# Patient Record
Sex: FEMALE | Race: BLACK OR AFRICAN AMERICAN | NOT HISPANIC OR LATINO | Employment: UNEMPLOYED
[De-identification: names, ages, dates, MRNs, and addresses within clinical notes are randomized per-mention and may not be internally consistent; named-entity substitution may affect disease eponyms.]

---

## 2019-02-15 ENCOUNTER — HEALTH MAINTENANCE LETTER (OUTPATIENT)
Age: 29
End: 2019-02-15

## 2019-05-17 ENCOUNTER — HOSPITAL ENCOUNTER (OUTPATIENT)
Facility: CLINIC | Age: 29
End: 2019-05-17
Attending: ADVANCED PRACTICE MIDWIFE | Admitting: ADVANCED PRACTICE MIDWIFE
Payer: COMMERCIAL

## 2019-05-30 ENCOUNTER — HOSPITAL ENCOUNTER (EMERGENCY)
Facility: CLINIC | Age: 29
Discharge: HOME | End: 2019-05-31
Attending: EMERGENCY MEDICINE | Admitting: EMERGENCY MEDICINE
Payer: COMMERCIAL

## 2019-05-30 DIAGNOSIS — S01.81XA FACIAL LACERATION, INITIAL ENCOUNTER: ICD-10-CM

## 2019-05-30 LAB
ANION GAP SERPL CALCULATED.3IONS-SCNC: 3 MMOL/L (ref 3–14)
BASOPHILS # BLD AUTO: 0 10E9/L (ref 0–0.2)
BASOPHILS NFR BLD AUTO: 0.3 %
BUN SERPL-MCNC: 11 MG/DL (ref 7–30)
CALCIUM SERPL-MCNC: 9.2 MG/DL (ref 8.5–10.1)
CHLORIDE SERPL-SCNC: 109 MMOL/L (ref 94–109)
CO2 SERPL-SCNC: 28 MMOL/L (ref 20–32)
CREAT SERPL-MCNC: 1.13 MG/DL (ref 0.52–1.04)
DIFFERENTIAL METHOD BLD: ABNORMAL
EOSINOPHIL # BLD AUTO: 0 10E9/L (ref 0–0.7)
EOSINOPHIL NFR BLD AUTO: 0.3 %
ERYTHROCYTE [DISTWIDTH] IN BLOOD BY AUTOMATED COUNT: 14.2 % (ref 10–15)
GFR SERPL CREATININE-BSD FRML MDRD: 66 ML/MIN/{1.73_M2}
GLUCOSE SERPL-MCNC: 82 MG/DL (ref 70–99)
HCT VFR BLD AUTO: 47.7 % (ref 35–47)
HGB BLD-MCNC: 15.5 G/DL (ref 11.7–15.7)
IMM GRANULOCYTES # BLD: 0 10E9/L (ref 0–0.4)
IMM GRANULOCYTES NFR BLD: 0.2 %
LYMPHOCYTES # BLD AUTO: 2.4 10E9/L (ref 0.8–5.3)
LYMPHOCYTES NFR BLD AUTO: 36.6 %
MCH RBC QN AUTO: 30.2 PG (ref 26.5–33)
MCHC RBC AUTO-ENTMCNC: 32.5 G/DL (ref 31.5–36.5)
MCV RBC AUTO: 93 FL (ref 78–100)
MONOCYTES # BLD AUTO: 0.5 10E9/L (ref 0–1.3)
MONOCYTES NFR BLD AUTO: 7.8 %
NEUTROPHILS # BLD AUTO: 3.6 10E9/L (ref 1.6–8.3)
NEUTROPHILS NFR BLD AUTO: 54.8 %
NRBC # BLD AUTO: 0 10*3/UL
NRBC BLD AUTO-RTO: 0 /100
PLATELET # BLD AUTO: 249 10E9/L (ref 150–450)
POTASSIUM SERPL-SCNC: 3.4 MMOL/L (ref 3.4–5.3)
RBC # BLD AUTO: 5.14 10E12/L (ref 3.8–5.2)
SODIUM SERPL-SCNC: 140 MMOL/L (ref 133–144)
WBC # BLD AUTO: 6.6 10E9/L (ref 4–11)

## 2019-05-30 PROCEDURE — 80048 BASIC METABOLIC PNL TOTAL CA: CPT | Performed by: EMERGENCY MEDICINE

## 2019-05-30 PROCEDURE — 85025 COMPLETE CBC W/AUTO DIFF WBC: CPT | Performed by: EMERGENCY MEDICINE

## 2019-05-30 PROCEDURE — 82075 ASSAY OF BREATH ETHANOL: CPT | Performed by: EMERGENCY MEDICINE

## 2019-05-30 PROCEDURE — 99282 EMERGENCY DEPT VISIT SF MDM: CPT | Mod: 25 | Performed by: EMERGENCY MEDICINE

## 2019-05-30 PROCEDURE — 12011 RPR F/E/E/N/L/M 2.5 CM/<: CPT | Mod: Z6 | Performed by: EMERGENCY MEDICINE

## 2019-05-30 PROCEDURE — 99283 EMERGENCY DEPT VISIT LOW MDM: CPT | Performed by: EMERGENCY MEDICINE

## 2019-05-30 PROCEDURE — 12011 RPR F/E/E/N/L/M 2.5 CM/<: CPT | Performed by: EMERGENCY MEDICINE

## 2019-05-30 NOTE — ED AVS SNAPSHOT
[facility], [place], Emergency Department  [address] [place] [id number]  Phone:  [phone]                                    Queen Brittany Stack   MRN: [id number]    Department:  [facility], Emergency Department   Date of Visit:  5/30/2019           After Visit Summary Signature Page    I have received my discharge instructions, and my questions have been answered. I have discussed any challenges I see with this plan with the nurse or doctor.    ..........................................................................................................................................  Patient/Patient Representative Signature      ..........................................................................................................................................  Patient Representative Print Name and Relationship to Patient    ..................................................               ................................................  Date                                   Time    ..........................................................................................................................................  Reviewed by Signature/Title    ...................................................              ..............................................  Date                                               Time          22EPIC Rev 08/18

## 2019-05-31 VITALS
RESPIRATION RATE: 16 BRPM | OXYGEN SATURATION: 99 % | TEMPERATURE: 98.6 F | SYSTOLIC BLOOD PRESSURE: 104 MMHG | BODY MASS INDEX: 31.98 KG/M2 | HEART RATE: 92 BPM | HEIGHT: 67 IN | DIASTOLIC BLOOD PRESSURE: 72 MMHG

## 2019-05-31 LAB — ALCOHOL BREATH TEST: 0.05 (ref 0–0.01)

## 2019-05-31 PROCEDURE — 25000132 ZZH RX MED GY IP 250 OP 250 PS 637: Performed by: EMERGENCY MEDICINE

## 2019-05-31 RX ORDER — LIDOCAINE HYDROCHLORIDE AND EPINEPHRINE 10; 10 MG/ML; UG/ML
5 INJECTION, SOLUTION INFILTRATION; PERINEURAL ONCE
Status: DISCONTINUED | OUTPATIENT
Start: 2019-05-31 | End: 2019-05-31 | Stop reason: HOSPADM

## 2019-05-31 RX ORDER — IBUPROFEN 600 MG/1
600 TABLET, FILM COATED ORAL ONCE
Status: COMPLETED | OUTPATIENT
Start: 2019-05-31 | End: 2019-05-31

## 2019-05-31 RX ADMIN — IBUPROFEN 600 MG: 600 TABLET ORAL at 05:22
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
## 2019-05-31 NOTE — DISCHARGE INSTRUCTIONS
The suture in the lip will dissolve on its own.     Please make an appointment to follow up with Your Primary Care Provider or Primary Care Center (phone: [phone] in 3-5 days for recheck.     Return to the ED if you are having worsening symptoms, or any urgent/life-threatening concerns.
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
## 2019-10-05 ENCOUNTER — HEALTH MAINTENANCE LETTER (OUTPATIENT)
Age: 29
End: 2019-10-05

## 2019-10-21 ENCOUNTER — TRANSFERRED RECORDS (OUTPATIENT)
Dept: HEALTH INFORMATION MANAGEMENT | Facility: CLINIC | Age: 29
End: 2019-10-21

## 2019-11-19 ENCOUNTER — HOSPITAL ENCOUNTER (INPATIENT)
Facility: CLINIC | Age: 29
LOS: 5 days | Discharge: LEFT BEFORE DISCHARGE | End: 2019-11-25
Attending: EMERGENCY MEDICINE | Admitting: PSYCHIATRY & NEUROLOGY
Payer: COMMERCIAL

## 2019-11-19 DIAGNOSIS — F29 PSYCHOSIS, UNSPECIFIED PSYCHOSIS TYPE (H): ICD-10-CM

## 2019-11-19 PROCEDURE — 25000132 ZZH RX MED GY IP 250 OP 250 PS 637: Performed by: EMERGENCY MEDICINE

## 2019-11-19 PROCEDURE — 99285 EMERGENCY DEPT VISIT HI MDM: CPT

## 2019-11-19 PROCEDURE — 90791 PSYCH DIAGNOSTIC EVALUATION: CPT

## 2019-11-19 RX ORDER — OLANZAPINE 10 MG/1
10 TABLET, ORALLY DISINTEGRATING ORAL AT BEDTIME
Status: DISCONTINUED | OUTPATIENT
Start: 2019-11-19 | End: 2019-11-25 | Stop reason: HOSPADM

## 2019-11-19 RX ADMIN — OLANZAPINE 10 MG: 10 TABLET, ORALLY DISINTEGRATING ORAL at 21:28

## 2019-11-19 ASSESSMENT — ENCOUNTER SYMPTOMS: AGITATION: 1

## 2019-11-20 PROBLEM — J45.20 MILD INTERMITTENT ASTHMA WITHOUT COMPLICATION: Status: ACTIVE | Noted: 2019-01-08

## 2019-11-20 PROBLEM — F32.A DEPRESSION WITH SUICIDAL IDEATION: Status: ACTIVE | Noted: 2019-11-20

## 2019-11-20 PROBLEM — R45.851 DEPRESSION WITH SUICIDAL IDEATION: Status: ACTIVE | Noted: 2019-11-20

## 2019-11-20 LAB
AMPHETAMINES UR QL SCN: POSITIVE
ANION GAP SERPL CALCULATED.3IONS-SCNC: 5 MMOL/L (ref 3–14)
B-HCG SERPL-ACNC: <1 IU/L (ref 0–5)
BARBITURATES UR QL: NEGATIVE
BASOPHILS # BLD AUTO: 0 10E9/L (ref 0–0.2)
BASOPHILS NFR BLD AUTO: 0.3 %
BENZODIAZ UR QL: NEGATIVE
BUN SERPL-MCNC: 14 MG/DL (ref 7–30)
CALCIUM SERPL-MCNC: 8.4 MG/DL (ref 8.5–10.1)
CANNABINOIDS UR QL SCN: NEGATIVE
CHLORIDE SERPL-SCNC: 109 MMOL/L (ref 94–109)
CO2 SERPL-SCNC: 24 MMOL/L (ref 20–32)
COCAINE UR QL: NEGATIVE
CREAT SERPL-MCNC: 0.82 MG/DL (ref 0.52–1.04)
DIFFERENTIAL METHOD BLD: ABNORMAL
EOSINOPHIL # BLD AUTO: 0 10E9/L (ref 0–0.7)
EOSINOPHIL NFR BLD AUTO: 0.8 %
ERYTHROCYTE [DISTWIDTH] IN BLOOD BY AUTOMATED COUNT: 12.7 % (ref 10–15)
GFR SERPL CREATININE-BSD FRML MDRD: >90 ML/MIN/{1.73_M2}
GLUCOSE SERPL-MCNC: 93 MG/DL (ref 70–99)
HCT VFR BLD AUTO: 44.1 % (ref 35–47)
HGB BLD-MCNC: 14.8 G/DL (ref 11.7–15.7)
IMM GRANULOCYTES # BLD: 0 10E9/L (ref 0–0.4)
IMM GRANULOCYTES NFR BLD: 0 %
LYMPHOCYTES # BLD AUTO: 2.3 10E9/L (ref 0.8–5.3)
LYMPHOCYTES NFR BLD AUTO: 59.4 %
MCH RBC QN AUTO: 29.7 PG (ref 26.5–33)
MCHC RBC AUTO-ENTMCNC: 33.6 G/DL (ref 31.5–36.5)
MCV RBC AUTO: 88 FL (ref 78–100)
MONOCYTES # BLD AUTO: 0.3 10E9/L (ref 0–1.3)
MONOCYTES NFR BLD AUTO: 6.4 %
NEUTROPHILS # BLD AUTO: 1.3 10E9/L (ref 1.6–8.3)
NEUTROPHILS NFR BLD AUTO: 33.1 %
NRBC # BLD AUTO: 0 10*3/UL
NRBC BLD AUTO-RTO: 0 /100
OPIATES UR QL SCN: NEGATIVE
PCP UR QL SCN: NEGATIVE
PLATELET # BLD AUTO: 225 10E9/L (ref 150–450)
POTASSIUM SERPL-SCNC: 3.6 MMOL/L (ref 3.4–5.3)
RBC # BLD AUTO: 4.99 10E12/L (ref 3.8–5.2)
SODIUM SERPL-SCNC: 138 MMOL/L (ref 133–144)
TSH SERPL DL<=0.005 MIU/L-ACNC: 1 MU/L (ref 0.4–4)
WBC # BLD AUTO: 3.9 10E9/L (ref 4–11)

## 2019-11-20 PROCEDURE — 80048 BASIC METABOLIC PNL TOTAL CA: CPT | Performed by: PSYCHIATRY & NEUROLOGY

## 2019-11-20 PROCEDURE — 80307 DRUG TEST PRSMV CHEM ANLYZR: CPT | Performed by: EMERGENCY MEDICINE

## 2019-11-20 PROCEDURE — 84443 ASSAY THYROID STIM HORMONE: CPT | Performed by: PSYCHIATRY & NEUROLOGY

## 2019-11-20 PROCEDURE — 84702 CHORIONIC GONADOTROPIN TEST: CPT | Performed by: PSYCHIATRY & NEUROLOGY

## 2019-11-20 PROCEDURE — 99221 1ST HOSP IP/OBS SF/LOW 40: CPT | Mod: AI | Performed by: PHYSICIAN ASSISTANT

## 2019-11-20 PROCEDURE — 85025 COMPLETE CBC W/AUTO DIFF WBC: CPT | Performed by: PSYCHIATRY & NEUROLOGY

## 2019-11-20 PROCEDURE — 25000132 ZZH RX MED GY IP 250 OP 250 PS 637: Performed by: EMERGENCY MEDICINE

## 2019-11-20 PROCEDURE — 36415 COLL VENOUS BLD VENIPUNCTURE: CPT | Performed by: PSYCHIATRY & NEUROLOGY

## 2019-11-20 PROCEDURE — 12400000 ZZH R&B MH

## 2019-11-20 RX ORDER — ALBUTEROL SULFATE 90 UG/1
2 AEROSOL, METERED RESPIRATORY (INHALATION) EVERY 4 HOURS PRN
COMMUNITY
End: 2021-04-14

## 2019-11-20 RX ORDER — ALBUTEROL SULFATE 90 UG/1
2 AEROSOL, METERED RESPIRATORY (INHALATION) EVERY 6 HOURS PRN
Status: DISCONTINUED | OUTPATIENT
Start: 2019-11-20 | End: 2019-11-25 | Stop reason: HOSPADM

## 2019-11-20 RX ORDER — IBUPROFEN 200 MG
200 TABLET ORAL EVERY 4 HOURS PRN
COMMUNITY
End: 2021-04-14

## 2019-11-20 RX ORDER — ACETAMINOPHEN 325 MG/1
650 TABLET ORAL EVERY 6 HOURS PRN
COMMUNITY
End: 2021-04-14

## 2019-11-20 RX ADMIN — OLANZAPINE 10 MG: 10 TABLET, ORALLY DISINTEGRATING ORAL at 21:16

## 2019-11-20 ASSESSMENT — ACTIVITIES OF DAILY LIVING (ADL)
TRANSFERRING: 0-->INDEPENDENT
DRESS: 0-->INDEPENDENT
DRESS: INDEPENDENT
HYGIENE/GROOMING: INDEPENDENT
LAUNDRY: WITH SUPERVISION
RETIRED_COMMUNICATION: 0-->UNDERSTANDS/COMMUNICATES WITHOUT DIFFICULTY
COGNITION: 0 - NO COGNITION ISSUES REPORTED
AMBULATION: 0-->INDEPENDENT
RETIRED_EATING: 0-->INDEPENDENT
FALL_HISTORY_WITHIN_LAST_SIX_MONTHS: NO
BATHING: 0-->INDEPENDENT
TOILETING: 0-->INDEPENDENT
HYGIENE/GROOMING: INDEPENDENT
LAUNDRY: WITH SUPERVISION
ORAL_HYGIENE: INDEPENDENT
SWALLOWING: 0-->SWALLOWS FOODS/LIQUIDS WITHOUT DIFFICULTY
ORAL_HYGIENE: INDEPENDENT
DRESS: INDEPENDENT

## 2019-11-20 NOTE — PROGRESS NOTES
Patient admitted from [facility] ED. Patient was unable to answer several questions. Reports she is depressed and having SI. Appears to be responding to internal stimuli. Denies taking any home medications. Positive for amphetamines. Had zyprexa 10mg in ED, and appears very tired. Patient refused admission questions, due to be tired. States she can remain safe while in the hospital.     Welcome packet reviewed with patient. Information reviewed includes getting emergency help, preventing infections, understanding your care, using medication safely, reducing falls, preventing pressure ulcers, smoking cessation, powerful choices and Patients Bill of Rights.  Pt. Search completed and belongings inventoried.         Care plan initiated. Assessments reviewed with physician and admit orders received. Video monitoring in progress, Patient Informed.

## 2019-11-20 NOTE — PROGRESS NOTES
"Evasive, when answering questions, she feels bother when questions are asked.   She denies any suicidal ideation, she is homeless , stated \"  I live everywhere and I come to the Hospital[facility] to get warm meals and a place to stay\",  She reported she is trying to stop smoking \"crack cocaine\" last time she had it , was a few days ago.   Denies any abuse, or rape.    "

## 2019-11-20 NOTE — PROGRESS NOTES
11/20/19 0405   Patient Belongings   Did you bring any home meds/supplements to the hospital?  Yes   Disposition of meds  Other (see comment)   Patient Belongings locker;other (see comments)   Patient Belongings Put in Hospital Secure Location (Security or Locker, etc.) clothing;shoes;purse/wallet;cell phone/electronics   Belongings Search Yes   Clothing Search Yes   Second Staff Geovanna   Comment Belongings placed in unit locker.     Purse (x3)  Bag (x2)  Screwdriver  Face shaver  Scissors  Portable   Notebooks w/ pen  Flashlight  Empty mini Smirnoff bottle  Jeans (x5)  Bra  Sweatpants w/ strings  Poncho / shawl  Pillowcases (x2)  Leggings (x3)  Tank top (x3)  Long sleeve top  Lighter (x3)  Sweatshirt w/ strings  Cellphone  Cellphone (cracked)  Jewelry  Pink bin  Jackets (x4)  Vest  Shoes w/ laces (x2)  Boots  Flip flops  Hat (x2)  Shorts w/ strings  Romper  Dress (x3)  Halter top  Body suit  Gloves (2 pairs)  Misc. Paperwork  Bible (x2)  Encyclopedia [employer] Gard[employer]  Underwear  Misc. Toiletries  Makeup  Socks (8 pairs)  1 stiletto heel (unpaired)  Stiletto (1 pair)  Hair striaghtener  3 plaid shirts  Metronidazole 500mg (x11 tabs)  Inhaler (Proair)    Admission:  I am responsible for any personal items that are not sent to the safe or pharmacy. [facility] is not responsible for loss, theft or damage of any property in my possession.        Patient Signature: ___________________________________________      Date/Time:__________________________     Staff Signature: __________________________________      Date/Time:__________________________     2nd Staff person, if patient is unable/unwilling to sign:      __________________________________________________________      Date/Time: __________________________        Discharge:  [facility] has returned all of my personal belongings:     Patient Signature: ________________________________________     Date/Time: ____________________________________     Staff  Signature: ______________________________________     Date/Time:_____________________________________

## 2019-11-20 NOTE — PHARMACY-ADMISSION MEDICATION HISTORY
Pharmacy Medication History  Admission medication history interview status for the 11/19/2019  admission is complete. See EPIC admission navigator for prior to admission medications     Medication history sources: Patient and Care Everywhere  Medication history source reliability: Poor  Adherence assessment: Poor - difficult to obtain medication history, needed to ask multiple times for medications.    Additional medication history information:   Patient appears to have filled Abilify and trazodone in the past, though she states she is not currently taking anything.    Medication reconciliation completed by provider prior to medication history? N/A    Time spent in this activity: 10 minutes.      Prior to Admission medications    Medication Sig Last Dose Taking? Auth Provider   acetaminophen (TYLENOL) 325 MG tablet Take 650 mg by mouth every 6 hours as needed for mild pain prn Yes Unknown, Entered By History   albuterol (PROAIR HFA/PROVENTIL HFA/VENTOLIN HFA) 108 (90 Base) MCG/ACT inhaler Inhale 2 puffs into the lungs every 4 hours as needed for shortness of breath / dyspnea or wheezing prn Yes Unknown, Entered By History   ibuprofen (ADVIL/MOTRIN) 200 MG tablet Take 200 mg by mouth every 4 hours as needed for mild pain prn Yes Unknown, Entered By History   Condoms Latex Lubricated CHANDLER Insist partner use comdom prior to any coital penetration.   Anna Tenorio APRN CNM

## 2019-11-20 NOTE — ED PROVIDER NOTES
"  History     Chief Complaint:  Suicidal    History limited by: patient states she is not able to communicate right now due to irritation.      Queen Brittany Stack is a 29 year old female with a history of bipolar 1 disorder, polysubstance abuse, depression, and anxiety who presents to the emergency department for evaluation of suicidal ideation. The patient reports she has been having suicidal ideations for the last few days. She denies ever trying to hurt herself. She states she does \"not feel sane right now.\" The patient indicates she is unable to communicate because she feels irritated.     Allergies:  NKDA     Medications:    Albuterol inhaler  Desyrel  Abilify      Past Medical History:    Asthma  Bipolar 1 disoder  Herpes simplex   Major depressive disorder   Anxiety   Polysubstance abuse   PID  Gonorrhea    Past Surgical History:    The patient does not have any pertinent past surgical history.     Family History:    Diabetes  HTN  Autoimmune disease     Social History:  Presents alone.  Current every day smoker, 0.25 ppd, 2 pack years.   Negative for alcohol use.   Positive for marijuana use.   Marital Status:  Single [1]     Review of Systems   Psychiatric/Behavioral: Positive for agitation and suicidal ideas.   All other systems reviewed and are negative.      Physical Exam     Patient Vitals for the past 24 hrs:   BP Temp Heart Rate Resp SpO2   11/19/19 1954 -- 97.8  F (36.6  C) 89 18 98 %   11/19/19 1941 (!) 137/103 -- -- 20 98 %      Physical Exam  General: No distress. Appears agitated.   Head: No signs of trauma.   Mouth/Throat: Oropharynx moist.   Eyes: Conjunctivae are normal. Pupils are equal..   Neck: Normal range of motion.    Resp:No respiratory distress.   MSK: Normal range of motion. No obvious deformity.  Neuro: The patient is alert and interactive. RUGGIERO. Speech normal. GCS 15  Skin: No lesion or sign of trauma noted.   Psych: agitated and flat affect.      Emergency Department Course "     Laboratory:  Drug abuse screen urine: pending     Interventions:  2128 Zyprexa 10 mg PO    Emergency Department Course:  Nursing notes and vitals reviewed. 2004 I performed an exam of the patient as documented above.     Medicine administered as documented above.     The patient was evaluated by DEC.     The patient was signed out to my colleague, Dr. Lam, pending admission.    Findings and plan explained to the Patient prior to sign out.     Impression & Plan      Medical Decision Making:  Queen Brittany Stack is a 29 year old female who presents with an alteration in her mental status, likely secondary to psychosis. The patient is hemodynamically stable. She is awake and alert. She does have a history of drug intoxication and polysubstance abuse. No evidence for trauma. She is not extremely cooperative with the exam. However, the DEC  was able to get most of her answers to her questions regarding her current mental status, and she appears to be unable to care for herself. She is gong to require hospitalization. She is medically cleared at this point. Her evaluation is somewhat difficult given her non-cooperation with my questioning.     Diagnosis:    ICD-10-CM   1. Psychosis, unspecified psychosis type (H) F29     Disposition:  Signed out to Dr. Lam pending admission.     Scribe Disclosure:  I, Leela Sheets, am serving as a scribe on 11/19/2019 at 8:26 PM to personally document services performed by Joe Valdivia MD based on my observations and the provider's statements to me.      Leela Sheets  11/19/2019    EMERGENCY DEPARTMENT       Joe Valdivia MD  11/20/19 0058

## 2019-11-20 NOTE — ED TRIAGE NOTES
Pt came through triage with multiple bags, pt stated suicidal. Refuses to answer if she has a plan, refuses to answer about ETOH or drug use. Refuses to answer about ingesting substance with intent to harm self. Limited triage due to pt not speaking to staff. At one point pt stated wants an Aids test.

## 2019-11-20 NOTE — PLAN OF CARE
Problem: Depressive Symptoms  Goal: Depressive Symptoms  Description  Signs and symptoms of listed problems will be absent or manageable.  Outcome: No Change  Flowsheets (Taken 11/20/2019 1326)  Depressive Symptoms Assessed: all  Depressive Symptoms Present: affect; other (see comment); insight  Note:   Pt presents with a blunted affect and slightly irritated mood. Her insight is poor and judgment is impaired. Pt was isolative for most of the day, and engaged minimally with staff. Pt stated that she is simply at the hospital for meals and a warm bed, and has made frequent requests for food. She also stated that she is seeking help for crack/cocaine use. Pt denies any SI.

## 2019-11-20 NOTE — H&P
[facility]    History and Physical - Hospitalist Service       Date of Admission:  11/19/2019    Assessment & Plan   Queen Brittany Stack is a 29 year old female past medical history significant for major depressive disorder, anxiety, bipolar 1 disorder, mild intermittent asthma, polysubstance abuse admitted on 11/19/2019 with complaints of suicidal ideation.      Suicidal ideation      Major depressive disorder, recurrent episode, moderate     Bipolar I disorder, most recent episode mixed, moderate     Generalized anxiety disorder    Assessment: Sweating history of multiple psychiatric issues.  Presented to the ED at [facility] on 11/17/2019 similar complaints as well as wanting to be tested for STDs.  Patient stoic on exam declined to answer any questions.  Labs are unremarkable.  Vital signs stable.    Plan:   - Resume and adjust medications per psychiatry  - 72-hour hold in place.       Mild intermittent asthma without complication    Assessment: Per chart review patient rarely uses inhaler.  Patient declined to answer questions but has no increased work of breathing or wheezing on exam.    Plan: PRN albuterol inhaler available      Polysubstance dependence (H)    Cannabis dependence, continuous (H)    Tobacco use disorder    Assessment: UDS positive for amphetamines.    Plan: Encourage cessation. Pt declined to provide further history.      Pelvic inflammatory disease    Assessment: Previously treated for gonorrhea.    Plan: No current issues however patient recently requested STD test at [facility]. Decline to provide additional information.    The hospitalist service will sign off at this time.  Basic labs and vital signs are unremarkable.  Patient declines to participate in interview and allows limited physical examination.  She has no complaints.  Nursing has no current concerns regarding this patient. Please notify us if you have any questions or concerns throughout this admission that internal  medicine needs to address.     Diet: Regular Diet Adult    DVT Prophylaxis: Low Risk/Ambulatory with no VTE prophylaxis indicated and Ambulate every shift  Sims Catheter: not present  Code Status: Full Code      Disposition Plan   Expected discharge: Per primary service, recommended to prior living arrangement once Cleared per psychiatry.  Entered: Lalitha Barclay PA-C 11/20/2019, 10:05 AM     The patient's care was discussed with the Attending Physician, Dr. Nguyen, Bedside Nurse and Patient.    Lalitha Barclay PA-C  [facility]    ______________________________________________________________________    Chief Complaint   Suicidal ideation    History is obtained from the patient and electronic health record    History of Present Illness   Queen Brittany Stack is a 29 year old female past medical history significant for bipolar 1 disorder, polysubstance abuse, depression, and anxiety who presents emergency department for evaluation of suicidal ideation.  Patient reported to the ED physician is she is having increased suicidal thoughts over the past few days.  Denies any attempts or self-harm. Did not admit to specific plan. Patient was given 10 mg of PSA approximately emergency department.  Drug screen was completed. Per chart review, seen at [facility] 11/17/19 for similar and requested STD check. Was discharged from the ED. VSS. UDS +Amphetamine. Labs unremarkable including BMP, TSH, CBC, glucose.    Review of Systems    Review of systems not obtained due to patient factors - lack of cooperation    Past Medical History    I have reviewed this patient's medical history and updated it with pertinent information if needed.   Past Medical History:   Diagnosis Date     Abnormal Pap smear     2010, f/u normal     Anxiety      Asthma     no ihaler use     Bipolar 1 disorder (H)      Bipolar affective (H)      Fainting spell      Herpes simplex without mention of complication     Pt reports last outbreak  about 4 weeks ago     Major depressive disorder      Pelvic inflammatory disease      Polysubstance abuse (H)        Past Surgical History   I have reviewed this patient's surgical history and updated it with pertinent information if needed.  Past Surgical History:   Procedure Laterality Date     NO HISTORY OF SURGERY         Social History   I have reviewed this patient's social history and updated it with pertinent information if needed.  Social History     Tobacco Use     Smoking status: Current Every Day Smoker     Packs/day: 0.25     Years: 8.00     Pack years: 2.00     Types: Cigarettes     Smokeless tobacco: Never Used   Substance Use Topics     Alcohol use: No     Comment: none     Drug use: Yes     Types: Marijuana     Comment: last smoke yest.       Family History   I have reviewed this patient's family history and updated it with pertinent information if needed.   Family History   Problem Relation Age of Onset     Unknown/Adopted Mother      Unknown/Adopted Father      Unknown/Adopted Maternal Grandmother      Unknown/Adopted Maternal Grandfather      Unknown/Adopted Paternal Grandmother      Unknown/Adopted Paternal Grandfather        Prior to Admission Medications   Prior to Admission Medications   Prescriptions Last Dose Informant Patient Reported? Taking?   Condoms Latex Lubricated CHANDLER   No No   Sig: Insist partner use comdom prior to any coital penetration.   Emollient (EUCERIN PLUS INTENSIVE REPAIR) 2.5-10 % CREA   No No   Sig: Externally apply topically 3 times daily      Facility-Administered Medications: None     Allergies   Allergies   Allergen Reactions     Bees Anaphylaxis       Physical Exam   Vital Signs: Temp: 98.4  F (36.9  C) Temp src: Oral BP: 127/81 Pulse: 81 Heart Rate: 91 Resp: 16 SpO2: 99 % O2 Device: None (Room air)    Weight: 0 lbs 0 oz    Physical Exam  Constitutional:       General: She is awake. She is not in acute distress.     Appearance: Normal appearance. She is normal  weight.   HENT:      Head: Normocephalic and atraumatic.   Eyes:      Extraocular Movements: Extraocular movements intact.   Cardiovascular:      Rate and Rhythm: Normal rate and regular rhythm.      Heart sounds: Normal heart sounds, S1 normal and S2 normal. No murmur.   Pulmonary:      Effort: Pulmonary effort is normal. No respiratory distress.      Breath sounds: Normal breath sounds. No wheezing, rhonchi or rales.   Abdominal:      General: There is no distension.      Palpations: Abdomen is soft.   Musculoskeletal: Normal range of motion.   Skin:     General: Skin is warm and dry.   Neurological:      General: No focal deficit present.      Mental Status: She is alert.   Psychiatric:         Attention and Perception: She is attentive.         Mood and Affect: Affect is blunt and flat.         Speech: She is noncommunicative.         Behavior: Behavior is uncooperative and withdrawn.           Data   Data reviewed today: I reviewed all medications, new labs and imaging results over the last 24 hours. I personally reviewed no images or EKG's today.    Recent Labs   Lab 11/20/19  0828   WBC 3.9*   HGB 14.8   MCV 88         POTASSIUM 3.6   CHLORIDE 109   CO2 24   BUN 14   CR 0.82   ANIONGAP 5   TAZ 8.4*   GLC 93       No results found for this or any previous visit (from the past 24 hour(s)).

## 2019-11-20 NOTE — PROGRESS NOTES
11/20/19 1400   General Information   Has Not Attended OT as of: 11/20/19     Pt has not attended OT since admit.  Will continue to encourage participation and completion of  self-assessment as able. OT staff will explain the purpose of being involved with treatment plan and provide options to meet current needs and goals.

## 2019-11-20 NOTE — H&P
"Admitted:     11/19/2019      IDENTIFYING DATA AND REASON FOR REFERRAL:  Queen Brittany Stack is a 29-year-old woman who reports she is single and has never been .  She states she has a 14-year-old daughter who resides in [place] with her mother as well as 9 and 7-year-old sons who are in the custody of her ex-boyfriend's mother.  She presented to [facility] ED on 11/19/2019 on account of being overwhelmed and suicidal.  She reportedly has a history of bipolar 1 disorder, polysubstance abuse and had been more irritable lately on account of which she sought help in the emergency room.  Information was gathered through direct patient contact.  She is admitted on a 72-hour hold that expires on 11/25/2019 at 3:43 a.m.      CHIEF COMPLAINT:  \"I'm just overwhelmed.\"      HISTORY OF PRESENT ILLNESS:  Queen Brittany Stack is a 29-year-old woman who reports established history of mental illness and chemical use problems.  She states she has had multiple ED visits but does not recall any previous psychiatric hospitalization.  She states she does not have a therapist or psychiatrist, but recalls previous diagnosis of bipolar 1 disorder along with dependence on crack cocaine and methamphetamines.  She states she has been homeless for the last 3 years and claims she had lost custody of her daughter to her mother a few years ago.  She states she still has visitation rights with her sons, but their grandmother reportedly would not let her see them.  The patient admits to ruminative worry as well as feelings of hopelessness, helplessness and worthlessness.  She endorses depressed mood and anhedonia.  She reports crying spells, lack of energy, lack of motivation.  She states that she has been more depressed lately and had been using crack cocaine and methamphetamines up until 5 days ago.  She tells me she is hoping to pursue CD treatment.  She states she was last in treatment about 5 years ago.      PAST " PSYCHIATRIC HISTORY:  The patient reports that she has been on Abilify and trazodone in the past.  She currently does not have a psychiatrist or therapist.      CHEMICAL USE HISTORY:  As described above, the patient admits to abusing crack cocaine and methamphetamines with her last use of methamphetamines being 5 days ago and crack cocaine about a week ago.      PAST MEDICAL HISTORY:   1.  Abnormal Pap smear.   2.  Asthma.   3.  Fainting spell.   4.  Pelvic inflammatory disease.   5.  Polysubstance abuse.      PAST SURGICAL HISTORY:   section x 3.      MEDICATIONS PRIOR TO ADMISSION:  Eucerin Plus Intensive Repair Crme 2.5-10% externally applied topically 3 times daily.      ALLERGIES:  BEES THAT CAUSE ANAPHYLAXIS.      FAMILY PSYCHIATRIC HISTORY:  The patient reports history of mental illness in her biological parents, but does not know of a specific diagnosis.      SOCIAL HISTORY:  The patient reports she grew up in [place].  She has an older brother, a younger sister and 2 half siblings.  She states her father has other children that she does not know of.  She states she obtained her GED after the 12th grade.  She states she has taken different courses in medical coding and has worked as a  and .  She states she has been in trouble with the law on account of check fraud in the past, but denies any  history.  She states she has never been  and has a 14-year-old daughter and 9 and 7-year-old sons.  She is currently homeless and unemployed.      REVIEW OF SYSTEMS:  I refer the reader to the review of systems documented by Lalitha Barclay PA-C, on 2019 at 8:00 a.m.      VITAL SIGNS:  Blood pressure 127/81, pulse 81, respirations 16, temperature 98.4, weight unrecorded.      MENTAL STATUS EXAMINATION:  This is a young lady who appears her stated age of 29.  She is dressed in hospital scrubs.  She is teary and fatigued.  She makes little eye contact.  She  is seen at her bedside where she is lying almost face down, but responds to queries.  Her speech is soft and monosyllabic.  Her mood is depressed and her affect is flat and restricted in range.  Her thought process is logical, relevant and goal directed.  She does not endorse the presence of auditory or visual hallucinations and there are no delusions elicited.  She is alert and oriented to time, place and person.  Her fund of knowledge is fair.  Her gait and station are within normal limits.  Her muscle strength is adequate.  She displays poor insight and limited judgment.  Her impulse control is marginal.  Risk assessment at this time is considered moderate to high on account of her persistent chemical use problems.      DIAGNOSTIC IMPRESSION:  Queen Brittany Stack is a 29-year-old woman with prior history of bipolar 1 disorder and polysubstance abuse who presents to [facility] on account of experiencing suicidal ideations in the context of recent abuse of amphetamines and crack cocaine.  She reports feeling down and sad, but denies the presence of auditory or visual hallucinations.      ADMITTING DIAGNOSES:   1.  Bipolar 1 disorder by history, current episode depressed without psychotic features.     2.  Substance-induced mood disorder, rule out amphetamine-induced mood disorder.  3.  Amphetamine use disorder.   4.  Cocaine use disorder.   5.  Adjustment disorder with mixed anxiety and depressed mood.   6.  Mild intermittent asthma without complication.   7.  History of pelvic inflammatory disease.    8.  Tobacco use disorder.      PLAN:  The patient will be maintained on the ITC unit.  Staff will provide a safe environment and she will be encouraged to participate in individual, milieu and group therapy.  She states she wants help with her chemical use problems and is open to completing a chemical use assessment.  She will be maintained on olanzapine at 10 mg at bedtime.  Estimated length of  stay 3-5 days.         NACHO ROGERS MD             D: 2019   T: 2019   MT:       Name:     QUEEN CUENCA   MRN:      [id number]        Account:      [id number]   :      1990        Admitted:     2019                   Document: [id number]

## 2019-11-20 NOTE — CONSULTS
11/20/19 CD consult acknowledged. The writer left a VM for the unit social workers stating patient has [facility] insurance so she will need Rule 25 funding through [facility] for any substance use services.  Requested unit social workers to provide [facility] Rule 25 application to patient prior to chem dep seeing patient for evaluation.

## 2019-11-21 PROCEDURE — 12400000 ZZH R&B MH

## 2019-11-21 PROCEDURE — 25000132 ZZH RX MED GY IP 250 OP 250 PS 637: Performed by: EMERGENCY MEDICINE

## 2019-11-21 PROCEDURE — 25000132 ZZH RX MED GY IP 250 OP 250 PS 637: Performed by: PSYCHIATRY & NEUROLOGY

## 2019-11-21 RX ORDER — ACETAMINOPHEN 325 MG/1
650 TABLET ORAL EVERY 4 HOURS PRN
Status: DISCONTINUED | OUTPATIENT
Start: 2019-11-21 | End: 2019-11-25 | Stop reason: HOSPADM

## 2019-11-21 RX ADMIN — OLANZAPINE 10 MG: 10 TABLET, ORALLY DISINTEGRATING ORAL at 20:55

## 2019-11-21 RX ADMIN — ACETAMINOPHEN 650 MG: 325 TABLET, FILM COATED ORAL at 17:37

## 2019-11-21 ASSESSMENT — ACTIVITIES OF DAILY LIVING (ADL)
ORAL_HYGIENE: INDEPENDENT
HYGIENE/GROOMING: INDEPENDENT
HYGIENE/GROOMING: INDEPENDENT
DRESS: INDEPENDENT
ORAL_HYGIENE: INDEPENDENT
LAUNDRY: WITH SUPERVISION
DRESS: SCRUBS (BEHAVIORAL HEALTH)
LAUNDRY: WITH SUPERVISION

## 2019-11-21 NOTE — CONSULTS
11/21/19 chem dep consult acknowledged.  Attempted to meet with patient , she was sleeping and initially acknowledged me, but then would not open eyes, ad blanket covering her face and quit verbally acknowledging me.  Patient needs funding through [facility] for any substance use services.  I spoke with Aleksandra SMILEY () and informed her I would leave application in front of her paper chart and to notify me once patient was given this paperwork to complete and then I will complete Rule 25 if she is interested in treatment.

## 2019-11-21 NOTE — PROGRESS NOTES
[facility] Psychiatric Progress Note      Interval History:   Pt seen, team meeting held with , nursing staff, OT, and PA's to review diagnosis and treatment plan.  Staff reported the patient has been medication compliant and has spent a lot of time on the unit sleeping.  She does not say much and isolates in her room.  She does not endorse the presence of auditory or visual hallucinations.  She declined to speak with the [facility] earlier this morning but subsequently informed staff that she had completed a rule 25 assessment 2 weeks prior.  She is somewhat withdrawn and internally preoccupied but does not endorse any self-harm thoughts plans or intent.     Review of systems:   The Review of Systems completed by Amanda Waldron MD is negative other than noted in the HPI     Medications:       OLANZapine zydis  10 mg Oral At Bedtime     albuterol, nicotine    Mental Status Examination:     Appearance:  dressed in hospital scrubs, Superficially cooperative and slightly unkempt  Attitude:  evasive, guarded and slightly uncooperative  Eye Contact:  fair  Mood:  sad  and depressed  Affect:  mood congruent, intensity is blunted and intensity is flat  Speech:  clear, coherent and decreased prosody  Psychomotor Behavior:  no evidence of tardive dyskinesia, dystonia, or tics and intact station, gait and muscle tone  Thought Process:  logical, linear and goal oriented  Associations:  no loose associations  Thought Content:  no evidence of suicidal ideation or homicidal ideation and patient appears to be responding to internal stimuli  Insight:  limited  Judgment:  fair  Oriented to:  time, person, and place  Attention Span and Concentration:  fair  Recent and Remote Memory:  fair  Language: Able to name objects, Able to repeat phrases and Able to read and write  Fund of Knowledge: appropriate  Muscle Strength and Tone: normal  Gait and Station: Normal          Labs/Vitals:   /64    Pulse 78   Temp 98  F (36.7  C)   Resp 16   SpO2 99%   No results found for this or any previous visit (from the past 24 hour(s)).    Impression:   Queen Brittany Stack is a 29-year-old woman with prior history of bipolar 1 disorder and polysubstance abuse who presents to [facility] on account of experiencing suicidal ideations in the context of recent abuse of amphetamines and crack cocaine.  She reports feeling down and sad, but denies the presence of auditory or visual hallucinations.       DIagnoses:     1.  Bipolar 1 disorder by history, current episode depressed without psychotic features.     2.  Substance-induced mood disorder, rule out amphetamine-induced mood disorder.  3.  Amphetamine use disorder.   4.  Cocaine use disorder.   5.  Adjustment disorder with mixed anxiety and depressed mood.   6.  Mild intermittent asthma without complication.   7.  History of pelvic inflammatory disease.    8.  Tobacco use disorder.          Plan:   1. Written information given on medications. Side effects, risks, benefits reviewed.  2. Medication changes: None.  3. Legal Status: 72-hour hold.  4. CM to verify reported Rule-25 assessment.      Attestation:  Patient has been seen and evaluated by me, Amanda Waldron MD today.    PATIENT ID  Name: Queen Brittany Stack  MRN:[id number]  YOB: 1990

## 2019-11-21 NOTE — PLAN OF CARE
Problem: Depressive Symptoms  Goal: Depressive Symptoms  Description  Signs and symptoms of listed problems will be absent or manageable.  11/20/2019 2222 by Ron Baker  Flowsheets (Taken 11/20/2019 2222)  Depressive Symptoms Assessed: all  Depressive Symptoms Present: affect; mood; anxiety; insight; thought process  Note:   Pt presents with a blunt sad affect and a depressed mood. Pt spent almost all shift isolating in her room. Pt did come out briefly for a snack and dinner earlier in the shift, before retreating to her room. Pt declined a 1 to 1 and was minimally social. Pt ate all of her food and was med compliant. Pt denies Si.

## 2019-11-21 NOTE — PLAN OF CARE
patient spent most of the shift in her room, bed resting. Isolated and withdrawn. She is irritable at times and states she just wants to be left alone. An attempt was made to move her to SDU today but patient became increasingly agitated so it was decided to keep her in ITC for the time being. Mood is depressed. Affect is flat and blunt.

## 2019-11-21 NOTE — PROGRESS NOTES
attempted to meet with patient this afternoon to do a social history, but she declined to meet with , stating that she wants to sleep.  did ask her where she had her Rule 25 assessment done, and she replied that it was done at the [facility] on [address] [place] [address] in [place].  will attempt to have her sign a release of information form tomorrow in order to get a copy of the Rule 25 and determine when it was done.

## 2019-11-22 PROCEDURE — 25000132 ZZH RX MED GY IP 250 OP 250 PS 637: Performed by: EMERGENCY MEDICINE

## 2019-11-22 PROCEDURE — 12400000 ZZH R&B MH

## 2019-11-22 RX ADMIN — OLANZAPINE 10 MG: 10 TABLET, ORALLY DISINTEGRATING ORAL at 21:41

## 2019-11-22 ASSESSMENT — ACTIVITIES OF DAILY LIVING (ADL)
DRESS: STREET CLOTHES
HYGIENE/GROOMING: INDEPENDENT
ORAL_HYGIENE: INDEPENDENT
LAUNDRY: WITH SUPERVISION

## 2019-11-22 NOTE — PLAN OF CARE
Shift Update: Pt mood is irritable. Thought process is impaired. Insight is poor. Pt denies suicidal ideation. Patient states she wants to leave and states her hold is up tomorrow. This writer corrected her and told her it is up on the 23 rd. THe original hold was not correct. A compass was done. Pt is ambiguous about CD Rx.

## 2019-11-22 NOTE — PROGRESS NOTES
[facility] Psychiatric Progress Note      Interval History:   Pt seen, team meeting held with , nursing staff, OT, and PA's to review diagnosis and treatment plan.  Staff report that patient has been isolative and intermittently ornery. She reportedly refused to move from the [facility]. She says she wants to go to treatment but wants to find out what is going on with her ride first. She states her current medication is making her sleepy. She denies experiencing racing thoughts or hallucinations. She reports future orientation. Unit CM reports that patient had a Rule-25 assessment completed at detox about 2 weeks ago and the hospital [facility] has been apprised to facilitate an update. Tolerating medications well without significant side effects.     Review of systems:   The Review of Systems completed by Amanda Waldron MD is negative other than noted in the HPI     Medications:       OLANZapine zydis  10 mg Oral At Bedtime     acetaminophen, albuterol, nicotine    Mental Status Examination:     Appearance:  dressed in hospital scrubs, casually dressed and slightly unkempt  Attitude:  somewhat cooperative  Eye Contact:  fair  Mood:  Dysphoric  Affect:  mood congruent and intensity is flat  Speech:  clear, coherent and normal prosody  Psychomotor Behavior:  no evidence of tardive dyskinesia, dystonia, or tics and intact station, gait and muscle tone  Thought Process:  logical, linear and goal oriented  Associations:  no loose associations  Thought Content:  no evidence of suicidal ideation or homicidal ideation and patient appears to be responding to internal stimuli  Insight:  limited  Judgment:  fair  Oriented to:  time, person, and place  Attention Span and Concentration:  fair  Recent and Remote Memory:  fair  Language: Able to name objects, Able to repeat phrases and Able to read and write  Fund of Knowledge: appropriate  Muscle Strength and Tone: normal  Gait and Station:  Normal          Labs/Vitals:   /86   Pulse 90   Temp 98  F (36.7  C) (Oral)   Resp 16   SpO2 99%   No results found for this or any previous visit (from the past 24 hour(s)).    Impression:   Queen Brittany Stack is a 29-year-old woman with prior history of bipolar 1 disorder and polysubstance abuse who presents to [facility] on account of experiencing suicidal ideations in the context of recent abuse of amphetamines and crack cocaine.  She reports feeling down and sad, but denies the presence of auditory or visual hallucinations.       DIagnoses:     1.  Bipolar 1 disorder by history, current episode depressed without psychotic features.     2.  Substance-induced mood disorder, rule out amphetamine-induced mood disorder.  3.  Amphetamine use disorder.   4.  Cocaine use disorder.   5.  Adjustment disorder with mixed anxiety and depressed mood.   6.  Mild intermittent asthma without complication.   7.  History of pelvic inflammatory disease.    8.  Tobacco use disorder.           Plan:   1. Written information given on medications. Side effects, risks, benefits reviewed.  2. Medication changes: None.  3. Legal Status: 72-hour hold expires on 11/25/2019 at 03:43 AM.  4. Pursue CD treatment.      Attestation:  Patient has been seen and evaluated by me, Amanda Waldron MD today.    PATIENT ID  Name: Queen Brittany Stack  MRN:[id number]  YOB: 1990

## 2019-11-22 NOTE — CONSULTS
11/22/19 chem dep consult acknowledged.  Message received from Aleksandra SMILEY, that patient reported she had Rule 25 completed recently at [address] [place] [address].  She is working on obtaining copy and will update me once received to determine if an update is needed.

## 2019-11-22 NOTE — PROGRESS NOTES
met with patient briefly to have her sign a release of information form for [facility] ([phone]). Patient apparently had a Rule 25 done at [address] in [place] recently. She is uncertain of when it was done.  called and left a message for callback to check on the status of the Rule 25.

## 2019-11-22 NOTE — PLAN OF CARE
BEHAVIORAL TEAM DISCUSSION    Participants: MD, RN, CM, PA, OT   Progress: No change  Anticipated length of stay: Through 72-hour hold  which expires on 11/25/2019 at 03:43 AM.  Continued Stay Criteria/Rationale: Continued stabilization  Medical/Physical: Per hospitalist consult.   Precautions:   Behavioral Orders   Procedures    Code 1 - Restrict to Unit    Routine Programming     As clinically indicated    Status 15     Every 15 minutes.     Plan:  Possibly CD treatment at discharge. Patient had Rule 25 done at [facility], so trying to obtain copy of Rule 25 and see if it can be updated.   Rationale for change in precautions or plan: N/A

## 2019-11-22 NOTE — PLAN OF CARE
Pt spent the majority of the shift napping and bedresting.  Up for dinner.  Complained of head pain in the back of her head that he described as throbbing and rated as a 10 out of 10. Order received for PRN Tylenol.  Pt slept after.  She was woken up for hs meds and was uncertain if the meds helped.  Appeared tense and irritable.  Continue treatment plan.

## 2019-11-23 PROCEDURE — 12400000 ZZH R&B MH

## 2019-11-23 PROCEDURE — 25000132 ZZH RX MED GY IP 250 OP 250 PS 637: Performed by: EMERGENCY MEDICINE

## 2019-11-23 RX ADMIN — OLANZAPINE 10 MG: 10 TABLET, ORALLY DISINTEGRATING ORAL at 21:17

## 2019-11-23 ASSESSMENT — ACTIVITIES OF DAILY LIVING (ADL)
ORAL_HYGIENE: INDEPENDENT
DRESS: STREET CLOTHES;SCRUBS (BEHAVIORAL HEALTH)
HYGIENE/GROOMING: INDEPENDENT
LAUNDRY: WITH SUPERVISION

## 2019-11-23 NOTE — PLAN OF CARE
Pt spent the majority of the shift in her room bed resting. She only got up to eat dinner, do laundry and spent some time in the lounge. She presents with a blunt affect and irritable mood.

## 2019-11-23 NOTE — PLAN OF CARE
Shift Update: Pt mood is irritable,affect is tense. Thought process is impaired. Insight is poor. Pt denied suicidal ideation, attended the community meeting, was med and food compliant. Pt repeatedly asked if she could get discharged today. Staffs explained her that her hold expires on Monday but the pt didn't seem to register the information.

## 2019-11-24 VITALS
SYSTOLIC BLOOD PRESSURE: 126 MMHG | RESPIRATION RATE: 17 BRPM | OXYGEN SATURATION: 93 % | TEMPERATURE: 98.9 F | HEART RATE: 86 BPM | DIASTOLIC BLOOD PRESSURE: 85 MMHG

## 2019-11-24 PROCEDURE — 25000132 ZZH RX MED GY IP 250 OP 250 PS 637: Performed by: PSYCHIATRY & NEUROLOGY

## 2019-11-24 PROCEDURE — 25000132 ZZH RX MED GY IP 250 OP 250 PS 637: Performed by: EMERGENCY MEDICINE

## 2019-11-24 PROCEDURE — 12400000 ZZH R&B MH

## 2019-11-24 RX ADMIN — NICOTINE POLACRILEX 2 MG: 2 GUM, CHEWING ORAL at 22:32

## 2019-11-24 RX ADMIN — OLANZAPINE 10 MG: 10 TABLET, ORALLY DISINTEGRATING ORAL at 22:25

## 2019-11-24 ASSESSMENT — ACTIVITIES OF DAILY LIVING (ADL)
DRESS: INDEPENDENT
LAUNDRY: WITH SUPERVISION
LAUNDRY: WITH SUPERVISION
HYGIENE/GROOMING: INDEPENDENT
HYGIENE/GROOMING: INDEPENDENT
ORAL_HYGIENE: INDEPENDENT
ORAL_HYGIENE: INDEPENDENT
DRESS: INDEPENDENT

## 2019-11-24 NOTE — PLAN OF CARE
Problem: Depressive Symptoms  Goal: Depressive Symptoms  Description  Signs and symptoms of listed problems will be absent or manageable.  Outcome: No Change  Flowsheets (Taken 11/24/2019 8227)  Depressive Symptoms Assessed: all  Depressive Symptoms Present: mood; insight; other (see comment); affect  Note:   Pt presents with a tense affect and irritable mood. Pt appears calmer than yesterday and is more visible on the unit. She spent time in the lounge and attended activity group. Her insight is poor and judgement is impaired. Pt continues to question staff about her hold but is less agitated and redirectable. She denies SI.

## 2019-11-25 NOTE — PROGRESS NOTES
Pt discharged AMA 0001, papers signed; escorted downstairs w/ staff. Pt given belongings, papers signed. Denies SI and understands possible consequences of d/cing AMA.

## 2019-11-25 NOTE — PLAN OF CARE
Problem: Depressive Symptoms  Goal: Depressive Symptoms  Description  Signs and symptoms of listed problems will be absent or manageable.  11/24/2019 2056 by Ambika Charlton  Outcome: No Change  Flowsheets (Taken 11/24/2019 1446 by Betzaida Ac)  Depressive Symptoms Assessed: all  Depressive Symptoms Present: mood;insight;other (see comment);affect  Note:   Pt presents with a tense affect and labile mood. Pt thought process and insight is poor. Pt was visible on the unit for most of the shift, seen watching Tv in the lounge, on the phone, and attended both activity and wrap up group. Pt was adamant about leaving when her hold is up at midnight. Staff explained to pt that leaving tonight would be AMA. Pt has many requests, easily irritated, but redirectable. Pt denies Si, med compliant.

## 2019-12-09 NOTE — DISCHARGE SUMMARY
[facility]    Discharge Summary  Adult Psychiatry    Date of Admission:  11/19/2019  Date of Discharge:  11/25/2019 12:01 AM  Discharging Provider: Amanda Waldron MD      Discharge Diagnoses   1.  Bipolar 1 disorder by history, current episode depressed without psychotic features.     2.  Substance-induced mood disorder, rule out amphetamine-induced mood disorder.  3.  Amphetamine use disorder.   4.  Cocaine use disorder.   5.  Adjustment disorder with mixed anxiety and depressed mood.   6.  Mild intermittent asthma without complication.   7.  History of pelvic inflammatory disease.    8.  Tobacco use disorder.     History of Present Illness   Queen Brittany Stack is a 29-year-old woman who reports she is single and has never been .  She states she has a 14-year-old daughter who resides in [place] with her mother as well as 9 and 7-year-old sons who are in the custody of her ex-boyfriend's mother.  She presented to [facility] ED on 11/19/2019 on account of being overwhelmed and suicidal.  She reportedly has a history of bipolar 1 disorder, polysubstance abuse and had been more irritable lately on account of which she sought help in the emergency room. For more details, I refer the reader to the initial psychiatric assessment documented on 11/20/2019 by Amanda Waldron MD in addition to the history and physical examination documented by Lalitha Barclay PA-C on 11/20/2019.    Hospital Course   Queen Brittany Stack was admitted on 11/19/2019.  She was retained on the intensive treatment center on account of her reported self-injurious thoughts and history of substance use disorders.  She was not very forthcoming with information and spent a lot of time in bed resting.  She appeared to be coming down from the effects of psychostimulants but denied experiencing any auditory or visual hallucinations.  Patient also denied experiencing any self-harm  thoughts plans or intent even though she claims she had felt unsafe prior to coming into the hospital she requested to be placed back on psychotropic medications on account of which she was offered olanzapine at 10 mg at bedtime.  She indicated that this helped her mood and racing thoughts.  She requested a chemical dependency assessment as she expressed interest in pursuing residential CD treatment.  She subsequently advised the unit  that she had completed a rule 25 application at [facility] in [place].  She was advised by the [facility] Nataly Cedillo to sign a release of information so that her rule 25 assessment completed at detox could be forwarded to her to update in order to facilitate her hospitalization.  She continues to display irritability and poor insight.  Once her 72-hour hold , the patient expressed no further interest in CD treatment and discharged herself AGAINST MEDICAL ADVICE on 2019.  No aftercare appointments was made at discharge and she did not receive any medications.    Amanda Waldron MD    Significant Results and Procedures   Please see below.    Unresulted Labs Ordered in the Past 30 Days of this Admission     No orders found from 10/20/2019 to 2019.          Code Status   Full Code    Primary Care Physician   Physician No Ref-Primary    Physical Exam   No physical examination was completed at discharge.    Time Spent on this Encounter   I, Amanda Waldron MD, personally saw the patient today and spent greater than 30 minutes discharging this patient.    Discharge Disposition   Discharged to home  Condition at discharge: Stable    PSYCHIATRY FOLLOW-UP:  None  Consultations This Hospital Stay   HOSPITALIST IP CONSULT  CHEMICAL DEPENDENCY IP CONSULT    Discharge Orders   No discharge procedures on file.  Discharge Medications   Discharge Medication List as of 2019 12:00 PM      CONTINUE these medications which have NOT  CHANGED    Details   acetaminophen (TYLENOL) 325 MG tablet Take 650 mg by mouth every 6 hours as needed for mild pain, Historical      albuterol (PROAIR HFA/PROVENTIL HFA/VENTOLIN HFA) 108 (90 Base) MCG/ACT inhaler Inhale 2 puffs into the lungs every 4 hours as needed for shortness of breath / dyspnea or wheezing, HistoricalPharmacy may dispense brand covered by insurance (Proair, or proventil or ventolin or generic albuterol inhaler)      Condoms Latex Lubricated CHANDLER Insist partner use comdom prior to any coital penetration., Disp-24 Device, R-3, Fax      ibuprofen (ADVIL/MOTRIN) 200 MG tablet Take 200 mg by mouth every 4 hours as needed for mild pain, Historical           Allergies   Allergies   Allergen Reactions     Bees Anaphylaxis     Data   Most Recent 3 CBC's:  Recent Labs   Lab Test 11/20/19 0828 05/30/19 2317 06/13/15  1236   WBC 3.9* 6.6 5.0   HGB 14.8 15.5 13.6   MCV 88 93 85    249 242      Most Recent 3 BMP's:  Recent Labs   Lab Test 11/20/19  0828 05/30/19  2317 10/23/12  1334 07/10/12  1637    140  --  134   POTASSIUM 3.6 3.4  --  3.6   CHLORIDE 109 109  --  103   CO2 24 28  --  22   BUN 14 11  --  10   CR 0.82 1.13* 0.60 0.60   ANIONGAP 5 3  --  9.1   TAZ 8.4* 9.2  --  9.0   GLC 93 82  --  84     Most Recent 2 LFT's:  Recent Labs   Lab Test 10/23/12  1334 03/28/12  0835   AST 28 18   ALT 26 <6   ALKPHOS  --  54   BILITOTAL  --  0.5      Panel:No lab results found.  Most Recent 6 Bacteria Isolates From Any Culture (See EPIC Reports for Culture Details):  Recent Labs   Lab Test 10/14/12  1930 08/27/12  1700 08/17/12  1705 08/13/12  1400 07/10/12  1530 05/31/12  1030   CULT 50 to 100,000 colonies/mL Mixed gram positive abiola Multiple species present, probable perineal contamination. Susceptibility testing not routinely done >100,000 colonies/mL Mixed gram positive abiola Multiple species present, probable perineal contamination. Susceptibility testing not routinely done 10 to 50,000  colonies/mL Mixed gram negative and positive abiola Multiple species present, probable perineal contamination. Susceptibility testing not routinely done No Beta Streptococcus isolated 10 to 50,000 colonies/mL Mixed gram positive abiola Multiple species present, probable perineal contamination. Susceptibility testing not routinely done 10 to 50,000 colonies/mL Mixed gram positive abiola Multiple species present, probable perineal contamination. Susceptibility testing not routinely done     Most Recent TSH, T4 and A1c Labs:  Recent Labs   Lab Test 19  0828   TSH 1.00     Results for orders placed or performed during the hospital encounter of 06/13/15   US Pelvic Complete w Transvaginal    Narrative    US PELVIC COMPLETE WITH TRANSVAGINAL 2015 1:58 PM    HISTORY:  Pain.  four weeks ago. Evaluate for retained  products of conception.     TECHNIQUE: Transabdominal. Transvaginal scanning for clarification of  endometrial and ovarian anatomy. Color Doppler of the ovaries.    FINDINGS:   Uterus: 9.7 x 4.6 x 5.1 cm. No fibroids.    Endometrial stripe: Thickened and hypervascular and somewhat  heterogeneous measuring up to 2.1 cm. Cannot exclude a small amount of  retained products of conception versus hemorrhage.    Right ovary: 1.6 x 1.5 x 1.4 cm complex cyst, likely an hemorrhagic  cyst or collapsing cyst. Blood flow present.     Left ovary: Normal. Blood flow present.     Small amount of free fluid.       Impression    IMPRESSION:   1. Endometrial stripe is heterogeneous and hypervascular measuring up  to 2.1 cm in diameter. Findings are suspicious for a small amount of  retained products of conception. Hemorrhage or blood within the  endometrial canal could contribute to this appearance as well.  2. 1.6 x 1.5 x 1.4 cm complex right ovarian cyst, likely an  hemorrhagic cyst or collapsing cyst.  3. Small amount of free fluid.    WANDA BOWER MD

## 2020-11-14 ENCOUNTER — HEALTH MAINTENANCE LETTER (OUTPATIENT)
Age: 30
End: 2020-11-14

## 2021-04-14 ENCOUNTER — HOSPITAL ENCOUNTER (INPATIENT)
Facility: CLINIC | Age: 31
LOS: 20 days | Discharge: PSYCHIATRIC OR SUBSTANCE USE FACILITY | End: 2021-05-05
Attending: EMERGENCY MEDICINE | Admitting: PSYCHIATRY & NEUROLOGY
Payer: COMMERCIAL

## 2021-04-14 ENCOUNTER — TELEPHONE (OUTPATIENT)
Dept: BEHAVIORAL HEALTH | Facility: CLINIC | Age: 31
End: 2021-04-14

## 2021-04-14 ENCOUNTER — APPOINTMENT (OUTPATIENT)
Dept: ULTRASOUND IMAGING | Facility: CLINIC | Age: 31
End: 2021-04-14
Attending: EMERGENCY MEDICINE
Payer: COMMERCIAL

## 2021-04-14 DIAGNOSIS — Z33.1 PREGNANT STATE, INCIDENTAL: ICD-10-CM

## 2021-04-14 DIAGNOSIS — F19.20 POLYSUBSTANCE DEPENDENCE (H): ICD-10-CM

## 2021-04-14 DIAGNOSIS — F19.10 POLYSUBSTANCE ABUSE (H): ICD-10-CM

## 2021-04-14 DIAGNOSIS — Z33.2 LEGAL TERMINATION OF PREGNANCY: ICD-10-CM

## 2021-04-14 DIAGNOSIS — M79.10 MYALGIA: ICD-10-CM

## 2021-04-14 DIAGNOSIS — Z20.822 COVID-19 RULED OUT BY LABORATORY TESTING: ICD-10-CM

## 2021-04-14 DIAGNOSIS — Z30.8 ENCOUNTER FOR OTHER CONTRACEPTIVE MANAGEMENT: ICD-10-CM

## 2021-04-14 DIAGNOSIS — Z64.0 UNWANTED PREGNANCY WITH PLANS FOR TERMINATION: Primary | ICD-10-CM

## 2021-04-14 DIAGNOSIS — Z64.0 UNWANTED PREGNANCY WITH PLANS FOR TERMINATION: ICD-10-CM

## 2021-04-14 DIAGNOSIS — F29 PSYCHOSIS, UNSPECIFIED PSYCHOSIS TYPE (H): ICD-10-CM

## 2021-04-14 DIAGNOSIS — F31.75 BIPOLAR DISORDER, IN PARTIAL REMISSION, MOST RECENT EPISODE DEPRESSED (H): ICD-10-CM

## 2021-04-14 DIAGNOSIS — R45.851 SUICIDAL IDEATION: ICD-10-CM

## 2021-04-14 DIAGNOSIS — J30.2 SEASONAL ALLERGIC RHINITIS, UNSPECIFIED TRIGGER: ICD-10-CM

## 2021-04-14 LAB
ALBUMIN SERPL-MCNC: 3.3 G/DL (ref 3.4–5)
ALBUMIN UR-MCNC: 10 MG/DL
ALP SERPL-CCNC: 51 U/L (ref 40–150)
ALT SERPL W P-5'-P-CCNC: 21 U/L (ref 0–50)
AMPHETAMINES UR QL SCN: POSITIVE
ANION GAP SERPL CALCULATED.3IONS-SCNC: 7 MMOL/L (ref 3–14)
APPEARANCE UR: ABNORMAL
AST SERPL W P-5'-P-CCNC: 17 U/L (ref 0–45)
B-HCG SERPL-ACNC: ABNORMAL IU/L (ref 0–5)
BACTERIA #/AREA URNS HPF: ABNORMAL /HPF
BARBITURATES UR QL: NEGATIVE
BASOPHILS # BLD AUTO: 0 10E9/L (ref 0–0.2)
BASOPHILS NFR BLD AUTO: 0.2 %
BENZODIAZ UR QL: NEGATIVE
BILIRUB SERPL-MCNC: 0.5 MG/DL (ref 0.2–1.3)
BILIRUB UR QL STRIP: NEGATIVE
BUN SERPL-MCNC: 12 MG/DL (ref 7–30)
CALCIUM SERPL-MCNC: 8.8 MG/DL (ref 8.5–10.1)
CANNABINOIDS UR QL SCN: POSITIVE
CHLORIDE SERPL-SCNC: 105 MMOL/L (ref 94–109)
CO2 SERPL-SCNC: 24 MMOL/L (ref 20–32)
COCAINE UR QL: POSITIVE
COLOR UR AUTO: ABNORMAL
CREAT SERPL-MCNC: 0.79 MG/DL (ref 0.52–1.04)
DIFFERENTIAL METHOD BLD: ABNORMAL
DIFFERENTIAL METHOD BLD: NORMAL
EOSINOPHIL # BLD AUTO: 0 10E9/L (ref 0–0.7)
EOSINOPHIL NFR BLD AUTO: 0.2 %
ERYTHROCYTE [DISTWIDTH] IN BLOOD BY AUTOMATED COUNT: 13.6 % (ref 10–15)
ERYTHROCYTE [DISTWIDTH] IN BLOOD BY AUTOMATED COUNT: NORMAL % (ref 10–15)
ETHANOL UR QL SCN: NEGATIVE
FLUAV RNA RESP QL NAA+PROBE: NEGATIVE
FLUBV RNA RESP QL NAA+PROBE: NEGATIVE
GFR SERPL CREATININE-BSD FRML MDRD: >90 ML/MIN/{1.73_M2}
GLUCOSE SERPL-MCNC: 114 MG/DL (ref 70–99)
GLUCOSE UR STRIP-MCNC: NEGATIVE MG/DL
HCT VFR BLD AUTO: 29.9 % (ref 35–47)
HCT VFR BLD AUTO: NORMAL % (ref 35–47)
HGB BLD-MCNC: 10.2 G/DL (ref 11.7–15.7)
HGB BLD-MCNC: NORMAL G/DL (ref 11.7–15.7)
HGB UR QL STRIP: NEGATIVE
IMM GRANULOCYTES # BLD: 0.1 10E9/L (ref 0–0.4)
IMM GRANULOCYTES NFR BLD: 0.6 %
KETONES UR STRIP-MCNC: 40 MG/DL
LABORATORY COMMENT REPORT: NORMAL
LEUKOCYTE ESTERASE UR QL STRIP: ABNORMAL
LIPASE SERPL-CCNC: 101 U/L (ref 73–393)
LYMPHOCYTES # BLD AUTO: 2 10E9/L (ref 0.8–5.3)
LYMPHOCYTES NFR BLD AUTO: 22.8 %
MCH RBC QN AUTO: 30.7 PG (ref 26.5–33)
MCH RBC QN AUTO: NORMAL PG (ref 26.5–33)
MCHC RBC AUTO-ENTMCNC: 34.1 G/DL (ref 31.5–36.5)
MCHC RBC AUTO-ENTMCNC: NORMAL G/DL (ref 31.5–36.5)
MCV RBC AUTO: 90 FL (ref 78–100)
MCV RBC AUTO: NORMAL FL (ref 78–100)
MONOCYTES # BLD AUTO: 0.7 10E9/L (ref 0–1.3)
MONOCYTES NFR BLD AUTO: 7.5 %
MUCOUS THREADS #/AREA URNS LPF: PRESENT /LPF
NEUTROPHILS # BLD AUTO: 6.1 10E9/L (ref 1.6–8.3)
NEUTROPHILS NFR BLD AUTO: 68.7 %
NITRATE UR QL: NEGATIVE
NRBC # BLD AUTO: 0 10*3/UL
NRBC BLD AUTO-RTO: 0 /100
OPIATES UR QL SCN: NEGATIVE
PH UR STRIP: 5.5 PH (ref 5–7)
PLATELET # BLD AUTO: 335 10E9/L (ref 150–450)
PLATELET # BLD AUTO: NORMAL 10E9/L (ref 150–450)
POTASSIUM SERPL-SCNC: 3.7 MMOL/L (ref 3.4–5.3)
PROT SERPL-MCNC: 7.3 G/DL (ref 6.8–8.8)
RBC # BLD AUTO: 3.32 10E12/L (ref 3.8–5.2)
RBC # BLD AUTO: NORMAL 10E12/L (ref 3.8–5.2)
RBC #/AREA URNS AUTO: 20 /HPF (ref 0–2)
RSV RNA SPEC QL NAA+PROBE: NORMAL
SARS-COV-2 RNA RESP QL NAA+PROBE: NEGATIVE
SODIUM SERPL-SCNC: 136 MMOL/L (ref 133–144)
SOURCE: ABNORMAL
SP GR UR STRIP: 1.02 (ref 1–1.03)
SPECIMEN SOURCE: NORMAL
SQUAMOUS #/AREA URNS AUTO: 15 /HPF (ref 0–1)
UROBILINOGEN UR STRIP-MCNC: NORMAL MG/DL (ref 0–2)
WBC # BLD AUTO: 8.9 10E9/L (ref 4–11)
WBC # BLD AUTO: NORMAL 10E9/L (ref 4–11)
WBC #/AREA URNS AUTO: 50 /HPF (ref 0–5)

## 2021-04-14 PROCEDURE — 81001 URINALYSIS AUTO W/SCOPE: CPT | Performed by: EMERGENCY MEDICINE

## 2021-04-14 PROCEDURE — C9803 HOPD COVID-19 SPEC COLLECT: HCPCS | Performed by: EMERGENCY MEDICINE

## 2021-04-14 PROCEDURE — 76801 OB US < 14 WKS SINGLE FETUS: CPT

## 2021-04-14 PROCEDURE — 87340 HEPATITIS B SURFACE AG IA: CPT | Performed by: EMERGENCY MEDICINE

## 2021-04-14 PROCEDURE — 80307 DRUG TEST PRSMV CHEM ANLYZR: CPT | Performed by: EMERGENCY MEDICINE

## 2021-04-14 PROCEDURE — 99285 EMERGENCY DEPT VISIT HI MDM: CPT | Mod: 25 | Performed by: EMERGENCY MEDICINE

## 2021-04-14 PROCEDURE — 80053 COMPREHEN METABOLIC PANEL: CPT | Performed by: EMERGENCY MEDICINE

## 2021-04-14 PROCEDURE — 99285 EMERGENCY DEPT VISIT HI MDM: CPT | Performed by: EMERGENCY MEDICINE

## 2021-04-14 PROCEDURE — 250N000013 HC RX MED GY IP 250 OP 250 PS 637: Performed by: EMERGENCY MEDICINE

## 2021-04-14 PROCEDURE — 87086 URINE CULTURE/COLONY COUNT: CPT

## 2021-04-14 PROCEDURE — 83690 ASSAY OF LIPASE: CPT | Performed by: EMERGENCY MEDICINE

## 2021-04-14 PROCEDURE — 80320 DRUG SCREEN QUANTALCOHOLS: CPT | Performed by: EMERGENCY MEDICINE

## 2021-04-14 PROCEDURE — 90791 PSYCH DIAGNOSTIC EVALUATION: CPT

## 2021-04-14 PROCEDURE — 84702 CHORIONIC GONADOTROPIN TEST: CPT | Performed by: EMERGENCY MEDICINE

## 2021-04-14 PROCEDURE — 87636 SARSCOV2 & INF A&B AMP PRB: CPT | Performed by: EMERGENCY MEDICINE

## 2021-04-14 PROCEDURE — 85025 COMPLETE CBC W/AUTO DIFF WBC: CPT | Performed by: EMERGENCY MEDICINE

## 2021-04-14 RX ORDER — ACETAMINOPHEN 500 MG
1000 TABLET ORAL ONCE
Status: COMPLETED | OUTPATIENT
Start: 2021-04-14 | End: 2021-04-14

## 2021-04-14 RX ORDER — ONDANSETRON 4 MG/1
4 TABLET, ORALLY DISINTEGRATING ORAL EVERY 8 HOURS PRN
Status: ON HOLD | COMMUNITY
End: 2021-05-04

## 2021-04-14 RX ORDER — OLANZAPINE 10 MG/1
10 TABLET, ORALLY DISINTEGRATING ORAL ONCE
Status: COMPLETED | OUTPATIENT
Start: 2021-04-14 | End: 2021-04-14

## 2021-04-14 RX ORDER — CEFUROXIME AXETIL 500 MG/1
500 TABLET ORAL 2 TIMES DAILY
Status: ON HOLD | COMMUNITY
End: 2021-05-04

## 2021-04-14 RX ADMIN — OLANZAPINE 10 MG: 10 TABLET, ORALLY DISINTEGRATING ORAL at 14:29

## 2021-04-14 RX ADMIN — ACETAMINOPHEN 1000 MG: 500 TABLET, FILM COATED ORAL at 13:15

## 2021-04-14 ASSESSMENT — ACTIVITIES OF DAILY LIVING (ADL)
DRESSING/BATHING_DIFFICULTY: NO
DIFFICULTY_EATING/SWALLOWING: NO
FALL_HISTORY_WITHIN_LAST_SIX_MONTHS: NO
WALKING_OR_CLIMBING_STAIRS_DIFFICULTY: NO
HEARING_DIFFICULTY_OR_DEAF: NO
DOING_ERRANDS_INDEPENDENTLY_DIFFICULTY: NO
DIFFICULTY_COMMUNICATING: NO
CONCENTRATING,_REMEMBERING_OR_MAKING_DECISIONS_DIFFICULTY: NO
WEAR_GLASSES_OR_BLIND: NO
TOILETING_ISSUES: NO

## 2021-04-14 ASSESSMENT — ENCOUNTER SYMPTOMS
FLANK PAIN: 0
NECK PAIN: 0
HALLUCINATIONS: 1
SHORTNESS OF BREATH: 0
VOMITING: 0
NAUSEA: 0
MUSCULOSKELETAL NEGATIVE: 1
ABDOMINAL PAIN: 0
EYES NEGATIVE: 1
HEADACHES: 1
FEVER: 0

## 2021-04-14 NOTE — PHARMACY-ADMISSION MEDICATION HISTORY
Admission Medication History Completed by Pharmacy    See [facility] Admission Navigator for allergy information, preferred outpatient pharmacy, prior to admission medications and immunization status.     Medication history sources:  patient via iPad interview, Care Everywhere ([facility])    Changes made to PTA medication list (reason)  Added:   - both meds  Deleted:   - APAP, ibuprofen PRNs  Changed: N/A    Additional medication history information:   - Cefuroxime and Zofran prescribed at [facility] ED visit on 4/6/21. Patient states she took some Zofran PRN, but did no take the antibiotic course (prescribed after left vaginal sidewall laceration repair per [facility] notes)  - Patient denies taking any additional Rx/OTC medications other than the ones listed below.    Prior to Admission medications    Medication Sig Last Dose Taking? Auth Provider   cefuroxime (CEFTIN) 500 MG tablet Take 500 mg by mouth 2 times daily for 7 days Not yet started Yes Unknown, Entered By History   ondansetron (ZOFRAN-ODT) 4 MG ODT tab Take 4 mg by mouth every 8 hours as needed for nausea or vomiting PRN Yes Unknown, Entered By History     Date completed: 04/14/21    Medication history completed by:   Gopal Calderon, PharmD, BCPS  [facility]  Emergency Department: Ascom *[id number]

## 2021-04-14 NOTE — ED NOTES
Bed: ED10  Expected date:   Expected time:   Means of arrival:   Comments:  needs to be cleaned- held for rm 8

## 2021-04-14 NOTE — ED NOTES
Bed: ED03  Expected date: 4/14/21  Expected time: 8:37 AM  Means of arrival:   Comments:  Hen 438  30F  Body aches
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
## 2021-04-14 NOTE — ED PROVIDER NOTES
"    [facility] EMERGENCY DEPARTMENT ([facility])  4/14/21  History     Chief Complaint   Patient presents with     Hallucinations     Pt states she sick of the voices in her head     Suicidal     Pt states she wants to kill herself because she doesn't want to live anymore     The history is provided by the patient and medical records.     Queen Brittany Stack is a 30 year old female MDD, bipolar I, polysubstance abuse and homelessness who presents to the ED via EMS for evaluation of hallucinations and suicidal ideation.  Patient reportedly walked in and [facility] facility and asked him to call an ambulance. She reports that she is \"sick of the voices in my head\" and that \"I want to kill myself because I do not want to live anymore.\"  The patient notes that there is a family hanging around her and her children who are torturing them.  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  She notes she came in today because all of her feelings are so terrible that she would like to find a way out.  The patient initially endorsed body generalized body aches but now complains of head pain and dorsal foot pain.  She denies chest pain, abdominal pain or shortness of breath.  Patient notes daily use of alcohol, cigarettes and crack.  Patient last used crack cocaine yesterday.    This part of the document was transcribed by Willem Tran Medical Scribe.    Past Medical History:   Diagnosis Date     Abnormal Pap smear     2010, f/u normal     Anxiety      Asthma     no ihaler use     Bipolar 1 disorder (H)      Bipolar affective (H)      Fainting spell      Herpes simplex without mention of complication     Pt reports last outbreak about 4 weeks ago     Major depressive disorder      Pelvic inflammatory disease      Polysubstance abuse (H)        Past Surgical History:   Procedure Laterality Date     NO HISTORY OF SURGERY         Family History "   Problem Relation Age of Onset     Unknown/Adopted Mother      Unknown/Adopted Father      Unknown/Adopted Maternal Grandmother      Unknown/Adopted Maternal Grandfather      Unknown/Adopted Paternal Grandmother      Unknown/Adopted Paternal Grandfather        Social History     Tobacco Use     Smoking status: Current Every Day Smoker     Packs/day: 0.25     Years: 8.00     Pack years: 2.00     Types: Cigarettes     Smokeless tobacco: Never Used   Substance Use Topics     Alcohol use: No     Comment: none     Current Facility-Administered Medications   Medication     OLANZapine zydis (zyPREXA) ODT tab 10 mg     Current Outpatient Medications   Medication     cefuroxime (CEFTIN) 500 MG tablet     ondansetron (ZOFRAN-ODT) 4 MG ODT tab        Allergies   Allergen Reactions     Bees Anaphylaxis         Review of Systems   Constitutional: Negative for fever.   Eyes: Negative.    Respiratory: Negative for shortness of breath.    Cardiovascular: Negative for chest pain.   Gastrointestinal: Negative for abdominal pain, nausea and vomiting.   Genitourinary: Negative for flank pain.   Musculoskeletal: Negative.  Negative for neck pain.        Positive for foot pain   Skin: Negative for rash.   Neurological: Positive for headaches.   Psychiatric/Behavioral: Positive for hallucinations and suicidal ideas.   All other systems reviewed and are negative.        Physical Exam   BP: 133/80  Pulse: 79  Temp: 97.4  F (36.3  C)  Resp: 18  SpO2: 98 %  Physical Exam  Physical Exam   Constitutional:   well nourished, well developed, screaming, demanding to see a doctor.  HENT:   Head: Normocephalic and atraumatic.   Cardiovascular: regular rate and rhythm without murmurs or gallops  Pulmonary/Chest: Clear to auscultation bilaterally, with no wheezes or retractions. No respiratory distress.  GI: Soft with good bowel sounds.  Non-tender, non-distended, with no guarding, no rebound, no peritoneal signs.   Back:  No bony or CVA tenderness    Musculoskeletal:  no edema  Skin: Skin is warm and dry. No rash noted.   Neurological: alert and oriented to person, place, and time. Nonfocal exam  Psychiatric:    Patient is agitated and aggressive.  Appears to be hallucinating.  She is perseverating and tangential with pressured speech.  She is very emotional    ED Course     ED Course as of Apr 14 1401   Wed Apr 14, 2021   1222 WBC: Canceled, Test credited     Procedures        The medical record was reviewed and interpreted.  Current labs reviewed and interpreted.  Previous labs reviewed and interpreted.       Results for orders placed or performed during the hospital encounter of 04/14/21   CBC with platelets differential     Status: None   Result Value Ref Range    WBC Canceled, Test credited 4.0 - 11.0 10e9/L    RBC Count Canceled, Test credited 3.8 - 5.2 10e12/L    Hemoglobin Canceled, Test credited 11.7 - 15.7 g/dL    Hematocrit Canceled, Test credited 35.0 - 47.0 %    MCV Canceled, Test credited 78 - 100 fl    MCH Canceled, Test credited 26.5 - 33.0 pg    MCHC Canceled, Test credited 31.5 - 36.5 g/dL    RDW Canceled, Test credited 10.0 - 15.0 %    Platelet Count Canceled, Test credited 150 - 450 10e9/L    Diff Method Canceled, Test credited    Comprehensive metabolic panel     Status: Abnormal   Result Value Ref Range    Sodium 136 133 - 144 mmol/L    Potassium 3.7 3.4 - 5.3 mmol/L    Chloride 105 94 - 109 mmol/L    Carbon Dioxide 24 20 - 32 mmol/L    Anion Gap 7 3 - 14 mmol/L    Glucose 114 (H) 70 - 99 mg/dL    Urea Nitrogen 12 7 - 30 mg/dL    Creatinine 0.79 0.52 - 1.04 mg/dL    GFR Estimate >90 >60 mL/min/[1.73_m2]    GFR Estimate If Black >90 >60 mL/min/[1.73_m2]    Calcium 8.8 8.5 - 10.1 mg/dL    Bilirubin Total 0.5 0.2 - 1.3 mg/dL    Albumin 3.3 (L) 3.4 - 5.0 g/dL    Protein Total 7.3 6.8 - 8.8 g/dL    Alkaline Phosphatase 51 40 - 150 U/L    ALT 21 0 - 50 U/L    AST 17 0 - 45 U/L   Lipase     Status: None   Result Value Ref Range    Lipase 101 73  - 393 U/L   HCG quantitative pregnancy (blood)     Status: Abnormal   Result Value Ref Range    HCG Quantitative Serum 130,371 (H) 0 - 5 IU/L   UA reflex to Microscopic and Culture     Status: Abnormal    Specimen: Urine clean catch; Nasopharyngeal   Result Value Ref Range    Color Urine Light Yellow     Appearance Urine Slightly Cloudy     Glucose Urine Negative NEG^Negative mg/dL    Bilirubin Urine Negative NEG^Negative    Ketones Urine 40 (A) NEG^Negative mg/dL    Specific Gravity Urine 1.020 1.003 - 1.035    Blood Urine Negative NEG^Negative    pH Urine 5.5 5.0 - 7.0 pH    Protein Albumin Urine 10 (A) NEG^Negative mg/dL    Urobilinogen mg/dL Normal 0.0 - 2.0 mg/dL    Nitrite Urine Negative NEG^Negative    Leukocyte Esterase Urine Large (A) NEG^Negative    Source Nasopharyngeal     RBC Urine 20 (H) 0 - 2 /HPF    WBC Urine 50 (H) 0 - 5 /HPF    Bacteria Urine Moderate (A) NEG^Negative /HPF    Squamous Epithelial /HPF Urine 15 (H) 0 - 1 /HPF    Mucous Urine Present (A) NEG^Negative /LPF   Drug abuse screen 6 urine (chem dep)     Status: Abnormal   Result Value Ref Range    Amphetamine Qual Urine Positive (A) NEG^Negative    Barbiturates Qual Urine Negative NEG^Negative    Benzodiazepine Qual Urine Negative NEG^Negative    Cannabinoids Qual Urine Positive (A) NEG^Negative    Cocaine Qual Urine Positive (A) NEG^Negative    Ethanol Qual Urine Negative NEG^Negative    Opiates Qualitative Urine Negative NEG^Negative   Asymptomatic Influenza A/B & SARS-CoV2 (COVID-19) Virus PCR Multiplex     Status: None    Specimen: Nasopharyngeal   Result Value Ref Range    Flu A/B & SARS-COV-2 PCR Source Nasopharyngeal     SARS-CoV-2 PCR Result NEGATIVE     Influenza A PCR Negative NEG^Negative    Influenza B PCR Negative NEG^Negative    Respiratory Syncytial Virus PCR (Note)     Flu A/B & SARS-CoV-2 PCR Comment (Note)    Urine Culture Aerobic Bacterial     Status: None (Preliminary result)    Specimen: Unspecified Urine   Result Value  Ref Range    Specimen Description Unspecified Urine     Special Requests Specimen received in preservative     Culture Micro PENDING      Medications   OLANZapine zydis (zyPREXA) ODT tab 10 mg (has no administration in time range)   acetaminophen (TYLENOL) tablet 1,000 mg (1,000 mg Oral Given 4/14/21 1315)        Assessments & Plan (with Medical Decision Making)         I have reviewed the nursing notes. Emergency Department course:  The patient was seen and examined at 0915 am.     Per chart review, on 4/4/21 patient presented to [facility] ED with vaginal bleeding.  US OB revealed live intrauterine pregnancy and 2.9 cm follicle arising from the left ovary.  Patient was seen by OB/GYN for repair of left vaginal sidewall laceration approximately 1 inch deep.  Patient was thought to have stabbed herself in attempt to self abort.    A ultrasound done at that time showed 1. Live intrauterine pregnancy as above. No acute abnormality is identified.  2. Dominant 2.9 cm follicle arising from the left ovary. The ovaries demonstrate an otherwise unremarkable transabdominal appearance.    The patient was seen today by  [facility]  Rhoda.  Please see her documentation for details.  Basically the patient is very dysregulated and states she cannot stand the voices in her head anymore.  She is tangential..  She is suicidal and has tried to harm her self.  She told Rhoda that she wanted to blow her head off with a gun and asked Rhoda if she had a gun.    Laboratory studies show an essentially unremarkable comprehensive metabolic panel apart from a low albumin of 3.3.  Lipase is normal at 101.  UA is contaminated with squamous epithelial cells.   hCG is 130,371.    The patient is a 30-year-old polysubstance abuser who presents with psychosis and hallucinations.  She is pregnant and has attempted to self- terminate recently.  She is suicidal.  I placed her on a 72-hour hold for admission at approximately 1345 pm.      I have  reviewed the findings, diagnosis, plan and need for follow up with the patient.    New Prescriptions    No medications on file       Final diagnoses:   Polysubstance abuse (H)   Psychosis, unspecified psychosis type (H)   Suicidal ideation   Pregnant state, incidental     I, Willem Tran , am serving as a trained medical scribe to document services personally performed by Swati Vegas MD, based on the provider's statements to me.      I, Swati Vegas MD, was physically present and have reviewed and verified the accuracy of this note documented by Willem Tran.   --This note was created in part by the use of Dragon voice recognition dictation system. Inadvertent grammatical errors and typographical errors may still exist.  MD Swati Jordan MD  [facility] EMERGENCY DEPARTMENT  4/14/2021     Swati Vegas MD  04/14/21 1558

## 2021-04-14 NOTE — SAFE
Queen Brittany Stack  April 14, 2021    Patient is a 30 year old pregnant female with hx of bipolar and polysubstance abuse.  Psychosis, delusional thinking, voices.  Suicidal ideation with a plan to jump from something or shoot her head off.  Denies having or access to a gun.  Requests a gun from clinician to be put out of her misery.  UDS positive for amphetamine, cocaine, and cannabis.      Current Suicidal Ideation/Self-Injurious Concerns/Methods: Jumping (from building, into traffic, etc.) and Other shooting self with a gun.    Inappropriate Sexual Behavior: Concern that patient may have recently attempted to self-abort her pregnancy with a knife.  (Review record from [facility] 4/4-4/6/2021.     Aggression/Homicidal Ideation: Agitation/Hyperactivity  Patient denies a plan or intent to harm or kill anyone.  Later she makes references to making people pay.        For additional details see full DEC assessment.       Rhoda Vera

## 2021-04-14 NOTE — ED TRIAGE NOTES
"Pt is homeless. Walked into [facility] and asked them to call an ambulance. Stated that she was tired. No specific complaints otherwise. Pt states here that she is \"sick of the voices in my head\" and \"I want to kill myself because I don't want to live anymore\". Pt seems somewhat confused as when asked if she was pregnant in the last 12 weeks she stated yes. When asked when she gave birth she stated 2 weeks ago. When asked where the baby was, she then stated \"in the toilet\".   "

## 2021-04-15 PROBLEM — F29 PSYCHOSIS, UNSPECIFIED PSYCHOSIS TYPE (H): Status: ACTIVE | Noted: 2021-04-15

## 2021-04-15 PROBLEM — F19.10 POLYSUBSTANCE ABUSE (H): Status: ACTIVE | Noted: 2021-04-15

## 2021-04-15 PROBLEM — Z33.1 PREGNANT STATE, INCIDENTAL: Status: ACTIVE | Noted: 2021-04-15

## 2021-04-15 PROBLEM — R45.851 SUICIDAL IDEATION: Status: ACTIVE | Noted: 2021-04-15

## 2021-04-15 LAB
BACTERIA SPEC CULT: NORMAL
Lab: NORMAL
SPECIMEN SOURCE: NORMAL

## 2021-04-15 PROCEDURE — 250N000013 HC RX MED GY IP 250 OP 250 PS 637: Performed by: NURSE PRACTITIONER

## 2021-04-15 PROCEDURE — 99223 1ST HOSP IP/OBS HIGH 75: CPT | Mod: AI | Performed by: NURSE PRACTITIONER

## 2021-04-15 PROCEDURE — 124N000002 HC R&B MH UMMC

## 2021-04-15 RX ORDER — ACETAMINOPHEN 325 MG/1
650 TABLET ORAL EVERY 4 HOURS PRN
Status: DISCONTINUED | OUTPATIENT
Start: 2021-04-15 | End: 2021-05-05 | Stop reason: HOSPADM

## 2021-04-15 RX ORDER — PRENATAL VIT/IRON FUM/FOLIC AC 27MG-0.8MG
1 TABLET ORAL DAILY
Status: DISCONTINUED | OUTPATIENT
Start: 2021-04-15 | End: 2021-05-05 | Stop reason: HOSPADM

## 2021-04-15 RX ORDER — HYDROXYZINE HYDROCHLORIDE 25 MG/1
25 TABLET, FILM COATED ORAL EVERY 4 HOURS PRN
Status: DISCONTINUED | OUTPATIENT
Start: 2021-04-15 | End: 2021-05-05 | Stop reason: HOSPADM

## 2021-04-15 RX ORDER — LANOLIN ALCOHOL/MO/W.PET/CERES
3 CREAM (GRAM) TOPICAL
Status: DISCONTINUED | OUTPATIENT
Start: 2021-04-15 | End: 2021-04-17

## 2021-04-15 RX ORDER — MAGNESIUM HYDROXIDE/ALUMINUM HYDROXICE/SIMETHICONE 120; 1200; 1200 MG/30ML; MG/30ML; MG/30ML
30 SUSPENSION ORAL EVERY 4 HOURS PRN
Status: DISCONTINUED | OUTPATIENT
Start: 2021-04-15 | End: 2021-05-05 | Stop reason: HOSPADM

## 2021-04-15 RX ORDER — OLANZAPINE 5 MG/1
5-10 TABLET ORAL 2 TIMES DAILY PRN
Status: DISCONTINUED | OUTPATIENT
Start: 2021-04-15 | End: 2021-05-05 | Stop reason: HOSPADM

## 2021-04-15 RX ORDER — OLANZAPINE 10 MG/2ML
5-10 INJECTION, POWDER, FOR SOLUTION INTRAMUSCULAR 2 TIMES DAILY PRN
Status: DISCONTINUED | OUTPATIENT
Start: 2021-04-15 | End: 2021-05-05 | Stop reason: HOSPADM

## 2021-04-15 RX ADMIN — CEPHALEXIN 250 MG: 250 CAPSULE ORAL at 12:42

## 2021-04-15 RX ADMIN — CEPHALEXIN 250 MG: 250 CAPSULE ORAL at 17:30

## 2021-04-15 RX ADMIN — PRENATAL VITAMINS-IRON FUMARATE 27 MG IRON-FOLIC ACID 0.8 MG TABLET 1 TABLET: at 12:42

## 2021-04-15 NOTE — PROGRESS NOTES
"CLINICAL NUTRITION SERVICES - ASSESSMENT NOTE     Nutrition Prescription    RECOMMENDATIONS FOR MDs/PROVIDERS TO ORDER:  Prenatal MVI    Malnutrition Status:    Unable to assess as not nutrition/intake hx PTA available, will monitor and update as able     Recommendations already ordered by Registered Dietitian (RD):  Ensure enlive BID with meals (breakfast & dinner; rotate flavors)    Future/Additional Recommendations:  Monitor ability/appropriateness to speak with pt and obtain nutrition hx   Monitor PO intake and supplement acceptance/tolerance  Weight trends for gestational goals      REASON FOR ASSESSMENT  Queen Brittany Stack is a/an 30 year old female assessed by the dietitian for MST screen + (unsure of weight loss)    NUTRITION HISTORY  Presents for SI; & pregnant (gestational age estimated 12wk). Hx of BPD, polysubstance abuse, psychosis.   Per chart review:   3/29/21: presented to ED & reported low appetite     CURRENT NUTRITION ORDERS  Diet: Regular  Intake/Tolerance:   Per chart review: pt requesting snacks   Per RN: pt refusing to speak with RN, providers. Eating meals adequately; no concern noted for inadequat intake during admission. Recommended writer follow up at later date/time to speak with pt. RN notified of writer's order for ensure BID.    Per pt: unable to speak with pt; will follow up as able/appropriate    LABS  Labs reviewed  Albumin 3.3 (L)    MEDICATIONS  Medications reviewed  Zyprexa  Maalox     ANTHROPOMETRICS  Height: 5'7\"  Most Recent Weight: 67.1 kg (148 lb)    IBW: 61.3 kg  BMI: Normal BMI  Weight History:   10/30/20: 63.5kg (140lb)  No further weight hx available per chart review    Pt does not appear to have recent weight loss as reported by MST score, however given pt's gestation (~12wk) weight gain is not adequate for goals (0.8-1.0lb/week for 2nd & 3rd trimester); overall weight gain goal of 25-35lb for BMI.    Dosing Weight: 67 kg based on actual recent weight of 67.1kg on " 4/14/21    ASSESSED NUTRITION NEEDS  Estimated Energy Needs: 5107-8590 kcals/day (30 - 35 kcals/kg )  Justification: Increased needs  Estimated Protein Needs: 74-94 grams protein/day (1.1 - 1.4 grams of pro/kg)  Justification: Increased needs  Estimated Fluid Needs: 3000 mL/day (3L/day)   Justification: Increased needs    PHYSICAL FINDINGS  See malnutrition section below.  Did not see pt in person; per chart review no abnormal physical findings noted    MALNUTRITION  % Intake: Unable to assess  % Weight Loss: None noted, however weight gain does not meet goals for gestational age   Subcutaneous Fat Loss: Unable to assess  Muscle Loss: Unable to assess  Fluid Accumulation/Edema: None noted  Malnutrition Diagnosis: Unable to assess as not nutrition/intake hx PTA available, will monitor and update as able     NUTRITION DIAGNOSIS  Increased nutrient needs (calorie, protein) related to gestation as evidenced by therapeutic recommended guidelines above       INTERVENTIONS  Implementation  Medical food supplement therapy - as above     Goals  Patient to consume % of nutritionally adequate meal trays TID, or the equivalent with supplements/snacks.     Monitoring/Evaluation  Progress toward goals will be monitored and evaluated per protocol.    Nancy Flores  Dietetic Intern
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
## 2021-04-15 NOTE — PROVIDER NOTIFICATION
04/14/21 2300   Patient Belongings   Did you bring any home meds/supplements to the hospital?  No   Patient Belongings locker;sent to security per site process   Patient Belongings Put in Hospital Secure Location (Security or Locker, etc.) clothing;suitcase;other (see comments)  (taser, EBT card sent to security )   Belongings Search Yes   Clothing Search Yes   Second Staff queen and farnaz     On unit--suitcase and pt belonging bag with:   -shoes X2, boots, sweatshirt with strings, sunglasses (broken), partial nail set, partial earring set, bra X3, black hat, socks, shoelaces, eye lashes, lip stick, make-up, cocoa butter, sports drink, misc hair products, perfume, tanktop, shorts, lamb  Sent to security:   -lisset (sent from ) contrdeanna [id number]  -1.57 in change and EBT card envelope number [id number]    A               Admission:  I am responsible for any personal items that are not sent to the safe or pharmacy.  [facility] is not responsible for loss, theft or damage of any property in my possession.    Signature:  _________________________________ Date: _______  Time: _____                                              Staff Signature:  ____________________________ Date: ________  Time: _____      2nd Staff person, if patient is unable/unwilling to sign:    Signature: ________________________________ Date: ________  Time: _____     Discharge:  [facility] has returned all of my personal belongings:    Signature: _________________________________ Date: ________  Time: _____                                          Staff Signature:  ____________________________ Date: ________  Time: _____
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
## 2021-04-15 NOTE — PLAN OF CARE
CTC called [facility] pre-petition screening, [phone]. Gave verbal report to [place]. Faxed petition to [phone].

## 2021-04-15 NOTE — PLAN OF CARE
Pt. remains in bed. Pt.'s breakfast brought to her room;  pt. did eat majority of her tray.  This staff has attempted to engage with her several times.  The pt. Is currently refusing to engage in a 1:1 with this staff,  does not open her eyes or engages in eye contact.  The pt. will not answer any questions that staff asks about regarding her emotional, physical, and cognitive  status.  Pt. was offered her antibiotic which the pt. currently is refusing.  The pt.'s behavioral assessment was not completed due to the pt. refusing to answer any questions currently.  Staff will continue to assess the pt. on a physical, emotional, and cognitive status.
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
## 2021-04-15 NOTE — PROGRESS NOTES
Pt slept approximately 6.5 hrs. Pt woke up requesting for snacks. No other issues noted. Will continue to monitor.

## 2021-04-15 NOTE — ED NOTES
ED to Behavioral Floor Handoff    SITUATION  Queen Brittany Stack is a 30 year old female who speaks English and lives is homeless unknown The patient arrived in the ED by ambulance from clinic with a complaint of Hallucinations (Pt states she sick of the voices in her head) and Suicidal (Pt states she wants to kill herself because she doesn't want to live anymore)  .The patient's current symptoms started/worsened 6 day(s) ago and during this time the symptoms have increased.   In the ED, pt was diagnosed with   Final diagnoses:   Polysubstance abuse (H)   Psychosis, unspecified psychosis type (H)   Suicidal ideation   Pregnant state, incidental        Initial vitals were: BP: 133/80  Pulse: 79  Temp: 97.4  F (36.3  C)  Resp: 18  SpO2: 98 %   --------  Is the patient diabetic? No   If yes, last blood glucose? --     If yes, was this treated in the ED? --  --------  Is the patient inebriated (ETOH) No or Impaired on other substances? Yes  MSSA done? N/A  Last MSSA score: --    Were withdrawal symptoms treated? N/A  Does the patient have a seizure history? Yes. If yes, date of most recent seizure-- 3 years ago, unknown etiology  --------  Is the patient patient experiencing suicidal ideation? reports suicidal ideation with out intention or a suicidal plan    Homicidal ideation? denies current or recent homicidal ideation or behaviors.    Self-injurious behavior/urges? denies current or recent self injurious behavior or ideation.  ------  Was pt aggressive in the ED No  Was a code called No  Is the pt now cooperative? Yes  -------  Meds given in ED:   Medications   acetaminophen (TYLENOL) tablet 1,000 mg (1,000 mg Oral Given 4/14/21 1315)   OLANZapine zydis (zyPREXA) ODT tab 10 mg (10 mg Oral Given 4/14/21 5340)      Family present during ED course? No  Family currently present? No    BACKGROUND  Does the patient have a cognitive impairment or developmental disability? No  Allergies:   Allergies   Allergen Reactions      Bees Anaphylaxis   .   Social demographics are   Social History     Socioeconomic History     Marital status: Single     Spouse name: None     Number of children: None     Years of education: None     Highest education level: None   Occupational History     None   Social Needs     Financial resource strain: None     Food insecurity     Worry: None     Inability: None     Transportation needs     Medical: None     Non-medical: None   Tobacco Use     Smoking status: Current Every Day Smoker     Packs/day: 0.25     Years: 8.00     Pack years: 2.00     Types: Cigarettes     Smokeless tobacco: Never Used   Substance and Sexual Activity     Alcohol use: No     Comment: none     Drug use: Yes     Types: Marijuana     Comment: last smoke yest.     Sexual activity: Yes     Partners: Male     Birth control/protection: None     Comment: NONE   Lifestyle     Physical activity     Days per week: None     Minutes per session: None     Stress: None   Relationships     Social connections     Talks on phone: None     Gets together: None     Attends Congregation service: None     Active member of club or organization: None     Attends meetings of clubs or organizations: None     Relationship status: None     Intimate partner violence     Fear of current or ex partner: None     Emotionally abused: None     Physically abused: None     Forced sexual activity: None   Other Topics Concern     Parent/sibling w/ CABG, MI or angioplasty before 65F 55M? Not Asked   Social History Narrative     None        ASSESSMENT  Labs results   Labs Ordered and Resulted from Time of ED Arrival Up to the Time of Departure from the ED   COMPREHENSIVE METABOLIC PANEL - Abnormal; Notable for the following components:       Result Value    Glucose 114 (*)     Albumin 3.3 (*)     All other components within normal limits   HCG QUANTITATIVE PREGNANCY - Abnormal; Notable for the following components:    HCG Quantitative Serum 130,371 (*)     All other components  within normal limits   UA MACROSCOPIC WITH REFLEX TO MICRO AND CULTURE - Abnormal; Notable for the following components:    Ketones Urine 40 (*)     Protein Albumin Urine 10 (*)     Leukocyte Esterase Urine Large (*)     RBC Urine 20 (*)     WBC Urine 50 (*)     Bacteria Urine Moderate (*)     Squamous Epithelial /HPF Urine 15 (*)     Mucous Urine Present (*)     All other components within normal limits   DRUG ABUSE SCREEN 6 CHEM DEP URINE ([facility]) - Abnormal; Notable for the following components:    Amphetamine Qual Urine Positive (*)     Cannabinoids Qual Urine Positive (*)     Cocaine Qual Urine Positive (*)     All other components within normal limits   CBC WITH PLATELETS DIFFERENTIAL - Abnormal; Notable for the following components:    RBC Count 3.32 (*)     Hemoglobin 10.2 (*)     Hematocrit 29.9 (*)     All other components within normal limits   CBC WITH PLATELETS DIFFERENTIAL   LIPASE   INFLUENZA A/B & SARS-COV2 PCR MULTIPLEX   DOCUMENT   URINE CULTURE AEROBIC BACTERIAL      Imaging Studies: No results found for this or any previous visit (from the past 24 hour(s)).   Most recent vital signs /66   Pulse 82   Temp 97.4  F (36.3  C) (Oral)   Resp 18   SpO2 98%    Abnormal labs/tests/findings requiring intervention:---   Pain control: pt had none  Nausea control: pt had none    RECOMMENDATION  Are any infection precautions needed (MRSA, VRE, etc.)? No If yes, what infection? --  ---  Does the patient have mobility issues? independently. If yes, what device does the pt use? ---  ---  Is patient on 72 hour hold or commitment? Yes If on 72 hour hold, have hold and rights been given to patient? Yes  Are admitting orders written if after 10 p.m. ?N/A  Tasks needing to be completed:---     Rick Sepulveda, RN    [phone] [facility]   [phone] [facility] ED

## 2021-04-15 NOTE — PLAN OF CARE
BEHAVIORAL TEAM DISCUSSION    Participants: Vicky Jackman CNP; Jackie Momin and Maida Vance [facility] s; Yarelis Jansen RN  Progress: pt is a 30 year old female [facility] due to psychotic sxs and SI with a plan.  Her UA is positive for amphetamines, cannabinoids and cocaine.    Anticipated length of stay: TBD  Continued Stay Criteria/Rationale: pt needs further evaluation  Medical/Physical: pt is pregnant   Precautions:   Behavioral Orders   Procedures     Code 1 - Restrict to Unit     Routine Programming     As clinically indicated     Self Injury Precaution     Single Room     Status 15     Every 15 minutes.     Suicide precautions     Patients on Suicide Precautions should have a Combination Diet ordered that includes a Diet selection(s) AND a Behavioral Tray selection for Safe Tray - with utensils, or Safe Tray - NO utensils       Plan: assess, monitor and stabilize.    Rationale for change in precautions or plan: new admission with hx of bipolar disorder and substance abuse.
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
## 2021-04-15 NOTE — TELEPHONE ENCOUNTER
R: Carmelita/David/Gasper     816pm - Garcia, on call provider, paged   831pm - Garcia accepts   Pt placed in queue   837pm - unit charge notified, will look into the chart and call intake back   924pm - intake called unit charge, unavail, intake will try later     933pm - Garcia called and reports the unit charge made some concern. There is concern about the viability in the fetus, would like ultra sound and additional information about gestational period. Would like to request additional OB/GYN work up   934pm - doc to doc initiated, Garcia requested ultra sound Lesvia reports he will order. Garcia wants a page back once the ultra sound is completed   941pm - Intake called ED charge and requested that intake be notified once the ultrasound has been completed     1027pm - ED charge notified intake that the ultrasound was completed and the RN in the ED had given report the station 32  1032pm - Garcia called and reports pt has UTI and would like that treated in the ED  1033pm - Doc to doc initiated Garcia reports that she would like to wait for the results of the ultrasound before sending her to the unit. Lesvia reports he will not treat it in the ED.   Garcia additionally reports if the results are within normal limits we can proceed w admission but would like to wait the results before sending her up to the unit   1036pm - Intake called unit charge, reports the pt is already en route to the unit in order to complete the admission before shift change. He additionally reported less concern as the pt is 14 weeks along   1037pm - Garcia paged   1038pm - Garcia notified that pt en route to the unit   1039pm - ED charge notified intake the results are within normal limits and is ready to send the pt up to the unit   1039pm - N[place] notified

## 2021-04-15 NOTE — ED NOTES
This writer spoke with a nurse on [address] who voiced concerns about whether or not there was a plan to check on the viability of the patient's fetus.  This concern was shared with the provider who stated that there was no need at this time for that concern.
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
## 2021-04-16 LAB
ABO + RH BLD: NORMAL
ABO + RH BLD: NORMAL
BASOPHILS # BLD AUTO: 0 10E9/L (ref 0–0.2)
BASOPHILS NFR BLD AUTO: 0.2 %
BLD GP AB SCN SERPL QL: NORMAL
BLOOD BANK CMNT PATIENT-IMP: NORMAL
CHOLEST SERPL-MCNC: 130 MG/DL
DIFFERENTIAL METHOD BLD: ABNORMAL
EOSINOPHIL # BLD AUTO: 0 10E9/L (ref 0–0.7)
EOSINOPHIL NFR BLD AUTO: 0.2 %
ERYTHROCYTE [DISTWIDTH] IN BLOOD BY AUTOMATED COUNT: 13.4 % (ref 10–15)
HBV SURFACE AG SERPL QL IA: NONREACTIVE
HCT VFR BLD AUTO: 35.8 % (ref 35–47)
HDLC SERPL-MCNC: 81 MG/DL
HGB BLD-MCNC: 12 G/DL (ref 11.7–15.7)
IMM GRANULOCYTES # BLD: 0 10E9/L (ref 0–0.4)
IMM GRANULOCYTES NFR BLD: 0.4 %
LDLC SERPL CALC-MCNC: 27 MG/DL
LYMPHOCYTES # BLD AUTO: 1.9 10E9/L (ref 0.8–5.3)
LYMPHOCYTES NFR BLD AUTO: 16.8 %
MCH RBC QN AUTO: 30.4 PG (ref 26.5–33)
MCHC RBC AUTO-ENTMCNC: 33.5 G/DL (ref 31.5–36.5)
MCV RBC AUTO: 91 FL (ref 78–100)
MONOCYTES # BLD AUTO: 0.4 10E9/L (ref 0–1.3)
MONOCYTES NFR BLD AUTO: 3.8 %
NEUTROPHILS # BLD AUTO: 8.7 10E9/L (ref 1.6–8.3)
NEUTROPHILS NFR BLD AUTO: 78.6 %
NONHDLC SERPL-MCNC: 49 MG/DL
NRBC # BLD AUTO: 0 10*3/UL
NRBC BLD AUTO-RTO: 0 /100
PLATELET # BLD AUTO: 365 10E9/L (ref 150–450)
RBC # BLD AUTO: 3.95 10E12/L (ref 3.8–5.2)
SPECIMEN EXP DATE BLD: NORMAL
T4 FREE SERPL-MCNC: 0.92 NG/DL (ref 0.76–1.46)
TRIGL SERPL-MCNC: 108 MG/DL
TSH SERPL DL<=0.005 MIU/L-ACNC: 0.29 MU/L (ref 0.4–4)
WBC # BLD AUTO: 11 10E9/L (ref 4–11)

## 2021-04-16 PROCEDURE — 99221 1ST HOSP IP/OBS SF/LOW 40: CPT | Performed by: PHYSICIAN ASSISTANT

## 2021-04-16 PROCEDURE — 85025 COMPLETE CBC W/AUTO DIFF WBC: CPT | Performed by: PSYCHIATRY & NEUROLOGY

## 2021-04-16 PROCEDURE — 250N000013 HC RX MED GY IP 250 OP 250 PS 637: Performed by: PSYCHIATRY & NEUROLOGY

## 2021-04-16 PROCEDURE — 86901 BLOOD TYPING SEROLOGIC RH(D): CPT | Performed by: NURSE PRACTITIONER

## 2021-04-16 PROCEDURE — 84439 ASSAY OF FREE THYROXINE: CPT | Performed by: PSYCHIATRY & NEUROLOGY

## 2021-04-16 PROCEDURE — 80061 LIPID PANEL: CPT | Performed by: PSYCHIATRY & NEUROLOGY

## 2021-04-16 PROCEDURE — 87591 N.GONORRHOEAE DNA AMP PROB: CPT | Performed by: NURSE PRACTITIONER

## 2021-04-16 PROCEDURE — 84443 ASSAY THYROID STIM HORMONE: CPT | Performed by: PSYCHIATRY & NEUROLOGY

## 2021-04-16 PROCEDURE — 86850 RBC ANTIBODY SCREEN: CPT | Performed by: NURSE PRACTITIONER

## 2021-04-16 PROCEDURE — 99207 PR CONSULT E&M CHANGED TO INITIAL LEVEL: CPT | Performed by: PHYSICIAN ASSISTANT

## 2021-04-16 PROCEDURE — 86780 TREPONEMA PALLIDUM: CPT | Performed by: PSYCHIATRY & NEUROLOGY

## 2021-04-16 PROCEDURE — 250N000013 HC RX MED GY IP 250 OP 250 PS 637: Performed by: PHYSICIAN ASSISTANT

## 2021-04-16 PROCEDURE — 36415 COLL VENOUS BLD VENIPUNCTURE: CPT | Performed by: PSYCHIATRY & NEUROLOGY

## 2021-04-16 PROCEDURE — 86803 HEPATITIS C AB TEST: CPT | Performed by: PSYCHIATRY & NEUROLOGY

## 2021-04-16 PROCEDURE — 124N000002 HC R&B MH UMMC

## 2021-04-16 PROCEDURE — 87491 CHLMYD TRACH DNA AMP PROBE: CPT | Performed by: NURSE PRACTITIONER

## 2021-04-16 PROCEDURE — 87389 HIV-1 AG W/HIV-1&-2 AB AG IA: CPT | Performed by: PSYCHIATRY & NEUROLOGY

## 2021-04-16 PROCEDURE — 99233 SBSQ HOSP IP/OBS HIGH 50: CPT | Performed by: NURSE PRACTITIONER

## 2021-04-16 PROCEDURE — 250N000013 HC RX MED GY IP 250 OP 250 PS 637: Performed by: NURSE PRACTITIONER

## 2021-04-16 PROCEDURE — 86900 BLOOD TYPING SEROLOGIC ABO: CPT | Performed by: NURSE PRACTITIONER

## 2021-04-16 RX ORDER — LORATADINE 10 MG/1
10 TABLET ORAL DAILY
Status: DISCONTINUED | OUTPATIENT
Start: 2021-04-16 | End: 2021-05-05 | Stop reason: HOSPADM

## 2021-04-16 RX ADMIN — AMOXICILLIN AND CLAVULANATE POTASSIUM 1 TABLET: 875; 125 TABLET, FILM COATED ORAL at 19:57

## 2021-04-16 RX ADMIN — MELATONIN TAB 3 MG 3 MG: 3 TAB at 19:57

## 2021-04-16 RX ADMIN — CEPHALEXIN 250 MG: 250 CAPSULE ORAL at 00:17

## 2021-04-16 RX ADMIN — LORATADINE 10 MG: 10 TABLET ORAL at 17:02

## 2021-04-16 RX ADMIN — CEPHALEXIN 250 MG: 250 CAPSULE ORAL at 06:12

## 2021-04-16 RX ADMIN — ACETAMINOPHEN 650 MG: 325 TABLET, FILM COATED ORAL at 09:33

## 2021-04-16 RX ADMIN — PRENATAL VITAMINS-IRON FUMARATE 27 MG IRON-FOLIC ACID 0.8 MG TABLET 1 TABLET: at 08:04

## 2021-04-16 ASSESSMENT — ACTIVITIES OF DAILY LIVING (ADL)
DRESS: INDEPENDENT
LAUNDRY: WITH SUPERVISION
ORAL_HYGIENE: INDEPENDENT
HYGIENE/GROOMING: INDEPENDENT
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
## 2021-04-16 NOTE — PROGRESS NOTES
NOC Shift Report    Pt in bed at beginning of shift, breathing quiet and unlabored. Pt slept through shift, woke up once for snacks. No complaints or concerns at this time. Pt tolerated ABX. No other issues.Will continue to monitor.

## 2021-04-16 NOTE — PLAN OF CARE
"..Pt was isolative to room throughout shift sleeping and napping, minimally social with peers, not attending groups. Affect was blunted, flat, somewhat tense. Mood was depressed. Pt ate meals and took her scheduled prenatal vitamin and requested prn tylenol for L ear pain. Pt has orders for a urine screen for gonorrhea and chlamydia, sterile sample cup was left in her bathroom. Pt is currently sleeping and has not provided a sample yet. No SI/SIB noted, endorses AH \"talking shit\" about her. Will continue to monitor.   "

## 2021-04-16 NOTE — PLAN OF CARE
VM from [facility], [phone], with pre-petition screening. They are supporting both MI and CD.

## 2021-04-16 NOTE — CONSULTS
[facility]  Consult Note - Hospitalist Service     Date of Admission:  4/14/2021  Consult Requested by: Soo WOODARD CNP    Reason for Consult: Ear pain     Assessment & Plan   Queen Brittany Stack is a 30 year old female admitted on 4/14/2021. She has a history of bipolar 1, bipolar affective, MDD, HSV, PID, asthma, polysubstance abuse, currently pregnant who is admitted to station 32 for SI currently on 72 hours hold. Medicine was asked to consult for left ear pain.     Otitis media   Eustation tube dysfunction s/s seasonal allergies   Patient has 1-3 days history of left ear shooting pain with associated congestion and itchy eyes. Reports hx of seasonal allergies, does not take any medication for allergies PTA. On exam, left ear with fluid noted in the middle ear, TM intact with erythema of inferior portion.    - Start loratadine 10 mg daily for seasonal allergies   - Augmentin 875 BID X 7 days for treatment of otitis media (pregnancy class B and discussed with OB)   - Please notify medicine if any worsening pain or pain not resolved upon completion of abx     Abnormal UA   - Agree that treatment is not necessary based on urine culture     Pregnancy   - OB/gyn consulted appreciate assistance     Bipolar 1  Bipolar affective  MDD  SI   - Management per psychiatry      Currently patient is medically stable and medicine will sign off. Thank you for allowing us to be a part of this patients care. Please notify on call JENELLE if any intercurrent medical issues arise.       ANALY Tafoya  [facility]  Contact information available via [facility] Paging/Directory    ______________________________________________________________________    Chief Complaint   Ear pain     History is obtained from the patient    History of Present Illness   Queen Brittany Stack is a 30 year old female who has a history of bipolar 1,  bipolar affective, MDD, HSV, PID, asthma, polysubstance abuse, currently pregnant who is admitted to station 32N for SI currently on 72 hours hold. Medicine was asked to consult for left ear pain. Patient is a poor historian. Reports left ear pain starting yesterday with associated itchy eyes and rhinorrhea. Notes pain as sharp. Denies fevers, chills, chest pain, SOB, trauma to ear, sore throat, dysphagia.     Review of Systems   The 5 point Review of Systems is negative other than noted in the HPI.     Past Medical History    I have reviewed this patient's medical history and updated it with pertinent information if needed.   Past Medical History:   Diagnosis Date     Abnormal Pap smear     2010, f/u normal     Anxiety      Asthma     no ihaler use     Bipolar 1 disorder (H)      Bipolar affective (H)      Fainting spell      Herpes simplex without mention of complication     Pt reports last outbreak about 4 weeks ago     Major depressive disorder      Pelvic inflammatory disease      Polysubstance abuse (H)        Past Surgical History   I have reviewed this patient's surgical history and updated it with pertinent information if needed.  Past Surgical History:   Procedure Laterality Date     NO HISTORY OF SURGERY         Social History   I have reviewed this patient's social history and updated it with pertinent information if needed.  Social History     Tobacco Use     Smoking status: Current Every Day Smoker     Packs/day: 0.25     Years: 8.00     Pack years: 2.00     Types: Cigarettes     Smokeless tobacco: Never Used   Substance Use Topics     Alcohol use: No     Comment: none     Drug use: Yes     Types: Marijuana     Comment: last smoke yest.       Family History   I have reviewed this patient's family history and updated it with pertinent information if needed.  Family History   Problem Relation Age of Onset     Unknown/Adopted Mother      Unknown/Adopted Father      Unknown/Adopted Maternal Grandmother       Unknown/Adopted Maternal Grandfather      Unknown/Adopted Paternal Grandmother      Unknown/Adopted Paternal Grandfather        Medications   Medications Prior to Admission   Medication Sig Dispense Refill Last Dose     cefuroxime (CEFTIN) 500 MG tablet Take 500 mg by mouth 2 times daily for 7 days   Not yet started     ondansetron (ZOFRAN-ODT) 4 MG ODT tab Take 4 mg by mouth every 8 hours as needed for nausea or vomiting   PRN       Allergies   Allergies   Allergen Reactions     Bees Anaphylaxis       Physical Exam   Vital Signs: Temp: 98  F (36.7  C) Temp src: Oral BP: 103/71 Pulse: 101   Resp: 16       Weight: 148 lbs 0 oz  GENERAL: Alert and oriented x 3. NAD.   HEENT: Anicteric sclera. PERRL. Mucous membranes moist and without lesions.   LEFT EAR: Non tender to palpation of outer ear, TM intact, fluid present posterior to ear drum, erythema at inferior portion of TM. RIGHT EAR: Minimal fluid present posterior to TM, no erythema.   RESPIRATORY: Effort normal on RA.    SKIN: No jaundice. No rashes.         Data   Results for orders placed or performed during the hospital encounter of 04/14/21 (from the past 24 hour(s))   TSH with free T4 reflex and/or T3 as indicated   Result Value Ref Range    TSH 0.29 (L) 0.40 - 4.00 mU/L   Lipid panel   Result Value Ref Range    Cholesterol 130 <200 mg/dL    Triglycerides 108 <150 mg/dL    HDL Cholesterol 81 >49 mg/dL    LDL Cholesterol Calculated 27 <100 mg/dL    Non HDL Cholesterol 49 <130 mg/dL   CBC with platelets differential   Result Value Ref Range    WBC 11.0 4.0 - 11.0 10e9/L    RBC Count 3.95 3.8 - 5.2 10e12/L    Hemoglobin 12.0 11.7 - 15.7 g/dL    Hematocrit 35.8 35.0 - 47.0 %    MCV 91 78 - 100 fl    MCH 30.4 26.5 - 33.0 pg    MCHC 33.5 31.5 - 36.5 g/dL    RDW 13.4 10.0 - 15.0 %    Platelet Count 365 150 - 450 10e9/L    Diff Method Automated Method     % Neutrophils 78.6 %    % Lymphocytes 16.8 %    % Monocytes 3.8 %    % Eosinophils 0.2 %    % Basophils 0.2 %     % Immature Granulocytes 0.4 %    Nucleated RBCs 0 0 /100    Absolute Neutrophil 8.7 (H) 1.6 - 8.3 10e9/L    Absolute Lymphocytes 1.9 0.8 - 5.3 10e9/L    Absolute Monocytes 0.4 0.0 - 1.3 10e9/L    Absolute Eosinophils 0.0 0.0 - 0.7 10e9/L    Absolute Basophils 0.0 0.0 - 0.2 10e9/L    Abs Immature Granulocytes 0.0 0 - 0.4 10e9/L    Absolute Nucleated RBC 0.0    ABO/Rh type and screen   Result Value Ref Range    ABO A     RH(D) Pos     Antibody Screen Neg     Test Valid Only At          [facility]    Specimen Expires 04/19/2021    T4 free   Result Value Ref Range    T4 Free 0.92 0.76 - 1.46 ng/dL

## 2021-04-17 LAB
C TRACH DNA SPEC QL NAA+PROBE: NEGATIVE
N GONORRHOEA DNA SPEC QL NAA+PROBE: NEGATIVE
SPECIMEN SOURCE: NORMAL
SPECIMEN SOURCE: NORMAL
T PALLIDUM AB SER QL: NONREACTIVE

## 2021-04-17 PROCEDURE — 250N000013 HC RX MED GY IP 250 OP 250 PS 637: Performed by: PHYSICIAN ASSISTANT

## 2021-04-17 PROCEDURE — 250N000013 HC RX MED GY IP 250 OP 250 PS 637: Performed by: CLINICAL NURSE SPECIALIST

## 2021-04-17 PROCEDURE — 250N000013 HC RX MED GY IP 250 OP 250 PS 637: Performed by: NURSE PRACTITIONER

## 2021-04-17 PROCEDURE — 124N000002 HC R&B MH UMMC

## 2021-04-17 RX ADMIN — Medication 5 MG: at 19:45

## 2021-04-17 RX ADMIN — LORATADINE 10 MG: 10 TABLET ORAL at 08:11

## 2021-04-17 RX ADMIN — AMOXICILLIN AND CLAVULANATE POTASSIUM 1 TABLET: 875; 125 TABLET, FILM COATED ORAL at 08:11

## 2021-04-17 RX ADMIN — PRENATAL VITAMINS-IRON FUMARATE 27 MG IRON-FOLIC ACID 0.8 MG TABLET 1 TABLET: at 08:11

## 2021-04-17 RX ADMIN — AMOXICILLIN AND CLAVULANATE POTASSIUM 1 TABLET: 875; 125 TABLET, FILM COATED ORAL at 19:44

## 2021-04-17 ASSESSMENT — ACTIVITIES OF DAILY LIVING (ADL)
ORAL_HYGIENE: INDEPENDENT
HYGIENE/GROOMING: INDEPENDENT
DRESS: INDEPENDENT
ORAL_HYGIENE: INDEPENDENT
LAUNDRY: WITH SUPERVISION
LAUNDRY: WITH SUPERVISION
DRESS: INDEPENDENT
HYGIENE/GROOMING: INDEPENDENT

## 2021-04-17 NOTE — PLAN OF CARE
..Pt was visible in lounge in the morning, spent time watching a comedy movie and was laughing, brighter upon approach, minimally social with peers. Affect was full range. Mood was calm. Pt ate meals and was compliant with medications. She spent the rest of the shift resting in her room. No SI/SIB noted. Will continue to monitor.

## 2021-04-17 NOTE — PLAN OF CARE
"Nursing Assessment     Pt had uneventful shift. Pt visible in milieu at times. When not in milieu, pt appeared to be napping in room. Pt did not attend group. Throughout the shift, pt appeared calm, comfortable, tired, flat and blunted. Reports auditory hallucinations. Pt states the voices are \"quieter,\" this shift. Pt denies SI, SIB and HI. Pt agreeable to safe behaviors on the unit. Pt ate 100% of dinner. No observed concerns with appetite. Pt requested PRN melatonin to target sleep. First dose education provided. Pt instructed to follow up with AM RN re:effectiveness. Pt continues to endorse ear pain, but at a manageable level. Pt took medications without issue. Pt educated on plan of care and encouraged to continue working toward goals.    Pt remains on SI and SIB precautions. Will continue to monitor and support.       "

## 2021-04-17 NOTE — PLAN OF CARE
Patient slept 5 hours during the overnight shift. Patient was heard coughing on and off through the night and requested chicken broth at approximately 0200 to help soothe the cough. Patient also up at 0500 for a snack (applesauce and an apple). No other issues or concerns reported or observed. Nursing will continue to monitor.

## 2021-04-18 LAB
HCV AB SERPL QL IA: NONREACTIVE
HIV 1+2 AB+HIV1 P24 AG SERPL QL IA: NONREACTIVE

## 2021-04-18 PROCEDURE — 250N000013 HC RX MED GY IP 250 OP 250 PS 637: Performed by: CLINICAL NURSE SPECIALIST

## 2021-04-18 PROCEDURE — G0177 OPPS/PHP; TRAIN & EDUC SERV: HCPCS

## 2021-04-18 PROCEDURE — 250N000013 HC RX MED GY IP 250 OP 250 PS 637: Performed by: PSYCHIATRY & NEUROLOGY

## 2021-04-18 PROCEDURE — 250N000013 HC RX MED GY IP 250 OP 250 PS 637: Performed by: PHYSICIAN ASSISTANT

## 2021-04-18 PROCEDURE — 250N000013 HC RX MED GY IP 250 OP 250 PS 637: Performed by: NURSE PRACTITIONER

## 2021-04-18 PROCEDURE — 124N000002 HC R&B MH UMMC

## 2021-04-18 RX ADMIN — HYDROXYZINE HYDROCHLORIDE 25 MG: 25 TABLET, FILM COATED ORAL at 20:48

## 2021-04-18 RX ADMIN — AMOXICILLIN AND CLAVULANATE POTASSIUM 1 TABLET: 875; 125 TABLET, FILM COATED ORAL at 08:15

## 2021-04-18 RX ADMIN — ACETAMINOPHEN 650 MG: 325 TABLET, FILM COATED ORAL at 19:22

## 2021-04-18 RX ADMIN — AMOXICILLIN AND CLAVULANATE POTASSIUM 1 TABLET: 875; 125 TABLET, FILM COATED ORAL at 19:22

## 2021-04-18 RX ADMIN — PRENATAL VITAMINS-IRON FUMARATE 27 MG IRON-FOLIC ACID 0.8 MG TABLET 1 TABLET: at 08:15

## 2021-04-18 RX ADMIN — ACETAMINOPHEN 650 MG: 325 TABLET, FILM COATED ORAL at 10:34

## 2021-04-18 RX ADMIN — Medication 5 MG: at 19:22

## 2021-04-18 RX ADMIN — LORATADINE 10 MG: 10 TABLET ORAL at 08:15

## 2021-04-18 ASSESSMENT — ACTIVITIES OF DAILY LIVING (ADL)
DRESS: INDEPENDENT
ORAL_HYGIENE: INDEPENDENT
HYGIENE/GROOMING: INDEPENDENT
DRESS: INDEPENDENT
HYGIENE/GROOMING: INDEPENDENT
LAUNDRY: WITH SUPERVISION
LAUNDRY: WITH SUPERVISION
ORAL_HYGIENE: INDEPENDENT

## 2021-04-18 NOTE — PLAN OF CARE
"..Pt was visible in milieu, watching movies and eating in the lounge, social with select peers. Affect was full range. Mood was calm. Pt ate meals and was compliant with medications. Pt endorses AH, no SI/SIB noted. Pt asked staff when her \"out day\" would be, referring to when she would be discharged. Pt was reminded that she is on a hold and is going through the commitment process, but she could discuss this with the provider on Monday. Will continue to monitor.   "

## 2021-04-18 NOTE — PLAN OF CARE
"Patient played chess, and watched movie, with peers in [facility] until 2030 and went to bed \"tired\" (PRN melatonin, increased to 5 mg, given). Patient was med-compliant, eating 100% and cooperative. Patient was on phone with mother and believes \"my sister wants my baby\" will be the solution to current pregnancy. No cough complained of or observed.     Patient denies auditory/visual hallucinations. Patient denies anxiety/depression and states \"I feel better.    Patient denies SI/SIB.    /73   Pulse 87   Temp 98  F (36.7  C) (Oral)   Resp 16   Wt 67.1 kg (148 lb)   SpO2 (!) 84%   BMI 23.18 kg/m  . No complaints of pain.    Nursing will continue to monitor.    "

## 2021-04-19 ENCOUNTER — ANESTHESIA EVENT (OUTPATIENT)
Dept: BEHAVIORAL HEALTH | Facility: CLINIC | Age: 31
End: 2021-04-19

## 2021-04-19 PROBLEM — F31.9 BIPOLAR DISORDER (H): Status: ACTIVE | Noted: 2021-04-19

## 2021-04-19 PROBLEM — Z64.0 UNWANTED PREGNANCY WITH PLANS FOR TERMINATION: Status: ACTIVE | Noted: 2021-04-14

## 2021-04-19 PROCEDURE — 250N000013 HC RX MED GY IP 250 OP 250 PS 637: Performed by: PHYSICIAN ASSISTANT

## 2021-04-19 PROCEDURE — 250N000013 HC RX MED GY IP 250 OP 250 PS 637: Performed by: PSYCHIATRY & NEUROLOGY

## 2021-04-19 PROCEDURE — 250N000013 HC RX MED GY IP 250 OP 250 PS 637: Performed by: NURSE PRACTITIONER

## 2021-04-19 PROCEDURE — 250N000013 HC RX MED GY IP 250 OP 250 PS 637: Performed by: CLINICAL NURSE SPECIALIST

## 2021-04-19 PROCEDURE — 99233 SBSQ HOSP IP/OBS HIGH 50: CPT | Performed by: NURSE PRACTITIONER

## 2021-04-19 PROCEDURE — 124N000002 HC R&B MH UMMC

## 2021-04-19 RX ADMIN — LORATADINE 10 MG: 10 TABLET ORAL at 08:29

## 2021-04-19 RX ADMIN — ACETAMINOPHEN 650 MG: 325 TABLET, FILM COATED ORAL at 17:41

## 2021-04-19 RX ADMIN — AMOXICILLIN AND CLAVULANATE POTASSIUM 1 TABLET: 875; 125 TABLET, FILM COATED ORAL at 19:57

## 2021-04-19 RX ADMIN — HYDROXYZINE HYDROCHLORIDE 25 MG: 25 TABLET, FILM COATED ORAL at 20:00

## 2021-04-19 RX ADMIN — PRENATAL VITAMINS-IRON FUMARATE 27 MG IRON-FOLIC ACID 0.8 MG TABLET 1 TABLET: at 08:29

## 2021-04-19 RX ADMIN — Medication 5 MG: at 20:00

## 2021-04-19 RX ADMIN — HYDROXYZINE HYDROCHLORIDE 25 MG: 25 TABLET, FILM COATED ORAL at 02:16

## 2021-04-19 RX ADMIN — AMOXICILLIN AND CLAVULANATE POTASSIUM 1 TABLET: 875; 125 TABLET, FILM COATED ORAL at 08:29

## 2021-04-19 ASSESSMENT — ACTIVITIES OF DAILY LIVING (ADL)
DRESS: INDEPENDENT
DRESS: INDEPENDENT
HYGIENE/GROOMING: INDEPENDENT
ORAL_HYGIENE: INDEPENDENT
LAUNDRY: WITH SUPERVISION
LAUNDRY: WITH SUPERVISION
ORAL_HYGIENE: INDEPENDENT
HYGIENE/GROOMING: INDEPENDENT

## 2021-04-19 NOTE — PLAN OF CARE
Patient up x2 since the beginning of the shift, requesting snacks and additional to assist with sleep and snacks. During the first encounter (at approximately 0000), it was explained she did not have any additional medications for sleep, but was provided with snacks. During the second interaction, (at approximately 0215), patient requested hydroxyzine and another snack, both of which she received (see MAR) and went back to bed afterwards. Currently in her room. Will continue to monitor.

## 2021-04-19 NOTE — PROGRESS NOTES
Initially seen by OT on this date.  participated in OT clinic this evening, where she initiated a chosen project (painted plaque), followed through with plan, and asked for support with supplies as needed. Focused on task. Quiet and agreeable. Was given a written-self assessment, has not yet completed it. To be revisited upon increased group attendance. More observation needed to complete initial evaluation at this time.      04/18/21 2000   Occupational Therapy   Type of Intervention structured groups   Response Initiates, socially acceptable   Hours 1
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
## 2021-04-19 NOTE — PLAN OF CARE
Pt. willing to engage with staff for a short 1:1.  Pt. states that she wants to be discharged today.  The pt. realizes that she is on a 72 hold till .  Pt. denies suicidal ideation, self-injurious behavior, any hallucinations.  Pt. states that her thoughts are clear and reality based.  Pt. states that she will be living with friends in a tent, that it is safe for her.  Pt. also states that she plans on having an  at [facility] when she leaves the hospital.  Pt. Seems to have minimal insight into her mental health and chemical dependency issues.

## 2021-04-19 NOTE — PLAN OF CARE
Pt slept 6 hours. Awake requesting snack several times and received prn medication during shift, see prev. Notes and MAR.  No safety issues reported. Will continue to monitor.

## 2021-04-19 NOTE — PLAN OF CARE
Problem: Adult Inpatient Plan of Care  Goal: Plan of Care Review  Outcome: No Change     Denied pain, anxiety and depression. Denied SI and said she is not hearing voices. Said she would like to know when she is able to leave the hospital. Watching a movie in the milieu, calm with a blunted affect. Continues on SIB and suicide precautions.

## 2021-04-19 NOTE — CONSULTS
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 panel     Status: Abnormal   Result Value Ref Range    Sodium 136 133 - 144 mmol/L    Potassium 3.7 3.4 - 5.3 mmol/L    Chloride 105 94 - 109 mmol/L    Carbon Dioxide 24 20 - 32 mmol/L    Anion Gap 7 3 - 14 mmol/L    Glucose 114 (H) 70 - 99 mg/dL    Urea Nitrogen 12 7 - 30 mg/dL    Creatinine 0.79 0.52 - 1.04 mg/dL    GFR Estimate >90 >60 mL/min/[1.73_m2]    GFR Estimate If Black >90 >60 mL/min/[1.73_m2]    Calcium 8.8 8.5 - 10.1 mg/dL    Bilirubin Total 0.5 0.2 - 1.3 mg/dL    Albumin 3.3 (L) 3.4 - 5.0 g/dL    Protein Total 7.3 6.8 - 8.8 g/dL    Alkaline Phosphatase 51 40 - 150 U/L    ALT 21 0 - 50 U/L    AST 17 0 - 45 U/L   Lipase     Status: None   Result Value Ref Range    Lipase 101 73 - 393 U/L   HCG quantitative pregnancy (blood)     Status: Abnormal   Result Value Ref Range    HCG Quantitative Serum 130,371 (H) 0 - 5 IU/L   UA reflex to Microscopic and Culture     Status: Abnormal    Specimen: Urine clean catch; Nasopharyngeal   Result Value Ref Range    Color Urine Light Yellow     Appearance Urine Slightly Cloudy     Glucose Urine Negative NEG^Negative mg/dL     Bilirubin Urine Negative NEG^Negative    Ketones Urine 40 (A) NEG^Negative mg/dL    Specific Gravity Urine 1.020 1.003 - 1.035    Blood Urine Negative NEG^Negative    pH Urine 5.5 5.0 - 7.0 pH    Protein Albumin Urine 10 (A) NEG^Negative mg/dL    Urobilinogen mg/dL Normal 0.0 - 2.0 mg/dL    Nitrite Urine Negative NEG^Negative    Leukocyte Esterase Urine Large (A) NEG^Negative    Source Nasopharyngeal     RBC Urine 20 (H) 0 - 2 /HPF    WBC Urine 50 (H) 0 - 5 /HPF    Bacteria Urine Moderate (A) NEG^Negative /HPF    Squamous Epithelial /HPF Urine 15 (H) 0 - 1 /HPF    Mucous Urine Present (A) NEG^Negative /LPF   Drug abuse screen 6 urine (chem dep)     Status: Abnormal   Result Value Ref Range    Amphetamine Qual Urine Positive (A) NEG^Negative    Barbiturates Qual Urine Negative NEG^Negative    Benzodiazepine Qual Urine Negative NEG^Negative    Cannabinoids Qual Urine Positive (A) NEG^Negative    Cocaine Qual Urine Positive (A) NEG^Negative    Ethanol Qual Urine Negative NEG^Negative    Opiates Qualitative Urine Negative NEG^Negative   Asymptomatic Influenza A/B & SARS-CoV2 (COVID-19) Virus PCR   Multiplex     Status: None    Specimen: Nasopharyngeal   Result Value Ref Range    Flu A/B & SARS-COV-2 PCR Source Nasopharyngeal     SARS-CoV-2 PCR Result NEGATIVE     Influenza A PCR Negative NEG^Negative    Influenza B PCR Negative NEG^Negative    Respiratory Syncytial Virus PCR (Note)     Flu A/B & SARS-CoV-2 PCR Comment (Note)    CBC with platelets differential     Status: Abnormal   Result Value Ref Range    WBC 8.9 4.0 - 11.0 10e9/L    RBC Count 3.32 (L) 3.8 - 5.2 10e12/L    Hemoglobin 10.2 (L) 11.7 - 15.7 g/dL    Hematocrit 29.9 (L) 35.0 - 47.0 %    MCV 90 78 - 100 fl    MCH 30.7 26.5 - 33.0 pg    MCHC 34.1 31.5 - 36.5 g/dL    RDW 13.6 10.0 - 15.0 %    Platelet Count 335 150 - 450 10e9/L    Diff Method Automated Method     % Neutrophils 68.7 %    % Lymphocytes 22.8 %    % Monocytes 7.5 %    % Eosinophils 0.2 %    %  Basophils 0.2 %    % Immature Granulocytes 0.6 %    Nucleated RBCs 0 0 /100    Absolute Neutrophil 6.1 1.6 - 8.3 10e9/L    Absolute Lymphocytes 2.0 0.8 - 5.3 10e9/L    Absolute Monocytes 0.7 0.0 - 1.3 10e9/L    Absolute Eosinophils 0.0 0.0 - 0.7 10e9/L    Absolute Basophils 0.0 0.0 - 0.2 10e9/L    Abs Immature Granulocytes 0.1 0 - 0.4 10e9/L    Absolute Nucleated RBC 0.0    Urine Culture Aerobic Bacterial     Status: None    Specimen: Unspecified Urine   Result Value Ref Range    Specimen Description Unspecified Urine     Special Requests Specimen received in preservative     Culture Micro       10,000 to 50,000 colonies/mL  mixed urogenital abiola  Susceptibility testing not routinely done          Assessment/Plan:   Queen Brittany Stack is a 30 year old  at 12w4d with   suicidal ideation and bipolar type 1 with psychosis and desire to     terminate pregnancy. Discussed with the primary team that   terminating the pregnancy would still be an option at this time.   We request that the team notify us when the patient is stable   from a psychiatric perspective and appears to be able to consent   to surgical intervention for termination of pregnancy. The   primary care team also notified us that the patient is homeless   and would like the most financially reasonable option for   termination of pregnancy. We will discuss options for termination     including here at [facility] as well as at [facility] to allow   the patient to make an informed decision regarding procedural   costs. Finally, the primary team asked about appropriate UTI   treatment given the UA done in the emergency department. I   recommended keflex as a reasonable antibiotic for UTI in   pregnancy though urine culture will help tailor antibiotic   coverage. Based on the urine culture results, we would not   recommend antibiotic treatment at this time.     #Termination of Pregnancy  - Recommend that psychiatry page gynecology once the patient is    stable and able to consent to a surgical procedure  - Will discuss options for termination including locations and   financial circumstance of each location    #UTI   - Treatment not necessary at this time based on the the bacterial     colony being mixed urogenital abiola likely intrinsic to the   patient's urogenital tract.      Palmer Styles MD  OBGYN PGY-1  April 15, 2021 3:40 PM     Urine Culture Aerobic Bacterial     Status: None    Specimen: Unspecified Urine   Result Value Ref Range    Specimen Description Unspecified Urine     Special Requests Specimen received in preservative     Culture Micro       10,000 to 50,000 colonies/mL  mixed urogenital abiola  Susceptibility testing not routinely done          Assessment/Plan:   Queen Brittany Stack is a 30 year old  at 12w4d with   suicidal ideation and bipolar type 1 with psychosis and desire to   terminate pregnancy. Discussed with the primary team that   terminating the pregnancy would still be an option at this time.   We request that the team notify us when the patient is stable   from a psychiatric perspective and appears to be able to consent   to surgical intervention for termination of pregnancy. The   primary care team also notified us that the patient is homeless   and would like the most financially reasonable option for   termination of pregnancy. We will discuss options for termination   including here at [facility] as well as at [facility] to allow   the patient to make an informed decision regarding procedural   costs. Finally, the primary team asked about appropriate UTI   treatment given the UA done in the emergency department. I   recommended keflex as a reasonable antibiotic for UTI in   pregnancy though urine culture will help tailor antibiotic   coverage. Based on the urine culture results, we would not   recommend antibiotic treatment at this time.     #Termination of Pregnancy  - Recommend that psychiatry page gynecology once the  "patient is   stable and able to consent to a surgical procedure  - Will discuss options for termination including locations and   financial circumstance of each location    #UTI   - Treatment not necessary at this time based on the the bacterial   colony being mixed urogenital abiola likely intrinsic to the   patient's urogenital tract.      Palmer Styles MD  OBGYN PGY-1  April 15, 2021 3:40 PM    Patient was reviewed with me, assessment and plan made jointly.   Rosita Mueller MD     Internal Medicine Adult IP Consult for [facility] in [facility]: Patient to be seen: Routine within 24 hrs; Call back #: [id number]; Left ear pain x 3 days.  \"Sharp, crushing\" sensation worse when she breathes.  Stated it feels like foreign objec...     Status: None ()    Narrative    Tri Coley PA     4/16/2021  2:29 PM  [facility]  Consult Note - Hospitalist Service     Date of Admission:  4/14/2021  Consult Requested by: Soo WOODARD CNP    Reason for Consult: Ear pain     Assessment & Plan   Queen Brittany Stack is a 30 year old female admitted on   4/14/2021. She has a history of bipolar 1, bipolar affective,   MDD, HSV, PID, asthma, polysubstance abuse, currently pregnant   who is admitted to [facility] for SI currently on 72 hours hold.   Medicine was asked to consult for left ear pain.     Otitis media   Eustation tube dysfunction s/s seasonal allergies   Patient has 1-3 days history of left ear shooting pain with   associated congestion and itchy eyes. Reports hx of seasonal   allergies, does not take any medication for allergies PTA. On   exam, left ear with fluid noted in the middle ear, TM intact with   erythema of inferior portion.    - Start loratadine 10 mg daily for seasonal allergies   - Augmentin 875 BID X 7 days for treatment of otitis media   (pregnancy class B and discussed with OB)   - Please notify medicine if any worsening pain or pain not "   resolved upon completion of abx     Abnormal UA   - Agree that treatment is not necessary based on urine culture     Pregnancy   - OB/gyn consulted appreciate assistance     Bipolar 1  Bipolar affective  MDD  SI   - Management per psychiatry      Currently patient is medically stable and medicine will sign off.   Thank you for allowing us to be a part of this patients care.   Please notify on call JENELLE if any intercurrent medical issues   arise.       ANALY Tafoya  [facility]  Contact information available via [facility] Paging/Directory    __________________________________________________________________  ____    Chief Complaint   Ear pain     History is obtained from the patient    History of Present Illness   Queen Brittany Stack is a 30 year old female who has a history   of bipolar 1, bipolar affective, MDD, HSV, PID, asthma,   polysubstance abuse, currently pregnant who is admitted to   [facility] for SI currently on 72 hours hold. Medicine was asked   to consult for left ear pain. Patient is a poor historian.   Reports left ear pain starting yesterday with associated itchy   eyes and rhinorrhea. Notes pain as sharp. Denies fevers, chills,   chest pain, SOB, trauma to ear, sore throat, dysphagia.     Review of Systems   The 5 point Review of Systems is negative other than noted in the   HPI.     Past Medical History    I have reviewed this patient's medical history and updated it   with pertinent information if needed.   Past Medical History:   Diagnosis Date     Abnormal Pap smear     2010, f/u normal     Anxiety      Asthma     no ihaler use     Bipolar 1 disorder (H)      Bipolar affective (H)      Fainting spell      Herpes simplex without mention of complication     Pt reports last outbreak about 4 weeks ago     Major depressive disorder      Pelvic inflammatory disease      Polysubstance abuse (H)        Past Surgical History   I have reviewed this  patient's surgical history and updated it   with pertinent information if needed.  Past Surgical History:   Procedure Laterality Date     NO HISTORY OF SURGERY         Social History   I have reviewed this patient's social history and updated it with   pertinent information if needed.  Social History     Tobacco Use     Smoking status: Current Every Day Smoker     Packs/day: 0.25     Years: 8.00     Pack years: 2.00     Types: Cigarettes     Smokeless tobacco: Never Used   Substance Use Topics     Alcohol use: No     Comment: none     Drug use: Yes     Types: Marijuana     Comment: last smoke yest.       Family History   I have reviewed this patient's family history and updated it with   pertinent information if needed.  Family History   Problem Relation Age of Onset     Unknown/Adopted Mother      Unknown/Adopted Father      Unknown/Adopted Maternal Grandmother      Unknown/Adopted Maternal Grandfather      Unknown/Adopted Paternal Grandmother      Unknown/Adopted Paternal Grandfather        Medications   Medications Prior to Admission   Medication Sig Dispense Refill Last Dose     cefuroxime (CEFTIN) 500 MG tablet Take 500 mg by mouth 2 times   daily for 7 days   Not yet started     ondansetron (ZOFRAN-ODT) 4 MG ODT tab Take 4 mg by mouth every   8 hours as needed for nausea or vomiting   PRN       Allergies   Allergies   Allergen Reactions     Bees Anaphylaxis       Physical Exam   Vital Signs: Temp: 98  F (36.7  C) Temp src: Oral BP: 103/71   Pulse: 101   Resp: 16       Weight: 148 lbs 0 oz  GENERAL: Alert and oriented x 3. NAD.   HEENT: Anicteric sclera. PERRL. Mucous membranes moist and   without lesions.   LEFT EAR: Non tender to palpation of outer ear, TM intact, fluid   present posterior to ear drum, erythema at inferior portion of   TM. RIGHT EAR: Minimal fluid present posterior to TM, no   erythema.   RESPIRATORY: Effort normal on RA.    SKIN: No jaundice. No rashes.         Data   Results for orders  placed or performed during the hospital   encounter of 04/14/21 (from the past 24 hour(s))   TSH with free T4 reflex and/or T3 as indicated   Result Value Ref Range    TSH 0.29 (L) 0.40 - 4.00 mU/L   Lipid panel   Result Value Ref Range    Cholesterol 130 <200 mg/dL    Triglycerides 108 <150 mg/dL    HDL Cholesterol 81 >49 mg/dL    LDL Cholesterol Calculated 27 <100 mg/dL    Non HDL Cholesterol 49 <130 mg/dL   CBC with platelets differential   Result Value Ref Range    WBC 11.0 4.0 - 11.0 10e9/L    RBC Count 3.95 3.8 - 5.2 10e12/L    Hemoglobin 12.0 11.7 - 15.7 g/dL    Hematocrit 35.8 35.0 - 47.0 %    MCV 91 78 - 100 fl    MCH 30.4 26.5 - 33.0 pg    MCHC 33.5 31.5 - 36.5 g/dL    RDW 13.4 10.0 - 15.0 %    Platelet Count 365 150 - 450 10e9/L    Diff Method Automated Method     % Neutrophils 78.6 %    % Lymphocytes 16.8 %    % Monocytes 3.8 %    % Eosinophils 0.2 %    % Basophils 0.2 %    % Immature Granulocytes 0.4 %    Nucleated RBCs 0 0 /100    Absolute Neutrophil 8.7 (H) 1.6 - 8.3 10e9/L    Absolute Lymphocytes 1.9 0.8 - 5.3 10e9/L    Absolute Monocytes 0.4 0.0 - 1.3 10e9/L    Absolute Eosinophils 0.0 0.0 - 0.7 10e9/L    Absolute Basophils 0.0 0.0 - 0.2 10e9/L    Abs Immature Granulocytes 0.0 0 - 0.4 10e9/L    Absolute Nucleated RBC 0.0    ABO/Rh type and screen   Result Value Ref Range    ABO A     RH(D) Pos     Antibody Screen Neg     Test Valid Only At          [facility]    Specimen Expires 04/19/2021    T4 free   Result Value Ref Range    T4 Free 0.92 0.76 - 1.46 ng/dL      ABO/Rh type and screen     Status: None   Result Value Ref Range    ABO A     RH(D) Pos     Antibody Screen Neg     Test Valid Only At          [facility]    Specimen Expires 04/19/2021    Urine Culture Aerobic Bacterial     Status: None    Specimen: Unspecified Urine   Result Value Ref Range    Specimen Description Unspecified Urine     Special  Requests Specimen received in preservative     Culture Micro       10,000 to 50,000 colonies/mL  mixed urogenital abiola  Susceptibility testing not routinely done     Neisseria gonorrhoea PCR     Status: None    Specimen: Midstream Urine   Result Value Ref Range    Specimen Descrip Midstream Urine     N Gonorrhea PCR Negative NEG^Negative   Chlamydia trachomatis PCR     Status: None    Specimen: Midstream Urine   Result Value Ref Range    Specimen Description Midstream Urine     Chlamydia Trachomatis PCR Negative NEG^Negative        Assessment/Plan:    Queen Brittany Stack is a 30 year old  at 13w1d by 10w2d US with history of bipolar 1, bipolar affective, MDD, HSV, PID, asthma, and  polysubstance abuse being consulted for termination of pregnancy.    #Termination of Pregnancy  - Discussed with patient that at this gestational age we would recommend dilation and suction curettage for termination of pregnancy  - The patient was clearly able to discuss the risks, benefits, and alternatives to the procedure including continuing the pregnancy and the possibility of adoption. The patient recognized that the risks of the procedure increased with increasing gestational age.  - The patient has significant financial concerns and we discussed the possibility of termination as an inpatient here at [facility] vs. At [facility]. Though [facility] significantly [facility], the patient's primary team is unclear as to whether she would be permitted to pursue an outpatient procedure pending her inpatient hold/comittment.  - We will discuss the Women's Right to Know paperwork in preparation of termination at either [facility] or [facility]. The paperwork was signed on 21 at 1100.  - The patient stated that following the procedure she would like Depot Provera for contraception  - If either location is determined feasible, we will proceed with pre-operative labs including a hemoglobin and type  and screen      Discussed with Dr. Narinder Styles MD  OBGYN PGY-1  April 19, 2021 11:21 AM    The patient was reviewed with Dr. Styles and the patient's primary psychiatry team.  She is firm in her desire for pregnancy termination.  Her psychiatry team has had some concerns about her decision making since she was admitted though she has been improving.  They would like to wait until her civil commitment hearings are done, probably late next week before her procedure.  I will attempt to get her scheduled next Friday 4/30/2021.  The primary team will let me/the gyn team know if they need more time to clear her before surgery.     Virginia Barcenas MD, FACOG

## 2021-04-19 NOTE — PROGRESS NOTES
SPIRITUAL HEALTH SERVICES  SPIRITUAL ASSESSMENT Progress Note  [facility]     REFERRAL SOURCE: Consult    The patient requested a Bible, which I brought for her and left with the nurses station as patient was sleeping.    PLAN: SHS remains available per pt/request.    Karla Lee  Chaplain Resident  Pager: [phone]

## 2021-04-19 NOTE — PLAN OF CARE
"  Problem: Activity and Energy Impairment (Depressive Signs/Symptoms)  Goal: Optimized Energy Level (Depressive Signs/Symptoms)  Outcome: Improving     Pt had a good shift. She spent most of her time in the lounge, socializing appropriately with staff and peers. Her affect was pleasant, her mood calm. Pt endorses \"a little bit\" of anxiety. She denies SI/SIB. She states \"the voices have quieted down.\" Pt states she feels ready to discharge, however there is no plan for her to discharge as she is in the commitment process. Pt has been telling staff and peers that she will be discharging tomorrow. Writer asked pt if she had been told that she would discharge, she replied \"It's just a strong hope. They said they'd know more by 3,\" with crossed fingers.    Prns: tylenol for jaw pain, melatonin for sleep, and hydroxyzine 25 mg for anxiety and sleep.  "

## 2021-04-20 ENCOUNTER — ANESTHESIA (OUTPATIENT)
Dept: BEHAVIORAL HEALTH | Facility: CLINIC | Age: 31
End: 2021-04-20

## 2021-04-20 PROCEDURE — 250N000013 HC RX MED GY IP 250 OP 250 PS 637: Performed by: NURSE PRACTITIONER

## 2021-04-20 PROCEDURE — 250N000013 HC RX MED GY IP 250 OP 250 PS 637: Performed by: CLINICAL NURSE SPECIALIST

## 2021-04-20 PROCEDURE — 124N000002 HC R&B MH UMMC

## 2021-04-20 PROCEDURE — 250N000013 HC RX MED GY IP 250 OP 250 PS 637: Performed by: PHYSICIAN ASSISTANT

## 2021-04-20 PROCEDURE — 99233 SBSQ HOSP IP/OBS HIGH 50: CPT | Performed by: NURSE PRACTITIONER

## 2021-04-20 PROCEDURE — 250N000013 HC RX MED GY IP 250 OP 250 PS 637: Performed by: PSYCHIATRY & NEUROLOGY

## 2021-04-20 RX ORDER — ARIPIPRAZOLE 2 MG/1
2 TABLET ORAL AT BEDTIME
Status: DISCONTINUED | OUTPATIENT
Start: 2021-04-20 | End: 2021-04-28

## 2021-04-20 RX ADMIN — AMOXICILLIN AND CLAVULANATE POTASSIUM 1 TABLET: 875; 125 TABLET, FILM COATED ORAL at 08:36

## 2021-04-20 RX ADMIN — LORATADINE 10 MG: 10 TABLET ORAL at 08:36

## 2021-04-20 RX ADMIN — PRENATAL VITAMINS-IRON FUMARATE 27 MG IRON-FOLIC ACID 0.8 MG TABLET 1 TABLET: at 08:36

## 2021-04-20 RX ADMIN — Medication 5 MG: at 21:23

## 2021-04-20 RX ADMIN — HYDROXYZINE HYDROCHLORIDE 25 MG: 25 TABLET, FILM COATED ORAL at 21:23

## 2021-04-20 RX ADMIN — AMOXICILLIN AND CLAVULANATE POTASSIUM 1 TABLET: 875; 125 TABLET, FILM COATED ORAL at 20:13

## 2021-04-20 ASSESSMENT — ACTIVITIES OF DAILY LIVING (ADL)
LAUNDRY: WITH SUPERVISION
DRESS: INDEPENDENT
ORAL_HYGIENE: INDEPENDENT
HYGIENE/GROOMING: HANDWASHING;INDEPENDENT
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
## 2021-04-20 NOTE — PLAN OF CARE
"Pt is calm, pleasant and cooperative. She states her mood is \"pretty good.\" See PCS for shift asmnt; also provider not re canceled procedure.   "

## 2021-04-20 NOTE — PROGRESS NOTES
Pt had a brief epidode of being upset, when told she was not going to have her D and C, today. She was swearing, and threatening to harm her NP. Pt spoke with PA, and was calm shortly after.

## 2021-04-20 NOTE — CONSULTS
Ethics Consultation        Date: 21    Time:  1400     Attending physician: Vicky Jackman, Psych NP     Consult requested by: Vicky Jackman     Reason for consult: 13+ weeks pregnant and requests to terminate pregnancy, however she is on a court hold in the process of MICD commitment due to delusional thoughts pertaining to pregnancy and suspected of attempting to self-induce .       Participants in Consult:      Jaskaran Jackman     Decisional capacity: None    Advance directive: None       Background: (Medical)      is 13 weeks pregnant  Admission at [facility] - for vaginal bleeding. OB/GYN repaired 1 inch deep laceration on vaginal wall. Medical team was concerned about a self-attempted      Social:     Diagnosed Bipolar disorder, suicidal ideation, auditory hallucinations. Not currently on psychotropic medication.  Pursuing MICD commitment  Stated desire to terminate the pregnancy        Ethical Issue: The appropriateness of terminating a pregnancy in an incapacitated pregnant patient        Ethics Analysis:  Typically, a decision to terminate a pregnancy is made by a patient in conversation with her provider and possibly her partner. However, this standard approach cannot take place when a patient lacks decision-making capacity. When a patient lacks decision-making capacity, she cannot decide for herself whether or not to pursue a medical treatment. In this case, we rely on surrogate decision-makers to make medical decisions for the patient. We hold surrogates to the substituted judgment standard - the surrogate must make a decision that would best align with what the patient would have wanted if the patient were decisional.    In this case,  is not able to make her own medical decisions but has requested an . Her mother has been acting as her surrogate decision-maker and has, in the past, demonstrated that she can make decisions for Queen  that reflect what  would have wanted if she could decide for herself. By relying on the typical process of surrogate decision-making, we can respect  s autonomy and take into account her values when it comes to reproductive decisions. This recommendation is in line with the ethics literature (see Angelique et al.)        Recommendations:     We should proceed under the normal process of surrogate decision-making. The medical team should talk to  s mother (her surrogate decision-maker) to ask what  would have wanted if she could make a decision for herself. If her mother agrees that Fabiola current wishes to terminate the pregnancy align with what she would have wanted if she were decisional, then it is acceptable to proceed with the termination.    References: Gabriella Merino, Brissa Muniz, and Jesús Monge,  Decision?Making for an Incapacitated Pregnant Patient,  [facility], [place] Report 47, no. 4 (2017): 12?15. DOI: [email or web address]/hast.734    Thank you for including me in this patient s care. If any further questions arise feel free to contact me via the ethics pager.    Jaskaran Farmer  Pager [phone]

## 2021-04-20 NOTE — PROGRESS NOTES
Pt up once requesting snack. Discussed with pt procedure today and pt agreed to be NPO for procedure today.Requested breakfast be saved for her if procedure moved to am time. Pt slept 7 hours.

## 2021-04-21 PROCEDURE — G0177 OPPS/PHP; TRAIN & EDUC SERV: HCPCS

## 2021-04-21 PROCEDURE — 99233 SBSQ HOSP IP/OBS HIGH 50: CPT | Performed by: NURSE PRACTITIONER

## 2021-04-21 PROCEDURE — 250N000013 HC RX MED GY IP 250 OP 250 PS 637: Performed by: NURSE PRACTITIONER

## 2021-04-21 PROCEDURE — 250N000013 HC RX MED GY IP 250 OP 250 PS 637: Performed by: PHYSICIAN ASSISTANT

## 2021-04-21 PROCEDURE — 124N000002 HC R&B MH UMMC

## 2021-04-21 PROCEDURE — 250N000013 HC RX MED GY IP 250 OP 250 PS 637: Performed by: PSYCHIATRY & NEUROLOGY

## 2021-04-21 RX ORDER — TRAZODONE HYDROCHLORIDE 50 MG/1
50 TABLET, FILM COATED ORAL AT BEDTIME
Status: DISCONTINUED | OUTPATIENT
Start: 2021-04-21 | End: 2021-04-22

## 2021-04-21 RX ORDER — TRAZODONE HYDROCHLORIDE 50 MG/1
50 TABLET, FILM COATED ORAL
Status: DISCONTINUED | OUTPATIENT
Start: 2021-04-21 | End: 2021-04-22

## 2021-04-21 RX ADMIN — AMOXICILLIN AND CLAVULANATE POTASSIUM 1 TABLET: 875; 125 TABLET, FILM COATED ORAL at 19:10

## 2021-04-21 RX ADMIN — PRENATAL VITAMINS-IRON FUMARATE 27 MG IRON-FOLIC ACID 0.8 MG TABLET 1 TABLET: at 09:30

## 2021-04-21 RX ADMIN — TRAZODONE HYDROCHLORIDE 50 MG: 50 TABLET ORAL at 19:12

## 2021-04-21 RX ADMIN — ACETAMINOPHEN 650 MG: 325 TABLET, FILM COATED ORAL at 14:37

## 2021-04-21 RX ADMIN — LORATADINE 10 MG: 10 TABLET ORAL at 09:30

## 2021-04-21 RX ADMIN — AMOXICILLIN AND CLAVULANATE POTASSIUM 1 TABLET: 875; 125 TABLET, FILM COATED ORAL at 09:29

## 2021-04-21 RX ADMIN — HYDROXYZINE HYDROCHLORIDE 25 MG: 25 TABLET, FILM COATED ORAL at 17:16

## 2021-04-21 ASSESSMENT — ACTIVITIES OF DAILY LIVING (ADL)
HYGIENE/GROOMING: INDEPENDENT
LAUNDRY: WITH SUPERVISION
ORAL_HYGIENE: INDEPENDENT
DRESS: INDEPENDENT
DRESS: INDEPENDENT
ORAL_HYGIENE: INDEPENDENT
HYGIENE/GROOMING: INDEPENDENT
LAUNDRY: WITH SUPERVISION

## 2021-04-21 NOTE — PLAN OF CARE
Pt. Willing to engage in a 1:1 with staff.  Pt. States that she is doing well overall; pt. States that physically she feels ok,  that her thoughts are clear, and emotionally feels stable.  Pt. States that she would like help in finding housing; if there is no housing, then she is willing to go to a shelter. The pt. states that she has been involved with [facility] homeless program.   Pt.  also states that she has lived at a shelter before.  The pt.  States that she is not interested in chemical dependency treatment, that she has been to treatment before and does not believe it would be helpful to her currently. The pt. does state that she knows that she needs to stop using drugs.  In terms of support, the pt. states that she has her mother in [place] and some friends here in [place].  Overall the pt. seems to have minimal insight into her current mental health issues and current stressors.  The pt. was pleasant and cooperative; currently has no questions or requests from staff at this time.

## 2021-04-21 NOTE — PROGRESS NOTES
"Pt approached writer appearing upset and requested to talk about \"her procedure\".  Writer replied that we could talk in her room.  Pt appeared calm and asked when she will have her procedure.  I reminded her that she had spoken with a few people regarding this today.  She recalls meeting with medical provider and talking about being deemed incompetent to make the decision at this time because she is hospitalized for MH.  She is in agreement with having her mother advocate for her and afterwards spoke with her mother on the phone.  Pt did approach staff to state that she will not take Abilify.  She did recall having been on Abilify in the past without any s/e concerns but then stated that \"I don't want to take any meds.\"  \"I don't believe in medications now\".  I reminded her that she has been on Abilify in the past and it would help her bipolar symptoms if she were to resume it.  We also talked about taking medications being a responsible way to manage her Bipolar and a sign of good judgement.  Pt agreed to think about it later when her Abilify is to be administered.    "

## 2021-04-21 NOTE — PLAN OF CARE
Pt slept 7 hours. Up for snack at end of shift. No safety issues or concerns reported. Will continue to monitor.

## 2021-04-21 NOTE — PLAN OF CARE
Attended 2 OT Creative Expression Groups with 2 and 4 peers present x 45 minutes and 1 Coping Skills Group with 2 peers present x 25 minutes. Initiated creative expression task completion. Assertive in making needs known. Social with peer appropriately setting boundaries. Superficial and guarded regarding her issues. Was able to attend to details and plan simple task x 35 minutes at a time. Arrived to afternoon group a little late. Began writing in her journal rather than work on coping skills activity with the rest of the group. No contributions made to coping skills discussion.
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
## 2021-04-22 ENCOUNTER — TELEPHONE (OUTPATIENT)
Dept: OBGYN | Facility: CLINIC | Age: 31
End: 2021-04-22

## 2021-04-22 ENCOUNTER — PREP FOR PROCEDURE (OUTPATIENT)
Dept: OBGYN | Facility: CLINIC | Age: 31
End: 2021-04-22

## 2021-04-22 ENCOUNTER — ANESTHESIA EVENT (OUTPATIENT)
Dept: SURGERY | Facility: CLINIC | Age: 31
End: 2021-04-22

## 2021-04-22 ENCOUNTER — MEDICAL CORRESPONDENCE (OUTPATIENT)
Dept: HEALTH INFORMATION MANAGEMENT | Facility: CLINIC | Age: 31
End: 2021-04-22

## 2021-04-22 DIAGNOSIS — Z33.2 LEGAL TERMINATION OF PREGNANCY: Primary | ICD-10-CM

## 2021-04-22 LAB
LABORATORY COMMENT REPORT: NORMAL
SARS-COV-2 RNA RESP QL NAA+PROBE: NEGATIVE
SPECIMEN SOURCE: NORMAL

## 2021-04-22 PROCEDURE — G0177 OPPS/PHP; TRAIN & EDUC SERV: HCPCS

## 2021-04-22 PROCEDURE — 250N000013 HC RX MED GY IP 250 OP 250 PS 637: Performed by: NURSE PRACTITIONER

## 2021-04-22 PROCEDURE — 250N000013 HC RX MED GY IP 250 OP 250 PS 637: Performed by: PHYSICIAN ASSISTANT

## 2021-04-22 PROCEDURE — 250N000013 HC RX MED GY IP 250 OP 250 PS 637: Performed by: PSYCHIATRY & NEUROLOGY

## 2021-04-22 PROCEDURE — 124N000002 HC R&B MH UMMC

## 2021-04-22 PROCEDURE — 87635 SARS-COV-2 COVID-19 AMP PRB: CPT | Performed by: NURSE PRACTITIONER

## 2021-04-22 PROCEDURE — 99233 SBSQ HOSP IP/OBS HIGH 50: CPT | Performed by: NURSE PRACTITIONER

## 2021-04-22 RX ORDER — TRAZODONE HYDROCHLORIDE 150 MG/1
150 TABLET ORAL AT BEDTIME
Status: DISCONTINUED | OUTPATIENT
Start: 2021-04-22 | End: 2021-04-26

## 2021-04-22 RX ORDER — TRAZODONE HYDROCHLORIDE 150 MG/1
150 TABLET ORAL
Status: DISCONTINUED | OUTPATIENT
Start: 2021-04-22 | End: 2021-04-26

## 2021-04-22 RX ORDER — FLUTICASONE PROPIONATE 50 MCG
1 SPRAY, SUSPENSION (ML) NASAL DAILY
Status: DISCONTINUED | OUTPATIENT
Start: 2021-04-22 | End: 2021-04-30

## 2021-04-22 RX ORDER — MISOPROSTOL 200 UG/1
400 TABLET ORAL ONCE
Status: COMPLETED | OUTPATIENT
Start: 2021-04-23 | End: 2021-04-23

## 2021-04-22 RX ADMIN — FLUTICASONE PROPIONATE 1 SPRAY: 50 SPRAY, METERED NASAL at 17:30

## 2021-04-22 RX ADMIN — PRENATAL VITAMINS-IRON FUMARATE 27 MG IRON-FOLIC ACID 0.8 MG TABLET 1 TABLET: at 08:01

## 2021-04-22 RX ADMIN — TRAZODONE HYDROCHLORIDE 150 MG: 150 TABLET ORAL at 19:52

## 2021-04-22 RX ADMIN — HYDROXYZINE HYDROCHLORIDE 25 MG: 25 TABLET, FILM COATED ORAL at 02:33

## 2021-04-22 RX ADMIN — AMOXICILLIN AND CLAVULANATE POTASSIUM 1 TABLET: 875; 125 TABLET, FILM COATED ORAL at 08:01

## 2021-04-22 RX ADMIN — TRAZODONE HYDROCHLORIDE 50 MG: 50 TABLET ORAL at 02:33

## 2021-04-22 RX ADMIN — HYDROXYZINE HYDROCHLORIDE 25 MG: 25 TABLET, FILM COATED ORAL at 17:13

## 2021-04-22 RX ADMIN — AMOXICILLIN AND CLAVULANATE POTASSIUM 1 TABLET: 875; 125 TABLET, FILM COATED ORAL at 19:51

## 2021-04-22 RX ADMIN — LORATADINE 10 MG: 10 TABLET ORAL at 08:01

## 2021-04-22 ASSESSMENT — ACTIVITIES OF DAILY LIVING (ADL)
ORAL_HYGIENE: INDEPENDENT
DRESS: INDEPENDENT
LAUNDRY: WITH SUPERVISION
HYGIENE/GROOMING: INDEPENDENT

## 2021-04-22 NOTE — TELEPHONE ENCOUNTER
Call received from Dr. Mueller requesting scheduling assistance for D&C for pt 4/23 after 1030am. WRTK signed 4/22/21 at 10:30am    S termination checklist initiated and given to GREGORIA Marino to schedule if possible.

## 2021-04-22 NOTE — CONSULTS
Reviewed Ethics consult and case reviewed with Dr. Yates.     Per Ethics:   We should proceed under the normal process of surrogate decision-making. The medical team should talk to  s mother (her surrogate decision-maker) to ask what  would have wanted if she could make a decision for herself. If her mother agrees that Fabiola current wishes to terminate the pregnancy align with what she would have wanted if she were decisional, then it is acceptable to proceed with the termination.       Called patient's mother Rani to discuss her understanding of 's desires for pregnancy. She agrees that  would have wanted pregnancy termination and has stated prior to admission. She feels this is what  still wants.     24hr WRTK consent reviewed with Rani at 10:30 am on 4/22/2021. Will plan D&C in next few days- clinic working on scheduling.     Rosita Mueller MD

## 2021-04-22 NOTE — PROGRESS NOTES
CLINICAL NUTRITION SERVICES - REASSESSMENT NOTE     Nutrition Prescription    RECOMMENDATIONS FOR MDs/PROVIDERS TO ORDER:  None at this time    Malnutrition Status:    None noted     Recommendations already ordered by Registered Dietitian (RD):  Continue current diet and supplement orders    Future/Additional Recommendations:  RD will sign off at this time, but remains available if re-consulted.       EVALUATION OF THE PROGRESS TOWARD GOALS   Diet: Regular  Supplements and snacks: Ensure Enlive bid with meals  Intake: Good intake, 100% of meals, taking snacks       NEW FINDINGS   4/22: 72.7 kg  4/20: 71.94 kg  4/18: 71.5 kg  4/14: 67.1 kg (148 lb)    Weight trending up      MALNUTRITION  % Intake: Unable to assess  % Weight Loss: None noted  Subcutaneous Fat Loss: Unable to assess  Muscle Loss: Unable to assess  Fluid Accumulation/Edema: None noted  Malnutrition Diagnosis: Unable to assess as not nutrition/intake hx PTA available, will monitor and update as able     Previous Goals   Patient to consume % of nutritionally adequate meal trays TID, or the equivalent with supplements/snacks.  Evaluation: Met    Previous Nutrition Diagnosis  Increased nutrient needs (calorie, protein) related to gestation as evidenced by therapeutic recommended guidelines above     Evaluation: No longer applicable    CURRENT NUTRITION DIAGNOSIS  No nutrition diagnosis at this time    INTERVENTIONS  Implementation  Continue current diet and supplement orders    Goals  Patient to consume % of nutritionally adequate meal trays TID, or the equivalent with supplements/snacks.    Monitoring/Evaluation  RD will sign off at this time, but remains available if re-consulted.    Karla Parikh RD, LD  Unit Pager: [phone]  Weekend Pager: [phone]

## 2021-04-22 NOTE — TELEPHONE ENCOUNTER
Surgery scheduled 4/23 at 1045. Called and spoke with  staff to notify them that procedure was scheduled.     Emailed Jose La to review insurance coverage, awaiting reply.

## 2021-04-22 NOTE — PROGRESS NOTES
Pt woke up twice this shift. Requested/given prn trazodone and hydroxyzine. Pt slept 5.5 hrs. Will continue to monitor.

## 2021-04-22 NOTE — PLAN OF CARE
Attended 1 OT [employer] with 1 peer present x 35 minutes. Initiated group noting need to take in view to calm self. Reported she would be discharged today or tomorrow as there is no need for her to be here after . Denies any MH sx's or need for further tx. Did say she would take meds post discharge.

## 2021-04-22 NOTE — PROGRESS NOTES
Ob/Gyn team will sign off at this time as patient doesn't have decision making capacity per primary team. Please re-consult as needed regarding patient's plan to terminate the pregnancy or otherwise.    Discussed with Dr. Mueller.    Amy Schumer, MD   Ob/Gyn PGY-4

## 2021-04-22 NOTE — PROGRESS NOTES
"Pt spent most of the evening in the lounge watching tv.  Reports mood is \"happier\".  Affect is full ranging.  Denies SI/HI and hallucinations.  She approached staff for PRN Vistaril for anxiety.  Refused Abilify at bedtime.    "
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
## 2021-04-22 NOTE — PLAN OF CARE
..Pt was visible in milieu, minimally social with peers, napping in her room in the afternoon. Affect was full range. Mood was anxious. Pt ate meals and was compliant with medications. Pt will be having a procedure to terminate her pregnancy tomorrow and she is feeling positive about it. No SI/SIB noted. Will continue to monitor.

## 2021-04-23 ENCOUNTER — ANESTHESIA (OUTPATIENT)
Dept: SURGERY | Facility: CLINIC | Age: 31
End: 2021-04-23

## 2021-04-23 LAB
ABO + RH BLD: NORMAL
ABO + RH BLD: NORMAL
BLD GP AB SCN SERPL QL: NORMAL
BLOOD BANK CMNT PATIENT-IMP: NORMAL
GLUCOSE SERPL-MCNC: 77 MG/DL (ref 70–99)
HGB BLD-MCNC: 11.4 G/DL (ref 11.7–15.7)
SPECIMEN EXP DATE BLD: NORMAL

## 2021-04-23 PROCEDURE — 250N000013 HC RX MED GY IP 250 OP 250 PS 637: Performed by: OBSTETRICS & GYNECOLOGY

## 2021-04-23 PROCEDURE — 90853 GROUP PSYCHOTHERAPY: CPT

## 2021-04-23 PROCEDURE — 360N000075 HC SURGERY LEVEL 2, PER MIN: Performed by: OBSTETRICS & GYNECOLOGY

## 2021-04-23 PROCEDURE — 86901 BLOOD TYPING SEROLOGIC RH(D): CPT | Performed by: STUDENT IN AN ORGANIZED HEALTH CARE EDUCATION/TRAINING PROGRAM

## 2021-04-23 PROCEDURE — 82947 ASSAY GLUCOSE BLOOD QUANT: CPT | Performed by: ANESTHESIOLOGY

## 2021-04-23 PROCEDURE — 88305 TISSUE EXAM BY PATHOLOGIST: CPT | Mod: TC | Performed by: OBSTETRICS & GYNECOLOGY

## 2021-04-23 PROCEDURE — 250N000009 HC RX 250: Performed by: OBSTETRICS & GYNECOLOGY

## 2021-04-23 PROCEDURE — 36415 COLL VENOUS BLD VENIPUNCTURE: CPT | Performed by: STUDENT IN AN ORGANIZED HEALTH CARE EDUCATION/TRAINING PROGRAM

## 2021-04-23 PROCEDURE — 250N000013 HC RX MED GY IP 250 OP 250 PS 637: Performed by: PHYSICIAN ASSISTANT

## 2021-04-23 PROCEDURE — 999N000141 HC STATISTIC PRE-PROCEDURE NURSING ASSESSMENT: Performed by: OBSTETRICS & GYNECOLOGY

## 2021-04-23 PROCEDURE — 86850 RBC ANTIBODY SCREEN: CPT | Performed by: STUDENT IN AN ORGANIZED HEALTH CARE EDUCATION/TRAINING PROGRAM

## 2021-04-23 PROCEDURE — 370N000017 HC ANESTHESIA TECHNICAL FEE, PER MIN: Performed by: OBSTETRICS & GYNECOLOGY

## 2021-04-23 PROCEDURE — 710N000012 HC RECOVERY PHASE 2, PER MINUTE: Performed by: OBSTETRICS & GYNECOLOGY

## 2021-04-23 PROCEDURE — 999N001020 HC STATISTIC H-SEND OUTS PREP: Performed by: OBSTETRICS & GYNECOLOGY

## 2021-04-23 PROCEDURE — 10A07Z6 ABORTION OF PRODUCTS OF CONCEPTION, VACUUM, VIA NATURAL OR ARTIFICIAL OPENING: ICD-10-PCS | Performed by: OBSTETRICS & GYNECOLOGY

## 2021-04-23 PROCEDURE — 272N000001 HC OR GENERAL SUPPLY STERILE: Performed by: OBSTETRICS & GYNECOLOGY

## 2021-04-23 PROCEDURE — 85018 HEMOGLOBIN: CPT | Performed by: STUDENT IN AN ORGANIZED HEALTH CARE EDUCATION/TRAINING PROGRAM

## 2021-04-23 PROCEDURE — 250N000013 HC RX MED GY IP 250 OP 250 PS 637: Performed by: NURSE PRACTITIONER

## 2021-04-23 PROCEDURE — 99232 SBSQ HOSP IP/OBS MODERATE 35: CPT | Performed by: NURSE PRACTITIONER

## 2021-04-23 PROCEDURE — 250N000011 HC RX IP 250 OP 636: Performed by: REGISTERED NURSE

## 2021-04-23 PROCEDURE — 86900 BLOOD TYPING SEROLOGIC ABO: CPT | Performed by: STUDENT IN AN ORGANIZED HEALTH CARE EDUCATION/TRAINING PROGRAM

## 2021-04-23 PROCEDURE — 124N000002 HC R&B MH UMMC

## 2021-04-23 PROCEDURE — 88305 TISSUE EXAM BY PATHOLOGIST: CPT | Mod: 26 | Performed by: PATHOLOGY

## 2021-04-23 PROCEDURE — 250N000013 HC RX MED GY IP 250 OP 250 PS 637: Performed by: STUDENT IN AN ORGANIZED HEALTH CARE EDUCATION/TRAINING PROGRAM

## 2021-04-23 PROCEDURE — 258N000003 HC RX IP 258 OP 636: Performed by: REGISTERED NURSE

## 2021-04-23 RX ORDER — MEPERIDINE HYDROCHLORIDE 25 MG/ML
12.5 INJECTION INTRAMUSCULAR; INTRAVENOUS; SUBCUTANEOUS
Status: DISCONTINUED | OUTPATIENT
Start: 2021-04-23 | End: 2021-04-23 | Stop reason: HOSPADM

## 2021-04-23 RX ORDER — NALOXONE HYDROCHLORIDE 0.4 MG/ML
0.4 INJECTION, SOLUTION INTRAMUSCULAR; INTRAVENOUS; SUBCUTANEOUS
Status: DISCONTINUED | OUTPATIENT
Start: 2021-04-23 | End: 2021-04-23 | Stop reason: HOSPADM

## 2021-04-23 RX ORDER — DOXYCYCLINE 100 MG/1
200 CAPSULE ORAL ONCE
Status: DISCONTINUED | OUTPATIENT
Start: 2021-04-23 | End: 2021-04-23

## 2021-04-23 RX ORDER — ONDANSETRON 4 MG/1
4 TABLET, ORALLY DISINTEGRATING ORAL EVERY 30 MIN PRN
Status: DISCONTINUED | OUTPATIENT
Start: 2021-04-23 | End: 2021-04-23 | Stop reason: HOSPADM

## 2021-04-23 RX ORDER — ACETAMINOPHEN 325 MG/1
975 TABLET ORAL ONCE
Status: COMPLETED | OUTPATIENT
Start: 2021-04-23 | End: 2021-04-23

## 2021-04-23 RX ORDER — OXYCODONE HYDROCHLORIDE 5 MG/1
5 TABLET ORAL
Status: DISCONTINUED | OUTPATIENT
Start: 2021-04-23 | End: 2021-04-23

## 2021-04-23 RX ORDER — SODIUM CHLORIDE, SODIUM LACTATE, POTASSIUM CHLORIDE, CALCIUM CHLORIDE 600; 310; 30; 20 MG/100ML; MG/100ML; MG/100ML; MG/100ML
INJECTION, SOLUTION INTRAVENOUS CONTINUOUS
Status: DISCONTINUED | OUTPATIENT
Start: 2021-04-23 | End: 2021-04-23 | Stop reason: HOSPADM

## 2021-04-23 RX ORDER — PROPOFOL 10 MG/ML
INJECTION, EMULSION INTRAVENOUS CONTINUOUS PRN
Status: DISCONTINUED | OUTPATIENT
Start: 2021-04-23 | End: 2021-04-23

## 2021-04-23 RX ORDER — LIDOCAINE HYDROCHLORIDE 10 MG/ML
INJECTION, SOLUTION INFILTRATION; PERINEURAL PRN
Status: DISCONTINUED | OUTPATIENT
Start: 2021-04-23 | End: 2021-04-23 | Stop reason: HOSPADM

## 2021-04-23 RX ORDER — IBUPROFEN 200 MG
800 TABLET ORAL ONCE
Status: COMPLETED | OUTPATIENT
Start: 2021-04-23 | End: 2021-04-23

## 2021-04-23 RX ORDER — ONDANSETRON 2 MG/ML
INJECTION INTRAMUSCULAR; INTRAVENOUS PRN
Status: DISCONTINUED | OUTPATIENT
Start: 2021-04-23 | End: 2021-04-23

## 2021-04-23 RX ORDER — SODIUM CHLORIDE, SODIUM LACTATE, POTASSIUM CHLORIDE, CALCIUM CHLORIDE 600; 310; 30; 20 MG/100ML; MG/100ML; MG/100ML; MG/100ML
INJECTION, SOLUTION INTRAVENOUS CONTINUOUS PRN
Status: DISCONTINUED | OUTPATIENT
Start: 2021-04-23 | End: 2021-04-23

## 2021-04-23 RX ORDER — KETOROLAC TROMETHAMINE 30 MG/ML
INJECTION, SOLUTION INTRAMUSCULAR; INTRAVENOUS PRN
Status: DISCONTINUED | OUTPATIENT
Start: 2021-04-23 | End: 2021-04-23

## 2021-04-23 RX ORDER — PROPOFOL 10 MG/ML
INJECTION, EMULSION INTRAVENOUS PRN
Status: DISCONTINUED | OUTPATIENT
Start: 2021-04-23 | End: 2021-04-23

## 2021-04-23 RX ORDER — FENTANYL CITRATE 50 UG/ML
INJECTION, SOLUTION INTRAMUSCULAR; INTRAVENOUS PRN
Status: DISCONTINUED | OUTPATIENT
Start: 2021-04-23 | End: 2021-04-23

## 2021-04-23 RX ORDER — NALOXONE HYDROCHLORIDE 0.4 MG/ML
0.2 INJECTION, SOLUTION INTRAMUSCULAR; INTRAVENOUS; SUBCUTANEOUS
Status: DISCONTINUED | OUTPATIENT
Start: 2021-04-23 | End: 2021-04-23 | Stop reason: HOSPADM

## 2021-04-23 RX ORDER — ONDANSETRON 2 MG/ML
4 INJECTION INTRAMUSCULAR; INTRAVENOUS EVERY 30 MIN PRN
Status: DISCONTINUED | OUTPATIENT
Start: 2021-04-23 | End: 2021-04-23 | Stop reason: HOSPADM

## 2021-04-23 RX ADMIN — FENTANYL CITRATE 25 MCG: 50 INJECTION, SOLUTION INTRAMUSCULAR; INTRAVENOUS at 10:57

## 2021-04-23 RX ADMIN — MISOPROSTOL 400 MCG: 200 TABLET ORAL at 09:28

## 2021-04-23 RX ADMIN — SODIUM CHLORIDE, POTASSIUM CHLORIDE, SODIUM LACTATE AND CALCIUM CHLORIDE: 600; 310; 30; 20 INJECTION, SOLUTION INTRAVENOUS at 10:49

## 2021-04-23 RX ADMIN — MIDAZOLAM 2 MG: 1 INJECTION INTRAMUSCULAR; INTRAVENOUS at 10:49

## 2021-04-23 RX ADMIN — ACETAMINOPHEN 975 MG: 325 TABLET, FILM COATED ORAL at 09:26

## 2021-04-23 RX ADMIN — ONDANSETRON 4 MG: 2 INJECTION INTRAMUSCULAR; INTRAVENOUS at 11:08

## 2021-04-23 RX ADMIN — PROPOFOL 150 MCG/KG/MIN: 10 INJECTION, EMULSION INTRAVENOUS at 10:54

## 2021-04-23 RX ADMIN — PROPOFOL 50 MG: 10 INJECTION, EMULSION INTRAVENOUS at 10:59

## 2021-04-23 RX ADMIN — ACETAMINOPHEN 975 MG: 325 TABLET, FILM COATED ORAL at 18:36

## 2021-04-23 RX ADMIN — AMOXICILLIN AND CLAVULANATE POTASSIUM 1 TABLET: 875; 125 TABLET, FILM COATED ORAL at 08:20

## 2021-04-23 RX ADMIN — LORATADINE 10 MG: 10 TABLET ORAL at 12:25

## 2021-04-23 RX ADMIN — TRAZODONE HYDROCHLORIDE 150 MG: 150 TABLET ORAL at 22:03

## 2021-04-23 RX ADMIN — FLUTICASONE PROPIONATE 1 SPRAY: 50 SPRAY, METERED NASAL at 12:25

## 2021-04-23 RX ADMIN — KETOROLAC TROMETHAMINE 30 MG: 30 INJECTION, SOLUTION INTRAMUSCULAR at 11:13

## 2021-04-23 RX ADMIN — FENTANYL CITRATE 25 MCG: 50 INJECTION, SOLUTION INTRAMUSCULAR; INTRAVENOUS at 11:00

## 2021-04-23 RX ADMIN — FENTANYL CITRATE 50 MCG: 50 INJECTION, SOLUTION INTRAMUSCULAR; INTRAVENOUS at 10:54

## 2021-04-23 RX ADMIN — PRENATAL VITAMINS-IRON FUMARATE 27 MG IRON-FOLIC ACID 0.8 MG TABLET 1 TABLET: at 12:25

## 2021-04-23 RX ADMIN — IBUPROFEN 800 MG: 200 TABLET, FILM COATED ORAL at 18:36

## 2021-04-23 ASSESSMENT — ACTIVITIES OF DAILY LIVING (ADL)
LAUNDRY: WITH SUPERVISION
ORAL_HYGIENE: INDEPENDENT
HYGIENE/GROOMING: INDEPENDENT
DRESS: SCRUBS (BEHAVIORAL HEALTH)

## 2021-04-23 NOTE — PLAN OF CARE
Problem: Cognitive Impairment (Depressive Signs/Symptoms)  Goal: Optimized Cognitive Function (Depressive Signs/Symptoms)  Outcome: Improving  Flowsheets (Taken 4/23/2021 1333)  Mutually Determined Action Steps (Optimized Cognitive Function): follows step-by-step instructions     SI/Self harm:  pt denies  Aggression/agitation/HI:  none  AVH:  pt denies  Sleep: sleeping adequate, pt reported feeling tired this morning  PRN Med: No PRNs administered this shift on behavioral unit, though did receive Toradol and Acetaminophen in OR for pain  Medication AE: none reported or observed  Physical Complaints/Issues: none. Writer and OR RN educated pt re: what to expect after D&C regarding pain and bleeding.  I & O: eating and drinking well  ADLs: independent  Vitals:  stable and WNL  COVID 19 Assessment:  tested negative on 4/22/21  Milieu Participation: minimal. Pt was off the unit from 0845 to 1230. Upon return to unit pt ate lunch and went to bed.  Behavior: calm and controlled, pleasant and upbeat in interactions.     Please refer to post-discharge orders for care guidelines and when to call surgery center. Pt tolerated D&C procedure well and is not currently experiencing any pain or excessive bleeding. Pt is resting comfortably in bed. Eating well. Please monitor for urinary output and alert surgery center if unable to urinate after 8 hours post-procedure.   No other concerns at this time. Nursing will continue to monitor and assess.

## 2021-04-23 NOTE — PLAN OF CARE
Court order continuing hold and for remote hearing received.    Final court trial and [place] hearing  is 4/27 at 9am    Copy to pt, copy to chart.

## 2021-04-23 NOTE — PROGRESS NOTES
Pt slept most of the shift. Pt woke up, reminded about NPO status, but insisted and took 1/2 cup of some ice chips in her room around 4 am .No other issues. Will continue to monitor.
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
## 2021-04-23 NOTE — DISCHARGE INSTRUCTIONS
Behavioral Discharge Planning and Instructions        Summary:     You were admitted on 4/14/2021  due to Psychotic Symptomology and Suicidal Ideations.  You were treated by Vicky Jackman CNP and discharged on 5/5/2021 from station 32 to [facility]  Address:  [address], [place] [address]   Phone: [phone]    You were dually committed to the [facility] and the Commissioner of Human Services on 4/28/2021 and you are being discharged on a Provisional Discharge Agreement which shall remain in effect for the duration of the Commitment which expires on 10/27/2021.       You are also court ordered to take the medications that the doctor ordered for you.       Main Diagnosis:     Bipolar disorder type 1, most recent episode depressed, severe, with psychosis  Stimulant (cocaine, possibly methamphetamine) use disorder  Polysubstance abuse  Seasonal allergies  Status post D&C 4/23/2021    Health Care Follow-up:      Psychiatry Intake appointment scheduled at [facility] with VANCE Montanez CNP on Tuesday 5/18/2021 @ 8am  This is by [employer]  [address] [facility] [address]  [place], [place] [phone]   fx [phone]    You will be due for your second Depo Provera shot 7/26/21. An appointment has been scheduled for you at   [facility] with  Dr. Flores on 7/26/21 @ 3pm   [address], [place]  (third floor) [address]  Phone: [phone]    You have been assigned a :  PENNY Patiño  [facility]    (direct) [phone] (fax) [phone] (cell) [phone]      Attend all scheduled appointments with your outpatient providers. Call at least 24 hours in advance if you need to reschedule an appointment to ensure continued access to your outpatient providers.     Major Treatments, Procedures and Findings:  You were provided with: a psychiatric assessment, assessed for medical stability,  "medication evaluation and/or management, milieu management and medical interventions    Symptoms to Report: increased confusion, losing more sleep, mood getting worse or thoughts of suicide    Early warning signs can include: increased depression or anxiety sleep disturbances increased thoughts or behaviors of suicide or self-harm  increased unusual thinking, such as paranoia or hearing voices    Safety and Wellness:  Take all medicines as directed.  Make no changes unless your doctor suggests them.        Follow treatment recommendations.  Refrain from alcohol and non-prescribed drugs.  If there is a concern for safety, call 911.    Resources:   Crisis Intervention: 542.823.1659 or 517-006-2340 (TTY: 488.168.4117).  Call anytime for help.  National [place] on Mental Illness (www.[place].eufemia.org): 902.842.7597 or 113-258-6321.  Alcoholics Anonymous (www.alcoholics-anonymous.org): Check your phone book for your local chapter.  National Suicide Prevention Line (www.mentalhealthmn.org): 496-987-RHRP (7166)  [facility] Crisis (COPE) Response - Adult 234 247-7313  Text 4 Life: txt \"LIFE\" to 59888 for immediate support and crisis intervention  Crisis text line: Text \"[place]\" to 368946. Free, confidential, 24/7.  Crisis Intervention: 379.891.5630 or 085-947-3327. Call anytime for help.     General Medication Instructions:   See your medication sheet(s) for instructions.   Take all medicines as directed.  Make no changes unless your doctor suggests them.   Go to all your doctor visits.  Be sure to have all your required lab tests. This way, your medicines can be refilled on time.  Do not use any drugs not prescribed by your doctor.  Avoid alcohol.    Advance Directives:   Scanned document on file with [employer]? No scanned doc  Is document scanned? Pt states no documents  Honoring Choices Your Rights Handout: Informed and given  Was more information offered? Pt declined    The Treatment team has appreciated the opportunity to " work with you. If you have any questions or concerns about your recent admission, you can contact the unit which can receive your call 24 hours a day, 7 days a week. They will be able to get in touch with a Provider if needed. The unit number is [phone]       .Discharge Instructions: Following a Dilation   and Curettage    What to expect:    Expect small to moderate amount of vaginal bleeding which should taper off in 4-5 days. It should not be heavier than your regular menstrual flow.    Do not douche, and use a pad rather than tampons.     No intercourse until bleeding has ceased.    Activity:    Rest the day of surgery. You may resume normal activity the next day.    You may bathe or shower.    Avoid heavy lifting (10-15 lbs) for one week.    Comfort:    The amount of discomfort you can expect is very unpredictable. If you have pain that cannot be controlled with non-aspirin pain relievers or with the prescription you may have received, you should notify your doctor.    Abdominal cramping (like menstrual cramps) or low back ache are common and should not be a cause for concern. You will be drowsy and weak the day of surgery and possibly the following day.    Diet:    You have no restrictions on your diet. Following surgery, drink plenty of fluids and eat a light meal.    Nausea:    The anesthesia medications you received during your surgical procedure may produce some nausea.    If you feel nauseated, stay in bed, keep your head down and try drinking fluids such as Seven-Up, tea or soup.    Notify Physician at once if you experience:    A fever over 100.4 degrees (a low grade fever under 100 degrees is usual after surgery).    Heavy flow and/or passing large clots. Saturating more than 1 pad per hour for 2 or more hours.     Severe pain or cramps.    Important numbers  [facility] ([address]) - [place]: [phone]   [facility] ([address]) :  [phone]  Same-Day Surgery   Adult Discharge Orders & Instructions     For 24 hours after surgery:  1. Get plenty of rest.  A responsible adult must stay with you for at least 24 hours after you leave the hospital.   2. Pain medication can slow your reflexes. Do not drive or use heavy equipment.  If you have weakness or tingling, don't drive or use heavy equipment until this feeling goes away.  3. Mixing alcohol and pain medication can cause dizziness and slow your breathing. It can even be fatal. Do not drink alcohol while taking pain medication.  4. Avoid strenuous or risky activities.  Ask for help when climbing stairs.   5. You may feel lightheaded.  If so, sit for a few minutes before standing.  Have someone help you get up.   6. If you have nausea (feel sick to your stomach), drink only clear liquids such as apple juice, ginger ale, broth or 7-Up.  Rest may also help.  Be sure to drink enough fluids.  Move to a regular diet as you feel able. Take pain medications with a small amount of solid food, such as toast or crackers, to avoid nausea.   7. A slight fever is normal. Call the doctor if your fever is over 100 F (37.7 C) (taken under the tongue) or lasts longer than 24 hours.  8. You may have a dry mouth, muscle aches, trouble sleeping or a sore throat.  These symptoms should go away after 24 hours.  9. Do not make important or legal decisions.   Pain Management:      1. Take pain medication (if prescribed) for pain as directed by your physician.        2. WARNING: If the pain medication you have been prescribed contains Tylenol  (acetaminophen), DO NOT take additional doses of Tylenol (acetaminophen).     Call your doctor for any of the followin.  Signs of infection (fever, growing tenderness at the surgery site, severe pain, a large amount of drainage or bleeding, foul-smelling drainage, redness, swelling).    2.  It has been over 8 to 10 hours since surgery and you are still not able to urinate  (peyeimi).    3.  Headache for over 24 hours.    4.  Numbness, tingling or weakness the day after surgery (if you had spinal anesthesia).  To contact a doctor, call _____Dr. Yates___________ or:      [phone] and ask for the Resident On Call for:          ________Obgyn Resident/Fellow On-Call__________ (answered 24 hours a day)      Emergency Department:  [place] [facility]: [phone]  [place] [facility]: [phone]

## 2021-04-23 NOTE — PLAN OF CARE
"Pt was calm and cooperative for the most part on evenings. Pt can get loud as she laughs and makes remarks to movies but peers do not appear to mind. At the beginning of the shift pt was napping in her room. Pt came out at 1700 and joined the group in the lounge watching the movie. She ate her supper and makes her needs known. At 1714 prn atarax was administered per request. For the remainder of the evening pt spent time in the lounge watching movies. Writer checked in with pt and asked if she was having any suicidal thoughts and she stated \"no.\" Pt also denies SIB, AH, VH, and HI. Will continue to monitor.   "

## 2021-04-23 NOTE — ANESTHESIA CARE TRANSFER NOTE
Patient: Queen Brittany Stack    Procedure(s):  DILATION AND CURETTAGE, UTERUS, USING SUCTION    Diagnosis: Legal termination of pregnancy [Z33.2]  Diagnosis Additional Information: No value filed.    Anesthesia Type:   MAC     Note:    Oropharynx: oropharynx clear of all foreign objects and spontaneously breathing  Level of Consciousness: awake  Oxygen Supplementation: room air    Independent Airway: airway patency satisfactory and stable  Dentition: dentition unchanged  Vital Signs Stable: post-procedure vital signs reviewed and stable  Report to RN Given: handoff report given  Patient transferred to: Phase II    Handoff Report: Identifed the Patient, Identified the Reponsible Provider, Reviewed the pertinent medical history, Discussed the surgical course, Reviewed Intra-OP anesthesia mangement and issues during anesthesia, Set expectations for post-procedure period and Allowed opportunity for questions and acknowledgement of understanding      Vitals: (Last set prior to Anesthesia Care Transfer)  CRNA VITALS  4/23/2021 1049 - 4/23/2021 1130      4/23/2021             Resp Rate (observed):  16    EKG:  Sinus rhythm        Electronically Signed By: Brissa Gonzalez CRNA, VANCE KINNEY  April 23, 2021  11:30 AM

## 2021-04-23 NOTE — ANESTHESIA POSTPROCEDURE EVALUATION
Patient: Queen Brittany Stack    Procedure(s):  DILATION AND CURETTAGE, UTERUS, USING SUCTION    Diagnosis:Legal termination of pregnancy [Z33.2]  Diagnosis Additional Information: No value filed.    Anesthesia Type:  MAC    Note:  Disposition: Admission   Postop Pain Control: Uneventful            Sign Out: Well controlled pain   PONV: No   Neuro/Psych: Uneventful            Sign Out: Acceptable/Baseline neuro status   Airway/Respiratory: Uneventful            Sign Out: Acceptable/Baseline resp. status   CV/Hemodynamics:    Other NRE: NONE   DID A NON-ROUTINE EVENT OCCUR? No    Event details/Postop Comments:  I personally evaluated the patient at bedside. No anesthesia-related complications noted. Patient is hemodynamically stable with adequate control of pain and nausea. Ready for discharge from PACU. All questions were answered.    Claire Hill MD  Pediatric Anesthesiologist  [phone]           Last vitals:  Vitals:    04/23/21 1145 04/23/21 1200 04/23/21 1205   BP: 122/77 116/49 133/79   Pulse: 85 79 88   Resp: 16 16 14   Temp:   36.8  C (98.2  F)   SpO2: 96% 96% 95%       Last vitals prior to Anesthesia Care Transfer:  CRNA VITALS  4/23/2021 1049 - 4/23/2021 1149      4/23/2021             Resp Rate (observed):  16    EKG:  Sinus rhythm          Electronically Signed By: Claire Hill MD  April 23, 2021  2:43 PM

## 2021-04-23 NOTE — ANESTHESIA PREPROCEDURE EVALUATION
Anesthesia Pre-Procedure Evaluation    Patient: Queen Brittany Stack   MRN: [id number] : 1990        Preoperative Diagnosis: Legal termination of pregnancy [Z33.2]   Procedure : Procedure(s):  DILATION AND CURETTAGE, UTERUS, USING SUCTION     Past Medical History:   Diagnosis Date     Abnormal Pap smear     , f/u normal     Anxiety      Asthma     no ihaler use     Bipolar 1 disorder (H)      Bipolar affective (H)      Fainting spell      Herpes simplex without mention of complication     Pt reports last outbreak about 4 weeks ago     Major depressive disorder      Pelvic inflammatory disease      Polysubstance abuse (H)       Past Surgical History:   Procedure Laterality Date     NO HISTORY OF SURGERY        Allergies   Allergen Reactions     Bees Anaphylaxis      Social History     Tobacco Use     Smoking status: Current Every Day Smoker     Packs/day: 0.25     Years: 8.00     Pack years: 2.00     Types: Cigarettes     Smokeless tobacco: Never Used   Substance Use Topics     Alcohol use: No     Comment: none      Wt Readings from Last 1 Encounters:   21 72.7 kg (160 lb 3.2 oz)        Anesthesia Evaluation   Pt has had prior anesthetic. Type: Regional.        ROS/MED HX  ENT/Pulmonary:       Neurologic:       Cardiovascular:       METS/Exercise Tolerance: >4 METS    Hematologic:       Musculoskeletal:       GI/Hepatic:       Renal/Genitourinary:       Endo:       Psychiatric/Substance Use:     (+) psychiatric history bipolar, depression and other (comment) (pyschotic features.  Admitted 21 to [facility]) : polysubstance abuse.    Infectious Disease:       Malignancy:       Other: Comment: ; 13 5/7 weeks pregnant presents for an D&E     (+) Possibly pregnant, ,         Physical Exam    Airway  airway exam normal           Respiratory Devices and Support         Dental  no notable dental history         Cardiovascular   cardiovascular exam normal          Pulmonary   pulmonary exam  normal                OUTSIDE LABS:  CBC:   Lab Results   Component Value Date    WBC 11.0 04/16/2021    WBC 8.9 04/14/2021    HGB 11.4 (L) 04/23/2021    HGB 12.0 04/16/2021    HCT 35.8 04/16/2021    HCT 29.9 (L) 04/14/2021     04/16/2021     04/14/2021     BMP:   Lab Results   Component Value Date     04/14/2021     11/20/2019    POTASSIUM 3.7 04/14/2021    POTASSIUM 3.6 11/20/2019    CHLORIDE 105 04/14/2021    CHLORIDE 109 11/20/2019    CO2 24 04/14/2021    CO2 24 11/20/2019    BUN 12 04/14/2021    BUN 14 11/20/2019    CR 0.79 04/14/2021    CR 0.82 11/20/2019    GLC 77 04/23/2021     (H) 04/14/2021     COAGS:   Lab Results   Component Value Date    PTT 29 09/10/2012    INR 0.94 09/10/2012    FIBR 443 (H) 09/10/2012     POC:   Lab Results   Component Value Date    HCG negative 08/13/2016     HEPATIC:   Lab Results   Component Value Date    ALBUMIN 3.3 (L) 04/14/2021    PROTTOTAL 7.3 04/14/2021    ALT 21 04/14/2021    AST 17 04/14/2021    ALKPHOS 51 04/14/2021    BILITOTAL 0.5 04/14/2021     OTHER:   Lab Results   Component Value Date    TAZ 8.8 04/14/2021    LIPASE 101 04/14/2021    TSH 0.29 (L) 04/16/2021    T4 0.92 04/16/2021       Anesthesia Plan    ASA Status:  2   NPO Status:  NPO Appropriate    Anesthesia Type: MAC.   Induction: Intravenous.   Maintenance: TIVA.        Consents    Anesthesia Plan(s) and associated risks, benefits, and realistic alternatives discussed. Questions answered and patient/representative(s) expressed understanding.     - Discussed with:  Parent (Mother and/or Father), Legal Guardian      - Extended Intubation/Ventilatory Support Discussed: No.      - Patient is DNR/DNI Status: No    Use of blood products discussed: No .     Postoperative Care    Pain management: IV analgesics, Oral pain medications.   PONV prophylaxis: Ondansetron (or other 5HT-3)     Comments:    Discussed common and potentially harmful risks for MAC (GA as backup).   These risks  include, but were not limited to: Conversion to secured airway, Sore throat, Airway injury, Dental injury, Aspiration, Respiratory issues (Bronchospasm, Laryngospasm, Desaturation), Hemodynamic issues (Arrhythmia, Hypotension, Ischemia), Potential long term consequences of respiratory and hemodynamic issues, PONV, Emergence delirium/agitation  Risks of invasive procedures were not discussed: N/A    All questions were answered.            Claire Hill MD

## 2021-04-24 PROCEDURE — 124N000002 HC R&B MH UMMC

## 2021-04-24 PROCEDURE — 250N000013 HC RX MED GY IP 250 OP 250 PS 637: Performed by: PHYSICIAN ASSISTANT

## 2021-04-24 PROCEDURE — 250N000013 HC RX MED GY IP 250 OP 250 PS 637: Performed by: NURSE PRACTITIONER

## 2021-04-24 RX ADMIN — FLUTICASONE PROPIONATE 1 SPRAY: 50 SPRAY, METERED NASAL at 08:39

## 2021-04-24 RX ADMIN — TRAZODONE HYDROCHLORIDE 150 MG: 150 TABLET ORAL at 04:22

## 2021-04-24 RX ADMIN — TRAZODONE HYDROCHLORIDE 150 MG: 150 TABLET ORAL at 21:00

## 2021-04-24 RX ADMIN — TRAZODONE HYDROCHLORIDE 150 MG: 150 TABLET ORAL at 23:53

## 2021-04-24 RX ADMIN — LORATADINE 10 MG: 10 TABLET ORAL at 08:38

## 2021-04-24 RX ADMIN — PRENATAL VITAMINS-IRON FUMARATE 27 MG IRON-FOLIC ACID 0.8 MG TABLET 1 TABLET: at 08:38

## 2021-04-24 ASSESSMENT — ACTIVITIES OF DAILY LIVING (ADL)
DRESS: INDEPENDENT
ORAL_HYGIENE: INDEPENDENT
LAUNDRY: WITH SUPERVISION
HYGIENE/GROOMING: INDEPENDENT
ORAL_HYGIENE: INDEPENDENT
HYGIENE/GROOMING: INDEPENDENT
LAUNDRY: WITH SUPERVISION
DRESS: INDEPENDENT

## 2021-04-24 NOTE — PROGRESS NOTES
Pt slept until 5 pm then spent the rest of the shift watching tv and participating in unit activities.  Affect bright and calm.  Socializing with peers and occasionally talking on the phone with family.  She had many phone calls from family this evening wishing her a Happy Birthday.  She had her D&C procedure this morning and reports feeling a bit achy after waking up.  Took scheduled Tylenol and IBU this evening.  Appetite has been good.  She voices desire to abstain from drug use following discharge.  She has been unwilling to take scheduled Abilify.  Court Hold papers were faxed to the unit at 4 pm.

## 2021-04-24 NOTE — PROGRESS NOTES
" 21 2300   Groups   Details    (Psychotherapy)   Number of patients attending the group: 5   Group Length:  1 Hour     Group Therapy Type:      Summary of Group / Topics Discussed:        The  Psychotherapy group goal is to promote insight to positive choice and change. Group processing is within a supportive and safe environment. Patients will process emotions using verbal group and expressive psychotherapy interventions including visual art/writing interventions.     Group interventions support patients by: Catagories of Coping     Modalities to reach these goals include:  demonstrating and discussing: coping skills , gratitude practice, challenging negative thought patterns     Subjective -patient report of mood today-\"optimistic, feel like I overate today\"     Objective/ Intervention- Goal of group and Therapeutic modality utilized- topic Coping/ general processing     Group Response-  engaged     Patient Response-Pt was very engaged.She did a very thoughtful job with her project identifying multiple coping skills. She talked about her  today, matter of factly. She didn't seem to feel any emotional  distress about it, she seemed relieved .She said I have three kids. Financially I'm not ready for another child. She said she wanted to eliminate drugs from her life.    Armando Wagoner, BERNADETTEFT, ATR-BC    "

## 2021-04-24 NOTE — PROGRESS NOTES
Pt  awakened at 02:45, says she has not been able to go back to sleep, approached desk and requested PRN dose of Trazodone, given at 04:22, pt was reminded and agreed io request before 03:30.

## 2021-04-24 NOTE — PLAN OF CARE
Pt. willing to engage in 1:1 with staff.  Pt. states that she is doing well, denies suicidal ideation. Pt. spends majority of time in the lounge, watching TV, socializing with other pt.  Pt. denies any pain from her medical procedure of 4/23/2021 currently.  Pt. Has no questions or requests at this time.

## 2021-04-25 PROCEDURE — 124N000002 HC R&B MH UMMC

## 2021-04-25 PROCEDURE — G0177 OPPS/PHP; TRAIN & EDUC SERV: HCPCS

## 2021-04-25 PROCEDURE — 250N000013 HC RX MED GY IP 250 OP 250 PS 637: Performed by: NURSE PRACTITIONER

## 2021-04-25 PROCEDURE — 250N000013 HC RX MED GY IP 250 OP 250 PS 637: Performed by: PHYSICIAN ASSISTANT

## 2021-04-25 RX ADMIN — PRENATAL VITAMINS-IRON FUMARATE 27 MG IRON-FOLIC ACID 0.8 MG TABLET 1 TABLET: at 09:17

## 2021-04-25 RX ADMIN — LORATADINE 10 MG: 10 TABLET ORAL at 09:17

## 2021-04-25 RX ADMIN — FLUTICASONE PROPIONATE 1 SPRAY: 50 SPRAY, METERED NASAL at 09:19

## 2021-04-25 RX ADMIN — TRAZODONE HYDROCHLORIDE 150 MG: 150 TABLET ORAL at 19:51

## 2021-04-25 ASSESSMENT — ACTIVITIES OF DAILY LIVING (ADL)
LAUNDRY: WITH SUPERVISION
ORAL_HYGIENE: INDEPENDENT
HYGIENE/GROOMING: INDEPENDENT
HYGIENE/GROOMING: INDEPENDENT
DRESS: INDEPENDENT
LAUNDRY: WITH SUPERVISION
ORAL_HYGIENE: INDEPENDENT
DRESS: INDEPENDENT

## 2021-04-25 NOTE — PLAN OF CARE
Pt. willing to engage with staff for a 1:1.  Pt. reports that she is doing well, has some minor back discomfort from the surgical procedure.  Pt. states that her mood is positive, that her thoughts are clear, is not experiencing any intrusive thoughts, and denies suicidal ideation.  Pt. has no concerns at this time.

## 2021-04-25 NOTE — PROGRESS NOTES
Pt can be seen in the lounge watching tv most of the shift.  VSS.  She had her D&C yesterday and denies any s/s of infection.  She attended groups and overall has been sociable and pleasant.  Pt did have a verbal altercation with another female peer (Rhiannon) who sat in her chair in the lounge.  When female staff went to diffuse the situation she became angry at staff who she felt was targeting her.  Pt did appear tense and irritable at times this evening but deescalated on her own while watching a movie.  She took Trazodone this evening but continues to refuse her Abilify.

## 2021-04-25 NOTE — PROGRESS NOTES
Pt kept waking up through out the night for snacks. Requested/given prn trazodone 150 mg at 2353. No other concerns. Will continue to monitor.

## 2021-04-26 PROCEDURE — 250N000013 HC RX MED GY IP 250 OP 250 PS 637: Performed by: NURSE PRACTITIONER

## 2021-04-26 PROCEDURE — 99233 SBSQ HOSP IP/OBS HIGH 50: CPT | Performed by: NURSE PRACTITIONER

## 2021-04-26 PROCEDURE — G0177 OPPS/PHP; TRAIN & EDUC SERV: HCPCS

## 2021-04-26 PROCEDURE — 250N000011 HC RX IP 250 OP 636: Performed by: NURSE PRACTITIONER

## 2021-04-26 PROCEDURE — 124N000002 HC R&B MH UMMC

## 2021-04-26 PROCEDURE — 250N000013 HC RX MED GY IP 250 OP 250 PS 637: Performed by: PHYSICIAN ASSISTANT

## 2021-04-26 RX ORDER — MEDROXYPROGESTERONE ACETATE 150 MG/ML
150 INJECTION, SUSPENSION INTRAMUSCULAR
Status: DISCONTINUED | OUTPATIENT
Start: 2021-04-26 | End: 2021-05-05 | Stop reason: HOSPADM

## 2021-04-26 RX ORDER — TRAZODONE HYDROCHLORIDE 150 MG/1
300 TABLET ORAL AT BEDTIME
Status: DISCONTINUED | OUTPATIENT
Start: 2021-04-26 | End: 2021-05-05 | Stop reason: HOSPADM

## 2021-04-26 RX ADMIN — TRAZODONE HYDROCHLORIDE 150 MG: 150 TABLET ORAL at 00:41

## 2021-04-26 RX ADMIN — FLUTICASONE PROPIONATE 1 SPRAY: 50 SPRAY, METERED NASAL at 09:03

## 2021-04-26 RX ADMIN — MEDROXYPROGESTERONE ACETATE 150 MG: 150 INJECTION, SUSPENSION INTRAMUSCULAR at 13:26

## 2021-04-26 RX ADMIN — TRAZODONE HYDROCHLORIDE 300 MG: 150 TABLET ORAL at 21:12

## 2021-04-26 RX ADMIN — PRENATAL VITAMINS-IRON FUMARATE 27 MG IRON-FOLIC ACID 0.8 MG TABLET 1 TABLET: at 09:03

## 2021-04-26 RX ADMIN — LORATADINE 10 MG: 10 TABLET ORAL at 09:03

## 2021-04-26 ASSESSMENT — ACTIVITIES OF DAILY LIVING (ADL)
HYGIENE/GROOMING: INDEPENDENT
DRESS: INDEPENDENT
ORAL_HYGIENE: INDEPENDENT
HYGIENE/GROOMING: INDEPENDENT
ORAL_HYGIENE: INDEPENDENT
DRESS: INDEPENDENT
LAUNDRY: WITH SUPERVISION
LAUNDRY: WITH SUPERVISION

## 2021-04-26 NOTE — PLAN OF CARE
"Pt. willing to engage with staff on a 1:1.  Pt. states that she is doing really well emotionally, physically, and cognitively.  Pt. states that she was using and pregnant, that she came to the hospital to \"sober up,\"  and terminate the pregnancy.  The pt. states that she did both and now feels ready for discharge.  The pt. states that she is not interested in CD treatment, that she knows what she needs to do, and feels that she can do that on her own after discharge.  Pt. states that her main goal is housing and that she is working on that with [facility] and that she will continue to work with them.  In the meantime, the pt.states that she has friends that she can stay with.  Pt. denies suicidal ideation, self-injurious behavior, intrusive thoughts, any type of hallucinations.  Pt. was calm, pleasant, and cooperative.  Pt. Is attending some groups.  Pt. appears very comfortable on the unit.  "

## 2021-04-26 NOTE — PROGRESS NOTES
Pt was up severally for snacks. Requested/given prn trazodone at 0041. No other concerns. Will continue to monitor.

## 2021-04-26 NOTE — PLAN OF CARE
..Pt was isolative to room for most of shift but did come out to attend the evening art group, social with select peers. Affect was full range. Mood was calm. Pt ate meals, refused her HS abilify. No SI/SIB noted. Will continue to monitor.

## 2021-04-26 NOTE — PLAN OF CARE
"      CTC note      Work Completed: reviewed chart and remotely attended team discussion.  Entered team note.  Located a follow up appointment for pt:  \"You will be due for your second Depo Provera shot 7/26/21. An appointment has been scheduled for you at   [facility] with  Dr. Flores on 7/26/21 @ 3pm   [address] [place], [place]  (third floor) [address]  Phone: [phone]\"    Discharge plan or goal: pt has final court hearing tomorrow.                Barriers to discharge: pt arrived with acute sxs and complex medical needs and is in the court commitment process at this time    "

## 2021-04-26 NOTE — PROGRESS NOTES
attended 3 of 3 OT groups today. Very task oriented and productive on her art projects. Mostly quiet during group though occasionally chatted about personal topics such as trying to stay safe when homeless & feeling that she's been institutionalized for many years. Pleasant and appreciative of OT sessions.      04/26/21 1400   Occupational Therapy   Type of Intervention structured groups   Response Initiates, socially acceptable   Hours 3
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
## 2021-04-26 NOTE — PLAN OF CARE
BEHAVIORAL TEAM DISCUSSION    Participants: Vicky Jackman CNP; Jackie Momin and Maida Vance, KARRIE s; Yarelis Jansen RN  Progress: final court hearing tomorrow.  Pt has improved.  Pt denies SI, denies mental health concerns at this time.  Anticipated length of stay: dependent upon court process.  Continued Stay Criteria/Rationale: final court hearing tomorrow will determine discharge planning  Medical/Physical: completed AB  Precautions:   Behavioral Orders   Procedures     Code 1     Petition for commitment     MICD with [facility]     Routine Programming     As clinically indicated     Self Injury Precaution     Single Room     Status 15     Every 15 minutes.     Suicide precautions     Patients on Suicide Precautions should have a Combination Diet ordered that includes a Diet selection(s) AND a Behavioral Tray selection for Safe Tray - with utensils, or Safe Tray - NO utensils       Plan: continue to meet with psychiatry and work with court process for outcome. CTC addressing discharge planning  Rationale for change in precautions or plan: no change

## 2021-04-27 PROCEDURE — 99233 SBSQ HOSP IP/OBS HIGH 50: CPT | Performed by: NURSE PRACTITIONER

## 2021-04-27 PROCEDURE — G0177 OPPS/PHP; TRAIN & EDUC SERV: HCPCS

## 2021-04-27 PROCEDURE — 124N000002 HC R&B MH UMMC

## 2021-04-27 PROCEDURE — 250N000013 HC RX MED GY IP 250 OP 250 PS 637: Performed by: NURSE PRACTITIONER

## 2021-04-27 PROCEDURE — 250N000013 HC RX MED GY IP 250 OP 250 PS 637: Performed by: PHYSICIAN ASSISTANT

## 2021-04-27 PROCEDURE — 250N000013 HC RX MED GY IP 250 OP 250 PS 637: Performed by: PSYCHIATRY & NEUROLOGY

## 2021-04-27 RX ADMIN — PRENATAL VITAMINS-IRON FUMARATE 27 MG IRON-FOLIC ACID 0.8 MG TABLET 1 TABLET: at 08:47

## 2021-04-27 RX ADMIN — ACETAMINOPHEN 650 MG: 325 TABLET, FILM COATED ORAL at 19:49

## 2021-04-27 RX ADMIN — ACETAMINOPHEN 650 MG: 325 TABLET, FILM COATED ORAL at 09:34

## 2021-04-27 RX ADMIN — TRAZODONE HYDROCHLORIDE 300 MG: 150 TABLET ORAL at 19:36

## 2021-04-27 RX ADMIN — NICOTINE POLACRILEX 4 MG: 4 GUM, CHEWING BUCCAL at 12:28

## 2021-04-27 RX ADMIN — LORATADINE 10 MG: 10 TABLET ORAL at 08:47

## 2021-04-27 RX ADMIN — ARIPIPRAZOLE 2 MG: 2 TABLET ORAL at 20:23

## 2021-04-27 RX ADMIN — FLUTICASONE PROPIONATE 1 SPRAY: 50 SPRAY, METERED NASAL at 08:47

## 2021-04-27 ASSESSMENT — ACTIVITIES OF DAILY LIVING (ADL)
HYGIENE/GROOMING: INDEPENDENT
HYGIENE/GROOMING: INDEPENDENT
LAUNDRY: WITH SUPERVISION
DRESS: INDEPENDENT
LAUNDRY: WITH SUPERVISION
DRESS: INDEPENDENT
ORAL_HYGIENE: INDEPENDENT
ORAL_HYGIENE: INDEPENDENT

## 2021-04-27 NOTE — PLAN OF CARE
Pt slept 3.5 hours. Pt appeared to sleep  the rest of the morning. No safety issues or concerns noted.

## 2021-04-27 NOTE — PLAN OF CARE
"Pt observed sitting in her room in the dark engaging in self talk. Pt Previously up for snack and pt stating \"I'm fine, I just can't sleep\" when checked on with staff. Pt declining medication. Pt has appeared to be engaging in self talk in room since 0130 hours and has continued as observed during safety rounds as of 0315 hours. Pt pleasant and polite when interacting with staff. Will continue to monitor.   "
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
## 2021-04-27 NOTE — PLAN OF CARE
"Patient reports poor sleep last night \"because of morning court\" and, after morning court, returned to room yelling (crying) \"you are all liars -I am fine-I do not want CD treatment and know how to get it-I am just upset after the -I just want to go to the man I love and live my life-you could get me signed up for SSDI.\" Patient at this time threw stuff all over room while yelling continued-then picked up trash.    Patient reports \"some depression about \" and denies mental health s/s while ranting irrationally and angrily about procedure, the future, the past, family problems and \"hopeless life\" adding \"get away from me\". Patient appeared anxious/agitated with poor insight and judgement. Patient accepted tea, napped and went to group.     Patient denies SI/SIB.    Patient requested/provided PRN tylenol (0930) for HA reporting \"relief.\" Patient refused VS.    Nursing will continue to monitor.  "

## 2021-04-27 NOTE — PLAN OF CARE
"Attended 1 OT Creative Expression Group x 30 minutes with 4 peers present. Arrived to group late and collected unfinished tasks. Before sitting down she went to cupboards and collected new tasks to work on. Was informed she needed to finish unfinished tasks before gathering new ones. Ignored writer and continued gathering new tasks. Was once again informed of expectation to complete tasks. Pt. began yelling at writer, \"you fuck.ing b..... get out of my way, I can do whatever I want. All you white people are racists. You are f....ing with me because you are jealous of my name\".  Was asked to leave group due to inappropriate language, unwillingness to follow directions and persistent disruption to therapeutic group process\". Repeatedly requested to leave group and ignored directives. Pts asked to leave group and staff asked to assist in removing pt from group room. Pt continued to vocalize expletives and accuse staff of racism. Will attempt to process situation with pt , review group expectations and discuss return to groups when pt is open to discussion.  "

## 2021-04-27 NOTE — PLAN OF CARE
Problem: Adult Inpatient Plan of Care  Goal: Plan of Care Review  Outcome: No Change     Patient has been resting in room until dinner. Said she was upset with the Court hearing and that people were talking about her vagina and that made her feel uncomfortable. Said she was reed for a while after the hearing as it was upsetting for her. Also talked about how she had checked herself in the Hospital and she now feels like the process is taking a long time. Denied pain, anxiety and depression. Has been calm on the unit and affect full range. Denied hallucination, no SI and contracts for safety. 1:1 supportive listening given.  Continues on suicide and SIB precautions.

## 2021-04-28 PROCEDURE — 99232 SBSQ HOSP IP/OBS MODERATE 35: CPT | Performed by: NURSE PRACTITIONER

## 2021-04-28 PROCEDURE — 250N000013 HC RX MED GY IP 250 OP 250 PS 637: Performed by: NURSE PRACTITIONER

## 2021-04-28 PROCEDURE — 124N000002 HC R&B MH UMMC

## 2021-04-28 PROCEDURE — 250N000013 HC RX MED GY IP 250 OP 250 PS 637: Performed by: PHYSICIAN ASSISTANT

## 2021-04-28 PROCEDURE — 250N000013 HC RX MED GY IP 250 OP 250 PS 637: Performed by: PSYCHIATRY & NEUROLOGY

## 2021-04-28 RX ORDER — ARIPIPRAZOLE 2 MG/1
2 TABLET ORAL AT BEDTIME
Status: DISCONTINUED | OUTPATIENT
Start: 2021-04-28 | End: 2021-04-29

## 2021-04-28 RX ORDER — OLANZAPINE 10 MG/2ML
5 INJECTION, POWDER, FOR SOLUTION INTRAMUSCULAR AT BEDTIME
Status: DISCONTINUED | OUTPATIENT
Start: 2021-04-28 | End: 2021-04-29

## 2021-04-28 RX ADMIN — LORATADINE 10 MG: 10 TABLET ORAL at 08:34

## 2021-04-28 RX ADMIN — ACETAMINOPHEN 650 MG: 325 TABLET, FILM COATED ORAL at 08:31

## 2021-04-28 RX ADMIN — PRENATAL VITAMINS-IRON FUMARATE 27 MG IRON-FOLIC ACID 0.8 MG TABLET 1 TABLET: at 08:31

## 2021-04-28 RX ADMIN — TRAZODONE HYDROCHLORIDE 300 MG: 150 TABLET ORAL at 20:59

## 2021-04-28 RX ADMIN — TRAZODONE HYDROCHLORIDE 300 MG: 150 TABLET ORAL at 21:01

## 2021-04-28 RX ADMIN — ARIPIPRAZOLE 2 MG: 2 TABLET ORAL at 21:01

## 2021-04-28 RX ADMIN — FLUTICASONE PROPIONATE 1 SPRAY: 50 SPRAY, METERED NASAL at 08:32

## 2021-04-28 ASSESSMENT — ACTIVITIES OF DAILY LIVING (ADL)
DRESS: INDEPENDENT
DRESS: INDEPENDENT
ORAL_HYGIENE: INDEPENDENT
LAUNDRY: WITH SUPERVISION
ORAL_HYGIENE: INDEPENDENT
LAUNDRY: WITH SUPERVISION
HYGIENE/GROOMING: INDEPENDENT
HYGIENE/GROOMING: INDEPENDENT

## 2021-04-28 NOTE — PLAN OF CARE
[facility] gave pt the court order. Pt asked about next steps, [facility] advised concerning CD treatment; pt indicated would prefer doing that ASAP. Pt was calm. [facility] gave pt the CD worksheet.    [place] finished the worksheet which was sent to assessors.

## 2021-04-28 NOTE — PLAN OF CARE
CTC Note          Work Completed: reviewed chart and remotely attended team discussion.  Pt had her final court hearing today, outcome unknown as of this note.  She remains on a court hold.    Discharge plan or goal: likely go to CD treatment                Barriers to discharge: acuity of mental health and continued court hold.
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
## 2021-04-28 NOTE — PLAN OF CARE
"Patient appears anxious and sounds frustrated related to \"waiting for court paperwork\" and with much discussion verbalizes understanding that she will be held pending decision. Patient believes \"my  says I can go and he is faxing paperwork.\" Patient has repeatedly stated \"if I want to go to treatment I can do that I do not need you.\"    Patient has been tense all day, slept last night and is not attending groups today. Patient ate 100% and was cooperative with morning scheduled medication. Patient denies auditory/visual hallucinations and denies all mental health \"issues.\"    Patient denies SI/SIB.    BP 97/67   Pulse 90   Temp 98.6  F (37  C) (Tympanic)   Resp 16   Wt 72.7 kg (160 lb 3.2 oz)   SpO2 100%   BMI 25.09 kg/m    Patient provided PRN tylenol for HA (0830) and reported \"relief.\"    Nursing will continue to monitor.  "

## 2021-04-28 NOTE — PLAN OF CARE
Court order for commitment received. As of 4/28/2021, pt is duly committed as a mentally ill and chemically dependent person. This terminates on 10/27/2021.    There is also a [facility] order which runs concurrently with the commitment. The medication includes Haldol, Risperdal, Zyprexa and/or Abilify.     Copy to pt, copy to chart.

## 2021-04-29 PROCEDURE — H2032 ACTIVITY THERAPY, PER 15 MIN: HCPCS

## 2021-04-29 PROCEDURE — 250N000013 HC RX MED GY IP 250 OP 250 PS 637: Performed by: PHYSICIAN ASSISTANT

## 2021-04-29 PROCEDURE — 99232 SBSQ HOSP IP/OBS MODERATE 35: CPT | Performed by: NURSE PRACTITIONER

## 2021-04-29 PROCEDURE — G0177 OPPS/PHP; TRAIN & EDUC SERV: HCPCS

## 2021-04-29 PROCEDURE — 250N000013 HC RX MED GY IP 250 OP 250 PS 637: Performed by: NURSE PRACTITIONER

## 2021-04-29 PROCEDURE — 124N000002 HC R&B MH UMMC

## 2021-04-29 PROCEDURE — 250N000013 HC RX MED GY IP 250 OP 250 PS 637: Performed by: PSYCHIATRY & NEUROLOGY

## 2021-04-29 RX ORDER — ARIPIPRAZOLE 5 MG/1
5 TABLET ORAL AT BEDTIME
Status: DISCONTINUED | OUTPATIENT
Start: 2021-04-29 | End: 2021-05-03

## 2021-04-29 RX ORDER — OLANZAPINE 10 MG/2ML
5 INJECTION, POWDER, FOR SOLUTION INTRAMUSCULAR AT BEDTIME
Status: DISCONTINUED | OUTPATIENT
Start: 2021-04-29 | End: 2021-05-03

## 2021-04-29 RX ADMIN — HYDROXYZINE HYDROCHLORIDE 25 MG: 25 TABLET, FILM COATED ORAL at 21:11

## 2021-04-29 RX ADMIN — FLUTICASONE PROPIONATE 1 SPRAY: 50 SPRAY, METERED NASAL at 08:34

## 2021-04-29 RX ADMIN — ACETAMINOPHEN 650 MG: 325 TABLET, FILM COATED ORAL at 21:11

## 2021-04-29 RX ADMIN — TRAZODONE HYDROCHLORIDE 300 MG: 150 TABLET ORAL at 20:22

## 2021-04-29 RX ADMIN — LORATADINE 10 MG: 10 TABLET ORAL at 08:34

## 2021-04-29 RX ADMIN — ARIPIPRAZOLE 5 MG: 5 TABLET ORAL at 20:22

## 2021-04-29 RX ADMIN — PRENATAL VITAMINS-IRON FUMARATE 27 MG IRON-FOLIC ACID 0.8 MG TABLET 1 TABLET: at 08:34

## 2021-04-29 ASSESSMENT — ACTIVITIES OF DAILY LIVING (ADL)
HYGIENE/GROOMING: INDEPENDENT
DRESS: INDEPENDENT
LAUNDRY: WITH SUPERVISION
DRESS: INDEPENDENT
HYGIENE/GROOMING: INDEPENDENT
LAUNDRY: WITH SUPERVISION
ORAL_HYGIENE: INDEPENDENT
ORAL_HYGIENE: INDEPENDENT

## 2021-04-29 NOTE — PROGRESS NOTES
"Pt approached to talk with writer.  Affect is calm, pleasant, full-ranging.  States her mood is \"okay\" but that she did get upset easily at times.  She states she will continue to be 'good' in groups.  She asked about getting a rule 25 and the possibility of going to an outpatient program in [place].  \"Do you think I can go to [facility].\"  Pt says she has been to this addiction treatment program in the past.    "

## 2021-04-29 NOTE — PLAN OF CARE
Pt. willing to meet with staff for a 1:1.  Pt. states that she is now in agreement with CD treatment. Pt. states that she does think it would be helpful to her.  The pt. states that she hopes that she can be discharged soon; pt. realizes that discharge can take a few days for discharge to a CD program.  Pt. denies suicidal ideation, self-injurious behavior.  Pt. currently is coping well, no current outbursts.  Pt. appears to have a low frustration level, can escalate emotionally quickly with some verbal outbursts.

## 2021-04-29 NOTE — PLAN OF CARE
Problem: Sleep Disturbance (Depressive Signs/Symptoms)  Goal: Improved Sleep (Depressive Signs/Symptoms)  Outcome: No Change  Flowsheets (Taken 4/29/2021 5104)  Mutually Determined Action Steps (Improved Sleep): sleeps 4-6 hours at night  Patient observed sleeping most of the night; She briefly came out, asked for a snack, and returned to her room. She slept for approximately 7.25 hours.

## 2021-04-29 NOTE — PLAN OF CARE
Attended 2 OT  Creative Expression Groups x 45 minutes each with 3 peers present in each and 1 Affirmations Group x 45 minutes with 3 peers. Apologized for behavior 2 days prior and agreed to follow directions and guidelines reviewed. Was pleasant and cooperative throughout the day. Readily noted she needed to complete projects in bin. Decisive, planned and organized independently. Social with staff and peers. In structured group identified an affirmation she wanted to focus on and decorated a frame for it to post as a reminder to incorporate in daily self management.
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
## 2021-04-29 NOTE — PROGRESS NOTES
"Pt has been visible and social with others.  She attended evening groups and was reported to participate appropriately.  Reports mood is \"good\" and even.  Denies anxiety, denies SI/HI and hallucinations.  Approached writer at 9 pm for her night medications.  Writer reminded her of the court's decision to support commitment and [facility].  She took scheduled Abilify and Trazodone without issue and allowed mouth inspection for cheecking.  She stated \"Thank you.\" and \"Do you think they will let me out sooner if I can get into treatment?\"  Pt appears more receptive to plan of care.    "

## 2021-04-29 NOTE — PLAN OF CARE
"  Problem: Behavioral Health Plan of Care  Goal: Plan of Care Review  Outcome: Improving  Flowsheets  Taken 4/29/2021 0021 by Rubens Lindquist RN  Progress: improving  Patient Agreement with Plan of Care: agrees  Taken 4/28/2021 1400 by Erma Jackson RN  Plan of Care Reviewed With: patient  Note: Pt has been visible and social with others.  She attended evening groups and was reported to participate appropriately.  Reports mood is \"good\" and even.  Denies anxiety, denies SI/HI and hallucinations.  Approached writer at 9 pm for her night medications.  Writer reminded her of the court's decision to support commitment and Dockery.  She took scheduled Abilify and Trazodone without issue and allowed mouth inspection for cheecking.  She stated \"Thank you.\" and \"Do you think they will let me out sooner if I can get into treatment?\"  Pt appears more receptive to plan of care.       "

## 2021-04-30 PROCEDURE — G0177 OPPS/PHP; TRAIN & EDUC SERV: HCPCS

## 2021-04-30 PROCEDURE — 124N000002 HC R&B MH UMMC

## 2021-04-30 PROCEDURE — 250N000013 HC RX MED GY IP 250 OP 250 PS 637: Performed by: PSYCHIATRY & NEUROLOGY

## 2021-04-30 PROCEDURE — 250N000013 HC RX MED GY IP 250 OP 250 PS 637: Performed by: PHYSICIAN ASSISTANT

## 2021-04-30 PROCEDURE — H0001 ALCOHOL AND/OR DRUG ASSESS: HCPCS

## 2021-04-30 PROCEDURE — 99232 SBSQ HOSP IP/OBS MODERATE 35: CPT | Performed by: NURSE PRACTITIONER

## 2021-04-30 PROCEDURE — 250N000013 HC RX MED GY IP 250 OP 250 PS 637: Performed by: NURSE PRACTITIONER

## 2021-04-30 RX ORDER — FLUTICASONE PROPIONATE 50 MCG
1 SPRAY, SUSPENSION (ML) NASAL 2 TIMES DAILY
Status: DISCONTINUED | OUTPATIENT
Start: 2021-04-30 | End: 2021-05-05 | Stop reason: HOSPADM

## 2021-04-30 RX ORDER — CHLORAL HYDRATE 500 MG
1 CAPSULE ORAL DAILY
Status: DISCONTINUED | OUTPATIENT
Start: 2021-04-30 | End: 2021-05-05 | Stop reason: HOSPADM

## 2021-04-30 RX ADMIN — Medication 1 G: at 11:07

## 2021-04-30 RX ADMIN — LORATADINE 10 MG: 10 TABLET ORAL at 07:55

## 2021-04-30 RX ADMIN — FLUTICASONE PROPIONATE 1 SPRAY: 50 SPRAY, METERED NASAL at 07:56

## 2021-04-30 RX ADMIN — TRAZODONE HYDROCHLORIDE 300 MG: 150 TABLET ORAL at 20:08

## 2021-04-30 RX ADMIN — ACETAMINOPHEN 650 MG: 325 TABLET, FILM COATED ORAL at 20:11

## 2021-04-30 RX ADMIN — HYDROXYZINE HYDROCHLORIDE 25 MG: 25 TABLET, FILM COATED ORAL at 20:08

## 2021-04-30 RX ADMIN — PRENATAL VITAMINS-IRON FUMARATE 27 MG IRON-FOLIC ACID 0.8 MG TABLET 1 TABLET: at 07:55

## 2021-04-30 RX ADMIN — ARIPIPRAZOLE 5 MG: 5 TABLET ORAL at 20:08

## 2021-04-30 RX ADMIN — Medication 1 SPRAY: at 20:09

## 2021-04-30 ASSESSMENT — ACTIVITIES OF DAILY LIVING (ADL)
LAUNDRY: WITH SUPERVISION
DRESS: INDEPENDENT
ORAL_HYGIENE: INDEPENDENT
HYGIENE/GROOMING: INDEPENDENT

## 2021-04-30 NOTE — PLAN OF CARE
MIKAEL from [facility], [phone], requesting copy of commitment and Dockery. [facility] called back, sent copy.
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
## 2021-04-30 NOTE — PLAN OF CARE
Attended 1 OT Creative Expression Group x 45 minutes with 3 peers present. Focused and organized. Goal directed. Social with peers. Able to plan and organize simple task with good attention to details. Futuristic in conversation and goals.

## 2021-04-30 NOTE — PROGRESS NOTES
Pt woke up once this shift for snacks. No prn given. No other issues noted. Will continue to monitor.
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
## 2021-04-30 NOTE — CONSULTS
4/30/2021    CD consult completed. I will send out referrals this afternoon and ROIs to [facility].   Pt will need to sign CPA for Tiffanie Richey once we know where she will be placed for treatment.     Recommendations:   1. Abstain from all non-prescribed mood-altering substances  2. Take all medications as prescribed  3. Enter and complete a co-occurring IOP with lodging treatment program  4. Follow all recommendations upon discharge from treatment. Recommendations may include, but are not limited to: extended treatment, outpatient treatment and/or sober housing.  5. Follow all recommendations of your medical providers     Referrals:  Raúl   [address] [place] [address]  Phone: [phone]   Fax: [phone]     [facility]  Phone: [phone]  Fax: [phone]     L[facility] Becerra   Phone: [phone]  Fax: [phone]     PAULO consult completed by: ALBERT Pittman.  Phone Number: [phone]  E-mail Address: [email or web address] Mental Health and Addiction Services Evaluation Department  [address] [place] [address]

## 2021-04-30 NOTE — PLAN OF CARE
Amended court order for pt received. Order is amended to change the caption in the order to: Order for Commitment as a Person Who Poses a Risk of Harm Due to a Mental Illness and a Chemically Dependent Person.    Copy to pt, copy to chart.

## 2021-04-30 NOTE — PLAN OF CARE
..Pt was visible in milieu, attended scheduled groups, social and appropriate with peers. Affect was full range. Mood was calm. Pt ate meals and was compliant with medications. No SI/SIB noted. Will continue to monitor.

## 2021-05-01 PROCEDURE — 124N000002 HC R&B MH UMMC

## 2021-05-01 PROCEDURE — 250N000013 HC RX MED GY IP 250 OP 250 PS 637: Performed by: NURSE PRACTITIONER

## 2021-05-01 PROCEDURE — 250N000013 HC RX MED GY IP 250 OP 250 PS 637: Performed by: PHYSICIAN ASSISTANT

## 2021-05-01 PROCEDURE — 250N000013 HC RX MED GY IP 250 OP 250 PS 637: Performed by: PSYCHIATRY & NEUROLOGY

## 2021-05-01 RX ADMIN — PRENATAL VITAMINS-IRON FUMARATE 27 MG IRON-FOLIC ACID 0.8 MG TABLET 1 TABLET: at 08:00

## 2021-05-01 RX ADMIN — Medication 1 SPRAY: at 20:13

## 2021-05-01 RX ADMIN — Medication 1 SPRAY: at 08:01

## 2021-05-01 RX ADMIN — HYDROXYZINE HYDROCHLORIDE 25 MG: 25 TABLET, FILM COATED ORAL at 20:13

## 2021-05-01 RX ADMIN — ARIPIPRAZOLE 5 MG: 5 TABLET ORAL at 20:13

## 2021-05-01 RX ADMIN — LORATADINE 10 MG: 10 TABLET ORAL at 08:00

## 2021-05-01 RX ADMIN — TRAZODONE HYDROCHLORIDE 300 MG: 150 TABLET ORAL at 20:13

## 2021-05-01 RX ADMIN — Medication 1 G: at 08:00

## 2021-05-01 ASSESSMENT — ACTIVITIES OF DAILY LIVING (ADL)
HYGIENE/GROOMING: INDEPENDENT
ORAL_HYGIENE: INDEPENDENT
LAUNDRY: WITH SUPERVISION
HYGIENE/GROOMING: INDEPENDENT
DRESS: INDEPENDENT
DRESS: INDEPENDENT;SCRUBS (BEHAVIORAL HEALTH)
ORAL_HYGIENE: INDEPENDENT
LAUNDRY: WITH SUPERVISION

## 2021-05-01 NOTE — PROGRESS NOTES
Pt came into room as if to attend group. She didn't have a mask and said she was going to get her mask and did not return , she did not attend.

## 2021-05-01 NOTE — PLAN OF CARE
..Pt was visible in milieu, social and appropriate with peers, spent time watching movies in the lounge. Affect was full range. Mood was calm. Pt ate meals and was compliant with medications. No SI/SIB noted. Will continue to monitor.

## 2021-05-01 NOTE — PLAN OF CARE
Nursing Assessment     Pt had baseline shift. Pt visible in the milieu. Pt did not attend group due to giving up spot for a newer pt. Throughout the shift, pt appeared calm and comfortable. Pt denies SI, SIB, HI and AVH. Pt agreeable to safe behaviors on the unit. Pt ate 100% of dinner. Appetite appears WDL.  Pt denies any concerns with sleep. No medical concerns. Pt took medications without issue. PRN hydroxyzine administered to target anxiety and PRN tylenol administered to target pain. Pt endorsed headache 5/10. Pt sleeping when RN went to evaluate effectiveness. Pt educated on plan of care and encouraged to continue working toward goals.    Pt remains on SI and SIB precautions. Will continue to monitor and support.

## 2021-05-01 NOTE — PLAN OF CARE
Pt attended mental health education group. Did a house drawing. Wrote or kelsy values that govern their life, behaviors that they want to change or improve, emotions they want to experience more often, things they are happy about, what a life worth living would look like and people or things that protect them. Also discussed ways they blow off steam and things they are proud of. Discussed how these things can translate into goals and how that impacts how they view their current behaviors. Pt actively participated. Spoke at length about her children and her pride in them. Also discussed how she was working to create a reframe regarding being here and trying to embrace the idea of treatment and sobriety.

## 2021-05-02 PROCEDURE — 250N000013 HC RX MED GY IP 250 OP 250 PS 637: Performed by: PSYCHIATRY & NEUROLOGY

## 2021-05-02 PROCEDURE — 250N000013 HC RX MED GY IP 250 OP 250 PS 637: Performed by: PHYSICIAN ASSISTANT

## 2021-05-02 PROCEDURE — 124N000002 HC R&B MH UMMC

## 2021-05-02 PROCEDURE — 250N000013 HC RX MED GY IP 250 OP 250 PS 637: Performed by: NURSE PRACTITIONER

## 2021-05-02 RX ADMIN — Medication 1 G: at 07:55

## 2021-05-02 RX ADMIN — Medication 1 SPRAY: at 07:55

## 2021-05-02 RX ADMIN — PRENATAL VITAMINS-IRON FUMARATE 27 MG IRON-FOLIC ACID 0.8 MG TABLET 1 TABLET: at 07:55

## 2021-05-02 RX ADMIN — Medication 1 SPRAY: at 19:07

## 2021-05-02 RX ADMIN — LORATADINE 10 MG: 10 TABLET ORAL at 07:55

## 2021-05-02 RX ADMIN — TRAZODONE HYDROCHLORIDE 300 MG: 150 TABLET ORAL at 19:06

## 2021-05-02 RX ADMIN — ARIPIPRAZOLE 5 MG: 5 TABLET ORAL at 19:06

## 2021-05-02 RX ADMIN — HYDROXYZINE HYDROCHLORIDE 25 MG: 25 TABLET, FILM COATED ORAL at 19:06

## 2021-05-02 ASSESSMENT — ACTIVITIES OF DAILY LIVING (ADL)
DRESS: INDEPENDENT
LAUNDRY: WITH SUPERVISION
HYGIENE/GROOMING: INDEPENDENT
ORAL_HYGIENE: INDEPENDENT

## 2021-05-02 NOTE — PLAN OF CARE
Problem: Adult Inpatient Plan of Care  Goal: Plan of Care Review  Outcome: No Change  Flowsheets (Taken 5/1/2021 2000)  Plan of Care Reviewed With: patient    Pt spent the majority of the shift in the lounge watching movies. Pt ate her dinner, presented with a full range affect and took her scheduled medications. Denied SI/SIB or hallucinations. Pt states she is waiting for CD tx placement. Pt was otherwise calm, cooperative, and pleasant. Pt took a PRN hydroxyzine at 2013 for anxiety which was effective.

## 2021-05-03 LAB
BASOPHILS # BLD AUTO: 0 10E9/L (ref 0–0.2)
BASOPHILS NFR BLD AUTO: 0.3 %
COPATH REPORT: NORMAL
DIFFERENTIAL METHOD BLD: ABNORMAL
EOSINOPHIL # BLD AUTO: 0 10E9/L (ref 0–0.7)
EOSINOPHIL NFR BLD AUTO: 0.4 %
ERYTHROCYTE [DISTWIDTH] IN BLOOD BY AUTOMATED COUNT: 14.5 % (ref 10–15)
HCT VFR BLD AUTO: 34.6 % (ref 35–47)
HGB BLD-MCNC: 11.4 G/DL (ref 11.7–15.7)
IMM GRANULOCYTES # BLD: 0 10E9/L (ref 0–0.4)
IMM GRANULOCYTES NFR BLD: 0.4 %
LYMPHOCYTES # BLD AUTO: 2.6 10E9/L (ref 0.8–5.3)
LYMPHOCYTES NFR BLD AUTO: 34 %
MCH RBC QN AUTO: 30.6 PG (ref 26.5–33)
MCHC RBC AUTO-ENTMCNC: 32.9 G/DL (ref 31.5–36.5)
MCV RBC AUTO: 93 FL (ref 78–100)
MONOCYTES # BLD AUTO: 0.7 10E9/L (ref 0–1.3)
MONOCYTES NFR BLD AUTO: 9.8 %
NEUTROPHILS # BLD AUTO: 4.2 10E9/L (ref 1.6–8.3)
NEUTROPHILS NFR BLD AUTO: 55.1 %
NRBC # BLD AUTO: 0 10*3/UL
NRBC BLD AUTO-RTO: 0 /100
PLATELET # BLD AUTO: 340 10E9/L (ref 150–450)
RBC # BLD AUTO: 3.73 10E12/L (ref 3.8–5.2)
WBC # BLD AUTO: 7.5 10E9/L (ref 4–11)

## 2021-05-03 PROCEDURE — 250N000013 HC RX MED GY IP 250 OP 250 PS 637: Performed by: PHYSICIAN ASSISTANT

## 2021-05-03 PROCEDURE — H2032 ACTIVITY THERAPY, PER 15 MIN: HCPCS

## 2021-05-03 PROCEDURE — 124N000002 HC R&B MH UMMC

## 2021-05-03 PROCEDURE — 99232 SBSQ HOSP IP/OBS MODERATE 35: CPT | Performed by: NURSE PRACTITIONER

## 2021-05-03 PROCEDURE — 250N000013 HC RX MED GY IP 250 OP 250 PS 637: Performed by: PSYCHIATRY & NEUROLOGY

## 2021-05-03 PROCEDURE — 250N000013 HC RX MED GY IP 250 OP 250 PS 637: Performed by: NURSE PRACTITIONER

## 2021-05-03 PROCEDURE — 85025 COMPLETE CBC W/AUTO DIFF WBC: CPT | Performed by: NURSE PRACTITIONER

## 2021-05-03 PROCEDURE — G0177 OPPS/PHP; TRAIN & EDUC SERV: HCPCS

## 2021-05-03 PROCEDURE — 36415 COLL VENOUS BLD VENIPUNCTURE: CPT | Performed by: NURSE PRACTITIONER

## 2021-05-03 RX ORDER — RISPERIDONE 1 MG/1
1 TABLET ORAL AT BEDTIME
Status: DISCONTINUED | OUTPATIENT
Start: 2021-05-03 | End: 2021-05-05 | Stop reason: HOSPADM

## 2021-05-03 RX ORDER — OLANZAPINE 10 MG/2ML
5 INJECTION, POWDER, FOR SOLUTION INTRAMUSCULAR AT BEDTIME
Status: DISCONTINUED | OUTPATIENT
Start: 2021-05-03 | End: 2021-05-05 | Stop reason: HOSPADM

## 2021-05-03 RX ADMIN — NICOTINE POLACRILEX 4 MG: 4 GUM, CHEWING BUCCAL at 18:22

## 2021-05-03 RX ADMIN — Medication 1 SPRAY: at 07:57

## 2021-05-03 RX ADMIN — TRAZODONE HYDROCHLORIDE 300 MG: 150 TABLET ORAL at 19:58

## 2021-05-03 RX ADMIN — NICOTINE POLACRILEX 4 MG: 4 GUM, CHEWING BUCCAL at 19:58

## 2021-05-03 RX ADMIN — PRENATAL VITAMINS-IRON FUMARATE 27 MG IRON-FOLIC ACID 0.8 MG TABLET 1 TABLET: at 07:57

## 2021-05-03 RX ADMIN — ACETAMINOPHEN 650 MG: 325 TABLET, FILM COATED ORAL at 17:07

## 2021-05-03 RX ADMIN — RISPERIDONE 1 MG: 1 TABLET ORAL at 20:02

## 2021-05-03 RX ADMIN — Medication 1 SPRAY: at 19:57

## 2021-05-03 RX ADMIN — Medication 1 G: at 07:57

## 2021-05-03 RX ADMIN — HYDROXYZINE HYDROCHLORIDE 25 MG: 25 TABLET, FILM COATED ORAL at 17:07

## 2021-05-03 RX ADMIN — LORATADINE 10 MG: 10 TABLET ORAL at 07:57

## 2021-05-03 ASSESSMENT — ACTIVITIES OF DAILY LIVING (ADL)
HYGIENE/GROOMING: INDEPENDENT
LAUNDRY: WITH SUPERVISION
HYGIENE/GROOMING: INDEPENDENT
ORAL_HYGIENE: INDEPENDENT
DRESS: SCRUBS (BEHAVIORAL HEALTH)
DRESS: INDEPENDENT
LAUNDRY: WITH SUPERVISION
ORAL_HYGIENE: INDEPENDENT

## 2021-05-03 NOTE — PLAN OF CARE
Gabriela from [facility], would like additional charting. [phone].    [facility] called back. Will send documents requested. Advised that Gabriela should contact Jackie BURNETTE with admit date as this writer won't be here for the next couple of days.    Ph[facility] is [facility] in [place] off [address]. Prefer meds sent there.

## 2021-05-03 NOTE — PLAN OF CARE
Problem: Adult Inpatient Plan of Care  Goal: Plan of Care Review  Outcome: No Change     C/o back pain 10/10, prn tylenol and warm pack given. At pain reassess patient said the pain is 0/10. Anxiety 8/10-prn atarax given. Said she is anxious about discharging and looking forward to leaving soon. Depression 0/10, denied SI and no hallucinations. Contracts for safety. Continues on suicide and SIB precautions. Present in lounge, watching TV socializing with peers and cooperative with cares.

## 2021-05-03 NOTE — PLAN OF CARE
Pt slept 7 hours. Pt up for snack and slept most of shift. No safety issues or concerns reported.

## 2021-05-03 NOTE — PLAN OF CARE
Problem: Behavioral Health Plan of Care  Goal: Optimized Coping Skills in Response to Life Stressors  Outcome: Improving  Note: Pt. Willing to engage in 1:1 with staff.  Pt. States that she is doing better overall, that she does attend some of the groups, and is involved with some of the unit activities.  Pt. Indicates verbally that she is understanding the need for CD treatment and is optimistic about the benefits of CD treatment.  Pt. Is pleasant and cooperative.  Pt. Denies suicidal ideation, any intrusive thoughts, and has no current concerns at this time.
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
## 2021-05-03 NOTE — PROGRESS NOTES
"SPIRITUAL HEALTH SERVICES  SPIRITUAL ASSESSMENT Progress Note  [facility]       REFERRAL SOURCE: Self initiated  visit, Jain specific.     DATA: Pt Queen Brittany Stack identifies as Jain and is of  descent.      was hoping to get some sleep so I made my visit brief with her. During my assessment,  expressed to me that, \"Both my parents are Jain but I like to explore other faiths as well\". I asked  if she would like to update her Druze designation in her medical record from Jain to something else and she declined.      wanted to receive Islamic incantations/prayers at beside we prayed together at her request.      has requested an English copy of the Holy Quran and has received it. I also provided  with an Islamic prayer booklet and card with a Prophetic supplication.       PLAN: I will follow up with Queen Brittany Stack for the duration of his stay.     Mercedes Claudio  Lead Jain   Pager [phone]    [facility] remains available 24/7 for emergent requests/referrals, either by having the switchboard page the on-call  or by entering an ASAP/STAT consult in Epic (this will also page the on-call ).    "

## 2021-05-03 NOTE — PLAN OF CARE
BEHAVIORAL TEAM DISCUSSION    Participants: Vicky Jackman CNP; Jackie Momin and KARRIE Muro s; Yarelis Jansen RN  Progress: Pt is dually committed.   delusional thoughts have been reduced.  Pt is cooperative and working with psychiatry for best outcome.  Pt requested a medication change and this was initiated.   Anticipated length of stay: 1-2 days   Continued Stay Criteria/Rationale: will need to determine if pt can tolerate Risperdal  Medical/Physical: Status post D&C 4/23/2021  Precautions:   Behavioral Orders   Procedures     Code 1     Petition for commitment     MICD with [facility]     Routine Programming     As clinically indicated     Self Injury Precaution     Single Room     Status 15     Every 15 minutes.     Suicide precautions     Patients on Suicide Precautions should have a Combination Diet ordered that includes a Diet selection(s) AND a Behavioral Tray selection for Safe Tray - with utensils, or Safe Tray - NO utensils       Plan: coordinate admission to residential CD treatment program.  Pt will need psychiatrist follow up  Rationale for change in precautions or plan: abilify side effects were problematic.  Otherwise, no change in plan.

## 2021-05-03 NOTE — PLAN OF CARE
Nursing Assessment     Pt had uneventful shift. Pt visible in the milieu, however withdrawn from peers.  Throughout the shift, pt appeared calm, tired, flat and blunted. Pt denies SI, SIB, HI and AVH. Pt agreeable to safe behaviors. Pt ate 100% of dinner. Appetite appears WDL.  Pt denies any concerns with sleep at this time. No medical concerns. Pt took medications without issue. PRN hydroxyzine administered to target anxiety. Pt endorsed effectiveness. Pt appears to be accepting of situation. No behavioral concerns this shift. Pt went to bed around 1900 this evening. Pt educated on plan of care and encouraged to continue working toward goals.    Pt remains on SI and SIB precautions. Will continue to monitor and support.

## 2021-05-04 VITALS
OXYGEN SATURATION: 100 % | DIASTOLIC BLOOD PRESSURE: 75 MMHG | HEART RATE: 114 BPM | RESPIRATION RATE: 16 BRPM | WEIGHT: 177.3 LBS | BODY MASS INDEX: 27.77 KG/M2 | SYSTOLIC BLOOD PRESSURE: 126 MMHG | TEMPERATURE: 98.7 F

## 2021-05-04 PROCEDURE — G0177 OPPS/PHP; TRAIN & EDUC SERV: HCPCS

## 2021-05-04 PROCEDURE — 250N000013 HC RX MED GY IP 250 OP 250 PS 637: Performed by: PSYCHIATRY & NEUROLOGY

## 2021-05-04 PROCEDURE — H2032 ACTIVITY THERAPY, PER 15 MIN: HCPCS

## 2021-05-04 PROCEDURE — 124N000002 HC R&B MH UMMC

## 2021-05-04 PROCEDURE — 99232 SBSQ HOSP IP/OBS MODERATE 35: CPT | Performed by: NURSE PRACTITIONER

## 2021-05-04 PROCEDURE — 250N000013 HC RX MED GY IP 250 OP 250 PS 637: Performed by: PHYSICIAN ASSISTANT

## 2021-05-04 PROCEDURE — 250N000013 HC RX MED GY IP 250 OP 250 PS 637: Performed by: NURSE PRACTITIONER

## 2021-05-04 RX ORDER — MEDROXYPROGESTERONE ACETATE 150 MG/ML
150 INJECTION, SUSPENSION INTRAMUSCULAR
Start: 2021-07-25

## 2021-05-04 RX ORDER — CHLORAL HYDRATE 500 MG
1 CAPSULE ORAL DAILY
Qty: 30 CAPSULE | Refills: 1 | Status: ON HOLD | OUTPATIENT
Start: 2021-05-05 | End: 2022-04-18

## 2021-05-04 RX ORDER — ACETAMINOPHEN 325 MG/1
650 TABLET ORAL EVERY 4 HOURS PRN
Start: 2021-05-04 | End: 2022-04-15

## 2021-05-04 RX ORDER — LORATADINE 10 MG/1
10 TABLET ORAL DAILY
Qty: 30 TABLET | Refills: 1 | Status: ON HOLD | OUTPATIENT
Start: 2021-05-05 | End: 2022-04-18

## 2021-05-04 RX ORDER — HYDROXYZINE HYDROCHLORIDE 25 MG/1
25 TABLET, FILM COATED ORAL EVERY 4 HOURS PRN
Qty: 60 TABLET | Refills: 1 | Status: ON HOLD | OUTPATIENT
Start: 2021-05-04 | End: 2022-04-18

## 2021-05-04 RX ORDER — MULTIPLE VITAMINS W/ MINERALS TAB 9MG-400MCG
1 TAB ORAL DAILY
Qty: 30 TABLET | Refills: 1 | Status: SHIPPED | OUTPATIENT
Start: 2021-05-04 | End: 2022-04-15

## 2021-05-04 RX ORDER — TRAZODONE HYDROCHLORIDE 300 MG/1
300 TABLET ORAL AT BEDTIME
Qty: 30 TABLET | Refills: 1 | Status: ON HOLD | OUTPATIENT
Start: 2021-05-04 | End: 2022-04-18

## 2021-05-04 RX ORDER — FLUTICASONE PROPIONATE 50 MCG
1 SPRAY, SUSPENSION (ML) NASAL 2 TIMES DAILY
Qty: 16 G | Refills: 1 | Status: ON HOLD | OUTPATIENT
Start: 2021-05-04 | End: 2022-04-18

## 2021-05-04 RX ORDER — RISPERIDONE 1 MG/1
1 TABLET ORAL AT BEDTIME
Qty: 30 TABLET | Refills: 1 | Status: ON HOLD | OUTPATIENT
Start: 2021-05-04 | End: 2022-04-18

## 2021-05-04 RX ADMIN — Medication 1 SPRAY: at 19:49

## 2021-05-04 RX ADMIN — Medication 1 SPRAY: at 08:30

## 2021-05-04 RX ADMIN — TRAZODONE HYDROCHLORIDE 300 MG: 150 TABLET ORAL at 19:49

## 2021-05-04 RX ADMIN — ACETAMINOPHEN 650 MG: 325 TABLET, FILM COATED ORAL at 13:30

## 2021-05-04 RX ADMIN — HYDROXYZINE HYDROCHLORIDE 25 MG: 25 TABLET, FILM COATED ORAL at 18:34

## 2021-05-04 RX ADMIN — RISPERIDONE 1 MG: 1 TABLET ORAL at 19:49

## 2021-05-04 RX ADMIN — NICOTINE POLACRILEX 4 MG: 4 GUM, CHEWING BUCCAL at 14:04

## 2021-05-04 RX ADMIN — PRENATAL VITAMINS-IRON FUMARATE 27 MG IRON-FOLIC ACID 0.8 MG TABLET 1 TABLET: at 08:30

## 2021-05-04 RX ADMIN — Medication 1 G: at 08:30

## 2021-05-04 RX ADMIN — LORATADINE 10 MG: 10 TABLET ORAL at 08:30

## 2021-05-04 ASSESSMENT — ACTIVITIES OF DAILY LIVING (ADL)
ORAL_HYGIENE: INDEPENDENT
HYGIENE/GROOMING: INDEPENDENT
LAUNDRY: WITH SUPERVISION
LAUNDRY: UNABLE TO COMPLETE
DRESS: SCRUBS (BEHAVIORAL HEALTH)
DRESS: SCRUBS (BEHAVIORAL HEALTH)
HYGIENE/GROOMING: INDEPENDENT
ORAL_HYGIENE: INDEPENDENT

## 2021-05-04 NOTE — PLAN OF CARE
Problem: Adult Inpatient Plan of Care  Goal: Plan of Care Review  Outcome: Improving     Patient resting in room for a while then has been present in the milieu. Took a shower and did her laundry. Denied pain, anxiety and depression. Affect blunted and cooperative with cares. Continues on suicide and SIB precautions.
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
## 2021-05-04 NOTE — PLAN OF CARE
This CTC placed call to Jemma lubin:  Pt can arrive at [facility]  Address: [address], [place] [address]  Phone: [phone]  Tomorrow at 9:30am.  Informed team.    This [facility] scheduled Psychiatry Intake appointment at [facility] with VANCE Montanez CNP on Tuesday 5/18/2021 @ 8am  This is by telehealth  [address]  [place], [place] [phone]   fx [phone]    You will be due for your second Depo Provera shot 7/26/21. An appointment has been scheduled for you at   [facility] with  Dr. Flores on 7/26/21 @ 3pm   [address] [place], [place]  (third floor) [address]  Phone: [phone]    This writer emailed Paris Mann to determine who was assigned to pt for : You have been assigned a :  PENNY Patiño  [facility]    (direct)[phone] (fax) [phone] (cell) [phone]    Completed AVS and sent appointments and pt's [address] location to Samara    Pt was dually committed to the [facility] and the Commissioner of Human Services on 4/28/2021 and is being discharged on a Provisional Discharge Agreement which shall remain in effect for the duration of the Commitment which expires on 10/27/2021.

## 2021-05-04 NOTE — PLAN OF CARE
Pt actively participated in a structured Therapeutic Recreation group with a focus on leisure participation, stress reduction, and social engagement via an active group game. Pt remained focused and engaged for about half of the group, leaving early. Pt mood was calm and was appropriate with interactions. Pt was sociable with others and offered to keep score for everyone in the group. Pt was very active in the tossing large, foam dice for the activity.

## 2021-05-04 NOTE — PLAN OF CARE
Problem: Cognitive Impairment (Depressive Signs/Symptoms)  Goal: Optimized Cognitive Function (Depressive Signs/Symptoms)  Outcome: Improving  Flowsheets (Taken 5/4/2021 1249)  Mutually Determined Action Steps (Optimized Cognitive Function):    remains focused during activity    follows step-by-step instructions    participates in problem resolution     Problem: Decreased Participation and Engagement (Depressive Signs/Symptoms)  Goal: Increased Participation and Engagement (Depressive Signs/Symptoms)  Outcome: Improving  Flowsheets (Taken 5/4/2021 1249)  Mutually Determined Action Steps (Increased Participation and Engagement):    participates in one or more activity    voluntarily attends group therapy    establishes future-oriented goal     SI/Self harm:  pt denies  Aggression/agitation/HI:  none  AVH:  pt denies  Sleep: 5.75 hours sleep documented on NOC shift.   PRN Med: Tylenol for headache rated 8 out of 10 (10 = worst) at 1330  Medication AE: none reported or observed  Physical Complaints/Issues: none  I & O: eating and drinking well  ADLs: independent  Vitals:  stable and WNL  COVID 19 Assessment:  pt tested negative on 4/22/21. Not currently showing any s/s of COVID19 infection  Milieu Participation: adequate, attended some groups, watched TV with peers, also spent time in her room reading.   Behavior: calm and controlled. Pt is pleasant in interactions and cooperative with care.   Nursing Discharge Planning: Pt will discharge tomorrow to [facility]. Belongings from security and medications are on the unit. Please secure a taxi ride for patient to arrive at [facility] by 0930.  No other concerns at this time. Nursing will continue to monitor and assess.
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
## 2021-05-05 PROCEDURE — 250N000013 HC RX MED GY IP 250 OP 250 PS 637: Performed by: NURSE PRACTITIONER

## 2021-05-05 PROCEDURE — 99239 HOSP IP/OBS DSCHRG MGMT >30: CPT | Performed by: NURSE PRACTITIONER

## 2021-05-05 PROCEDURE — 250N000013 HC RX MED GY IP 250 OP 250 PS 637: Performed by: PHYSICIAN ASSISTANT

## 2021-05-05 RX ADMIN — Medication 1 G: at 08:53

## 2021-05-05 RX ADMIN — Medication 1 SPRAY: at 08:56

## 2021-05-05 RX ADMIN — PRENATAL VITAMINS-IRON FUMARATE 27 MG IRON-FOLIC ACID 0.8 MG TABLET 1 TABLET: at 08:53

## 2021-05-05 RX ADMIN — LORATADINE 10 MG: 10 TABLET ORAL at 08:53

## 2021-05-05 ASSESSMENT — ACTIVITIES OF DAILY LIVING (ADL)
DRESS: INDEPENDENT
ORAL_HYGIENE: INDEPENDENT
HYGIENE/GROOMING: INDEPENDENT

## 2021-05-05 NOTE — DISCHARGE SUMMARY
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 were noted.  She was initially opposed to CD treatment but eventually was agreeable to going.  A CD consult was completed.     The patient's symptoms of psychosis improved.  She denied auditory hallucinations from 4/19 onward.  She continued to state her belief that in the ER on 4/14 she was surprised to learn of her pregnancy because she believes that she had a miscarriage on 4/4, when records from her medical hospitalization at [facility] 4/4 - 4/6 show no evidence thereof.  She said that when she was discharged from [facility] on 4/6 she thought she was no longer pregnant, although records indicate she was informed that she was pregnant and given resources for termination; she surmises that she was pregnant with twins and miscarried one on 4/4.  Aside from this, no other delusional thoughts were noted.  Irritability was much improved.  She denied feeling " "depressed.  She denied suicidal ideation.  She was hopeful and future-oriented.  She ate and slept well.      Queen Brittany Stack was released via taxi to [facility] CD treatment on a provisional discharge.  At the time of discharge Queen Brittany Stack was determined to not be a danger to herself or others.     ADDENDUM:  [facility] staff called the unit approximately 4 hours after she was scheduled to arrive and indicated that she had not.  She is in violation of the terms of her provisional discharge.  Her  should be contacted if she presents to an ER.           Discharge Medications:      Queen Brittany Stack   Home Medication Instructions COOPER:[id number]    Printed on:05/05/21 8457   Medication Information                      acetaminophen (TYLENOL) 325 MG tablet  Take 2 tablets (650 mg) by mouth every 4 hours as needed for mild pain (to moderate pain)             fish oil-omega-3 fatty acids 1000 MG capsule  Take 1 capsule (1 g) by mouth daily             fluticasone (FLONASE) 50 MCG/ACT nasal spray  Spray 1 spray into both nostrils 2 times daily             hydrOXYzine (ATARAX) 25 MG tablet  Take 1 tablet (25 mg) by mouth every 4 hours as needed for anxiety             loratadine (CLARITIN) 10 MG tablet  Take 1 tablet (10 mg) by mouth daily             medroxyPROGESTERone (DEPO-PROVERA) 150 MG/ML IM injection  Inject 1 mL (150 mg) into the muscle every 3 months.  Due 7/26/2021.             multivitamin w/minerals (THERA-VIT-M) tablet  Take 1 tablet by mouth daily             risperiDONE (RISPERDAL) 1 MG tablet  Take 1 tablet (1 mg) by mouth At Bedtime             traZODone 300 MG TABS  Take 300 mg by mouth At Bedtime                      Psychiatric Examination:     Appearance:  alert, adequate grooming, dressed in scrubs  Attitude:  cooperative  Eye Contact:  good  Mood: \"good, calm\"  Affect:  normal range  Speech:  clear, coherent  Psychomotor Behavior:  no evidence of tardive " dyskinesia, dystonia, or tics  Thought Process:  less than logical  Associations:  no loose associations  Thought Content:  denies hallucinations, denies suicidal and homicidal ideation, some delusional thoughts are evident  Insight:  fair, improved compared to admission  Judgment:  fair, improved compared to admission  Oriented to:  date, time, person, and place  Attention Span and Concentration:  fair to good  Recent and Remote Memory:  some impairment related to circumstances preceding admission and early in hospitalization  Language:  intact, fluent English  Fund of Knowledge:  appropriate  Muscle Strength and Tone:  normal  Gait and Station:  normal     /75   Pulse 114   Temp 98.7  F (37.1  C) (Oral)   Resp 16   Wt 80.4 kg (177 lb 4.8 oz)   SpO2 100%   BMI 27.77 kg/m           Discharge Plan:     Per Discharge AVS:    Summary:     You were admitted on 4/14/2021 due to psychotic symptomology and suicidal ideations.  You were treated by Vicky Jackman CNP and discharged on 5/5/2021 from station [address] to [address] [place] [address]  Address:  [address], [place] [address]   Phone: [phone]    You were dually committed to the [facility] and the [facility] on 4/28/2021 and you are being discharged on a Provisional Discharge Agreement which shall remain in effect for the duration of the Commitment which expires on 10/27/2021.     You are also court ordered to take the medications that the doctor ordered for you.       Health Care Follow-up:      Psychiatry Intake appointment scheduled at [facility] with VANCE Montanez CNP on Tuesday 5/18/2021 @ 8am  This is by [employer]  [address]  [place] [phone]    [phone]    You will be due for your second Depo Provera shot 7/26/21. An appointment has been scheduled for you at   [facility] with  Dr. Flores on 7/26/21 @ 3pm   [address]  [place]  (third floor) [address]  Phone: [phone]    You have been assigned a :  PENNY Patiño  [facility]    (direct) [phone] (fax) [phone] (cell) [phone]      ADDENDUM:  [facility] staff called the unit approximately 4 hours after she was scheduled to arrive and indicated that she had not.  She is in violation of the terms of her provisional discharge.  Her  should be contacted if she presents to an ER.        She was provided with a 30-day supply of medications plus one refill.  She was advised to take her medications as prescribed and to abstain from alcohol and illicit substances.       Attestation:  The patient has been seen and evaluated by me, VANCE Dalton CNP   Discharge time > 30 minutes      Clinical Global Impressions  First:  Considering your total clinical experience with this particular patient population, how severe are the patient's symptoms at this time?: 7 (04/15/21 0652)  Compared to the patient's condition at the START of treatment, this patient's condition is: 4 (04/15/21 0652)  Most recent:  Considering your total clinical experience with this particular patient population, how severe are the patient's symptoms at this time?: 4 (05/05/21 0552)  Compared to the patient's condition at the START of treatment, this patient's condition is: 2 (05/05/21 0552)

## 2021-05-05 NOTE — PLAN OF CARE
Pt slept 6.75 hours. Pt up for snack and slept most of shift. No safety issues or concerns reported.

## 2021-05-05 NOTE — PLAN OF CARE
Music Therapy Group note     Total time in session: 40 minutes    Number of patients in group: 4    Scope of service: humanistic    Patient progress: moderate    Patient response/reaction to treatment intervention(s):  engaged well in songwriting intervention today in group, writing about her brother, whom she is proud of.  She also dedicated a song to him that is one of his favorites. She was upbeat, social and cooperative.  She was somewhat in and out of group and is excited for discharge tomorrow.     Venus Beasley MT-BC  Board-Certified Music Therapist

## 2021-05-05 NOTE — PLAN OF CARE
Patient discharged to [facility] via cab with a plan to follow up CD treatment and medication management. Discharge AVS reviewed, as well as discharge medications. They have no further questions and are aware to call the unit any time after discharge should further questions arise.She is currently with one of our staff on the way to [employer] to retrieve her taser. I called shila lubin to inform them that she has a taser, and they verbalized that they will lock it up for her for the duration of her stay.

## 2021-05-07 ENCOUNTER — TELEPHONE (OUTPATIENT)
Dept: PHARMACY | Facility: CLINIC | Age: 31
End: 2021-05-07

## 2021-05-07 NOTE — TELEPHONE ENCOUNTER
MTM referral from: Transitions of Care (recent hospital discharge or ED visit)    MTM referral outreach attempt #2 on May 7, 2021 at 1:30 PM      Outcome: Patient not reachable after several attempts, will route to MTM Pharmacist/Provider as an FYI. Thank you for the referral.    Jose Juan Schwartz, MTM coordinator

## 2021-07-09 ENCOUNTER — TRANSFERRED RECORDS (OUTPATIENT)
Dept: HEALTH INFORMATION MANAGEMENT | Facility: CLINIC | Age: 31
End: 2021-07-09

## 2021-09-12 ENCOUNTER — HEALTH MAINTENANCE LETTER (OUTPATIENT)
Age: 31
End: 2021-09-12

## 2021-12-02 NOTE — PLAN OF CARE
Shift Update: Pt mood is calm, affect is flat. Thought process is impaired. Insight is poor. Pt denied suicidal ideation, did not attend groups this evening, was med and food compliant. Pt spent most of the shift resting in her bed. Refused to participate in 1:1   [General Appearance - Well Developed] : well developed [Bowel Sounds] : normal bowel sounds [Urethral Meatus] : meatus normal [Penis Abnormality] : normal circumcised penis [FreeTextEntry1] : Bilateral descended testicles.  No tenderness with palpation of testicles.  No tenderness with palpation along the urethra.  No suprapubic tenderness with palpation.  Mild right CVA tenderness.  HEATHER revealing a soft, nonboggy, nonindurated prostate.  Describes "pressure "with prostate palpation

## 2022-01-02 ENCOUNTER — HEALTH MAINTENANCE LETTER (OUTPATIENT)
Age: 32
End: 2022-01-02

## 2022-04-07 ENCOUNTER — HOSPITAL ENCOUNTER (EMERGENCY)
Facility: CLINIC | Age: 32
Discharge: NURSING, REHAB OR LONG-TERM CARE | End: 2022-04-08
Attending: EMERGENCY MEDICINE | Admitting: EMERGENCY MEDICINE
Payer: COMMERCIAL

## 2022-04-07 DIAGNOSIS — F41.9 ANXIETY: ICD-10-CM

## 2022-04-07 DIAGNOSIS — R41.82 ALTERED MENTAL STATUS, UNSPECIFIED ALTERED MENTAL STATUS TYPE: ICD-10-CM

## 2022-04-07 PROCEDURE — 99284 EMERGENCY DEPT VISIT MOD MDM: CPT | Performed by: EMERGENCY MEDICINE

## 2022-04-07 PROCEDURE — 99285 EMERGENCY DEPT VISIT HI MDM: CPT | Mod: 25 | Performed by: EMERGENCY MEDICINE

## 2022-04-08 ENCOUNTER — TELEPHONE (OUTPATIENT)
Dept: OBGYN | Facility: CLINIC | Age: 32
End: 2022-04-08

## 2022-04-08 VITALS
DIASTOLIC BLOOD PRESSURE: 72 MMHG | OXYGEN SATURATION: 100 % | RESPIRATION RATE: 16 BRPM | TEMPERATURE: 98.4 F | SYSTOLIC BLOOD PRESSURE: 111 MMHG | HEART RATE: 86 BPM

## 2022-04-08 LAB
ALCOHOL BREATH TEST: 0 (ref 0–0.01)
AMPHETAMINES UR QL SCN: ABNORMAL
BARBITURATES UR QL: ABNORMAL
BENZODIAZ UR QL: ABNORMAL
CANNABINOIDS UR QL SCN: ABNORMAL
COCAINE UR QL: ABNORMAL
HCG UR QL: NEGATIVE
OPIATES UR QL SCN: ABNORMAL

## 2022-04-08 PROCEDURE — 81025 URINE PREGNANCY TEST: CPT | Performed by: EMERGENCY MEDICINE

## 2022-04-08 PROCEDURE — 250N000013 HC RX MED GY IP 250 OP 250 PS 637: Performed by: EMERGENCY MEDICINE

## 2022-04-08 PROCEDURE — 80307 DRUG TEST PRSMV CHEM ANLYZR: CPT | Performed by: EMERGENCY MEDICINE

## 2022-04-08 PROCEDURE — 90791 PSYCH DIAGNOSTIC EVALUATION: CPT

## 2022-04-08 PROCEDURE — 87591 N.GONORRHOEAE DNA AMP PROB: CPT | Performed by: EMERGENCY MEDICINE

## 2022-04-08 PROCEDURE — 87491 CHLMYD TRACH DNA AMP PROBE: CPT | Performed by: EMERGENCY MEDICINE

## 2022-04-08 RX ORDER — ACETAMINOPHEN 500 MG
1000 TABLET ORAL ONCE
Status: COMPLETED | OUTPATIENT
Start: 2022-04-08 | End: 2022-04-08

## 2022-04-08 RX ORDER — IBUPROFEN 600 MG/1
600 TABLET, FILM COATED ORAL ONCE
Status: COMPLETED | OUTPATIENT
Start: 2022-04-08 | End: 2022-04-08

## 2022-04-08 RX ADMIN — ACETAMINOPHEN 1000 MG: 500 TABLET ORAL at 10:34

## 2022-04-08 RX ADMIN — IBUPROFEN 600 MG: 600 TABLET ORAL at 10:34

## 2022-04-08 NOTE — ED NOTES
4/7/2022  Queen Brittany Stack 1990     [facility] Crisis Assessment    Patient was assessed: remote  Patient location: [place]    Referral Data and Chief Complaint   is a 31 year old who uses she/her pronouns. Patient presented to the ED via EMS and was referred to the ED by self. Patient is presenting to the ED for the following concerns: Anxiety.      Informed Consent and Assessment Methods    Patient is her own guardian. Writer met with patient and explained the crisis assessment process, including applicable information disclosures and limits to confidentiality, assessed understanding of the process, and obtained consent to proceed with the assessment. Patient was observed to be able to participate in the assessment as evidenced by her willingness to meet with  to complete assessment. Assessment methods included conducting a formal interview with patient, review of medical records, collaboration with medical staff, and obtaining relevant collateral information from family and community providers when available.    Narrative Summary of Presenting Problem and Current Functioning  What led to the patient presenting for crisis services, factors that make the crisis life threatening or complex, stressors, how is this disrupting the patient's life, and how current functioning is in comparison to baseline. How is patient presenting during the assessment.     Pt reports she reports to the ED due to having issues with her emotions (talking to self) for a few weeks now. Shares she goes into the hospital when she is experiencing headaches, body aches and has not been sleeping.     History of the Crisis  Duration of the current crisis, coping skills attempted to reduce the crisis, community resources used, and past presentations.    Pt shares she was previously diagnose with depression and Anxiety. Per Pt's file, Pt has a Hx of KARENA, MDD, Bipolar, polysubstance use disorder. Pt shares she is not engaged with  "MH services at this time and was unable to share coping skills that she utilizes at this time to manage her MH.     Collateral Information  Pt reports she could not remember her Mother's phone #    Risk Assessment    Risk of Harm to Self     ESS-6  1.a. Over the past 2 weeks, have you had thoughts of killing yourself? No  1.b. Have you ever attempted to kill yourself and, if yes, when did this last happen? No   2. Recent or current suicide plan? No   3. Recent or current intent to act on ideation? No  4. Lifetime psychiatric hospitalization? Yes  5. Pattern of excessive substance use? Yes  6. Current irritability, agitation, or aggression? Yes. Pt report she has not been sleeping and is struggling with headaches  Scoring note: BOTH 1a and 1b must be yes for it to score 1 point, if both are not yes it is zero. All others are 1 point per number. If all questions 1a/1b - 6 are no, risk is negligible. If one of 1a/1b is yes, then risk is mild. If either question 2 or 3, but not both, is yes, then risk is automatically moderate regardless of total score. If both 2 and 3 are yes, risk is automatically high regardless of total score.     Score: 2, mild risk    The patient has the following risk factors for suicide: health stressors, lack of support and family disruption    Is the patient experiencing current suicidal ideation: No    Is the patient engaging in preparatory suicide behaviors (formulating how to act on plan, giving away possessions, saying goodbye, displaying dramatic behavior changes, etc)? No    Does the patient have access to firearms or other lethal means? no    The patient has the following protective factors: ole system and other: is willing to engage in therapy and med management at this time    Support system information: Brother, and Parents and Higher Power    Patient strengths: \"my mind\"    Does the patient engage in non-suicidal self-injurious behavior (NSSI/SIB)? no    Is the patient vulnerable " to sexual exploitation?  No    Is the patient experiencing abuse or neglect? no    Is the patient a vulnerable adult? No      Risk of Harm to Others  The patient has the following risk factors of harm to others: no risk factors identified    Does the patient have thoughts of harming others? No    Is the patient engaging in sexually inappropriate behavior?  no       Current Substance Abuse    Is there recent substance abuse? no    Was a urine drug screen or blood alcohol level obtained: No      Current Symptoms/Concerns    Symptoms  Attention, hyperactivity, and impulsivity symptoms present: No    Anxiety symptoms present: No      Appetite symptoms present: No     Behavioral difficulties present: No     Cognitive impairment symptoms present: No    Depressive symptoms present: No    Eating disorder symptoms present: No    Learning disabilities, cognitive challenges, and/or developmental disorder symptoms present: No     Manic/hypomanic symptoms present: No    Personality and interpersonal functioning difficulties present : No    Psychosis symptoms present: No      Sleep difficulties present: Yes: Other: Pt reports she has not slept in days    Substance abuse disorder symptoms present: No     Trauma and stressor related symptoms present: No           Mental Status Exam   Affect: Flat   Appearance: Other: Pt had her blanket over her body for majority of assessment    Attention Span/Concentration: Other: Pt struggled with paying attention during assessment?    Eye Contact: Variable   Fund of Knowledge: Other: Pt declined to provide LMHP with MH Hx and Hx of current crisis    Language /Speech Content: Fluent   Language /Speech Volume: Soft    Language /Speech Rate/Productions: Normal    Recent Memory: Other: Pt declined to provide LMHP with MH Hx and Hx of current crisis   Remote Memory: Other: Pt declined to provide LMHP with MH Hx and Hx of current crisis   Mood: Other: Pt reported during assessment that she was sleep  deprived as a result, she declined to provide LMHP with additional information    Orientation to Person: Yes    Orientation to Place: Yes   Orientation to Time of Day: Yes    Orientation to Date: Yes    Situation (Do they understand why they are here?): No    Psychomotor Behavior: Normal    Thought Content: Clear   Thought Form: Flight of Ideas       Mental Health and Substance Abuse History    History  Current and historical diagnoses or mental health concerns:     Prior MH services (inpatient, programmatic care, outpatient, etc) : Yes Pt reports she has had multiple MH IP tretament. Declined to provide LMHP with additional information    Has the patient used [facility] crisis team services before?: unknown    History of substance abuse: Yes Per Pt chart    Prior PAULO services (inpatient, programmatic care, detox, outpatient, etc) : No    History of commitment: No    Family history of MH/PAULO: No    Trauma history: No    Medication  Psychotropic medications: No    Current Care Team  Primary Care Provider: No    Psychiatrist: No    Therapist: No    : No    CTSS or ARMHS: No    ACT Team: No    Other: No    Release of Information  Was a release of information signed: No. Reason: Pt needing to sign      Biopsychosocial Information    Socioeconomic Information  Current living situation: homeless    Employment/income source: unemployed    Relevant legal issues: Pt reports multiple arrests.     Cultural, Taoist, or spiritual influences on mental health care:     Is the patient active in the  or a : No      Relevant Medical Concerns   Patient identifies concerns with completing ADLs? No     Patient can ambulate independently? Yes     Other medical concerns? No     History of concussion or TBI? No        Diagnosis    311 (F32.9) Unspecified Depressive Disorder     300.00 (F41.9) Unspecified Anxiety Disorder - by history           Therapeutic Intervention  The following therapeutic methodologies were  employed when working with the patient: establishing rapport, active listening, assessing dimensions of crisis and identifying additional supports and alternative coping skills. Patient response to intervention: Pt was receptive although she declined to provide LMHP with additional information at times.      Disposition  Recommended disposition: Individual Therapy and Medication Management      Reviewed case and recommendations with attending provider. Attending Name: Dr. Blankenship      Attending concurs with disposition: Yes      Patient concurs with disposition: Yes      Guardian concurs with disposition: NA     Final disposition: Individual therapy  and Medication management.       Clinical Substantiation of Recommendations   Rationale with supporting factors for disposition and diagnosis.     Pt is a 31 year old female who presents into the ED with Anxiety. Pt has a historical diagnoses of depression, anxiety, bipolar and polysubstance use. Pt reports she is currently homeless and unemployed. Pt appears to have minimal risk factors as she denies current SI, SIB or HI. Pt is not currently engaged with MH provider at this time. Pt was receptive to completing assessment although she declined to provide LMHP with additionally MH Hx information at times. Pt was recommended for OP MH services and was scheduled for therapy and med management during today's visit.      Assessment Details  Patient interview started at: 8:20a and completed at: 8:54a.    Total duration spent on the patient case in minutes: 1.50 hrs     CPT code(s) utilized: 28399 - Psychotherapy for Crisis - 60 (30-74*) min       Aftercare and Safety Planning  Follow up plans with MH/PAULO services: Yes Pt was scheduled for therapy and med management services       Aftercare plan placed in the AVS and provided to patient: Yes. Given to patient by ED Staff    ALBERT Oshea      Aftercare Plan  If I am feeling unsafe or I am in a crisis, I will:   Contact  my established care providers   Call the National Suicide Prevention Lifeline: 160.839.1411   Go to the nearest emergency room   Call 911     Warning signs that I or other people might notice when a crisis is developing for me: lack of sleep, body aches, headaches    Things I am able to do on my own to cope or help me feel better: go for walks    Things that I am able to do with others to cope or help me feel better: talk,     Things I can use or do for distraction:    [place] crisis line @ **CRISIS (**800994) or by texting  [place]  to 446134.   Crisis Intervention: 221.617.2441 or 726-704-8018 (TTY: 775.202.6023). Call anytime for help.  National [place] on Mental Illness (www.[place].eufemia.org): 257.770.9913 or 788-219-3888.   [facility] Phone (free remote counseling): 830.674.2870 Web address: https://Smarkets.tinyclues/    Changes I can make to support my mental health and wellness: attend therapy and medication appointment     People in my life that I can ask for help: Parents and Brother    Your [facility] has a mental health crisis team you can call 24/7: [facility] Mobile Crisis  [phone] (adults)  [phone] (children)    Other things that are important when I m in crisis:   I will disclose my urges to people I trust,  -I will abstain from all mood altering chemicals not currently prescribed to me   -I will attend scheduled mental health therapy and psychiatric appointments and follow all   recommendations  -I will commit to 30 minutes of self care daily - this can be as simple as taking a shower, going for a walk, cooking a meal, read, writing, etc  -I will practice square breathing when I begin to feel anxious - in breath through the nose for the count of 4 and the first line on the square. Out breath through the mouth for the count of 4 for the second line of the square. Repeat to complete the square. Repeat the square as many times as needed.  - I will use distraction skills of: going for  "walks, watching TV, spending time outside, calling a friend or family member  -Use community resources, including hotline numbers, [facility] crisis and support meetings  -Maintain a daily schedule/routine  -Practice deep breathing skills    Appointment information and/or additional resources available to me:  THERAPY APPOINTMENT:  Date: Monday, 4/11/2022  Time: 2:00 pm - 3:00 pm  Provider: Jet Ochoa MA  Location: [facility], [facility] [address], [place] [address]  Phone: [phone]    MEDICATION APPOINTMENT:  Date: Wednesday, 4/20/2022  Time: 1:30 pm - 2:30 pm  Provider: Gopal Bunn  MSN  CNP,PMHNP,RN  Location: [facility], [address] [place] [address], [address] [address]  Phone: [phone]  Type: Medication Mgmt - Initial (In-Person)  Patient Instructions  Please arrive 15 minutes early for paperwork and bring Insurance cards and ID. If an  is needed will need to bring one with you.          Crisis Lines  Crisis Text Line  Text 799589  You will be connected with a trained live crisis counselor to provide support.    Por espanol, texto  SIDNEY a 929908 o texto a 442-AYUDAME en WhatsApp    National Hope Line  1.800.SUICIDE [6053476]      Community Resources  Fast Tracker  Linking people to mental health and substance use disorder resources  fasttrackermn.org     [place] Mental Health Warm Line  Peer to peer support  Monday thru Saturday, 12 pm to 10 pm  882.447.5237 or 8.426.481.5736  Text \"Support\" to 98578    National Prospect on Mental Illness (TOYIN)  506.017.5227 or 1.888.TOYIN.HELPS      Mental Health Apps  My3  https://myRexlypp.org/    VirtualHopeBox  https://Woppa.org/apps/virtual-hope-box/      Additional Information  Today you were seen by a licensed mental health professional through Triage and Transition services, Behavioral Healthcare Providers (BHP)  for a crisis assessment in the Emergency Department at [facility].  It is recommended that you follow up with your established providers (psychiatrist, mental health therapist, and/or primary care doctor - as relevant) as soon as possible. Coordinators from [facility] will be calling you in the next 24-48 hours to ensure that you have the resources you need.  You can also contact [facility] coordinators directly at [phone]. You may have been scheduled for or offered an appointment with a mental health provider. [facility] maintains an extensive network of licensed behavioral health providers to connect patients with the services they need.  We do not charge providers a fee to participate in our referral network.  We match patients with providers based on a patient's specific needs, insurance coverage, and location.  Our first effort will be to refer you to a provider within your care system, and will utilize providers outside your care system as needed.

## 2022-04-08 NOTE — DISCHARGE INSTRUCTIONS
Aftercare Plan  If I am feeling unsafe or I am in a crisis, I will:   Contact my established care providers   Call the National Suicide Prevention Lifeline: 367.127.5060   Go to the nearest emergency room   Call 911     Warning signs that I or other people might notice when a crisis is developing for me: lack of sleep, body aches, headaches    Things I am able to do on my own to cope or help me feel better: go for walks    Things that I am able to do with others to cope or help me feel better: talk,     Things I can use or do for distraction:    [place] crisis line @ **CRISIS (**210266) or by texting  [place]  to 547792.   Crisis Intervention: 800.421.7691 or 805-526-0715 (TTY: 458.847.4899). Call anytime for help.  National [place] on Mental Illness (www.[place].eufemia.org): 738.369.4959 or 653-167-1729.   [facility] Phone (free remote counseling): 779.511.1342 Web address: https://Fashiolista.Stream5/    Changes I can make to support my mental health and wellness: attend therapy and medication appointment     People in my life that I can ask for help: Parents and Brother    Your [facility] has a mental health crisis team you can call 24/7: [facility]  [phone] (adults)  [phone] (children)    Other things that are important when I m in crisis:   I will disclose my urges to people I trust,  -I will abstain from all mood altering chemicals not currently prescribed to me   -I will attend scheduled mental health therapy and psychiatric appointments and follow all   recommendations  -I will commit to 30 minutes of self care daily - this can be as simple as taking a shower, going for a walk, cooking a meal, read, writing, etc  -I will practice square breathing when I begin to feel anxious - in breath through the nose for the count of 4 and the first line on the square. Out breath through the mouth for the count of 4 for the second line of the square. Repeat to complete the square. Repeat the square  "as many times as needed.  - I will use distraction skills of: going for walks, watching TV, spending time outside, calling a friend or family member  -Use community resources, including hotline numbers, Critical access hospital crisis and support meetings  -Maintain a daily schedule/routine  -Practice deep breathing skills    Appointment information and/or additional resources available to me:  THERAPY APPOINTMENT:  Date: Monday, 4/11/2022  Time: 2:00 pm - 3:00 pm  Provider: Jet Ochoa MA  Location: [facility], [address] [place] [address] [place] [address]  Phone: [phone]    MEDICATION APPOINTMENT:  Date: Wednesday, 4/20/2022  Time: 1:30 pm - 2:30 pm  Provider: Gopal COOK  CNP,PMHNP,RN  Location: [facility], [address] [place] [address], [address], [place] [address]  Phone: [phone]  Type: Medication Mgmt - Initial (In-Person)  Patient Instructions  Please arrive 15 minutes early for paperwork and bring Insurance cards and ID. If an  is needed will need to bring one with you.          Crisis Lines  Crisis Text Line  Text 233130  You will be connected with a trained live crisis counselor to provide support.    Por espanol, texto  SIDNEY a 441170 o texto a 442-AYUDAME en WhatsApp    National Hope Line  1.800.SUICIDE [7476572]      Community Resources  Fast Tracker  Linking people to mental health and substance use disorder resources  fasttrackermn.org     [place] Mental Health Warm Line  Peer to peer support  Monday thru Saturday, 12 pm to 10 pm  953.708.4824 or 4.154.445.0853  Text \"Support\" to 42761    National [place] on Mental Illness (TOYIN)  195.235.3958 or 1.888.TOYIN.HELPS      Mental Health Apps  My3  https://PurThread Technologiespp.org/    VirtualHopeBox  https://Phoenix Books.org/apps/virtual-hope-box/      Additional Information  Today you were seen by a licensed mental health professional through Triage and Transition services, [facility] ([facility])  " for a crisis assessment in the Emergency Department at [facility].  It is recommended that you follow up with your established providers (psychiatrist, mental health therapist, and/or primary care doctor - as relevant) as soon as possible. Coordinators from [facility] will be calling you in the next 24-48 hours to ensure that you have the resources you need.  You can also contact [facility] coordinators directly at [phone]. You may have been scheduled for or offered an appointment with a mental health provider. [facility] maintains an extensive network of licensed behavioral health providers to connect patients with the services they need.  We do not charge providers a fee to participate in our referral network.  We match patients with providers based on a patient's specific needs, insurance coverage, and location.  Our first effort will be to refer you to a provider within your care system, and will utilize providers outside your care system as needed.          Shelters Information    [facility] Hotline   Phone: 1-954.315.5095 (Toll Free)   Provides information about emergency shelter and transitional housing for single adults, families and youth in the [address] [place] [address].     [facility]    [address]   Phone: [phone]   Intake: Must be referred through Adult Shelter Connect (see above).   Emergency Housing: Provides shelter, personal hygiene items and meals for homeless single  adults who are receiving GA, social security or other benefits. Open to men and women.   Love Program: Is another type of contract--a person has to be vouchered through the [facility]  to be classified as  Love.    Pay-for-Stay Housing: Is for men and women who are employed and can self-pay for Emergency  Housing. Weekly and monthly rates are available. Open to men and women.     [facility]   [address] [facility]   Phone: [phone], ext. [phone]   Hours: 6 pm-7  am.   Intake: Must be referred through Adult Shelter Connect (see above).   Provides: Has 34 beds for men and 6 beds for women, available for 30 days. Some extensions are possible. Breakfast, supper, showers, laundry, and an address to receive mail are available. Building is wheelchair accessible.     [facility] Secure Waiting   [address]   Phone: [phone]   Intake: Must be referred through Adult Shelter Connect (see p. 1)     [facility]: A secure shelter and breakfast for single homeless women. A free hot meal is served at 6 pm and warm showers are available. Open to women only.        [facility]   [address] [place] [address]   Phone: [phone] (daytime); [phone] (after 5 pm)   Hours: 5 pm-9 am.   Intake: Must be referred through Adult Shelter Connect (see p. 1)   Provides: Overnight shelter for 22 women; hot dinner, a simple breakfast and a to-go lunch are served to guests; showers, laundry, storage, savings, health clinic, and advocacy are available.   Remarks: Women who agree to save a minimum of $400 of their income each month to be used toward housing will receive an extended stay. (Need pay stubs to verify employment or disability benefits.)

## 2022-04-08 NOTE — ED NOTES
Patient seen by DEC , please see her note for further details. DEC  states she has been living on the streets for about a year and has been pretty depressed about this. She has had some good history of working with homeless shelters and homeless placement services. DEC  spoke with program staff and facilitated temporary housing for patient. [facility] [address]. She has the address, needs to check in by 7pm and has a bus card. DEC  feels she can be safely discharged. She has mild auditory hallucinations felt to be depressive in nature, doesn't appear to have any other reasons for being kept here. Patient just needs to have a place to be off the streets a bit. No suicidal or homicidal ideation. She reports prior history of cocaine use but denies any use recently. Blankenship will be transferred to a woman's shelter.  She is safe for discharge and is not suicidal and has no thoughts of harming others.       Noman Blankenship MD  04/08/22 3443

## 2022-04-08 NOTE — ED TRIAGE NOTES
Per [facility], Pt was found in hotel lobby complaining of anxiety. Pt told EMS that she is dealing with lots if personal issues but did not specify what is really going on. Pt denied suicidal or homicidal thoughts. VSS and Blood sugar 104.

## 2022-04-08 NOTE — ED PROVIDER NOTES
"  History     Chief Complaint   Patient presents with     Anxiety     HPI  Queen Brittany Stack is a 31 year old female with PMH notable for KARENA, MDD, Bipolar, polysubstance use disorder who presents to the ED with anxiety.  Patient seems especially drowsy, history limited due to patient drowsiness.  Patient states she \"has had a lot going on\" she endorses feeling very stressed recently.  She denies suicidal ideation, does endorse more depressed mood.  She denies hallucinations.  She denies recent substance use.  She denies recent trauma.    Past Medical and Surgical History, Medications, Allergies, and Social History were reviewed in the electronic medical record. Review with the patient was attempted but limited due to altered mental status.      Review of Systems  A complete review of systems was performed with pertinent positives and negatives noted in the HPI, and all other systems negative.     Physical Exam   BP: 111/72  Pulse: 86  Temp: 98.4  F (36.9  C)  Resp: 16  SpO2: 100 %    Physical Exam  General: No acute distress. Appears stated age.   HENT: MMM, no oropharyngeal lesions  Eyes: PERRL, normal sclerae   Cardio: Regular rate, extremities well perfused  Resp: Normal work of breathing, normal respiratory rate  Neuro: awakens to voice, gives brief answers, speech slurred. CN II-XII grossly intact. Grossly normal strength and sensation in all extremities.   MSK: no deformities.   Integumentary/Skin: no rash visualized, normal color  Psych: drowsy affect, behavior is drowsy. denies SI. denies HI. denies hallucinations. Thought process linear but . Insight poor. Eye contact somewhat avoidant.     ED Course      Procedures          Labs Ordered and Resulted from Time of ED Arrival to Time of ED Departure - No data to display  No orders to display          Assessments & Plan (with Medical Decision Making)   Patient presenting with reported anxiety and depressed mood.exam notable for patient being especially " drowsy and giving limited history, appears intoxicated. Vitals in the ED unremarkable. Nursing notes reviewed.     Behavioral health DEC assessment requested and patient slept while awaiting  availability.    Mental status clearing appropriately with monitoring through the night. Suspect intoxication vs amphetamine washout. UDS pending.     Patient signed out to oncoming provider with plan to follow-up behavioral health DEC assessment.  Dispo pending DEC recommendations.        Final diagnoses:   Anxiety   Altered mental status, unspecified altered mental status type     New Prescriptions    No medications on file       --  Hua Jordan MD   Emergency Medicine   [facility] EMERGENCY DEPARTMENT  4/7/2022     Hua Jordan MD  04/08/22 0772

## 2022-04-08 NOTE — ED NOTES
Bed: HW03  Expected date:   Expected time:   Means of arrival:   Comments:  H435 31F Anxiety/ Substance use

## 2022-04-08 NOTE — ED NOTES
is a 32 yo F who presented with anxiety.  The patient was signed out to me at change of shift.   stated she was tired, and had some anxiety.  She is not suicidal and has no thoughts of hurting herself.   will be discharged to follow up with her mental health therapist.  She also requested STI screening, but has no symptoms of pain or discharge.  She declined empiric treatment.       Noman Blankenship MD  04/08/22 1127

## 2022-04-08 NOTE — ED NOTES
Pt given discharge instructions. Was informed if she cannot given urine she will have to leave. Pt uncooperative with vital signs.

## 2022-04-08 NOTE — ED NOTES
"Pt requesting to speak with doctor. Would like to \"check myself into the psych garcía\". Dr. Blankenship notified and spoke with pt, will have DEC reassess.   "

## 2022-04-08 NOTE — ED NOTES
"Brief Reassessment      Queen Sreekanth is reassessed for change in status and disposition needs. Pt was seen on 4/8/22 by Holli Upton LMFT  see the initial assessment note for details.    Current patient presentation: Patient is able to remain awake for longer time periods.  She presents as more alert, coherent, and aware.  She asked to stay in the \"psych garcía to have a place to focus\" and to \"stop hearing bad thoughts.\"  When asked what the bad thoughts were, she responded \"my family is being raped\" or \"I will be raped\" or \"people are dying\" or \"the world hates me.\"  She denies thoughts that are directed at anyone or herself specifically.  When asked if she has specific thoughts to hurt someone or herself, she denies.  She thinks these bad thoughts are because she \"tired of being on the streets.\"  She reports being homeless since last April when her landlord kicked her out because of broken window.  She says she is very tired and cold.  I asked her if she has a place to stay, and she indicated that she did not.  She shared that she has no family or friends she can live with at this time.  She did share she had a positive experience at [facility] in [place] and with a  Woman named Zoila Perry.  She shared that housing programming in the past was helpful to her in getting longterm housing, mental and chemical health.  She gave verbal permission to contact a shelter and see if she can get placed there.      Called [facility] direct and got a placement for her at the [facility] at [address]  [facility].  She needs to arrive by 7 p.m.  She spoke to shelter coordinator on the phone, confirmed details, I wrote them out for her, she confirmed she has a bus pass to get there, consulted with Dr. Blankenship.    Client describes previous use of cocaine and marajuana, but denies use in the past 3 months.    Current risk to self or others? No    ESS-6  1.a. Over the past 2 weeks, have you had thoughts of " "killing yourself? No   1.b. Have you ever attempted to kill yourself and, if yes, when did this last happen? No  2. Recent or current suicide plan? No  3. Recent or current intent to act on ideation? No  4. Lifetime psychiatric hospitalization? Yes  5. Pattern of excessive substance use? Yes  6. Current irritability, agitation, or aggression? No  ESS-6 Score: 2     Changes in Mental Status: Yes; please explain: more cooperative, alert    Appearance:   Appropriate   Eye Contact:   Fair   Psychomotor Behavior: Normal   Attitude:   Cooperative  Guarded   Orientation:   All  Speech   Rate / Production: Normal    Volume:  Normal   Mood:    Normal  Affect:    Appropriate   Thought Content:  Clear  Paranoia   Thought Form:  Coherent  Logical   Insight:    Good    Additional collateral information: Spoke to shelter coordinator to arrange shelter    Changes noted since prior assessment: Yes - describe more alert    Treatment Objectives and Progress addressed while boarding in the ED: Assessed crisis, assessed level of support and needs, assessed housing needs, assessed mental health and chemical health needs     Therapeutic Interventions: Crisis intervention, active istening, rapport building, coordination of services, solution-focused      Clinical Impressions: Client would benefit from stable housing    Disposition Recommendations and Rationale: [facility]charge to [facility] in [place]; Patient desires to \"be off the streets\"; the shelter provides assistance for food, mental and chemical health programming.      Reviewed assessment with attending provider: Dr. Blankenship     Time spent: 45 minutes       ANGELA Salgado         "

## 2022-04-08 NOTE — TELEPHONE ENCOUNTER
Received fax for Medical Necessity paperwork to be completed and returned.     Form placed in Dr. Yates's basket in triage area.     - Soo Rowan

## 2022-04-08 NOTE — ED NOTES
Asked pt if she is ready to give a urine sample, pt stated she needs more time and requested to be put on a 72 hr medical hold. When asked why pt states she is stressed. Pt denies any suicidal thoughts or plan. Unable to assess vitals due to pt being uncooperative.

## 2022-04-09 LAB
C TRACH DNA SPEC QL NAA+PROBE: NEGATIVE
N GONORRHOEA DNA SPEC QL NAA+PROBE: NEGATIVE

## 2022-04-14 ENCOUNTER — TELEPHONE (OUTPATIENT)
Dept: BEHAVIORAL HEALTH | Facility: CLINIC | Age: 32
End: 2022-04-14
Payer: COMMERCIAL

## 2022-04-14 ENCOUNTER — HOSPITAL ENCOUNTER (INPATIENT)
Facility: CLINIC | Age: 32
LOS: 3 days | Discharge: LEFT BEFORE DISCHARGE | End: 2022-04-18
Attending: EMERGENCY MEDICINE | Admitting: PSYCHIATRY & NEUROLOGY
Payer: COMMERCIAL

## 2022-04-14 DIAGNOSIS — Z71.1 MENTAL HEALTH-RELATED COMPLAINT: ICD-10-CM

## 2022-04-14 DIAGNOSIS — J30.2 SEASONAL ALLERGIC RHINITIS, UNSPECIFIED TRIGGER: ICD-10-CM

## 2022-04-14 DIAGNOSIS — F33.1 MODERATE EPISODE OF RECURRENT MAJOR DEPRESSIVE DISORDER (H): ICD-10-CM

## 2022-04-14 DIAGNOSIS — R45.851 SUICIDAL IDEATION: ICD-10-CM

## 2022-04-14 DIAGNOSIS — F31.75 BIPOLAR DISORDER, IN PARTIAL REMISSION, MOST RECENT EPISODE DEPRESSED (H): ICD-10-CM

## 2022-04-14 DIAGNOSIS — F33.1 MAJOR DEPRESSIVE DISORDER, RECURRENT EPISODE, MODERATE (H): ICD-10-CM

## 2022-04-14 DIAGNOSIS — Z11.52 ENCOUNTER FOR SCREENING LABORATORY TESTING FOR SEVERE ACUTE RESPIRATORY SYNDROME CORONAVIRUS 2 (SARS-COV-2): ICD-10-CM

## 2022-04-14 LAB — SARS-COV-2 RNA RESP QL NAA+PROBE: NEGATIVE

## 2022-04-14 PROCEDURE — C9803 HOPD COVID-19 SPEC COLLECT: HCPCS | Performed by: EMERGENCY MEDICINE

## 2022-04-14 PROCEDURE — 90791 PSYCH DIAGNOSTIC EVALUATION: CPT

## 2022-04-14 PROCEDURE — 99285 EMERGENCY DEPT VISIT HI MDM: CPT | Mod: 25 | Performed by: EMERGENCY MEDICINE

## 2022-04-14 PROCEDURE — 99285 EMERGENCY DEPT VISIT HI MDM: CPT | Performed by: EMERGENCY MEDICINE

## 2022-04-14 PROCEDURE — 87635 SARS-COV-2 COVID-19 AMP PRB: CPT | Performed by: EMERGENCY MEDICINE

## 2022-04-14 NOTE — ED TRIAGE NOTES
Pt picked up by EMS from a bus station. Pt seemed to be responding to internal stimuli. Pt reports auditory and visual hallucinations. Denies drug use.

## 2022-04-14 NOTE — ED NOTES
[facility] ED Mental Health Handoff Note:       Brief HPI:  This is a 31 year old female signed out to me by Dr. LUIS Carl.  See initial ED Provider note for full details of the presentation. Interval history is pertinent for DEC assessment attempted.  Patient not engaged and participated minimally.  DEC recommends reassessment.    Home meds reviewed and ordered/administered: No    Medically stable for inpatient mental health admission: Yes.    Evaluated by mental health: Yes. Paln to reassess    Safety concerns: At the time I received sign out, there were no safety concerns.    Hold Status:  Active Orders   N/A           Exam:   Patient Vitals for the past 24 hrs:   BP Temp Temp src Pulse Resp SpO2   04/14/22 1020 97/55 -- -- 94 16 100 %   04/14/22 0429 (!) 133/97 97.1  F (36.2  C) Axillary 96 18 97 %           ED Course:    Medications - No data to display         There were no significant events during my shift.    Patient was signed out to the oncoming provider, Dr. Mensah      Impression:    ICD-10-CM    1. Mental health-related complaint  Z71.1        Plan:    1. Awaiting mental health evaluation/recommendations.      RESULTS:   No results found for this visit on 04/14/22 (from the past 24 hour(s)).          DANIEL ENGLISH MD, Daniel Gallegos MD  04/14/22 3304

## 2022-04-14 NOTE — ED NOTES
Patient has been sleeping most of the shift. Patient will wake up for food and fluids only. Patient has been calm and pleasant with staff this shift. Patient able to make needs known to staff.

## 2022-04-14 NOTE — ED NOTES
Bed: HW02  Expected date: 4/14/22  Expected time: 3:51 AM  Means of arrival: Ambulance  Comments:  [facility]  32 F  Used Crack and Meth

## 2022-04-14 NOTE — ED PROVIDER NOTES
ED Provider Note  [facility]      History     Chief Complaint   Patient presents with     Suicidal     Stated to EMS she would like to be shot in the head     Psychiatric Evaluation     Responding to hallucinations      HPI  Queen Brittany Stack is a 31 year old female with PMH notable for KARENA, MDD, Bipolar, polysubstance use disorder who presents to the ED by EMS for mental health evaluation.  Patient was picked up by EMS from a bus station.  EMS reports patient seem to responding to internal stimuli and reported some auditory and visual hallucinations.  During my evaluation history is limited as patient is not cooperative and is not wanting to talk to me however patient does report that she is extremely stressed, going through a hard time, states that she has a horrible life.  Patient reports suicide ideation, when asked if she had a plan she stated that she was tired of this and wants to be left alone.  Patient would not provide any additional history and would not talk to me any further.    Past Medical History  Past Medical History:   Diagnosis Date     Abnormal Pap smear     2010, f/u normal     Anxiety      Asthma     no ihaler use     Bipolar 1 disorder (H)      Bipolar affective (H)      Fainting spell      Herpes simplex without mention of complication     Pt reports last outbreak about 4 weeks ago     Major depressive disorder      Pelvic inflammatory disease      Polysubstance abuse (H)      Past Surgical History:   Procedure Laterality Date     DILATION AND CURETTAGE SUCTION N/A 4/23/2021    Procedure: DILATION AND CURETTAGE, UTERUS, USING SUCTION;  Surgeon: Lanie Yates MD;  Location: [facility] OR     NO HISTORY OF SURGERY       acetaminophen (TYLENOL) 325 MG tablet  fish oil-omega-3 fatty acids 1000 MG capsule  fluticasone (FLONASE) 50 MCG/ACT nasal spray  hydrOXYzine (ATARAX) 25 MG tablet  loratadine (CLARITIN) 10 MG tablet  medroxyPROGESTERone (DEPO-PROVERA) 150 MG/ML  IM injection  multivitamin w/minerals (THERA-VIT-M) tablet  risperiDONE (RISPERDAL) 1 MG tablet  traZODone 300 MG TABS      Allergies   Allergen Reactions     Bees Anaphylaxis     Family History  Family History   Problem Relation Age of Onset     Unknown/Adopted Mother      Unknown/Adopted Father      Unknown/Adopted Maternal Grandmother      Unknown/Adopted Maternal Grandfather      Unknown/Adopted Paternal Grandmother      Unknown/Adopted Paternal Grandfather      Social History   Social History     Tobacco Use     Smoking status: Current Every Day Smoker     Packs/day: 0.50     Years: 8.00     Pack years: 4.00     Types: Cigarettes     Smokeless tobacco: Never Used   Substance Use Topics     Alcohol use: Yes     Comment: last drank yesterday, 2 shots     Drug use: Yes     Types: Cocaine     Comment: cocaine last used yesterday      Past medical history, past surgical history, medications, allergies, family history, and social history were reviewed with the patient. No additional pertinent items.       Review of Systems  A complete review of systems was attempted but limited due to Patient not willing to provide history or cooperate.    Physical Exam   BP: (!) 133/97 (restless)  Pulse: 96  Temp: 97.1  F (36.2  C)  Resp: 18  SpO2: 97 %  Physical Exam  General: Afebrile, moderate distress, pacing around the young  HEENT: Normocephalic, atraumatic, conjunctivae normal. MMM  Neck: non-tender, supple  Cardio: regular rate. regular rhythm   Resp: Normal work of breathing, no respiratory distress, lungs clear bilaterally, no wheezing, rhonchi, rales  Chest/Back: no visual signs of trauma, no CVA tenderness   Abdomen: soft, non distension, no tenderness, no peritoneal signs   Neuro: alert and fully oriented. CN II-XII grossly intact. Grossly normal strength and sensation in all extremities.   MSK: no deformities. Normal range of motion  Integumentary/Skin: no rash visualized, normal color  Psych: odd affect, +suicide  ideation    ED Course      Procedures  No results found for any visits on 04/14/22.  Medications - No data to display     Assessments & Plan (with Medical Decision Making)   Queen Brittany Stack is a 31 year old female with PMH notable for KARENA, MDD, Bipolar, polysubstance use disorder who presents to the ED by EMS for mental health evaluation.  Upon arrival patient is pacing around the hallways in moderate distress, initially reports some suicidal ideation, EMS has concern for auditory and visual hallucinations.  History is limited as patient is uncooperative and not willing to answer majority of my questions.  Shortly afterwards patient passed out sleeping.    At this time will have patient rest, we will plan on behavioral health assessment and reevaluation once she is awake and willing to participate in history.    Patient signed out to morning provider pending behavioral health assessment and reevaluation and disposition.    I have reviewed the nursing notes. I have reviewed the findings, diagnosis, plan and need for follow up with the patient.    New Prescriptions    No medications on file       Final diagnoses:   Mental health-related complaint       --  Kerry Carl MD  [facility] EMERGENCY DEPARTMENT  4/14/2022     Kerry Carl MD  04/14/22 0505

## 2022-04-14 NOTE — ED NOTES
Patient board indicated patient may be ready for DEC.   ED staff reviewed and will let patient be for a time.  Not ready for DEC at this time

## 2022-04-15 ENCOUNTER — TELEPHONE (OUTPATIENT)
Dept: BEHAVIORAL HEALTH | Facility: CLINIC | Age: 32
End: 2022-04-15
Payer: COMMERCIAL

## 2022-04-15 PROBLEM — F33.1 MODERATE EPISODE OF RECURRENT MAJOR DEPRESSIVE DISORDER (H): Status: ACTIVE | Noted: 2022-04-15

## 2022-04-15 PROBLEM — Z71.1 MENTAL HEALTH-RELATED COMPLAINT: Status: ACTIVE | Noted: 2022-04-15

## 2022-04-15 LAB
AMPHETAMINES UR QL SCN: ABNORMAL
BARBITURATES UR QL: ABNORMAL
BENZODIAZ UR QL: ABNORMAL
CANNABINOIDS UR QL SCN: ABNORMAL
COCAINE UR QL: ABNORMAL
OPIATES UR QL SCN: ABNORMAL

## 2022-04-15 PROCEDURE — 124N000002 HC R&B MH UMMC

## 2022-04-15 PROCEDURE — 80307 DRUG TEST PRSMV CHEM ANLYZR: CPT | Performed by: EMERGENCY MEDICINE

## 2022-04-15 RX ORDER — MAGNESIUM HYDROXIDE/ALUMINUM HYDROXICE/SIMETHICONE 120; 1200; 1200 MG/30ML; MG/30ML; MG/30ML
30 SUSPENSION ORAL EVERY 4 HOURS PRN
Status: DISCONTINUED | OUTPATIENT
Start: 2022-04-15 | End: 2022-04-18 | Stop reason: HOSPADM

## 2022-04-15 RX ORDER — OLANZAPINE 10 MG/2ML
10 INJECTION, POWDER, FOR SOLUTION INTRAMUSCULAR 3 TIMES DAILY PRN
Status: DISCONTINUED | OUTPATIENT
Start: 2022-04-15 | End: 2022-04-18 | Stop reason: HOSPADM

## 2022-04-15 RX ORDER — HYDROXYZINE HYDROCHLORIDE 25 MG/1
25 TABLET, FILM COATED ORAL EVERY 4 HOURS PRN
Status: DISCONTINUED | OUTPATIENT
Start: 2022-04-15 | End: 2022-04-18 | Stop reason: HOSPADM

## 2022-04-15 RX ORDER — OLANZAPINE 10 MG/1
10 TABLET ORAL 3 TIMES DAILY PRN
Status: DISCONTINUED | OUTPATIENT
Start: 2022-04-15 | End: 2022-04-18 | Stop reason: HOSPADM

## 2022-04-15 RX ORDER — TRAZODONE HYDROCHLORIDE 50 MG/1
50 TABLET, FILM COATED ORAL
Status: DISCONTINUED | OUTPATIENT
Start: 2022-04-15 | End: 2022-04-18 | Stop reason: HOSPADM

## 2022-04-15 RX ORDER — AMOXICILLIN 250 MG
1 CAPSULE ORAL 2 TIMES DAILY PRN
Status: DISCONTINUED | OUTPATIENT
Start: 2022-04-15 | End: 2022-04-18 | Stop reason: HOSPADM

## 2022-04-15 RX ORDER — ACETAMINOPHEN 325 MG/1
650 TABLET ORAL EVERY 4 HOURS PRN
Status: DISCONTINUED | OUTPATIENT
Start: 2022-04-15 | End: 2022-04-18 | Stop reason: HOSPADM

## 2022-04-15 RX ORDER — NICOTINE 21 MG/24HR
1 PATCH, TRANSDERMAL 24 HOURS TRANSDERMAL DAILY
Status: DISCONTINUED | OUTPATIENT
Start: 2022-04-16 | End: 2022-04-18 | Stop reason: HOSPADM

## 2022-04-15 ASSESSMENT — ACTIVITIES OF DAILY LIVING (ADL)
WALKING_OR_CLIMBING_STAIRS_DIFFICULTY: NO
CONCENTRATING,_REMEMBERING_OR_MAKING_DECISIONS_DIFFICULTY: NO
DRESS: SCRUBS (BEHAVIORAL HEALTH)
CHANGE_IN_FUNCTIONAL_STATUS_SINCE_ONSET_OF_CURRENT_ILLNESS/INJURY: NO
ORAL_HYGIENE: INDEPENDENT
FALL_HISTORY_WITHIN_LAST_SIX_MONTHS: NO
DIFFICULTY_EATING/SWALLOWING: NO
HYGIENE/GROOMING: INDEPENDENT
WEAR_GLASSES_OR_BLIND: NO
DIFFICULTY_COMMUNICATING: NO
LAUNDRY: UNABLE TO COMPLETE
DRESSING/BATHING_DIFFICULTY: NO
DOING_ERRANDS_INDEPENDENTLY_DIFFICULTY: NO
HEARING_DIFFICULTY_OR_DEAF: NO
TOILETING_ISSUES: NO

## 2022-04-15 ASSESSMENT — LIFESTYLE VARIABLES
SKIP TO QUESTIONS 9-10: 1
AUDIT-C TOTAL SCORE: 0

## 2022-04-15 NOTE — TELEPHONE ENCOUNTER
R: The pt is currently in the [facility] awaiting placement.     Intake Morning Bed Search (Done at 8:00a):  [facility] is posting 0 beds.   [facility] is posting 0 beds.  [facility] is posting 0 beds.  [facility] is posting 0 beds.  [facility] is posting 0 beds.  [facility] is posting 0 beds.  [facility] is posting 0 beds.    8:04a [facility] at capacity. The pt remains on the waitlist. Intake continues to identify appropriate bed placement.    8:05a Intake called the [facility] RN to get the pt's bed placement preferences. ER RN will call intake once discussed with the pt. Intake awaiting return call.     8:07a Intake received call from ER RN- pt wants [facility] only for placement. Intake updated the work-list.     1:06p Intake paged provider to present for Unit 30 ([place]).    1:28p Provider returned intakes page- provider accepts the pt. Provider would like the pt to get a pregnancy test ordered.      1:34p Intake called Unit 30 Charge RN to go over admission in que. Unit will call the ER for report after the discharge has taken place.     1:38p Intake called [facility] to go over bed placement information.

## 2022-04-15 NOTE — PHARMACY-ADMISSION MEDICATION HISTORY
"  Admission Medication History Completed by Pharmacy    See [facility] Admission Navigator for allergy information, preferred outpatient pharmacy, prior to admission medications and immunization status.     Medication History Sources:     [facility], and patient interview (via iPad)    Changes made to PTA medication list (reason):    Added: None    Deleted: not taking  o Acetaminophen PRN    Flagged for removal: filled in May 2021 with an additional refill, pt reports not taking  o Fish Oil  o Flonase nasal spray  o Hydroxyzine PRN  o Loratadine  o Risperidone  o Trazodone    Changed: None    Additional Information:    Patient minimally participative in interview with \"yes\" or \"no\" answers to majority of question. Patient reported not taking any medications and could not recall any last doses of previous medications. All below PTA medications were filled after a previous admission in May of 2021.  o Patient was able to report she is due for her medroxyprogesterone injection, but did not remember the date of her last injection.    Prior to Admission medications    Medication Sig Last Dose Taking? Auth Provider   fish oil-omega-3 fatty acids 1000 MG capsule Take 1 capsule (1 g) by mouth daily More than a month at Last filled 5/4/21 Yes Soo Jackman APRN CNP   fluticasone (FLONASE) 50 MCG/ACT nasal spray Spray 1 spray into both nostrils 2 times daily More than a month at Last filled 5/4/21 Yes Soo Jackman APRN CNP   hydrOXYzine (ATARAX) 25 MG tablet Take 1 tablet (25 mg) by mouth every 4 hours as needed for anxiety More than a month at Last filled 5/4/21 Yes Soo Jackman APRN CNP   loratadine (CLARITIN) 10 MG tablet Take 1 tablet (10 mg) by mouth daily More than a month at Last filled 5/4/21 Yes Soo Jackman APRN CNP   medroxyPROGESTERone (DEPO-PROVERA) 150 MG/ML IM injection Inject 1 mL (150 mg) into the muscle every 3 months Due 7/26/2021. More than a " month at Unknown time Yes Soo Jackman APRN CNP   risperiDONE (RISPERDAL) 1 MG tablet Take 1 tablet (1 mg) by mouth At Bedtime More than a month at Last filled 5/4/21 Yes Soo Jackman APRN CNP   traZODone 300 MG TABS Take 300 mg by mouth At Bedtime More than a month at Last filled 5/4/21 Yes Soo Jackman APRN CNP       Date completed: 04/15/22    Medication history completed by:     Nicollette McMann, PharmD  [facility]  Emergency Department: Ascom *[id number]

## 2022-04-15 NOTE — ED NOTES
"Pt denies any thoughts of SI/SIB this morning. Pt admits to hearing voices pt states telling her, \"go back to sleep.\"   "
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
## 2022-04-15 NOTE — ED NOTES
[facility] ED Mental Health Handoff Note:       Brief HPI:  This is a 31 year old female signed out to me by Dr. Roberts.  See initial ED Provider note for full details of the presentation.     Home meds reviewed and ordered/administered: Yes- no meds    Medically stable for inpatient mental health admission: Yes.    Evaluated by mental health: Yes. The recommendation is for inpatient mental health treatment. Bed search in process    Safety concerns: At the time I received sign out, there were no safety concerns.    Hold Status:  Active Orders   N/A            Exam:   Patient Vitals for the past 24 hrs:   BP Temp Temp src Pulse Resp SpO2   04/15/22 1132 102/65 97.8  F (36.6  C) Oral 85 18 100 %   04/14/22 2306 99/68 -- -- 86 16 100 %   04/14/22 1804 92/54 -- -- 81 16 100 %           ED Course:    Medications - No data to display         There were no significant events during my shift.    Patient was signed out to the oncoming provider, Dr. Muniz      Impression:    ICD-10-CM    1. Mental health-related complaint  Z71.1    2. Suicidal ideation  R45.851    3. Moderate episode of recurrent major depressive disorder (H)  F33.1        Plan:    1. Awaiting inpatient mental health admission/transfer.      RESULTS:   Results for orders placed or performed during the hospital encounter of 04/14/22 (from the past 24 hour(s))   Asymptomatic COVID-19 Virus (Coronavirus) by PCR Nasopharyngeal     Status: Normal    Collection Time: 04/14/22  7:33 PM    Specimen: Nasopharyngeal; Swab   Result Value Ref Range    SARS CoV2 PCR Negative Negative    Narrative    Testing was performed using the guy  SARS-CoV-2 & Influenza A/B Assay on the guy  [employer]  System.  This test should be ordered for the detection of SARS-COV-2 in individuals who meet SARS-CoV-2 clinical and/or epidemiological criteria. Test performance is unknown in asymptomatic patients.  This test is for in vitro diagnostic use under the FDA EUA for laboratories  certified under CLIA to perform moderate and/or high complexity testing. This test has not been FDA cleared or approved.  A negative test does not rule out the presence of PCR inhibitors in the specimen or target RNA in concentration below the limit of detection for the assay. The possibility of a false negative should be considered if the patient's recent exposure or clinical presentation suggests COVID-19.  [facility] are certified under the Clinical Laboratory Improvement Amendments of 1988 (CLIA-88) as qualified to perform moderate and/or high complexity laboratory testing.   Urine Drugs of Abuse Screen     Status: Abnormal    Collection Time: 04/15/22  2:15 AM    Narrative    The following orders were created for panel order Urine Drugs of Abuse Screen.  Procedure                               Abnormality         Status                     ---------                               -----------         ------                     Drug abuse screen 1 urin...[[id number]]  Abnormal            Final result                 Please view results for these tests on the individual orders.   Drug abuse screen 1 urine (ED)     Status: Abnormal    Collection Time: 04/15/22  2:15 AM   Result Value Ref Range    Amphetamines Urine Screen Negative Screen Negative    Barbiturates Urine Screen Negative Screen Negative    Benzodiazepines Urine Screen Negative Screen Negative    Cannabinoids Urine Screen Negative Screen Negative    Cocaine Urine Screen Positive (A) Screen Negative    Opiates Urine Screen Negative Screen Negative             DANIEL ENGLISH MD, Daniel Gallegos MD  04/15/22 1569

## 2022-04-15 NOTE — ED NOTES
Patient signed out to me by my partner awaiting DEC reassessment and disposition.  Patient was evaluated by an  and deemed appropriate for inpatient psychiatric admission due to continued suicidal ideations with plan to walk into a moving traffic.  Patient will be admitted to psychiatry     Earlene Mensah MD  04/14/22 1922

## 2022-04-15 NOTE — SAFE
Queen Brittany Stack  April 14, 2022  SAFE Note    Critical Safety Issues: suicidal ideation with plan to walk into traffic. No current support system, minimal coping skills and unable to engage in safety planning.       Current Suicidal Ideation/Self-Injurious Concerns/Methods: Jumping (from building, into traffic, etc.)      Current or Historical Inappropriate Sexual Behavior: No      Current or Historical Aggression/Homicidal Ideation: None - N/A      Triggers: recent passing of ex-significant other    Updated care team: Yes    For additional details see full LMHP assessment.       SHAHRAM ShawSW

## 2022-04-15 NOTE — PLAN OF CARE
04/15/22 1657   Patient Belongings   Patient Belongings locker   Patient Belongings Put in Hospital Secure Location (Security or Locker, etc.) clothing;shoes   Belongings Search Yes   Clothing Search Yes   Second Staff Demarcus Gloverclaudette   Goal Outcome Evaluation:        Locker #8      1 gray sweatshirt, 1 gray armless sweatshirt, 1 flashlight, 2 small note book, 1 small deodorant, 1 smoke pipe, 1 gray jacket, 1 pair of shoes, 2 pair of socks, 1 jeans    NO PHONE           ..A               Admission:  I am responsible for any personal items that are not sent to the safe or pharmacy.  [facility] is not responsible for loss, theft or damage of any property in my possession.    Signature:  _________________________________ Date: _______  Time: _____                                              Staff Signature:  ____________________________ Date: ________  Time: _____      2nd Staff person, if patient is unable/unwilling to sign:    Signature: ________________________________ Date: ________  Time: _____     Discharge:  [facility] has returned all of my personal belongings:    Signature: _________________________________ Date: ________  Time: _____                                          Staff Signature:  ____________________________ Date: ________  Time: _____

## 2022-04-15 NOTE — ED NOTES
Patient is alert and oriented to situation, at this time denies needing any scheduled medicine. Will make MD aware of past medicine record.
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
## 2022-04-15 NOTE — TELEPHONE ENCOUNTER
0432 Bed Search Update:    [facility]-No beds available  [facility]-No beds available  [facility]-No beds available  [facility]-No beds available  [facility]-No beds available  [facility]-No beds available  [facility]-No beds available  [facility]-No beds available  [facility]-No beds available  [facility]-No beds available.  Low acuity only.  Mixed unit adol/adult/trupti  [facility]-No beds available  [facility]-No beds available  [facility]-Low acuity only  [facility]-No beds available  [facility]-No beds available  [facility]-Low acuity only.  Must be on a 72 HH. No intake after 10 PM.  [facility]-No beds available  [facility]-Voluntary/[place] only. No hx violence or sexual assault.  [facility]-No beds available  [facility]-No beds available    Pt is voluntary.  Unknown if willing to travel outside of the [facility].  Remains on wait list pending bed availability.

## 2022-04-15 NOTE — PLAN OF CARE
"Nursing Admission Assessment    Blankenship was admitted to station 30 for suicidal ideation in the presence of medication non-adherence and substance use. Pt is a 31 year old female who has history of  KARENA, MDD, Bipolar disorder, polysubstance use disorder, and MRSA. Pt was minimally cooperative with admission assessment, answering in one word answers only.      Situation: Pt comes into the hospital brought in by EMS due to disorganized thoughts and appearing to be responding to internal stimuli at a bus statiopn. Pt made several ER visits due to homelessness within the last several months. She intermittently endorses SI with plan to slit her wrists or jump off a building. She is being admitted for mental health treatment as she cannot engage in safety planning.     Legal status: Voluntary.      Social: This is a single, homeless, Black/ female. She did report to Writer that she pays for her drugs by \"prostitution,\" but is otherwise unemployed. She is independent in ADL's.     Psych Hx: Pt has been hospitalized here and [facility] multiple times for mental health, most recently 4/2021 - 5/2021 on station 32N. She reports she has no current medications. She reports no psychiatric providers. She was prescribed psychiatric medication after her last admission last year, and then never refilled them.      CD Hx: UDS positive for Cocaine. Denies other substance use. Drug of choice is Crack/Cocaine.  -Crack/Cocaine: Uses daily, multiple times per day, via smoking. Last use 4/13/22.  -Nicotine: Smokes 5-6 cigarettes daily. Start Nicotine patch and gum.     Medical:   -Hx of MRSA: No roommate order placed.     Precautions: Suicide     Impression: This is a depressed and suicidal patient. She reports SI with plan. She has AH that tell her she is \"worthless.\" Affect is flat. She is lethargic, guarded, and minimally cooperative. She endorses depression and hopelessness. Plan is to restart medication management and " encourage her to engage in treatment process.

## 2022-04-16 LAB
ALBUMIN SERPL-MCNC: 3.2 G/DL (ref 3.4–5)
ALP SERPL-CCNC: 58 U/L (ref 40–150)
ALT SERPL W P-5'-P-CCNC: 22 U/L (ref 0–50)
ANION GAP SERPL CALCULATED.3IONS-SCNC: 8 MMOL/L (ref 3–14)
AST SERPL W P-5'-P-CCNC: 14 U/L (ref 0–45)
BASOPHILS # BLD AUTO: 0 10E3/UL (ref 0–0.2)
BASOPHILS NFR BLD AUTO: 0 %
BILIRUB SERPL-MCNC: 0.2 MG/DL (ref 0.2–1.3)
BUN SERPL-MCNC: 20 MG/DL (ref 7–30)
CALCIUM SERPL-MCNC: 9 MG/DL (ref 8.5–10.1)
CHLORIDE BLD-SCNC: 105 MMOL/L (ref 94–109)
CHOLEST SERPL-MCNC: 133 MG/DL
CO2 SERPL-SCNC: 24 MMOL/L (ref 20–32)
CREAT SERPL-MCNC: 0.81 MG/DL (ref 0.52–1.04)
EOSINOPHIL # BLD AUTO: 0 10E3/UL (ref 0–0.7)
EOSINOPHIL NFR BLD AUTO: 1 %
ERYTHROCYTE [DISTWIDTH] IN BLOOD BY AUTOMATED COUNT: 13.1 % (ref 10–15)
GFR SERPL CREATININE-BSD FRML MDRD: >90 ML/MIN/1.73M2
GLUCOSE BLD-MCNC: 85 MG/DL (ref 70–99)
HCT VFR BLD AUTO: 41.4 % (ref 35–47)
HDLC SERPL-MCNC: 49 MG/DL
HGB BLD-MCNC: 14 G/DL (ref 11.7–15.7)
IMM GRANULOCYTES # BLD: 0 10E3/UL
IMM GRANULOCYTES NFR BLD: 0 %
LDLC SERPL CALC-MCNC: 64 MG/DL
LYMPHOCYTES # BLD AUTO: 2.7 10E3/UL (ref 0.8–5.3)
LYMPHOCYTES NFR BLD AUTO: 53 %
MCH RBC QN AUTO: 29.9 PG (ref 26.5–33)
MCHC RBC AUTO-ENTMCNC: 33.8 G/DL (ref 31.5–36.5)
MCV RBC AUTO: 89 FL (ref 78–100)
MONOCYTES # BLD AUTO: 0.5 10E3/UL (ref 0–1.3)
MONOCYTES NFR BLD AUTO: 10 %
NEUTROPHILS # BLD AUTO: 1.8 10E3/UL (ref 1.6–8.3)
NEUTROPHILS NFR BLD AUTO: 36 %
NONHDLC SERPL-MCNC: 84 MG/DL
NRBC # BLD AUTO: 0 10E3/UL
NRBC BLD AUTO-RTO: 0 /100
PLATELET # BLD AUTO: 253 10E3/UL (ref 150–450)
POTASSIUM BLD-SCNC: 3.9 MMOL/L (ref 3.4–5.3)
PROT SERPL-MCNC: 6.9 G/DL (ref 6.8–8.8)
RBC # BLD AUTO: 4.68 10E6/UL (ref 3.8–5.2)
SODIUM SERPL-SCNC: 137 MMOL/L (ref 133–144)
TRIGL SERPL-MCNC: 98 MG/DL
TSH SERPL DL<=0.005 MIU/L-ACNC: 0.87 MU/L (ref 0.4–4)
WBC # BLD AUTO: 5 10E3/UL (ref 4–11)

## 2022-04-16 PROCEDURE — 36415 COLL VENOUS BLD VENIPUNCTURE: CPT | Performed by: PSYCHIATRY & NEUROLOGY

## 2022-04-16 PROCEDURE — 250N000013 HC RX MED GY IP 250 OP 250 PS 637: Performed by: PSYCHIATRY & NEUROLOGY

## 2022-04-16 PROCEDURE — 99222 1ST HOSP IP/OBS MODERATE 55: CPT | Mod: AI | Performed by: PSYCHIATRY & NEUROLOGY

## 2022-04-16 PROCEDURE — 80061 LIPID PANEL: CPT | Performed by: PSYCHIATRY & NEUROLOGY

## 2022-04-16 PROCEDURE — 124N000002 HC R&B MH UMMC

## 2022-04-16 PROCEDURE — 84443 ASSAY THYROID STIM HORMONE: CPT | Performed by: PSYCHIATRY & NEUROLOGY

## 2022-04-16 PROCEDURE — 85004 AUTOMATED DIFF WBC COUNT: CPT | Performed by: PSYCHIATRY & NEUROLOGY

## 2022-04-16 PROCEDURE — 80053 COMPREHEN METABOLIC PANEL: CPT | Performed by: PSYCHIATRY & NEUROLOGY

## 2022-04-16 RX ORDER — CHLORAL HYDRATE 500 MG
1 CAPSULE ORAL DAILY
Status: DISCONTINUED | OUTPATIENT
Start: 2022-04-16 | End: 2022-04-18 | Stop reason: HOSPADM

## 2022-04-16 RX ORDER — FLUTICASONE PROPIONATE 50 MCG
1 SPRAY, SUSPENSION (ML) NASAL 2 TIMES DAILY
Status: DISCONTINUED | OUTPATIENT
Start: 2022-04-16 | End: 2022-04-18 | Stop reason: HOSPADM

## 2022-04-16 RX ORDER — TRAZODONE HYDROCHLORIDE 150 MG/1
300 TABLET ORAL AT BEDTIME
Status: DISCONTINUED | OUTPATIENT
Start: 2022-04-16 | End: 2022-04-18 | Stop reason: HOSPADM

## 2022-04-16 RX ORDER — HYDROXYZINE HYDROCHLORIDE 25 MG/1
25 TABLET, FILM COATED ORAL EVERY 4 HOURS PRN
Status: DISCONTINUED | OUTPATIENT
Start: 2022-04-16 | End: 2022-04-16

## 2022-04-16 RX ORDER — LORATADINE 10 MG/1
10 TABLET ORAL DAILY
Status: DISCONTINUED | OUTPATIENT
Start: 2022-04-16 | End: 2022-04-18 | Stop reason: HOSPADM

## 2022-04-16 RX ORDER — RISPERIDONE 1 MG/1
1 TABLET ORAL AT BEDTIME
Status: DISCONTINUED | OUTPATIENT
Start: 2022-04-16 | End: 2022-04-18 | Stop reason: HOSPADM

## 2022-04-16 RX ADMIN — NICOTINE 1 PATCH: 14 PATCH, EXTENDED RELEASE TRANSDERMAL at 10:52

## 2022-04-16 RX ADMIN — TRAZODONE HYDROCHLORIDE 300 MG: 150 TABLET ORAL at 20:02

## 2022-04-16 RX ADMIN — RISPERIDONE 1 MG: 1 TABLET ORAL at 20:02

## 2022-04-16 RX ADMIN — LORATADINE 10 MG: 10 TABLET ORAL at 18:09

## 2022-04-16 RX ADMIN — Medication 1 G: at 18:09

## 2022-04-16 ASSESSMENT — ACTIVITIES OF DAILY LIVING (ADL)
DRESS: SCRUBS (BEHAVIORAL HEALTH)
ORAL_HYGIENE: INDEPENDENT
HYGIENE/GROOMING: PROMPTS
ORAL_HYGIENE: INDEPENDENT
HYGIENE/GROOMING: INDEPENDENT
DRESS: SCRUBS (BEHAVIORAL HEALTH)

## 2022-04-16 NOTE — PLAN OF CARE
"Problem: Behavioral Health Plan of Care  Goal: Optimized Coping Skills in Response to Life Stressors  Outcome: Ongoing, Progressing    Blankenship is withdrawn, isolative. She spent most of the shift lying in bed. When asked about SI, she nods her head and states, \"I have too much stress.\" When asked to elaborate, pt just rolls over in bed and does not respond. She does agree to be safe on the unit. She denies HI/AVH/anxiety. Endorses depression, hopelessness. She is fatigued. Affect is flat.    Appetite is fair. She didn't eat all of dinner, but did eat her HS snack. She also told Writer she has recently lost weight and that she has not been having an appetite. Told pt that cessation of drug use should help her appetite.     No acute events this evening. She is behaviorally in control.       "

## 2022-04-16 NOTE — PLAN OF CARE
Patient slept total of 6.5 hours as observed during 15 minute checks. Patient woke once around 1 AM for snack and went back to bed. No behaviors noted this shift. Patient is on suicide precautions.
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
## 2022-04-16 NOTE — PLAN OF CARE
"Pt was given her 72 hour emergency hold order and her notice of patient rights paperwork. Pt states, \"fine, I'm leaving as soon as it expires. I'm going to take a nap.\" Appears slightly irritated, but remains behaviorally in control. Encouraged her to approach staff if she has further questions.    "

## 2022-04-16 NOTE — PLAN OF CARE
Pt approached Writer and asked to leave. She signed a 12 hour intent to leave at 1617 since she is voluntary. Writer explained that the doctor may discharge her AMA or could also consider putting her on a 72 hour hold because of her suicidal statements last night. Pt is aware of the options and still signed the form. On-call provider paged.

## 2022-04-16 NOTE — PLAN OF CARE
"  Problem: Plan of Care - These are the overarching goals to be used throughout the patient stay.    Goal: Plan of Care Review/Shift Note  Description: The Plan of Care Review/Shift note should be completed every shift.  The Outcome Evaluation is a brief statement about your assessment that the patient is improving, declining, or no change.  This information will be displayed automatically on your shift note.  Outcome: Ongoing, Progressing     Pt isolative to her room, spent much time napping.  Pt showered this AM.  Reports she slept \"alright\" last night.  Denied pain.  Rated anxiety 8/10, depression 9/10.  Pt denied SI/SIB/HI.  Denied A/VH's.  Pt brief in conversation, often offering 1 word responses.  Only scheduled med was nicotine patch which pt initially wanted to wait on, then declined & ultimately ended up receiving later in AM per her request.  No prn's administered this shift.  Pt presents as flat & withdrawn.  Behaviorally controlled.  Will continue to monitor & support plan of care.    "

## 2022-04-17 PROCEDURE — 250N000013 HC RX MED GY IP 250 OP 250 PS 637: Performed by: PSYCHIATRY & NEUROLOGY

## 2022-04-17 PROCEDURE — 124N000002 HC R&B MH UMMC

## 2022-04-17 RX ADMIN — RISPERIDONE 1 MG: 1 TABLET ORAL at 20:13

## 2022-04-17 RX ADMIN — TRAZODONE HYDROCHLORIDE 300 MG: 150 TABLET ORAL at 20:13

## 2022-04-17 RX ADMIN — FLUTICASONE PROPIONATE 1 SPRAY: 50 SPRAY, METERED NASAL at 20:13

## 2022-04-17 ASSESSMENT — ACTIVITIES OF DAILY LIVING (ADL)
ORAL_HYGIENE: INDEPENDENT
HYGIENE/GROOMING: INDEPENDENT
ORAL_HYGIENE: INDEPENDENT
HYGIENE/GROOMING: INDEPENDENT
DRESS: INDEPENDENT;SCRUBS (BEHAVIORAL HEALTH)
DRESS: SCRUBS (BEHAVIORAL HEALTH)

## 2022-04-17 NOTE — PLAN OF CARE
"Problem: Social, Occupational or Functional Impairment (Excessive Substance Use)  Goal: Enhanced Social, Occupational or Functional Skills (Excessive Substance Use)  Outcome: Ongoing, Not Progressing    Blankenship is guarded, irritable. She does not engage during check-in, just responds \"no\" to all Writer's questions. She denies HI/SI/AVH/depression/anxiety. She is lethargic. She requested to leave \"to go to my friend's house. I just don't want to be here. Tell the doctor I'm leaving.\" After signing a 12 hr intent to leave, pt was placed on 72 hr emergency hospitalization.     Writer reminds pt that pt told W and several other staff last night that she wanted to kill herself by slitting her wrists or jumping off a building. Pt replies, \"well what if I don't want to do that anymore?\" Her judgment and insight are impaired. Additionally, she does not have a safe place to go when she leaves the hospital.     She spent most of the shift napping, but spent some time eating snacks in the milieu. She ate all of dinner. Appetite is good. She refused her Flonase tonight, but accepted all other medications. She is behaviorally in control.        "

## 2022-04-17 NOTE — PLAN OF CARE
Night Nursing Note    4/16/22 - 4/17/22      Blankenship was in bed at beginning of shift and appeared asleep.  Monitored q15 mins for safety.  Appeared to sleep throughout night without difficulty.  Continue to monitor, offer support and reassurance per care plan.    Slept 7 hrs.

## 2022-04-17 NOTE — PLAN OF CARE
"Goal Outcome Evaluation:      Plan of care reviewed with: patient.    Patient alert and oriented to person, place, and time, adequately groomed. Pt is pleasant, limited eye contact - pt lying in bed with her face covered looks at writer a few times, declined AM medication stating \"I don't want to take anything now. I just want to see how I do without it.\" No PRN's given. Pt on suicidal precautions, no behaviors observed/no statements of self harm. Pt declined to participate in assessments just shrugged one shoulder in response to most questions. Pt did deny pain, anxiety, depression, SI, HA. Pt isolated in room rested 1 hour, slept 6 hours, up for meals. Pt states I'm still hungry\" - order received for double portions, appropriate with peers and staff. Pt communicates  needs to staff. No behaviors noted. Will continue to monitor behavior and encourage engagement.     NURSING ASSESSMENT  Pain: denies  Anxiety: denies  Depression: denies  SI: denies  SIB: denies  HI: denies  AVH: denies, did not appear to be responding to internal stimuli, did not demonstrate delusional thinking.  Mood/Affect: blunted   Sleep: pt states \"ok\"  Medication: noncompliant, no side effects reported   PRN: none  Appetite: breakfast  100%    Lunch 100%  ADLs: independent   Visits: none  Vitals: WNL   Medical: denies     Problem: Behavioral Health Plan of Care  Goal: Plan of Care Review  Outcome: Ongoing, Progressing     Problem: Behavioral Health Plan of Care  Goal: Optimal Comfort and Wellbeing  Outcome: Ongoing, Progressing       Problem: Behavioral Health Plan of Care  Goal: Adheres to Safety Considerations for Self and Others  Outcome: Ongoing, Progressing     Problem: Suicidal Behavior  Goal: Suicidal Behavior is Absent or Managed  Outcome: Ongoing, Progressing     Problem: Suicide Risk  Goal: Absence of Self-Harm  Outcome: Ongoing, Progressing     Problem: Activity and Energy Impairment (Excessive Substance Use)  Goal: Optimized Energy " Level (Excessive Substance Use)  Outcome: Ongoing, Progressing     Problem: Behavior Regulation Impairment (Excessive Substance Use)  Goal: Improved Behavioral Control (Excessive Substance Use)  Outcome: Ongoing, Progressing     Problem: Decreased Participation and Engagement (Excessive Substance Use)  Goal: Increased Participation and Engagement (Excessive Substance Use)  Outcome: Ongoing, Progressing     Problem: Sleep Disturbance  Goal: Adequate Sleep/Rest  Outcome: Ongoing, Progressing

## 2022-04-18 VITALS
BODY MASS INDEX: 21.75 KG/M2 | DIASTOLIC BLOOD PRESSURE: 75 MMHG | OXYGEN SATURATION: 97 % | HEART RATE: 89 BPM | WEIGHT: 138.9 LBS | RESPIRATION RATE: 16 BRPM | TEMPERATURE: 98 F | SYSTOLIC BLOOD PRESSURE: 136 MMHG

## 2022-04-18 PROCEDURE — 99239 HOSP IP/OBS DSCHRG MGMT >30: CPT | Performed by: PSYCHIATRY & NEUROLOGY

## 2022-04-18 PROCEDURE — 250N000013 HC RX MED GY IP 250 OP 250 PS 637: Performed by: PSYCHIATRY & NEUROLOGY

## 2022-04-18 RX ORDER — RISPERIDONE 1 MG/1
1 TABLET ORAL AT BEDTIME
Qty: 30 TABLET | Refills: 1 | Status: SHIPPED | OUTPATIENT
Start: 2022-04-18

## 2022-04-18 RX ORDER — TRAZODONE HYDROCHLORIDE 300 MG/1
300 TABLET ORAL AT BEDTIME
Qty: 30 TABLET | Refills: 1 | Status: SHIPPED | OUTPATIENT
Start: 2022-04-18 | End: 2022-10-29

## 2022-04-18 RX ORDER — HYDROXYZINE HYDROCHLORIDE 25 MG/1
25 TABLET, FILM COATED ORAL EVERY 4 HOURS PRN
Qty: 60 TABLET | Refills: 1 | Status: SHIPPED | OUTPATIENT
Start: 2022-04-18

## 2022-04-18 RX ORDER — LORATADINE 10 MG/1
10 TABLET ORAL DAILY
Qty: 30 TABLET | Refills: 1 | Status: SHIPPED | OUTPATIENT
Start: 2022-04-18 | End: 2022-10-29

## 2022-04-18 RX ORDER — FLUTICASONE PROPIONATE 50 MCG
1 SPRAY, SUSPENSION (ML) NASAL 2 TIMES DAILY
Qty: 16 G | Refills: 1 | Status: SHIPPED | OUTPATIENT
Start: 2022-04-18 | End: 2022-10-29

## 2022-04-18 RX ORDER — CHLORAL HYDRATE 500 MG
1 CAPSULE ORAL DAILY
Qty: 30 CAPSULE | Refills: 1 | Status: SHIPPED | OUTPATIENT
Start: 2022-04-18 | End: 2022-10-29

## 2022-04-18 RX ADMIN — Medication 1 G: at 12:28

## 2022-04-18 RX ADMIN — LORATADINE 10 MG: 10 TABLET ORAL at 12:28

## 2022-04-18 RX ADMIN — FLUTICASONE PROPIONATE 1 SPRAY: 50 SPRAY, METERED NASAL at 12:29

## 2022-04-18 ASSESSMENT — ACTIVITIES OF DAILY LIVING (ADL)
DRESS: SCRUBS (BEHAVIORAL HEALTH);INDEPENDENT
HYGIENE/GROOMING: INDEPENDENT
ORAL_HYGIENE: INDEPENDENT

## 2022-04-18 NOTE — PLAN OF CARE
04/18/22 1201   Individualization/Patient Specific Goals   Patient Personal Strengths resourceful;expressive of emotions;expressive of needs   Patient Vulnerabilities substance abuse/addiction;traumatic event;occupational insecurity;housing insecurity;lacks insight into illness;limited support system   Anxieties, Fears or Concerns Patient requesting discharge   Individualized Care Needs none   Patient-Specific Goals (Include Timeframe) none   Interprofessional Rounds   Participants CTC;psychiatrist;nursing;OT   Team Discussion   Participants Rigoberto Hicks MD; Paris Tavarez RN; Ibeth Pinon OT; Ashwini GERARDO   Progress improving   Anticipated length of stay dicharging today   Continued Stay Criteria/Rationale none, patient will discharge today.   Plan Patient has requested discharge   Anticipated Discharge Disposition homeless     PRECAUTIONS AND SAFETY    Behavioral Orders   Procedures    Code 1 - Restrict to Unit    Routine Programming     As clinically indicated    Status 15     Every 15 minutes.    Suicide precautions     Patients on Suicide Precautions should have a Combination Diet ordered that includes a Diet selection(s) AND a Behavioral Tray selection for Safe Tray - with utensils, or Safe Tray - NO utensils         Safety  Safety WDL: WDL  Patient Location: dining room, patient room, own  Observed Behavior: calm, sitting, walking, sleeping  Safety Measures: environmental rounds completed, safety rounds completed, suicide assessment completed, suicide check-in completed  Suicidality: Status 15, Minimal furniture in room, Behavioral scrubs (pajamas), Minimal personal belongings in room

## 2022-04-18 NOTE — PLAN OF CARE
Night Nursing Note   4/17/22 - 4/18/22    Blankenship was in bed at beginning of shift and appeared asleep.  Monitored q15 mins for safety.  Up x 2 for snacks.  Offered no complaints.  Returned back to bed and slept at intervals throughout night.  Continue to monitor, offer support and reassurance per care plan.    Slept 6.5 hrs.

## 2022-04-18 NOTE — DISCHARGE INSTRUCTIONS
Behavioral Discharge Planning and Instructions    Summary: You were admitted on 4/14/2022  due to Disorganized Thinking/Behaviors and Chemical Use Issues.  You were treated by Rigoberto Hicks MD. You requested early discharge and discharged Against Medical Advice on 4/18/22 from [address] [facility]  [address] [facility]  [address] [facility]    Main Diagnosis:  Bipolar affective disorder, type 1, depressed, Polysubstance abuse.    Health Care Follow-up:   Walk-in behavioral health support  [facility]  We serve [facility] residents 18+, focusing on needs related to mental health and substance use disorder.    Walk in anytime Monday-Friday, 9 a.m. to 9 p.m.      [facility]    This clinic provide a variety of health and wellness services to people who are homeless. It operates at 8 different shelters and drop-in centers in [place], to address health concerns, provide treatment including medications, coordinate health care services, provide substance use disorder and mental health services, provide health education, and coordinate access to health and  in the community. Any homeless adult or child is eligible. This includes those living outside or on the street, in shelter or transitional housing, with friends or relatives or who have been homeless within the past year.    Clinic hours  Hours vary by location. See listings below.  Appointments  Appointments aren't required, but we recommend calling to confirm the clinic is open. You can call the individual clinic (see list below) or the main phone number: [phone]. If you know a homeless person who needs medical care, have them call [phone] or direct them to a [facility] clinic site listed below.  Drop-in center for adults only  [facility]  [address], [phone]  Clinic hours:  Monday, 8 a.m. to noon  (mental health only)  Tuesday, 8 a.m to noon  Thursday, 9 a.m. to 4 p.m.  Friday, 9 a.m. to 1 p.m.  [facility]  [address], [phone] ext. [phone]  Clinic hours:  Monday - Wednesday, 8 a.m. to 4 p.m.  Thursday, 8 a.m. to 8 p.m.  Friday, 8 a.m. to noon  [facility]  [address], [phone]  Clinic hours:  Monday, 8 a.m. to 4 p.m.    Thursday, 9 a.m. to 4 p.m.  Friday, 9 a.m. to 1 p.m.  [address], [phone] ext. [phone]  Clinic hours:  Monday - Wednesday, 8 a.m. to 4 p.m.  Thursday, 8 a.m. to 8 p.m.  Friday, 8 a.m. to noon  [address], [phone]  Clinic hours:  Monday, 8 a.m. to 4 p.m.    [facility]  [address], [phone]  Clinic hours:  Monday, 8 a.m. to 4 p.m.  Tuesday, 8 a.m. to 8 p.m.  Wednesday, 8 a.m. to 4 p.m.  Thursday, 8 a.m. to 8 p.m. (5 - 8 p.m., mental health only)  Friday, 8 a.m. to noon  [address], [phone]  Clinic hours:  Monday, 3 - 8 p.m.  Wednesday, 3 - 8 p.m.  Thursday, 5 - 8 p.m. (mental health only)  Friday, 8 a.m. to noon  [address], [phone]  Clinic hours:  Wednesday, 5 - 8 p.m.  [facility]  [address] [place] [address], [phone]  Clinic hours:  Tuesday, 6 - 8 p.m.  [facility]  [address], [place], [phone]  Wednesday, 9 a.m. to 2 p.m.  If you are not homeless, but do not have insurance  Contact [facility] 2-1-1       Attend all scheduled appointments with your outpatient providers. Call at least 24 hours in advance if you need to reschedule an appointment to ensure continued access to your outpatient providers.     Major  "Treatments, Procedures and Findings:  You were provided with: a psychiatric assessment, assessed for medical stability, and medication evaluation and/or management    Symptoms to Report: feeling more aggressive, increased confusion, losing more sleep, mood getting worse, or thoughts of suicide    Early warning signs can include: increased depression or anxiety sleep disturbances increased thoughts or behaviors of suicide or self-harm  increased unusual thinking, such as paranoia or hearing voices    Safety and Wellness:  Take all medicines as directed.  Make no changes unless your doctor suggests them.      Follow treatment recommendations.  Refrain from alcohol and non-prescribed drugs.  If there is a concern for safety, call 911.    Resources:   Crisis Intervention: 372.951.8155 or 409-165-4566 (TTY: 925.508.2378).  Call anytime for help.  National [place] on Mental Illness (www.[place].eufemia.org): 766.359.8157 or 516-078-3491.  [facility] Crisis (COPE) Response - Adult [phone]  Text 4 Life: txt \"LIFE\" to 79369 for immediate support and crisis intervention  Crisis text line: Text \"[place]\" to 282988. Free, confidential, 24/7.    General Medication Instructions:   See your medication sheet(s) for instructions.   Take all medicines as directed.  Make no changes unless your doctor suggests them.   Go to all your doctor visits.  Be sure to have all your required lab tests. This way, your medicines can be refilled on time.  Do not use any drugs not prescribed by your doctor.  Avoid alcohol.    Advance Directives:   Scanned document on file with [facility]? No scanned doc  Is document scanned? Pt states no documents  Honoring Choices Your Rights Handout: Informed and given  Was more information offered? Materials given    The Treatment team has appreciated the opportunity to work with you. If you have any questions or concerns about your recent admission, you can contact the unit which can receive your call 24 hours a " day, 7 days a week. They will be able to get in touch with a Provider if needed. The unit number is [phone].

## 2022-04-18 NOTE — PLAN OF CARE
"  Problem: Suicide Risk  Goal: Absence of Self-Harm  Outcome: Adequate for Care Transition   Goal Outcome Evaluation:    Plan of Care Reviewed With: patient      \"I'm alright. Denies depression and anxiety. Thoughts are more clear and is able to focus well. Denies suicidal thoughts. Slept until shortly before lunch. Ate lunch.  Read over discharge instructions with patient and answered questions. Patient asked staff to wash clothes before she left. Clothes were not completely dry when patient insisted she take them out of dryer and put them on. Writer made it clear clothes were still damp and did not want her wearing them. Patient insisted she wear them and wanted to leave.  Will be discharged to [facility] home. Was discharged at 1420. Was given one bus token. Will take bus to to cousin's [address] [place] [address] [facility] [place] [facility] [place]. Patient said cousin was home.           "

## 2022-04-18 NOTE — PLAN OF CARE
Assessment/Intervention/Current Symptoms and Care Coordination  [facility] spoke with patient to obtain permission for calling mom, Rani Hernandez ([phone]). Patient signed DOREEN.  The following collateral was obtained from Rani.  Rani reports that patient is depressed and hopeless due to losing custody of her 3 children.  Daughter, age 17 is living with Rani in [place].  Two sons, ages 8 and 10 are living with paternal grandmother.  Their father was killed a few years ago.  Rani indicates that patient is homeless and has no support system in place and would be very worried if we were to discharge patient to the streets.  She also expressed some skepticism regarding the treatments that we offer in the inpatient setting, being worried that we may initiate electric shock treatments without patient's consent etc.  She feels that patient needs to be able to forgive herself for not being there for her kids in order to move forward.  Rani does indicate her awareness of chemical dependency issues that patient is experiencing and feels patient turns to drugs in her despair.  She feels that treatment will be more effective if patient agrees to it.  She plans to reach out to patient on the unit at some point.      Discharge Plan or Goal  Patient will be discharged today Against Medical Advice per her request.  She plans to stay with a cousin in [place].  She will utilize walk-in mental health services that have been provided to her on her discharge instructions.      Barriers to Discharge   None    Referral Status  Homeless resources and walk in mental health services information has been provided.      Legal Status  72 hour hold

## 2022-04-18 NOTE — PROGRESS NOTES
"SPIRITUAL HEALTH SERVICES  SPIRITUAL ASSESSMENT Progress Note  [facility] 30NR       REFERRAL SOURCE: Self initiated  visit, Quaker specific.     DATA: Pt Queen Brittany Stack identifies as Quaker and is of  descent.     I introduced myself to  as the Lead Quaker  and oriented her to [facility]. After our spiritual assessment, she stated that, \"I'm good, I don't need anything right now\".     did however accept an Islamic prayer booklet and card with a Prophetic supplication.       PLAN: I will follow up with Queen Brittany Stack for the duration of her stay. [facility] is available to Queen Brittany Stack per request.     Mercedes Claudio  Lead Quaker   Pager [phone]    [facility] remains available 24/7 for emergent requests/referrals, either by having the switchboard page the on-call  or by entering an ASAP/STAT consult in Epic (this will also page the on-call ).    "

## 2022-04-18 NOTE — PLAN OF CARE
"Problem: Decreased Participation and Engagement (Excessive Substance Use)  Goal: Increased Participation and Engagement (Excessive Substance Use)  Outcome: Ongoing, Not Progressing     has a sad affect. She appears depressed, but she denies all MH sx. Denies HI/SI/AVH/depression/anxiety. Reports her mood is \"not bad.\" She is guarded, indifferent. She spent some time in the milieu watching TV, but most of the time she was lying in the dark in her room. She took several naps throughout the shift. She is hypoactive, withdrawn, isolative.    Appetite is good, she ate both dinner and snack. She took all her HS meds with no complaints. She is behaviorally in control. No acute events this evening.       "
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
## 2022-07-20 ENCOUNTER — HOSPITAL ENCOUNTER (EMERGENCY)
Facility: CLINIC | Age: 32
Discharge: HOME | End: 2022-07-20
Attending: EMERGENCY MEDICINE | Admitting: EMERGENCY MEDICINE
Payer: COMMERCIAL

## 2022-07-20 VITALS
TEMPERATURE: 97.8 F | OXYGEN SATURATION: 100 % | HEART RATE: 87 BPM | SYSTOLIC BLOOD PRESSURE: 121 MMHG | DIASTOLIC BLOOD PRESSURE: 72 MMHG

## 2022-07-20 DIAGNOSIS — Z59.00 HOMELESSNESS: ICD-10-CM

## 2022-07-20 DIAGNOSIS — F19.10 SUBSTANCE ABUSE (H): ICD-10-CM

## 2022-07-20 LAB
ANION GAP SERPL CALCULATED.3IONS-SCNC: 4 MMOL/L (ref 3–14)
BASOPHILS # BLD AUTO: 0 10E3/UL (ref 0–0.2)
BASOPHILS NFR BLD AUTO: 1 %
BUN SERPL-MCNC: 18 MG/DL (ref 7–30)
CALCIUM SERPL-MCNC: 9 MG/DL (ref 8.5–10.1)
CHLORIDE BLD-SCNC: 109 MMOL/L (ref 94–109)
CO2 SERPL-SCNC: 28 MMOL/L (ref 20–32)
CREAT SERPL-MCNC: 0.86 MG/DL (ref 0.52–1.04)
EOSINOPHIL # BLD AUTO: 0.1 10E3/UL (ref 0–0.7)
EOSINOPHIL NFR BLD AUTO: 2 %
ERYTHROCYTE [DISTWIDTH] IN BLOOD BY AUTOMATED COUNT: 13 % (ref 10–15)
ETHANOL SERPL-MCNC: <0.01 G/DL
GFR SERPL CREATININE-BSD FRML MDRD: >90 ML/MIN/1.73M2
GLUCOSE BLD-MCNC: 91 MG/DL (ref 70–99)
HCG SERPL QL: NEGATIVE
HCT VFR BLD AUTO: 37.8 % (ref 35–47)
HGB BLD-MCNC: 12.6 G/DL (ref 11.7–15.7)
IMM GRANULOCYTES # BLD: 0 10E3/UL
IMM GRANULOCYTES NFR BLD: 0 %
LYMPHOCYTES # BLD AUTO: 2.6 10E3/UL (ref 0.8–5.3)
LYMPHOCYTES NFR BLD AUTO: 45 %
MCH RBC QN AUTO: 29.9 PG (ref 26.5–33)
MCHC RBC AUTO-ENTMCNC: 33.3 G/DL (ref 31.5–36.5)
MCV RBC AUTO: 90 FL (ref 78–100)
MONOCYTES # BLD AUTO: 0.4 10E3/UL (ref 0–1.3)
MONOCYTES NFR BLD AUTO: 8 %
NEUTROPHILS # BLD AUTO: 2.5 10E3/UL (ref 1.6–8.3)
NEUTROPHILS NFR BLD AUTO: 44 %
NRBC # BLD AUTO: 0 10E3/UL
NRBC BLD AUTO-RTO: 0 /100
PLATELET # BLD AUTO: 339 10E3/UL (ref 150–450)
POTASSIUM BLD-SCNC: 3.6 MMOL/L (ref 3.4–5.3)
RBC # BLD AUTO: 4.22 10E6/UL (ref 3.8–5.2)
SODIUM SERPL-SCNC: 141 MMOL/L (ref 133–144)
WBC # BLD AUTO: 5.6 10E3/UL (ref 4–11)

## 2022-07-20 PROCEDURE — 36415 COLL VENOUS BLD VENIPUNCTURE: CPT | Performed by: EMERGENCY MEDICINE

## 2022-07-20 PROCEDURE — 84703 CHORIONIC GONADOTROPIN ASSAY: CPT | Performed by: EMERGENCY MEDICINE

## 2022-07-20 PROCEDURE — 82077 ASSAY SPEC XCP UR&BREATH IA: CPT | Performed by: EMERGENCY MEDICINE

## 2022-07-20 PROCEDURE — 99283 EMERGENCY DEPT VISIT LOW MDM: CPT

## 2022-07-20 PROCEDURE — 80048 BASIC METABOLIC PNL TOTAL CA: CPT | Performed by: EMERGENCY MEDICINE

## 2022-07-20 PROCEDURE — 85025 COMPLETE CBC W/AUTO DIFF WBC: CPT | Performed by: EMERGENCY MEDICINE

## 2022-07-20 SDOH — ECONOMIC STABILITY - HOUSING INSECURITY: HOMELESSNESS UNSPECIFIED: Z59.00

## 2022-07-20 NOTE — ED PROVIDER NOTES
History   Chief Complaint:  Drug Overdose and Altered Mental Status    The history is provided by the patient and the EMS personnel.      Queen Brittany Stack is a 32 year old female with history of major depressive disorder, polysubstance dependence, anxiety and among other things who presents for evaluation of a possible drug overdose and altered mental status. Per EMS report, [place], passerby called EMS because she seemed mostly passed out. She was able to get up and walk with medics to their ambulance. She was more sleepy en route, EMS gave her 4 mg of narcan without change. Her vitals were stable, SaO2 at 98%, and blood glucose at 121. Per nurse report, she states that she used cocaine yesterday. She presents to the emergency department and denies any concern.  She reports that she is sleepy and wants to sleep. She reports alcohol use and states that she is hungry. She denies headache. She has no other complaints.     Review of Systems   Unable to perform ROS: Mental status change     Allergies:  Bees  Morphine  Oxycodone  Pork Derived Products    Medications:  Atarax   Claritin   Risperdal     Past Medical History:     Major depressive disorder  Anxiety   Drug use complicating pregnancy  Smoker   Vitamin D deficiency   Trichomonal infection   Herpes simplex virus infection   Depression   Asthma   Bipolar I disorder  Cannabis dependence   Generalized anxiety disorder  Polysubstance dependence   Pregnant stage, incidental   Suicidal ideation   Psychosis   Unwanted pregnancy  Mental health related complaint     Past Surgical History:    Dilation and curettage suction     Family History:    The patient denies past family history.     Social History:  The patient presents to the ED alone.   Arrived via EMS.     Physical Exam     Patient Vitals for the past 24 hrs:   BP Temp Temp src Pulse SpO2   07/20/22 1558 121/72 -- -- 87 100 %   07/20/22 1556 -- 97.8  F (36.6  C)  Temporal -- --       Physical Exam    Physical Exam   General:  Sitting on bed by self.   HENT:  No obvious trauma to head  Right Ear:  External ear normal.   Left Ear:  External ear normal.   Nose:  Nose normal.   Eyes:  Conjunctivae and EOM are normal. Pupils are equal, round, and reactive.   Neck: Normal range of motion. Neck supple. No tracheal deviation present.   CV:  Normal heart sounds. No murmur heard.  Pulm/Chest: Effort normal and breath sounds normal.   Abd: Soft. No distension. There is no tenderness. There is no rigidity, no rebound and no guarding.   M/S: Normal range of motion.   Neuro: Patient initially sleeping, but would wake up and answer few questions and then go back to sleep. GCS 15.  Skin: Skin is warm and dry. No rash noted. Not diaphoretic.   Psych: Normal mood and affect. Behavior is normal.     Emergency Department Course     Laboratory:  Labs Ordered and Resulted from Time of ED Arrival to Time of ED Departure   BASIC METABOLIC PANEL - Normal       Result Value    Sodium 141      Potassium 3.6      Chloride 109      Carbon Dioxide (CO2) 28      Anion Gap 4      Urea Nitrogen 18      Creatinine 0.86      Calcium 9.0      Glucose 91      GFR Estimate >90     ETHYL ALCOHOL LEVEL - Normal    Alcohol ethyl <0.01     HCG QUALITATIVE PREGNANCY - Normal    hCG Serum Qualitative Negative     CBC WITH PLATELETS AND DIFFERENTIAL    WBC Count 5.6      RBC Count 4.22      Hemoglobin 12.6      Hematocrit 37.8      MCV 90      MCH 29.9      MCHC 33.3      RDW 13.0      Platelet Count 339      % Neutrophils 44      % Lymphocytes 45      % Monocytes 8      % Eosinophils 2      % Basophils 1      % Immature Granulocytes 0      NRBCs per 100 WBC 0      Absolute Neutrophils 2.5      Absolute Lymphocytes 2.6      Absolute Monocytes 0.4      Absolute Eosinophils 0.1      Absolute Basophils 0.0      Absolute Immature Granulocytes 0.0      Absolute NRBCs 0.0          Emergency Department Course:    Reviewed:  I  reviewed nursing notes, vitals, past medical history and Care Everywhere    Assessments:  1625 I obtained history and examined the patient as noted above.   1946 Patient awake, alert, and denies any medical concerns, suicidal and homicidal ideas. I rechecked the patient and explained findings. She was provided with 3 courtesy meals.   2020 At this point I feel that the patient is safe for discharge, and the patient agrees.     Disposition:  The patient was discharged to home.     Impression & Plan   Medical Decision Making:  Queen Brittany Stack is a very pleasant 32 year old year old patient who presents to the emergency department with concern of possible altered mental status.  [place]  A passerby tried to wake her up and could not wake her up so called EMS.  Paramedics arrived.  The patient was cooperative.  She was able to get up and walk into the rig.  Her blood sugar was normal.  She fell asleep on their gurney.  She was not hypoxic.  EMS provided her Narcan to see if she would wake up, but she did not.  The patient admits to using cocaine yesterday and that she is very tired.  She has no medical concerns she wants addressed.  The patient has a benign abdominal exam.  She denies headache.  She has no chest pain.  She was quite sleepy so labs were performed.  They are unremarkable.  No alcohol in her system.  She was maintained on the cardiac monitor for over 4 hours and was never hypoxic.  She reported being very hungry and she was provided 3 courtesy meals.  She felt better.  She had no concerns for mental health.  She denied being suicidal or homicidal.  She denied hallucinations.  Encouraged close follow-up with her primary care physician.  I invited her to return at any time if she has additional concerns.    The treatment plan was discussed with the patient and they expressed understanding of this plan and consented to the plan.  In addition, the patient will return  to the emergency department if their symptoms persist, worsen, if new symptoms arise or if there is any concern as other pathology may be present that is not evident at this time. They also understand the importance of close follow up in the clinic and if unable to do so will return to the emergency department for a reevaluation. All questions were answered.  Diagnosis:    ICD-10-CM    1. Substance abuse (H)  F19.10    2. Homelessness  Z59.00        Scribe Disclosure:  I, Shasha Light, am serving as a scribe at 4:09 PM on 7/20/2022 to document services personally performed by Shan Calderon DO based on my observations and the provider's statements to me.            Shan Calderon DO  07/20/22 2844

## 2022-07-20 NOTE — ED TRIAGE NOTES
[place] Passersby called EMS. [place], mostly [place] out. Was able to get up and walk with medics to their ambulance. Became more sleepy en-route to ED. EMS gave 04 mg narcan without change. VS stable. SaO2 98% on RA. JK=338.     Triage Assessment     Row Name 07/20/22 6236       Triage Assessment (Adult)    Airway WDL WDL       Respiratory WDL    Respiratory WDL WDL       Skin Circulation/Temperature WDL    Skin Circulation/Temperature WDL WDL       Cardiac WDL    Cardiac WDL WDL       Peripheral/Neurovascular WDL    Peripheral Neurovascular WDL WDL       Cognitive/Neuro/Behavioral WDL    Cognitive/Neuro/Behavioral WDL WDL

## 2022-07-20 NOTE — ED NOTES
Bed: ED17  Expected date:   Expected time:   Means of arrival:   Comments:  Jade - 514 - 32 F overdose eta 0303

## 2022-10-29 ENCOUNTER — HOSPITAL ENCOUNTER (INPATIENT)
Facility: CLINIC | Age: 32
LOS: 4 days | Discharge: LEFT BEFORE DISCHARGE | End: 2022-11-02
Attending: EMERGENCY MEDICINE | Admitting: FAMILY MEDICINE
Payer: COMMERCIAL

## 2022-10-29 ENCOUNTER — APPOINTMENT (OUTPATIENT)
Dept: GENERAL RADIOLOGY | Facility: CLINIC | Age: 32
End: 2022-10-29
Attending: EMERGENCY MEDICINE
Payer: COMMERCIAL

## 2022-10-29 DIAGNOSIS — Z11.52 ENCOUNTER FOR SCREENING LABORATORY TESTING FOR SEVERE ACUTE RESPIRATORY SYNDROME CORONAVIRUS 2 (SARS-COV-2): ICD-10-CM

## 2022-10-29 DIAGNOSIS — L03.114 CELLULITIS OF LEFT HAND: ICD-10-CM

## 2022-10-29 DIAGNOSIS — M65.949 TENOSYNOVITIS OF HAND: Primary | ICD-10-CM

## 2022-10-29 DIAGNOSIS — M00.9 PYOGENIC ARTHRITIS OF LEFT HAND, DUE TO UNSPECIFIED ORGANISM (H): ICD-10-CM

## 2022-10-29 LAB
ABO/RH(D): NORMAL
ALBUMIN SERPL-MCNC: 3.4 G/DL (ref 3.4–5)
ALP SERPL-CCNC: 83 U/L (ref 40–150)
ALT SERPL W P-5'-P-CCNC: 42 U/L (ref 0–50)
AMPHETAMINES UR QL SCN: ABNORMAL
ANION GAP SERPL CALCULATED.3IONS-SCNC: 6 MMOL/L (ref 3–14)
ANTIBODY SCREEN: NEGATIVE
AST SERPL W P-5'-P-CCNC: 17 U/L (ref 0–45)
BARBITURATES UR QL: ABNORMAL
BASOPHILS # BLD AUTO: 0 10E3/UL (ref 0–0.2)
BASOPHILS NFR BLD AUTO: 0 %
BENZODIAZ UR QL: ABNORMAL
BILIRUB SERPL-MCNC: 0.4 MG/DL (ref 0.2–1.3)
BUN SERPL-MCNC: 7 MG/DL (ref 7–30)
CALCIUM SERPL-MCNC: 9.5 MG/DL (ref 8.5–10.1)
CANNABINOIDS UR QL SCN: ABNORMAL
CHLORIDE BLD-SCNC: 100 MMOL/L (ref 94–109)
CO2 SERPL-SCNC: 26 MMOL/L (ref 20–32)
COCAINE UR QL: ABNORMAL
CREAT SERPL-MCNC: 0.69 MG/DL (ref 0.52–1.04)
CRP SERPL-MCNC: 96 MG/L (ref 0–8)
EOSINOPHIL # BLD AUTO: 0 10E3/UL (ref 0–0.7)
EOSINOPHIL NFR BLD AUTO: 0 %
ERYTHROCYTE [DISTWIDTH] IN BLOOD BY AUTOMATED COUNT: 13.7 % (ref 10–15)
ERYTHROCYTE [SEDIMENTATION RATE] IN BLOOD BY WESTERGREN METHOD: 27 MM/HR (ref 0–20)
GFR SERPL CREATININE-BSD FRML MDRD: >90 ML/MIN/1.73M2
GLUCOSE BLD-MCNC: 114 MG/DL (ref 70–99)
HCG UR QL: NEGATIVE
HCT VFR BLD AUTO: 41.9 % (ref 35–47)
HGB BLD-MCNC: 14.3 G/DL (ref 11.7–15.7)
IMM GRANULOCYTES # BLD: 0 10E3/UL
IMM GRANULOCYTES NFR BLD: 0 %
INR PPP: 1 (ref 0.85–1.15)
LACTATE SERPL-SCNC: 1.5 MMOL/L (ref 0.7–2)
LYMPHOCYTES # BLD AUTO: 1.4 10E3/UL (ref 0.8–5.3)
LYMPHOCYTES NFR BLD AUTO: 11 %
MCH RBC QN AUTO: 29.5 PG (ref 26.5–33)
MCHC RBC AUTO-ENTMCNC: 34.1 G/DL (ref 31.5–36.5)
MCV RBC AUTO: 87 FL (ref 78–100)
MONOCYTES # BLD AUTO: 0.9 10E3/UL (ref 0–1.3)
MONOCYTES NFR BLD AUTO: 7 %
NEUTROPHILS # BLD AUTO: 9.7 10E3/UL (ref 1.6–8.3)
NEUTROPHILS NFR BLD AUTO: 82 %
NRBC # BLD AUTO: 0 10E3/UL
NRBC BLD AUTO-RTO: 0 /100
OPIATES UR QL SCN: ABNORMAL
PLATELET # BLD AUTO: 495 10E3/UL (ref 150–450)
POTASSIUM BLD-SCNC: 3.8 MMOL/L (ref 3.4–5.3)
PROT SERPL-MCNC: 8.4 G/DL (ref 6.8–8.8)
RBC # BLD AUTO: 4.84 10E6/UL (ref 3.8–5.2)
SARS-COV-2 RNA RESP QL NAA+PROBE: NEGATIVE
SODIUM SERPL-SCNC: 132 MMOL/L (ref 133–144)
SPECIMEN EXPIRATION DATE: NORMAL
WBC # BLD AUTO: 11.9 10E3/UL (ref 4–11)

## 2022-10-29 PROCEDURE — 999N000127 HC STATISTIC PERIPHERAL IV START W US GUIDANCE

## 2022-10-29 PROCEDURE — U0003 INFECTIOUS AGENT DETECTION BY NUCLEIC ACID (DNA OR RNA); SEVERE ACUTE RESPIRATORY SYNDROME CORONAVIRUS 2 (SARS-COV-2) (CORONAVIRUS DISEASE [COVID-19]), AMPLIFIED PROBE TECHNIQUE, MAKING USE OF HIGH THROUGHPUT TECHNOLOGIES AS DESCRIBED BY CMS-2020-01-R: HCPCS | Performed by: EMERGENCY MEDICINE

## 2022-10-29 PROCEDURE — 250N000013 HC RX MED GY IP 250 OP 250 PS 637: Performed by: EMERGENCY MEDICINE

## 2022-10-29 PROCEDURE — 81001 URINALYSIS AUTO W/SCOPE: CPT | Performed by: STUDENT IN AN ORGANIZED HEALTH CARE EDUCATION/TRAINING PROGRAM

## 2022-10-29 PROCEDURE — 96366 THER/PROPH/DIAG IV INF ADDON: CPT | Performed by: EMERGENCY MEDICINE

## 2022-10-29 PROCEDURE — 85610 PROTHROMBIN TIME: CPT | Performed by: EMERGENCY MEDICINE

## 2022-10-29 PROCEDURE — 96365 THER/PROPH/DIAG IV INF INIT: CPT | Performed by: EMERGENCY MEDICINE

## 2022-10-29 PROCEDURE — 120N000002 HC R&B MED SURG/OB UMMC

## 2022-10-29 PROCEDURE — 999N000285 HC STATISTIC VASC ACCESS LAB DRAW WITH PIV START

## 2022-10-29 PROCEDURE — 99285 EMERGENCY DEPT VISIT HI MDM: CPT | Mod: 25 | Performed by: EMERGENCY MEDICINE

## 2022-10-29 PROCEDURE — 86850 RBC ANTIBODY SCREEN: CPT | Performed by: EMERGENCY MEDICINE

## 2022-10-29 PROCEDURE — 73130 X-RAY EXAM OF HAND: CPT | Mod: LT

## 2022-10-29 PROCEDURE — 85025 COMPLETE CBC W/AUTO DIFF WBC: CPT | Performed by: EMERGENCY MEDICINE

## 2022-10-29 PROCEDURE — 250N000013 HC RX MED GY IP 250 OP 250 PS 637: Performed by: STUDENT IN AN ORGANIZED HEALTH CARE EDUCATION/TRAINING PROGRAM

## 2022-10-29 PROCEDURE — 87040 BLOOD CULTURE FOR BACTERIA: CPT | Performed by: EMERGENCY MEDICINE

## 2022-10-29 PROCEDURE — C9803 HOPD COVID-19 SPEC COLLECT: HCPCS | Performed by: EMERGENCY MEDICINE

## 2022-10-29 PROCEDURE — 80053 COMPREHEN METABOLIC PANEL: CPT | Performed by: EMERGENCY MEDICINE

## 2022-10-29 PROCEDURE — 258N000003 HC RX IP 258 OP 636: Performed by: STUDENT IN AN ORGANIZED HEALTH CARE EDUCATION/TRAINING PROGRAM

## 2022-10-29 PROCEDURE — 99285 EMERGENCY DEPT VISIT HI MDM: CPT | Mod: GC | Performed by: EMERGENCY MEDICINE

## 2022-10-29 PROCEDURE — 250N000011 HC RX IP 250 OP 636: Performed by: EMERGENCY MEDICINE

## 2022-10-29 PROCEDURE — 99222 1ST HOSP IP/OBS MODERATE 55: CPT | Mod: AI | Performed by: FAMILY MEDICINE

## 2022-10-29 PROCEDURE — 81025 URINE PREGNANCY TEST: CPT | Performed by: EMERGENCY MEDICINE

## 2022-10-29 PROCEDURE — 96367 TX/PROPH/DG ADDL SEQ IV INF: CPT | Performed by: EMERGENCY MEDICINE

## 2022-10-29 PROCEDURE — 258N000003 HC RX IP 258 OP 636: Performed by: EMERGENCY MEDICINE

## 2022-10-29 PROCEDURE — 80307 DRUG TEST PRSMV CHEM ANLYZR: CPT | Performed by: EMERGENCY MEDICINE

## 2022-10-29 PROCEDURE — 85652 RBC SED RATE AUTOMATED: CPT | Performed by: EMERGENCY MEDICINE

## 2022-10-29 PROCEDURE — 83605 ASSAY OF LACTIC ACID: CPT | Performed by: EMERGENCY MEDICINE

## 2022-10-29 PROCEDURE — 86901 BLOOD TYPING SEROLOGIC RH(D): CPT | Performed by: EMERGENCY MEDICINE

## 2022-10-29 PROCEDURE — 86140 C-REACTIVE PROTEIN: CPT | Performed by: EMERGENCY MEDICINE

## 2022-10-29 RX ORDER — ONDANSETRON 2 MG/ML
4 INJECTION INTRAMUSCULAR; INTRAVENOUS EVERY 6 HOURS PRN
Status: DISCONTINUED | OUTPATIENT
Start: 2022-10-29 | End: 2022-11-03 | Stop reason: HOSPADM

## 2022-10-29 RX ORDER — ONDANSETRON 4 MG/1
4 TABLET, ORALLY DISINTEGRATING ORAL EVERY 6 HOURS PRN
Status: DISCONTINUED | OUTPATIENT
Start: 2022-10-29 | End: 2022-11-03 | Stop reason: HOSPADM

## 2022-10-29 RX ORDER — IBUPROFEN 600 MG/1
600 TABLET, FILM COATED ORAL EVERY 6 HOURS PRN
Status: DISCONTINUED | OUTPATIENT
Start: 2022-10-29 | End: 2022-11-01

## 2022-10-29 RX ORDER — SODIUM CHLORIDE, SODIUM LACTATE, POTASSIUM CHLORIDE, CALCIUM CHLORIDE 600; 310; 30; 20 MG/100ML; MG/100ML; MG/100ML; MG/100ML
INJECTION, SOLUTION INTRAVENOUS CONTINUOUS
Status: DISCONTINUED | OUTPATIENT
Start: 2022-10-29 | End: 2022-10-30

## 2022-10-29 RX ORDER — ALBUTEROL SULFATE 90 UG/1
2 AEROSOL, METERED RESPIRATORY (INHALATION) EVERY 6 HOURS PRN
COMMUNITY

## 2022-10-29 RX ORDER — POLYETHYLENE GLYCOL 3350 17 G/17G
17 POWDER, FOR SOLUTION ORAL DAILY PRN
Status: DISCONTINUED | OUTPATIENT
Start: 2022-10-29 | End: 2022-11-03 | Stop reason: HOSPADM

## 2022-10-29 RX ORDER — LIDOCAINE 40 MG/G
CREAM TOPICAL
Status: DISCONTINUED | OUTPATIENT
Start: 2022-10-29 | End: 2022-11-03 | Stop reason: HOSPADM

## 2022-10-29 RX ORDER — ACETAMINOPHEN 325 MG/1
975 TABLET ORAL EVERY 8 HOURS
Status: DISCONTINUED | OUTPATIENT
Start: 2022-10-29 | End: 2022-11-03 | Stop reason: HOSPADM

## 2022-10-29 RX ORDER — ACETAMINOPHEN 500 MG
1000 TABLET ORAL ONCE
Status: COMPLETED | OUTPATIENT
Start: 2022-10-29 | End: 2022-10-29

## 2022-10-29 RX ADMIN — CEFEPIME HYDROCHLORIDE 2 G: 2 INJECTION, POWDER, FOR SOLUTION INTRAVENOUS at 14:55

## 2022-10-29 RX ADMIN — ACETAMINOPHEN 1000 MG: 500 TABLET ORAL at 14:55

## 2022-10-29 RX ADMIN — ACETAMINOPHEN 975 MG: 325 TABLET, FILM COATED ORAL at 20:38

## 2022-10-29 RX ADMIN — SODIUM CHLORIDE, POTASSIUM CHLORIDE, SODIUM LACTATE AND CALCIUM CHLORIDE: 600; 310; 30; 20 INJECTION, SOLUTION INTRAVENOUS at 22:01

## 2022-10-29 RX ADMIN — VANCOMYCIN HYDROCHLORIDE 1250 MG: 10 INJECTION, POWDER, LYOPHILIZED, FOR SOLUTION INTRAVENOUS at 16:32

## 2022-10-29 RX ADMIN — SODIUM CHLORIDE 1000 ML: 9 INJECTION, SOLUTION INTRAVENOUS at 14:54

## 2022-10-29 ASSESSMENT — ACTIVITIES OF DAILY LIVING (ADL)
ADLS_ACUITY_SCORE: 37

## 2022-10-29 NOTE — PHARMACY-ADMISSION MEDICATION HISTORY
Admission Medication History Completed by Pharmacy    See [facility] Admission Navigator for allergy information, preferred outpatient pharmacy, prior to admission medications and immunization status.     Medication History Sources:     Unable to interview patient due to mental status.    Discharge summary from [facility] (10/15/2022)    Changes made to PTA medication list (reason):    Added:   o Albuterol 108 mcg/act inhaler: inhale 2 puffs po q4hrs prnf bronchospasms, per [facility]     Deleted:   o Fish oil-omega-3 fatty acids 1000 mg capsule: take 1 capsule po every day, per [facility]  o Fluticasone 50 mcg/act nasal spray: spray 1 spray into both nostrils BID, per [facility]  o Loratadine 10 mg tablet: take 1 tab po every day, per [facility]  o Trazodone HCl 300 mg tabs: Take 1 tab po at bedtime, per [facility]    Changed: None    Additional Information:    Of note, patient discharged with Doxycyline 100 mg BID starting 10/18/22-10/25/2022. Unsure if patient completed the 7 day course.     Prior to Admission medications    Medication Sig Last Dose Taking? Auth Provider Long Term End Date   albuterol (PROAIR HFA/PROVENTIL HFA/VENTOLIN HFA) 108 (90 Base) MCG/ACT inhaler Inhale 2 puffs into the lungs every 6 hours as needed for shortness of breath / dyspnea or wheezing Unknown Yes Unknown, Entered By History     hydrOXYzine (ATARAX) 25 MG tablet Take 1 tablet (25 mg) by mouth every 4 hours as needed for anxiety Unknown Yes Rigoberto Hicks MD     medroxyPROGESTERone (DEPO-PROVERA) 150 MG/ML IM injection Inject 1 mL (150 mg) into the muscle every 3 months Due 7/26/2021. Unknown Yes Soo Jackman APRN CNP Yes    risperiDONE (RISPERDAL) 1 MG tablet Take 1 tablet (1 mg) by mouth At Bedtime Unknown Yes Rigoberto Hicks MD Yes          Date completed: 10/29/22    Medication history completed by: Dunia Doran

## 2022-10-29 NOTE — ED PROVIDER NOTES
ED Provider Note  [facility]      History     Chief Complaint   Patient presents with     Hand Injury     LEFT hand     The history is provided by the patient.     Queen Brittany Stack is a 32 year old female with a past medical history of polysubstance use and left middle, ring, and small finger cellulitis s/p I&D [place] [place], [place]) who presents with left hand pain. Antibiotics of Unasyn and vancomycin while inpatient at [facility]. Intraoperative cultures from the I&D are positive for multiple organisms. She reports not taking any antibiotics since leaving [place] from [facility] [place]18[place]22. Denies IVDU. Reports she last used drugs yesterday, cocaine. Reports generalized body aches.     Past Medical History  Past Medical History:   Diagnosis Date     Abnormal Pap smear     2010, f/u normal     Anxiety      Asthma     no ihaler use     Bipolar 1 disorder (H)      Bipolar affective (H)      Fainting spell      Herpes simplex without mention of complication     Pt reports last outbreak about 4 weeks ago     Major depressive disorder      Pelvic inflammatory disease      Polysubstance abuse (H)      Past Surgical History:   Procedure Laterality Date     DILATION AND CURETTAGE SUCTION N/A 4/23/2021    Procedure: DILATION AND CURETTAGE, UTERUS, USING SUCTION;  Surgeon: Lanie Yates MD;  Location: [facility] OR     NO HISTORY OF SURGERY       albuterol (PROAIR HFA/PROVENTIL HFA/VENTOLIN HFA) 108 (90 Base) MCG/ACT inhaler  hydrOXYzine (ATARAX) 25 MG tablet  medroxyPROGESTERone (DEPO-PROVERA) 150 MG/ML IM injection  risperiDONE (RISPERDAL) 1 MG tablet      Allergies   Allergen Reactions     Bees Anaphylaxis     Morphine Hives     Other reaction(s): Hives       Oxycodone Hives and Itching     Pork Derived Products Other (See Comments)     Christian reasons     Family History  Family History   Problem Relation Age of Onset     Unknown/Adopted Mother      Unknown/Adopted Father       Unknown/Adopted Maternal Grandmother      Unknown/Adopted Maternal Grandfather      Unknown/Adopted Paternal Grandmother      Unknown/Adopted Paternal Grandfather      Social History   Social History     Tobacco Use     Smoking status: Every Day     Packs/day: 0.50     Years: 8.00     Pack years: 4.00     Types: Cigarettes     Smokeless tobacco: Never   Substance Use Topics     Alcohol use: Yes     Comment: last drank yesterday, 2 shots     Drug use: Yes     Types: Cocaine     Comment: cocaine last used yesterday      Past medical history, past surgical history, medications, allergies, family history, and social history were reviewed with the patient. No additional pertinent items.       Review of Systems  A complete review of systems was performed with pertinent positives and negatives noted in the HPI, and all other systems negative.    Physical Exam   BP: (!) 143/92  Pulse: 90  Temp: 99.2  F (37.3  C)  Resp: 16  SpO2: 99 %  Physical Exam  Constitutional:       Appearance: She is ill-appearing and toxic-appearing. She is not diaphoretic.   HENT:      Head: Atraumatic.      Right Ear: External ear normal.      Left Ear: External ear normal.      Nose: Nose normal.      Mouth/Throat:      Mouth: Mucous membranes are dry.      Pharynx: Oropharynx is clear.   Eyes:      General: No scleral icterus.        Right eye: No discharge.         Left eye: No discharge.   Cardiovascular:      Rate and Rhythm: Tachycardia present.      Pulses: Normal pulses.   Pulmonary:      Breath sounds: No stridor. No wheezing.      Comments: Tachypnic initially during examination, improved throughout exam.   Abdominal:      General: Abdomen is flat.      Palpations: Abdomen is soft.      Tenderness: There is no abdominal tenderness. There is right CVA tenderness. There is no left CVA tenderness.   Musculoskeletal:      Comments: Left hand: middle, ring, and small fingers with incisions present over the dorsum of the PIP joints. Edema and  tenderness directly over the PIP joints. Edema present throughout the left hand. Patient actively moves all MCP joints, passively moves all DIP joints and left wrist. Pain with passive movement of PIP joints. No drainage from the surgical incisions.    Skin:     General: Skin is dry.   Neurological:      General: No focal deficit present.       ED Course     ED Course as of 10/29/22 1732   Sat Oct 29, 2022   1526 Echocardiogram Complete   1715 Seen by ortho who discussed their plans. Her exam is concerning for some pus in fingers that'll require procedural management. Continue IV antibiotics and NPO at midnight. Plan is to take patient to the OR tomorrow.      Procedures          Results for orders placed or performed during the hospital encounter of 10/29/22   XR Hand Left G/E 3 Views     Status: None    Narrative    EXAM: XR HAND LEFT G/E 3 VIEWS  LOCATION: [facility]  DATE/TIME: 10/29/2022 1:26 PM    INDICATION: hand pain, infection  COMPARISON: None.      Impression    IMPRESSION: There is diffuse soft tissue swelling over the hand. No acute fracture is seen. No periosteal reaction or osseous erosion to suggest osteomyelitis. If there is persistent concern, MRI would be more sensitive.   Comprehensive metabolic panel     Status: Abnormal   Result Value Ref Range    Sodium 132 (L) 133 - 144 mmol/L    Potassium 3.8 3.4 - 5.3 mmol/L    Chloride 100 94 - 109 mmol/L    Carbon Dioxide (CO2) 26 20 - 32 mmol/L    Anion Gap 6 3 - 14 mmol/L    Urea Nitrogen 7 7 - 30 mg/dL    Creatinine 0.69 0.52 - 1.04 mg/dL    Calcium 9.5 8.5 - 10.1 mg/dL    Glucose 114 (H) 70 - 99 mg/dL    Alkaline Phosphatase 83 40 - 150 U/L    AST 17 0 - 45 U/L    ALT 42 0 - 50 U/L    Protein Total 8.4 6.8 - 8.8 g/dL    Albumin 3.4 3.4 - 5.0 g/dL    Bilirubin Total 0.4 0.2 - 1.3 mg/dL    GFR Estimate >90 >60 mL/min/1.73m2   Lactic acid whole blood     Status: Normal   Result Value Ref Range    Lactic Acid  1.5 0.7 - 2.0 mmol/L   Asymptomatic COVID-19 Virus (Coronavirus) by PCR Nasopharyngeal     Status: Normal    Specimen: Nasopharyngeal; Swab   Result Value Ref Range    SARS CoV2 PCR Negative Negative    Narrative    Testing was performed using the Xpert Xpress SARS-CoV-2 Assay on the   Cepheid Gene-Xpert Instrument Systems. Additional information about   this Emergency Use Authorization (EUA) assay can be found via the Lab   Guide. This test should be ordered for the detection of SARS-CoV-2 in   individuals who meet SARS-CoV-2 clinical and/or epidemiological   criteria. Test performance is unknown in asymptomatic patients. This   test is for in vitro diagnostic use under the FDA EUA for   laboratories certified under CLIA to perform high complexity testing.   This test has not been FDA cleared or approved. A negative result   does not rule out the presence of PCR inhibitors in the specimen or   target RNA in concentration below the limit of detection for the   assay. The possibility of a false negative should be considered if   the patient's recent exposure or clinical presentation suggests   COVID-19. This test was validated by the [facility]. This laboratory is certified under the Clinical Laboratory Improvement Amendments of 1988 (CLIA-88) as qualified to perform high complexity laboratory testing.     CRP inflammation     Status: Abnormal   Result Value Ref Range    CRP Inflammation 96.0 (H) 0.0 - 8.0 mg/L   Erythrocyte sedimentation rate auto     Status: Abnormal   Result Value Ref Range    Erythrocyte Sedimentation Rate 27 (H) 0 - 20 mm/hr   INR     Status: Normal   Result Value Ref Range    INR 1.00 0.85 - 1.15   CBC with platelets and differential     Status: Abnormal   Result Value Ref Range    WBC Count 11.9 (H) 4.0 - 11.0 10e3/uL    RBC Count 4.84 3.80 - 5.20 10e6/uL    Hemoglobin 14.3 11.7 - 15.7 g/dL    Hematocrit 41.9 35.0 - 47.0 %    MCV 87 78 - 100 fL    MCH 29.5  26.5 - 33.0 pg    MCHC 34.1 31.5 - 36.5 g/dL    RDW 13.7 10.0 - 15.0 %    Platelet Count 495 (H) 150 - 450 10e3/uL    % Neutrophils 82 %    % Lymphocytes 11 %    % Monocytes 7 %    % Eosinophils 0 %    % Basophils 0 %    % Immature Granulocytes 0 %    NRBCs per 100 WBC 0 <1 /100    Absolute Neutrophils 9.7 (H) 1.6 - 8.3 10e3/uL    Absolute Lymphocytes 1.4 0.8 - 5.3 10e3/uL    Absolute Monocytes 0.9 0.0 - 1.3 10e3/uL    Absolute Eosinophils 0.0 0.0 - 0.7 10e3/uL    Absolute Basophils 0.0 0.0 - 0.2 10e3/uL    Absolute Immature Granulocytes 0.0 <=0.4 10e3/uL    Absolute NRBCs 0.0 10e3/uL   Drug abuse screen 1 urine (ED)     Status: Abnormal   Result Value Ref Range    Amphetamines Urine Screen Negative Screen Negative    Barbiturates Urine Screen Negative Screen Negative    Benzodiazepines Urine Screen Negative Screen Negative    Cannabinoids Urine Screen Negative Screen Negative    Cocaine Urine Screen Positive (A) Screen Negative    Opiates Urine Screen Negative Screen Negative   Adult Type and Screen     Status: None   Result Value Ref Range    ABO/RH(D) A POS     Antibody Screen Negative Negative    SPECIMEN EXPIRATION DATE [id number]    CBC with platelets differential     Status: Abnormal    Narrative    The following orders were created for panel order CBC with platelets differential.  Procedure                               Abnormality         Status                     ---------                               -----------         ------                     CBC with platelets and d...[[id number]]  Abnormal            Final result                 Please view results for these tests on the individual orders.   ABO/Rh type and screen     Status: None    Narrative    The following orders were created for panel order ABO/Rh type and screen.  Procedure                               Abnormality         Status                     ---------                               -----------         ------                      Adult Type and Screen[[id number]]                            Final result                 Please view results for these tests on the individual orders.   Urine Drugs of Abuse Screen     Status: Abnormal    Narrative    The following orders were created for panel order Urine Drugs of Abuse Screen.  Procedure                               Abnormality         Status                     ---------                               -----------         ------                     Drug abuse screen 1 urin...[[id number]]  Abnormal            Final result                 Please view results for these tests on the individual orders.     Medications   ceFEPIme (MAXIPIME) 2 g vial to attach to  ml bag for ADULTS or 50 ml bag for PEDS (0 g Intravenous Stopped 10/29/22 1633)   vancomycin (VANCOCIN) 1,250 mg in 0.9% NaCl 250 mL intermittent infusion (1,250 mg Intravenous New Bag 10/29/22 1632)   lidocaine 1 % 0.1-1 mL (has no administration in time range)   lidocaine (LMX4) cream (has no administration in time range)   sodium chloride (PF) 0.9% PF flush 3 mL (has no administration in time range)   sodium chloride (PF) 0.9% PF flush 3 mL (has no administration in time range)   melatonin tablet 1 mg (has no administration in time range)   lactated ringers infusion (has no administration in time range)   acetaminophen (TYLENOL) tablet 975 mg (has no administration in time range)   ibuprofen (ADVIL/MOTRIN) tablet 600 mg (has no administration in time range)   polyethylene glycol (MIRALAX) Packet 17 g (has no administration in time range)   ondansetron (ZOFRAN ODT) ODT tab 4 mg (has no administration in time range)     Or   ondansetron (ZOFRAN) injection 4 mg (has no administration in time range)   0.9% sodium chloride BOLUS (1,000 mLs Intravenous New Bag 10/29/22 1454)   acetaminophen (TYLENOL) tablet 1,000 mg (1,000 mg Oral Given 10/29/22 1455)        Assessments & Plan (with Medical Decision Making)   Queen Sreekanth is a 32 year old  female 12 days s/p left hand I&D, likely without antibiotics since last hospitalization, presenting with tachycardia, AMS, and hand pain concerning for worsening cellulitis and potential sepsis.     Blood cultures were obtained from the patient while in the emergency department prior to starting cefepime and vancomycin.  Will plan to admit to medicine service for further antibiotic treatment and medical management. Orthopedics was consulted while the patient was in the emergency department and agrees with our management plan and admission of the patient, and will see her while inpatient.    I have reviewed the nursing notes. I have reviewed the findings, diagnosis, plan and need for follow up with the patient.    New Prescriptions    No medications on file       Final diagnoses:   Cellulitis of left hand       --  Mayra Zarate, MS4  [facility]    Carolina Bazzi  [facility] EMERGENCY DEPARTMENT  10/29/2022    --    ED Attending Physician Attestation    I Carolina Bazzi MD, cared for this patient with the Medical Student. I performed, or re-performed, the physical exam and medical decision-making. I have verified the accuracy of all the medical student findings and documentation above, and have edited as necessary.    Summary of HPI, PE, ED Course   Patient is a 32 year old female evaluated in the emergency department for left hand pain, known infection, not an antibiotics. Exam notable for mild tachycardia, is sleepy, but she does answer questions appropriately and cooperates with the exam. Left hand is swollen with more significant swelling in the fingers.  Ring finger is most swollen. Middle and small fingers also swollen. Is able to move at the MCPs and DIPs, less movement at the PIPs. Crusting noted at dorsum of the PIPs on all fingers. ED course notable for: Ortho consult is advising NPO after midnight tonight and they will likely drain 2 of the fingers for suspected  abscess tomorrow. IV antibiotics started. Blood cultures pending. After the completion of care in the emergency department, the patient was admitted to inpatient.    Critical Care & Procedures  Not applicable.    Medical Decision Making  The medical record was reviewed and interpreted.  Current labs reviewed and interpreted.  Current images reviewed and interpreted: X-ray of hand without signs of osteomyelitis. No fracture. .      Carolina Bazzi MD  Emergency Medicine          Carolina Bazzi MD  10/29/22 5613

## 2022-10-29 NOTE — CONSULTS
[facility]  Orthopedic Surgery Consult    Name: Queen Brittany Stack  Age: 32 year old  MRN: [id number]  YOB: 1990    Reason for Consult: Left hand infection      Assessment and Plan:     Assessment:  32F with deep left long and ring finger infection, surrounding hand cellulitis, cannot exclude proximal interphalangeal joint septic arthritis, cannot exclude early flexor tenosynovitis.    Plan for irrigation and debridement of left hand on 10/30/2022.    Plan:  -N.p.o. at midnight  -Preoperative medical clearance    Staff: MD Alan Davis MD  Orthopedic Surgery PGY4  [phone]    Please page me directly with any questions/concerns during regular weekday hours before 5 pm. If there is no response, if it is a weekend, or if it is during evening hours then please page the orthopedic surgery resident on call.    History of Present Illness:     Patient was seen and examined by me. History, PMH, Meds, SH, complete ROS (10 organ systems) and PE reviewed with patient and prior medical records.      32-year-old right-hand-dominant female, with past medical history significant for polysubstance abuse.  She states that she was using fentanyl, and passed out, striking the dorsum of the left hand on something weeks ago.  She initially presented to the [facility] emergency department, and was treated with irrigation and debridement of the left small, ring, long fingers, with confirmed joint arthrotomy (traumatic).  She was then treated with postoperative IV antibiotics, however left AGAINST MEDICAL ADVICE from the [facility].    Initially treated with vancomycin and [place] [facility]    Patient does deny IV drug use, however she does smoke multiple substances, including crack cocaine, fentanyl.    Intraoperative cultures from 10/17 irrigation and debridement with multiple organisms.    Last drug use on 10/28- cocaine.       Past Medical  History:     Past Medical History:   Diagnosis Date     Abnormal Pap smear     2010, f/u normal     Anxiety      Asthma     no ihaler use     Bipolar 1 disorder (H)      Bipolar affective (H)      Fainting spell      Herpes simplex without mention of complication     Pt reports last outbreak about 4 weeks ago     Major depressive disorder      Pelvic inflammatory disease      Polysubstance abuse (H)        Past Surgical History:     Past Surgical History:   Procedure Laterality Date     DILATION AND CURETTAGE SUCTION N/A 4/23/2021    Procedure: DILATION AND CURETTAGE, UTERUS, USING SUCTION;  Surgeon: Lanie Yates MD;  Location:  OR     NO HISTORY OF SURGERY         Social History:     Social History     Socioeconomic History     Marital status: Single   Tobacco Use     Smoking status: Every Day     Packs/day: 0.50     Years: 8.00     Pack years: 4.00     Types: Cigarettes     Smokeless tobacco: Never   Substance and Sexual Activity     Alcohol use: Yes     Comment: last drank yesterday, 2 shots     Drug use: Yes     Types: Cocaine     Comment: cocaine last used yesterday     Sexual activity: Yes     Partners: Male     Birth control/protection: None     Comment: NONE       Family History:     Family History   Problem Relation Age of Onset     Unknown/Adopted Mother      Unknown/Adopted Father      Unknown/Adopted Maternal Grandmother      Unknown/Adopted Maternal Grandfather      Unknown/Adopted Paternal Grandmother      Unknown/Adopted Paternal Grandfather        Medications:     Current Facility-Administered Medications   Medication     acetaminophen (TYLENOL) tablet 975 mg     ceFEPIme (MAXIPIME) 2 g vial to attach to  ml bag for ADULTS or 50 ml bag for PEDS     ibuprofen (ADVIL/MOTRIN) tablet 600 mg     lactated ringers infusion     lidocaine (LMX4) cream     lidocaine 1 % 0.1-1 mL     melatonin tablet 1 mg     ondansetron (ZOFRAN ODT) ODT tab 4 mg    Or     ondansetron (ZOFRAN) injection 4 mg      polyethylene glycol (MIRALAX) Packet 17 g     sodium chloride (PF) 0.9% PF flush 3 mL     sodium chloride (PF) 0.9% PF flush 3 mL     vancomycin (VANCOCIN) 1,250 mg in 0.9% NaCl 250 mL intermittent infusion     Current Outpatient Medications   Medication Sig     albuterol (PROAIR HFA/PROVENTIL HFA/VENTOLIN HFA) 108 (90 Base) MCG/ACT inhaler Inhale 2 puffs into the lungs every 6 hours as needed for shortness of breath / dyspnea or wheezing     hydrOXYzine (ATARAX) 25 MG tablet Take 1 tablet (25 mg) by mouth every 4 hours as needed for anxiety     medroxyPROGESTERone (DEPO-PROVERA) 150 MG/ML IM injection Inject 1 mL (150 mg) into the muscle every 3 months Due 7/26/2021.     risperiDONE (RISPERDAL) 1 MG tablet Take 1 tablet (1 mg) by mouth At Bedtime       Allergies:     Allergies   Allergen Reactions     Bees Anaphylaxis     Morphine Hives     Other reaction(s): Hives       Oxycodone Hives and Itching     Pork Derived Products Other (See Comments)     Roman Catholic reasons       Review of Systems:     A comprehensive 10 point review of systems (constitutional, ENT, cardiac, peripheral vascular, respiratory, GI, , musculoskeletal, skin, neurological) was performed and found to be negative except as described in this note.      Physical Exam:     Vital Signs: BP (!) 143/92   Pulse 110   Temp 99.2  F (37.3  C) (Oral)   Resp 16   SpO2 98%   General: Resting comfortably in bed, awake, alert, cooperative, no apparent distress, appears stated age.  HEENT: Normocephalic, atraumatic, EOMI, no scleral icterus.  Cardiovascular: RRR assessed by peripheral pulse.  Respiratory: Breathing comfortably on room air, no wheezing.  Skin: No rashes or lesions.  Neurologic: A&Ox3, CN II-XII grossly intact  Musculoskeletal:  LUE: No gross deformity.  Swelling about the hand.  Particularly about the volar aspect of the ring and long proximal segment.  Exquisite pain with range of motion of the ring finger, moderate pain with range of  motion of the long finger.  This pain is primarily localized to the proximal IP joint of these fingers.  Evidence of postsurgical mid axial incisions about the small, ring, long fingers of the left hand.  Some amount of purulent drainage from the dorsum of the long finger skin intact.  No significant tenderness to palpation about the proximal tendon sheath.  However exquisitely tender to palpation about the volar aspect of the ring and long fingers.  Fingers held in a resting position.  Fires deltoid, biceps, triceps, wrist extension/flexion, , EPL, intrinsics, FPL with 5/5 strength. SILT in axillary, musculocutaneous, radial, ulnar, and median nerve distribution.  Sensory is intact on the radial and ulnar aspects to light touch of the small, ring, long finger radial pulse 2+ and fingers warm and well-perfused with <2 seconds capillary refill.     Imaging:     Radiographs of the left hand taken today in the emergency department available for my independent review demonstrate no acute fracture or dislocation, no osseous erosion or periosteal reaction in the area of the clinical infection.  Diffuse soft tissue swelling throughout the hand.  No radiopaque retained foreign body.    Data:     CBC:  Lab Results   Component Value Date    WBC 11.9 (H) 10/29/2022    HGB 14.3 10/29/2022     (H) 10/29/2022     BMP:  Lab Results   Component Value Date     (L) 10/29/2022    POTASSIUM 3.8 10/29/2022    CHLORIDE 100 10/29/2022    CO2 26 10/29/2022    BUN 7 10/29/2022    CR 0.69 10/29/2022    ANIONGAP 6 10/29/2022    TAZ 9.5 10/29/2022     (H) 10/29/2022     Inflammatory Markers:  Lab Results   Component Value Date    WBC 11.9 (H) 10/29/2022    WBC 5.6 07/20/2022    WBC 5.0 04/16/2022    CRP 96.0 (H) 10/29/2022    SED 27 (H) 10/29/2022

## 2022-10-29 NOTE — H&P
[facility]    History and Physical - [facility]       Date of Admission:  10/29/2022    Assessment & Plan      Queen Brittany Stack is a 32 year old female w/ PMHx of polysubstance use, Bipolar Disorder Type I, Asthma, and recent ED visit for left 3rd, 4th, and 5th digit cellulitis, wound culture positive for MDRO s/p I&D (10/17/2022). Admitted 10/29/2022 for left hand pain.    #Cellulitis of 3rd, 4th, and 5th digits of Left Hand  #Recent hospitalization for left hand cellulitis s/p I&D  #Leukocytosis  #HTN and Tachycardia   Treated inpt w/ IV Unasyn and Vancomycin. She left AMA, PO doxycycline sent to pharmacy, and was encouraged to complete. Notes not taking antibiotics since hospitalization. She is ill appearing. Afebrile. She is hypertensive and tachycardic. Physical exam notable for signs of dehydration. Mild leukocytosis, with increased absolute neutrophil count. Elevated CRP (96) on admission. Blood culture pending. WCx 10/17 positive for multiple organism including MRSA (vanc sensitive), Enterococus, Enterobacter and Klebsiella. C/f further antibiotic resistance. Cefepime and vancomycin started in the ED based on prior wound culture and continued for now, adjust based on follow up wound culture and blood cultures. Likely I&D with ortho in AM. NPO at midnight  - Ortho consult, appreciate recs  - follow up blood cultures  - daily cbc and cmp  - q48 hr crp  - continuous 100ml/hr LR    Antibiotics:  - IV cefepime 2g BID, IV vancomycin 1.25g BID     Pain:  - Tylenol 1000mg PO once daily  - Ibuprofen 600 mg Q6hr PRN  - Consider oxycodone 5 mg q4h prn; listed as allergy (hives) however patient denied allergies on admission; confirm with patient before ordering    #Polysubstance use disorder  #Urine tox screen Cocaine positive  Pt reported cocaine use within past 24 hours and UDS positive for cocaine in ED. She denies any other drug or alcohol  "use and denies history of IVDU. Notes that she is not experiencing withdrawal symptoms at this time. Tox screens over past year notable for positive cocaine and cannabinoid only She is hypertensive and tachycardic which can be explained by ongoing infection.   - No need to initiate withdrawal protocol at this time.   - Monitor for new onset symptoms   - Follow-up with PCP    Chronic    #Chronically unhoused  Patient confirmed she is currently \"living in a shelter.\" Offered SW consult for possible resources however patient declined at this time. Consider offering at later time when patient more awake and in less pain.    #Bipolar Disorder Type I  Notes that she is not taking any PTA medication.  - Follow-up w/ PCP  - Recommend starting PTA risperidone and trazodone if patient agreeable     #Tobacco Use  Declines nicotine replacement for now    #Mild intermittent Asthma  Not experiencing symptoms at this time. Not on PTA medication. Stable.             Diet: Regular Diet Adult  NPO per Anesthesia Guidelines for Procedure/Surgery Except for: Meds  NPO per Anesthesia Guidelines for Procedure/Surgery Except for: Meds    DVT Prophylaxis: Pneumatic Compression Devices (PADWA 3, mechanical prophylaxis indicated)  Sims Catheter: Not present  Fluids: Continuous 100ml/hr LR maintenance fluid  Central Lines: None  Cardiac Monitoring: None  Code Status:   Full; to confirm with patient    Clinically Significant Risk Factors Present on Admission                              Disposition Plan      Expected Discharge Date: 10/31/2022                The patient's care was discussed with the Attending Physician, Dr. Dequan Germain.    Darlene Moe  [facility] FINNIIDGROMERO Trainee    I was present with the FINNIIDGE Trainee who participated in the service and in the documentation of this note. I have verified the history and personally performed the physical exam and medical decision making, and have verified the content of the note, which " accurately reflects my assessment of the patient and the plan of care.       Simona Hawley MD  [facility]  [facility]  Securely message with the Vocera Web Console (learn more here)  Text page via [employer] Paging/Directory   Please see signed in provider for up to date coverage information    ______________________________________________________________________    Chief Complaint   Left hand pain.     Limited history obtained from the patient. She was not keen to talk d/t feeling ill. Narrative reliability questionable as she was in distress and eager to have staff leave her alone. Recommend follow-up when she is feeling better for better Hx.      History of Present Illness   Queen Brittany Stack is a 32 year old female w/ PMHx of polysubstance use, Bipolar Disorder Type I, Asthma, and recent ED visit for left 3rd, 4th, and 5th digit cellulitis, wound culture positive for MDRO s/p I&D (10/17/2022). Tx inpt w/ IV Unasyn and Vancomycin. She left AMA, PO doxycycline sent to pharmacy. Notes not taking antibiotics since hospitalization. Admitted 10/29/2022 for left hand pain.    She first noticed infection on 10/17 after which she went to ED. Attributes infection to injuring her hand after falling. Pain is only in her left hand, and does not radiate up her arm. Associated with erythematous, edematous, ulcerative lesions with crusting and dry purulent discharge. Precise date not clear. She notes feeling sick, warm, headaches, blurry vision, lightheadedness, syncope and is currently experiencing lower back pain. She is negative for fever, runny nose, sore throat, photophobia, N&V, abdominal pain, BM changes, black/bloody stool, palpitations, chest pain, SOB, increased anxiety, dysuria, frequency, or pyuria.     She also notes tenderness bilaterally in the postauricular area. No associated erythema, swelling, otorrhea.   Patient denies allergies, but  notable for oxycodone in chart (hives)  She notes that she is not taking any of her prescribed medication for chronic conditions. Notes that she doesn't smoke, drink or use illicit substances. Not interested in nicotine patches. No one she'd like us to notify of her hospital stay. She is currently homeless, not interested in  at this time.         ED Course:  Vitals: afebrile, , /92, RR 16, SpO2 98% on RA  Labs: Na 132, cmp otherwise wnl, CRP 96, ESR 27, WBC 11.9 with neutrophils 9.7, hgb wnl, blood culture in process, COVID negative, UDS + cocaine  Imaging: Xray left hand showed soft tissue swelling, no signs of osteo  Interventions: 1L NS bolus, IV cefepime 2g BID, IV vancomycin 1.25g BID for broad coverage, Tylenol 1g x1       Review of Systems    The 10 point Review of Systems is negative other than noted in the HPI.    Past Medical History    I have reviewed this patient's medical history and updated it with pertinent information if needed.   Past Medical History:   Diagnosis Date     Abnormal Pap smear     2010, f/u normal     Anxiety      Asthma     no ihaler use     Bipolar 1 disorder (H)      Bipolar affective (H)      Fainting spell      Herpes simplex without mention of complication     Pt reports last outbreak about 4 weeks ago     Major depressive disorder      Pelvic inflammatory disease      Polysubstance abuse (H)        Past Surgical History   I have reviewed this patient's surgical history and updated it with pertinent information if needed.  Past Surgical History:   Procedure Laterality Date     DILATION AND CURETTAGE SUCTION N/A 4/23/2021    Procedure: DILATION AND CURETTAGE, UTERUS, USING SUCTION;  Surgeon: Lanie Yates MD;  Location: [facility] OR     NO HISTORY OF SURGERY          Social History   I have reviewed this patient's social history and updated it with pertinent information if needed. Queen Brittany Stack  reports that she has been smoking cigarettes.  She has a 4.00 pack-year smoking history. She has never used smokeless tobacco. She reports current alcohol use. She reports current drug use. Drug: Cocaine.    Family History   I have reviewed this patient's family history and updated it with pertinent information if needed.  Family History   Problem Relation Age of Onset     Unknown/Adopted Mother      Unknown/Adopted Father      Unknown/Adopted Maternal Grandmother      Unknown/Adopted Maternal Grandfather      Unknown/Adopted Paternal Grandmother      Unknown/Adopted Paternal Grandfather        Prior to Admission Medications   Prior to Admission Medications   Prescriptions Last Dose Informant Patient Reported? Taking?   fish oil-omega-3 fatty acids 1000 MG capsule   No No   Sig: Take 1 capsule (1 g) by mouth daily   fluticasone (FLONASE) 50 MCG/ACT nasal spray   No No   Sig: Spray 1 spray into both nostrils 2 times daily   hydrOXYzine (ATARAX) 25 MG tablet   No No   Sig: Take 1 tablet (25 mg) by mouth every 4 hours as needed for anxiety   loratadine (CLARITIN) 10 MG tablet   No No   Sig: Take 1 tablet (10 mg) by mouth daily   medroxyPROGESTERone (DEPO-PROVERA) 150 MG/ML IM injection  Self No No   Sig: Inject 1 mL (150 mg) into the muscle every 3 months Due 7/26/2021.   risperiDONE (RISPERDAL) 1 MG tablet   No No   Sig: Take 1 tablet (1 mg) by mouth At Bedtime   traZODone HCl 300 MG TABS   No No   Sig: Take 300 mg by mouth At Bedtime      Facility-Administered Medications: None     Allergies   Allergies   Allergen Reactions     Bees Anaphylaxis     Morphine Hives     Other reaction(s): Hives       Oxycodone Hives and Itching     Pork Derived Products Other (See Comments)     Church reasons       Physical Exam   Vital Signs: Temp: 99.2  F (37.3  C) Temp src: Oral BP: (!) 143/92 Pulse: 110   Resp: 16 SpO2: 98 % O2 Device: None (Room air)    Weight: 0 lbs 0 oz  Minimal physical examination d/t pt distress. Recommend repeating when she is feeling better.    Constitutional: thin, ill-appearing dehydrated fatigued, somnolent, uncooperative, distressed and appears stated age  Eyes: pupilary abnormality to include small, equal, and slow to react  ENT:  Normocepalic, without obvious abnormality, Postauricular tenderness, Nares normal, septum midline, no lesions, no drainage, no erythema, swelling or discharge from ears., multiple carries, dry mucosa  Respiratory: No increased work of breathing, good air exchange, clear to auscultation bilaterally, no crackles or wheezing  Cardiovascular: tachycardic with regular rhythm, normal S1 and S2 and no murmur noted  GI: Right CVA per ED documentation. Unable to assess.  Musculoskeletal: Diffuse left hand swelling most pronounced, at the 3rd, 4th, and 5th digits. Erythematous, edematous, ulcerative lesions with crusting and dry purulent discharge (see below). Edema limited to the wrist.   Did not assess ROM d/t pt eagerness to have staff leave her room   Skin: Abrasion all over body in different stages of healing          (from ED provider)      Data   Data reviewed today: I reviewed all medications, new labs and imaging results over the last 24 hours.     Recent Labs   Lab 10/29/22  1348   WBC 11.9*   HGB 14.3   MCV 87   *   INR 1.00   *   POTASSIUM 3.8   CHLORIDE 100   CO2 26   BUN 7   CR 0.69   ANIONGAP 6   TAZ 9.5   *   ALBUMIN 3.4   PROTTOTAL 8.4   BILITOTAL 0.4   ALKPHOS 83   ALT 42   AST 17     Recent Results (from the past 24 hour(s))   XR Hand Left G/E 3 Views    Narrative    EXAM: XR HAND LEFT G/E 3 VIEWS  LOCATION: [facility]  DATE/TIME: 10/29/2022 1:26 PM    INDICATION: hand pain, infection  COMPARISON: None.      Impression    IMPRESSION: There is diffuse soft tissue swelling over the hand. No acute fracture is seen. No periosteal reaction or osseous erosion to suggest osteomyelitis. If there is persistent concern, MRI would be more sensitive.

## 2022-10-29 NOTE — PHARMACY-VANCOMYCIN DOSING SERVICE
Pharmacy Vancomycin Initial Note  Date of Service 2022  Patient's  1990  32 year old, female    Indication: Skin and Soft Tissue Infection    Current estimated CrCl = CrCl cannot be calculated (Patient's most recent lab result is older than the maximum 30 days allowed.).    Creatinine for last 3 days  No results found for requested labs within last 72 hours.    Recent Vancomycin Level(s) for last 3 days  No results found for requested labs within last 72 hours.      Vancomycin IV Administrations (past 72 hours)      No vancomycin orders with administrations in past 72 hours.                Nephrotoxins and other renal medications (From now, onward)    None          Contrast Orders - past 72 hours (72h ago, onward)    None          Insight[employer] Prediction of Planned Initial Vancomycin Regimen  Loading dose: N/A  Regimen: 1250 mg IV every 12 hours.  Start time: 14:37 on 10/29/2022  Exposure target: AUC24 (range)400-600 mg/L.hr   AUC24,ss: 557 mg/L.hr  Probability of AUC24 > 400: 82 %  Ctrough,ss: 15.8 mg/L  Probability of Ctrough,ss > 20: 33 %  Probability of nephrotoxicity (Lodise ELSIE ): 11 %          Plan:  1. Start vancomycin  1250 mg IV q12h.   2. Vancomycin monitoring method: AUC  3. Vancomycin therapeutic monitoring goal: 400-600 mg*h/L  4. Pharmacy will check vancomycin levels as appropriate in 1-3 Days.  5. Serum creatinine levels will be ordered every 48 hours.      QUETA PEDERSEN [facility]

## 2022-10-29 NOTE — PROGRESS NOTES
[facility]   BRIEF PROGRESS NOTE    Went to assess patient for baseline. Patient awoke to voice and requested provider leave the room.       Please see daily rounding note for full A/P.  Kika Singh, DO  6:39 PM

## 2022-10-30 ENCOUNTER — ANESTHESIA EVENT (OUTPATIENT)
Dept: SURGERY | Facility: CLINIC | Age: 32
End: 2022-10-30
Payer: COMMERCIAL

## 2022-10-30 ENCOUNTER — ANESTHESIA (OUTPATIENT)
Dept: SURGERY | Facility: CLINIC | Age: 32
End: 2022-10-30
Payer: COMMERCIAL

## 2022-10-30 LAB
ALBUMIN SERPL-MCNC: 2.5 G/DL (ref 3.4–5)
ALBUMIN UR-MCNC: NEGATIVE MG/DL
ALP SERPL-CCNC: 63 U/L (ref 40–150)
ALT SERPL W P-5'-P-CCNC: 30 U/L (ref 0–50)
ANION GAP SERPL CALCULATED.3IONS-SCNC: 3 MMOL/L (ref 3–14)
APPEARANCE UR: CLEAR
AST SERPL W P-5'-P-CCNC: 15 U/L (ref 0–45)
BILIRUB SERPL-MCNC: 0.2 MG/DL (ref 0.2–1.3)
BILIRUB UR QL STRIP: NEGATIVE
BUN SERPL-MCNC: 9 MG/DL (ref 7–30)
CALCIUM SERPL-MCNC: 8.1 MG/DL (ref 8.5–10.1)
CHLORIDE BLD-SCNC: 110 MMOL/L (ref 94–109)
CK SERPL-CCNC: 88 U/L (ref 30–225)
CO2 SERPL-SCNC: 26 MMOL/L (ref 20–32)
COLOR UR AUTO: ABNORMAL
CREAT SERPL-MCNC: 0.66 MG/DL (ref 0.52–1.04)
ERYTHROCYTE [DISTWIDTH] IN BLOOD BY AUTOMATED COUNT: 13.8 % (ref 10–15)
GFR SERPL CREATININE-BSD FRML MDRD: >90 ML/MIN/1.73M2
GLUCOSE BLD-MCNC: 105 MG/DL (ref 70–99)
GLUCOSE UR STRIP-MCNC: NEGATIVE MG/DL
HCT VFR BLD AUTO: 36.4 % (ref 35–47)
HGB BLD-MCNC: 12 G/DL (ref 11.7–15.7)
HGB UR QL STRIP: ABNORMAL
KETONES UR STRIP-MCNC: NEGATIVE MG/DL
LEUKOCYTE ESTERASE UR QL STRIP: NEGATIVE
MCH RBC QN AUTO: 29.1 PG (ref 26.5–33)
MCHC RBC AUTO-ENTMCNC: 33 G/DL (ref 31.5–36.5)
MCV RBC AUTO: 88 FL (ref 78–100)
NITRATE UR QL: NEGATIVE
PH UR STRIP: 6.5 [PH] (ref 5–7)
PLATELET # BLD AUTO: 391 10E3/UL (ref 150–450)
POTASSIUM BLD-SCNC: 4 MMOL/L (ref 3.4–5.3)
PROT SERPL-MCNC: 6.6 G/DL (ref 6.8–8.8)
RBC # BLD AUTO: 4.13 10E6/UL (ref 3.8–5.2)
RBC URINE: 1 /HPF
SODIUM SERPL-SCNC: 139 MMOL/L (ref 133–144)
SP GR UR STRIP: 1 (ref 1–1.03)
SQUAMOUS EPITHELIAL: 1 /HPF
UROBILINOGEN UR STRIP-MCNC: NORMAL MG/DL
WBC # BLD AUTO: 9.1 10E3/UL (ref 4–11)
WBC URINE: <1 /HPF

## 2022-10-30 PROCEDURE — 250N000009 HC RX 250: Performed by: STUDENT IN AN ORGANIZED HEALTH CARE EDUCATION/TRAINING PROGRAM

## 2022-10-30 PROCEDURE — 250N000013 HC RX MED GY IP 250 OP 250 PS 637: Performed by: STUDENT IN AN ORGANIZED HEALTH CARE EDUCATION/TRAINING PROGRAM

## 2022-10-30 PROCEDURE — 250N000013 HC RX MED GY IP 250 OP 250 PS 637

## 2022-10-30 PROCEDURE — 26080 EXPLORE/TREAT FINGER JOINT: CPT | Mod: LT | Performed by: STUDENT IN AN ORGANIZED HEALTH CARE EDUCATION/TRAINING PROGRAM

## 2022-10-30 PROCEDURE — 120N000002 HC R&B MED SURG/OB UMMC

## 2022-10-30 PROCEDURE — 99223 1ST HOSP IP/OBS HIGH 75: CPT | Performed by: INTERNAL MEDICINE

## 2022-10-30 PROCEDURE — 258N000003 HC RX IP 258 OP 636: Performed by: STUDENT IN AN ORGANIZED HEALTH CARE EDUCATION/TRAINING PROGRAM

## 2022-10-30 PROCEDURE — 250N000011 HC RX IP 250 OP 636: Performed by: INTERNAL MEDICINE

## 2022-10-30 PROCEDURE — 258N000001 HC RX 258: Performed by: STUDENT IN AN ORGANIZED HEALTH CARE EDUCATION/TRAINING PROGRAM

## 2022-10-30 PROCEDURE — 250N000011 HC RX IP 250 OP 636: Performed by: STUDENT IN AN ORGANIZED HEALTH CARE EDUCATION/TRAINING PROGRAM

## 2022-10-30 PROCEDURE — 82040 ASSAY OF SERUM ALBUMIN: CPT | Performed by: STUDENT IN AN ORGANIZED HEALTH CARE EDUCATION/TRAINING PROGRAM

## 2022-10-30 PROCEDURE — 999N000141 HC STATISTIC PRE-PROCEDURE NURSING ASSESSMENT: Performed by: STUDENT IN AN ORGANIZED HEALTH CARE EDUCATION/TRAINING PROGRAM

## 2022-10-30 PROCEDURE — 87077 CULTURE AEROBIC IDENTIFY: CPT | Performed by: STUDENT IN AN ORGANIZED HEALTH CARE EDUCATION/TRAINING PROGRAM

## 2022-10-30 PROCEDURE — 360N000075 HC SURGERY LEVEL 2, PER MIN: Performed by: STUDENT IN AN ORGANIZED HEALTH CARE EDUCATION/TRAINING PROGRAM

## 2022-10-30 PROCEDURE — 250N000011 HC RX IP 250 OP 636: Performed by: NURSE ANESTHETIST, CERTIFIED REGISTERED

## 2022-10-30 PROCEDURE — 0LN80ZZ RELEASE LEFT HAND TENDON, OPEN APPROACH: ICD-10-PCS | Performed by: STUDENT IN AN ORGANIZED HEALTH CARE EDUCATION/TRAINING PROGRAM

## 2022-10-30 PROCEDURE — 250N000011 HC RX IP 250 OP 636: Performed by: ANESTHESIOLOGY

## 2022-10-30 PROCEDURE — 99232 SBSQ HOSP IP/OBS MODERATE 35: CPT | Mod: GC | Performed by: FAMILY MEDICINE

## 2022-10-30 PROCEDURE — 370N000017 HC ANESTHESIA TECHNICAL FEE, PER MIN: Performed by: STUDENT IN AN ORGANIZED HEALTH CARE EDUCATION/TRAINING PROGRAM

## 2022-10-30 PROCEDURE — 272N000001 HC OR GENERAL SUPPLY STERILE: Performed by: STUDENT IN AN ORGANIZED HEALTH CARE EDUCATION/TRAINING PROGRAM

## 2022-10-30 PROCEDURE — 82550 ASSAY OF CK (CPK): CPT | Performed by: INTERNAL MEDICINE

## 2022-10-30 PROCEDURE — 250N000024 HC ISOFLURANE, PER MIN: Performed by: STUDENT IN AN ORGANIZED HEALTH CARE EDUCATION/TRAINING PROGRAM

## 2022-10-30 PROCEDURE — 258N000003 HC RX IP 258 OP 636: Performed by: INTERNAL MEDICINE

## 2022-10-30 PROCEDURE — 26020 DRAIN HAND TENDON SHEATH: CPT | Mod: LT | Performed by: STUDENT IN AN ORGANIZED HEALTH CARE EDUCATION/TRAINING PROGRAM

## 2022-10-30 PROCEDURE — 258N000003 HC RX IP 258 OP 636: Performed by: NURSE ANESTHETIST, CERTIFIED REGISTERED

## 2022-10-30 PROCEDURE — 36415 COLL VENOUS BLD VENIPUNCTURE: CPT | Performed by: STUDENT IN AN ORGANIZED HEALTH CARE EDUCATION/TRAINING PROGRAM

## 2022-10-30 PROCEDURE — 85027 COMPLETE CBC AUTOMATED: CPT | Performed by: STUDENT IN AN ORGANIZED HEALTH CARE EDUCATION/TRAINING PROGRAM

## 2022-10-30 PROCEDURE — 80053 COMPREHEN METABOLIC PANEL: CPT | Performed by: STUDENT IN AN ORGANIZED HEALTH CARE EDUCATION/TRAINING PROGRAM

## 2022-10-30 PROCEDURE — 250N000011 HC RX IP 250 OP 636

## 2022-10-30 PROCEDURE — 0HDGXZZ EXTRACTION OF LEFT HAND SKIN, EXTERNAL APPROACH: ICD-10-PCS | Performed by: STUDENT IN AN ORGANIZED HEALTH CARE EDUCATION/TRAINING PROGRAM

## 2022-10-30 PROCEDURE — 0R9X0ZZ DRAINAGE OF LEFT FINGER PHALANGEAL JOINT, OPEN APPROACH: ICD-10-PCS | Performed by: STUDENT IN AN ORGANIZED HEALTH CARE EDUCATION/TRAINING PROGRAM

## 2022-10-30 PROCEDURE — 710N000010 HC RECOVERY PHASE 1, LEVEL 2, PER MIN: Performed by: STUDENT IN AN ORGANIZED HEALTH CARE EDUCATION/TRAINING PROGRAM

## 2022-10-30 PROCEDURE — 250N000013 HC RX MED GY IP 250 OP 250 PS 637: Performed by: INTERNAL MEDICINE

## 2022-10-30 PROCEDURE — 250N000009 HC RX 250: Performed by: NURSE ANESTHETIST, CERTIFIED REGISTERED

## 2022-10-30 PROCEDURE — 0LD80ZZ EXTRACTION OF LEFT HAND TENDON, OPEN APPROACH: ICD-10-PCS | Performed by: STUDENT IN AN ORGANIZED HEALTH CARE EDUCATION/TRAINING PROGRAM

## 2022-10-30 RX ORDER — CEFAZOLIN SODIUM 2 G/100ML
2 INJECTION, SOLUTION INTRAVENOUS SEE ADMIN INSTRUCTIONS
Status: DISCONTINUED | OUTPATIENT
Start: 2022-10-30 | End: 2022-10-30 | Stop reason: HOSPADM

## 2022-10-30 RX ORDER — BUPIVACAINE HYDROCHLORIDE 2.5 MG/ML
INJECTION, SOLUTION INFILTRATION; PERINEURAL PRN
Status: DISCONTINUED | OUTPATIENT
Start: 2022-10-30 | End: 2022-10-30 | Stop reason: HOSPADM

## 2022-10-30 RX ORDER — FENTANYL CITRATE 50 UG/ML
25 INJECTION, SOLUTION INTRAMUSCULAR; INTRAVENOUS EVERY 5 MIN PRN
Status: DISCONTINUED | OUTPATIENT
Start: 2022-10-30 | End: 2022-10-30 | Stop reason: HOSPADM

## 2022-10-30 RX ORDER — MAGNESIUM HYDROXIDE 1200 MG/15ML
LIQUID ORAL PRN
Status: DISCONTINUED | OUTPATIENT
Start: 2022-10-30 | End: 2022-10-30 | Stop reason: HOSPADM

## 2022-10-30 RX ORDER — HYDROMORPHONE HYDROCHLORIDE 1 MG/ML
0.5 INJECTION, SOLUTION INTRAMUSCULAR; INTRAVENOUS; SUBCUTANEOUS
Status: DISCONTINUED | OUTPATIENT
Start: 2022-10-30 | End: 2022-10-30

## 2022-10-30 RX ORDER — PROPOFOL 10 MG/ML
INJECTION, EMULSION INTRAVENOUS CONTINUOUS PRN
Status: DISCONTINUED | OUTPATIENT
Start: 2022-10-30 | End: 2022-10-30

## 2022-10-30 RX ORDER — HYDROMORPHONE HCL IN WATER/PF 6 MG/30 ML
0.2 PATIENT CONTROLLED ANALGESIA SYRINGE INTRAVENOUS EVERY 5 MIN PRN
Status: DISCONTINUED | OUTPATIENT
Start: 2022-10-30 | End: 2022-10-30 | Stop reason: HOSPADM

## 2022-10-30 RX ORDER — PROPOFOL 10 MG/ML
INJECTION, EMULSION INTRAVENOUS PRN
Status: DISCONTINUED | OUTPATIENT
Start: 2022-10-30 | End: 2022-10-30

## 2022-10-30 RX ORDER — NALOXONE HYDROCHLORIDE 0.4 MG/ML
0.4 INJECTION, SOLUTION INTRAMUSCULAR; INTRAVENOUS; SUBCUTANEOUS
Status: DISCONTINUED | OUTPATIENT
Start: 2022-10-30 | End: 2022-11-03 | Stop reason: HOSPADM

## 2022-10-30 RX ORDER — CEFAZOLIN SODIUM 2 G/100ML
2 INJECTION, SOLUTION INTRAVENOUS
Status: DISCONTINUED | OUTPATIENT
Start: 2022-10-30 | End: 2022-10-30 | Stop reason: HOSPADM

## 2022-10-30 RX ORDER — ONDANSETRON 2 MG/ML
4 INJECTION INTRAMUSCULAR; INTRAVENOUS EVERY 30 MIN PRN
Status: DISCONTINUED | OUTPATIENT
Start: 2022-10-30 | End: 2022-10-30 | Stop reason: HOSPADM

## 2022-10-30 RX ORDER — METRONIDAZOLE 500 MG/1
500 TABLET ORAL EVERY 8 HOURS
Status: DISCONTINUED | OUTPATIENT
Start: 2022-10-30 | End: 2022-11-03 | Stop reason: HOSPADM

## 2022-10-30 RX ORDER — NALOXONE HYDROCHLORIDE 0.4 MG/ML
0.2 INJECTION, SOLUTION INTRAMUSCULAR; INTRAVENOUS; SUBCUTANEOUS
Status: DISCONTINUED | OUTPATIENT
Start: 2022-10-30 | End: 2022-11-03 | Stop reason: HOSPADM

## 2022-10-30 RX ORDER — HYDROMORPHONE HCL IN WATER/PF 6 MG/30 ML
.2-.4 PATIENT CONTROLLED ANALGESIA SYRINGE INTRAVENOUS
Status: DISCONTINUED | OUTPATIENT
Start: 2022-10-30 | End: 2022-10-31

## 2022-10-30 RX ORDER — SODIUM CHLORIDE, SODIUM LACTATE, POTASSIUM CHLORIDE, CALCIUM CHLORIDE 600; 310; 30; 20 MG/100ML; MG/100ML; MG/100ML; MG/100ML
INJECTION, SOLUTION INTRAVENOUS CONTINUOUS PRN
Status: DISCONTINUED | OUTPATIENT
Start: 2022-10-30 | End: 2022-10-30

## 2022-10-30 RX ORDER — DEXMEDETOMIDINE HYDROCHLORIDE 4 UG/ML
INJECTION, SOLUTION INTRAVENOUS PRN
Status: DISCONTINUED | OUTPATIENT
Start: 2022-10-30 | End: 2022-10-30

## 2022-10-30 RX ORDER — SODIUM CHLORIDE, SODIUM LACTATE, POTASSIUM CHLORIDE, CALCIUM CHLORIDE 600; 310; 30; 20 MG/100ML; MG/100ML; MG/100ML; MG/100ML
INJECTION, SOLUTION INTRAVENOUS CONTINUOUS
Status: DISCONTINUED | OUTPATIENT
Start: 2022-10-30 | End: 2022-10-30 | Stop reason: HOSPADM

## 2022-10-30 RX ORDER — ONDANSETRON 2 MG/ML
INJECTION INTRAMUSCULAR; INTRAVENOUS PRN
Status: DISCONTINUED | OUTPATIENT
Start: 2022-10-30 | End: 2022-10-30

## 2022-10-30 RX ORDER — OXYCODONE HYDROCHLORIDE 5 MG/1
5 TABLET ORAL EVERY 4 HOURS PRN
Status: DISCONTINUED | OUTPATIENT
Start: 2022-10-30 | End: 2022-10-30 | Stop reason: HOSPADM

## 2022-10-30 RX ORDER — FENTANYL CITRATE 50 UG/ML
25 INJECTION, SOLUTION INTRAMUSCULAR; INTRAVENOUS
Status: DISCONTINUED | OUTPATIENT
Start: 2022-10-30 | End: 2022-10-30 | Stop reason: HOSPADM

## 2022-10-30 RX ORDER — HYDROMORPHONE HYDROCHLORIDE 2 MG/1
2-4 TABLET ORAL EVERY 4 HOURS PRN
Status: DISCONTINUED | OUTPATIENT
Start: 2022-10-30 | End: 2022-11-03 | Stop reason: HOSPADM

## 2022-10-30 RX ORDER — MEPERIDINE HYDROCHLORIDE 25 MG/ML
12.5 INJECTION INTRAMUSCULAR; INTRAVENOUS; SUBCUTANEOUS
Status: DISCONTINUED | OUTPATIENT
Start: 2022-10-30 | End: 2022-10-30 | Stop reason: HOSPADM

## 2022-10-30 RX ORDER — ONDANSETRON 4 MG/1
4 TABLET, ORALLY DISINTEGRATING ORAL EVERY 30 MIN PRN
Status: DISCONTINUED | OUTPATIENT
Start: 2022-10-30 | End: 2022-10-30 | Stop reason: HOSPADM

## 2022-10-30 RX ORDER — FENTANYL CITRATE 50 UG/ML
INJECTION, SOLUTION INTRAMUSCULAR; INTRAVENOUS PRN
Status: DISCONTINUED | OUTPATIENT
Start: 2022-10-30 | End: 2022-10-30

## 2022-10-30 RX ADMIN — PROPOFOL 100 MG: 10 INJECTION, EMULSION INTRAVENOUS at 09:51

## 2022-10-30 RX ADMIN — HYDROMORPHONE HYDROCHLORIDE 2 MG: 2 TABLET ORAL at 15:36

## 2022-10-30 RX ADMIN — HYDROMORPHONE HYDROCHLORIDE 0.2 MG: 0.2 INJECTION, SOLUTION INTRAMUSCULAR; INTRAVENOUS; SUBCUTANEOUS at 11:40

## 2022-10-30 RX ADMIN — FENTANYL CITRATE 50 MCG: 50 INJECTION, SOLUTION INTRAMUSCULAR; INTRAVENOUS at 09:42

## 2022-10-30 RX ADMIN — Medication 8 MCG: at 09:38

## 2022-10-30 RX ADMIN — SODIUM CHLORIDE, POTASSIUM CHLORIDE, SODIUM LACTATE AND CALCIUM CHLORIDE: 600; 310; 30; 20 INJECTION, SOLUTION INTRAVENOUS at 09:22

## 2022-10-30 RX ADMIN — HYDROMORPHONE HYDROCHLORIDE 2 MG: 2 TABLET ORAL at 22:12

## 2022-10-30 RX ADMIN — METRONIDAZOLE 500 MG: 500 TABLET ORAL at 18:01

## 2022-10-30 RX ADMIN — HYDROMORPHONE HYDROCHLORIDE 0.4 MG: 0.2 INJECTION, SOLUTION INTRAMUSCULAR; INTRAVENOUS; SUBCUTANEOUS at 14:49

## 2022-10-30 RX ADMIN — Medication 2 MG: at 14:00

## 2022-10-30 RX ADMIN — ACETAMINOPHEN 975 MG: 325 TABLET, FILM COATED ORAL at 05:36

## 2022-10-30 RX ADMIN — Medication 4 MCG: at 10:27

## 2022-10-30 RX ADMIN — ONDANSETRON 4 MG: 2 INJECTION INTRAMUSCULAR; INTRAVENOUS at 10:33

## 2022-10-30 RX ADMIN — CEFEPIME HYDROCHLORIDE 2 G: 2 INJECTION, POWDER, FOR SOLUTION INTRAVENOUS at 01:46

## 2022-10-30 RX ADMIN — PROPOFOL 200 MG: 10 INJECTION, EMULSION INTRAVENOUS at 09:28

## 2022-10-30 RX ADMIN — Medication 1 MG: at 20:54

## 2022-10-30 RX ADMIN — ACETAMINOPHEN 975 MG: 325 TABLET, FILM COATED ORAL at 20:54

## 2022-10-30 RX ADMIN — FENTANYL CITRATE 25 MCG: 50 INJECTION, SOLUTION INTRAMUSCULAR; INTRAVENOUS at 09:28

## 2022-10-30 RX ADMIN — HYDROMORPHONE HYDROCHLORIDE 0.3 MG: 1 INJECTION, SOLUTION INTRAMUSCULAR; INTRAVENOUS; SUBCUTANEOUS at 10:17

## 2022-10-30 RX ADMIN — IBUPROFEN 600 MG: 600 TABLET ORAL at 03:26

## 2022-10-30 RX ADMIN — Medication 1 MG: at 01:57

## 2022-10-30 RX ADMIN — HYDROMORPHONE HYDROCHLORIDE 0.2 MG: 1 INJECTION, SOLUTION INTRAMUSCULAR; INTRAVENOUS; SUBCUTANEOUS at 10:29

## 2022-10-30 RX ADMIN — HYDROMORPHONE HYDROCHLORIDE 4 MG: 2 TABLET ORAL at 18:01

## 2022-10-30 RX ADMIN — Medication 4 MCG: at 11:01

## 2022-10-30 RX ADMIN — Medication 4 MCG: at 10:45

## 2022-10-30 RX ADMIN — DAPTOMYCIN 500 MG: 500 INJECTION, POWDER, LYOPHILIZED, FOR SOLUTION INTRAVENOUS at 22:11

## 2022-10-30 RX ADMIN — PROPOFOL 30 MCG/KG/MIN: 10 INJECTION, EMULSION INTRAVENOUS at 09:31

## 2022-10-30 RX ADMIN — PROPOFOL 30 MG: 10 INJECTION, EMULSION INTRAVENOUS at 10:45

## 2022-10-30 RX ADMIN — IBUPROFEN 600 MG: 600 TABLET ORAL at 13:14

## 2022-10-30 RX ADMIN — HYDROMORPHONE HYDROCHLORIDE 0.2 MG: 0.2 INJECTION, SOLUTION INTRAMUSCULAR; INTRAVENOUS; SUBCUTANEOUS at 11:45

## 2022-10-30 RX ADMIN — PROPOFOL 30 MG: 10 INJECTION, EMULSION INTRAVENOUS at 10:31

## 2022-10-30 RX ADMIN — SUCCINYLCHOLINE CHLORIDE 50 MG: 20 INJECTION, SOLUTION INTRAMUSCULAR; INTRAVENOUS; PARENTERAL at 09:28

## 2022-10-30 RX ADMIN — FENTANYL CITRATE 25 MCG: 50 INJECTION, SOLUTION INTRAMUSCULAR; INTRAVENOUS at 09:51

## 2022-10-30 RX ADMIN — Medication 1 MG: at 01:38

## 2022-10-30 RX ADMIN — VANCOMYCIN HYDROCHLORIDE 1250 MG: 10 INJECTION, POWDER, LYOPHILIZED, FOR SOLUTION INTRAVENOUS at 03:58

## 2022-10-30 RX ADMIN — ACETAMINOPHEN 975 MG: 325 TABLET, FILM COATED ORAL at 12:52

## 2022-10-30 RX ADMIN — METRONIDAZOLE 500 MG: 500 TABLET ORAL at 23:46

## 2022-10-30 RX ADMIN — CEFEPIME HYDROCHLORIDE 2 G: 2 INJECTION, POWDER, FOR SOLUTION INTRAVENOUS at 12:53

## 2022-10-30 ASSESSMENT — ACTIVITIES OF DAILY LIVING (ADL)
ADLS_ACUITY_SCORE: 37
ADLS_ACUITY_SCORE: 39
ADLS_ACUITY_SCORE: 37
ADLS_ACUITY_SCORE: 39
ADLS_ACUITY_SCORE: 37
ADLS_ACUITY_SCORE: 39
ADLS_ACUITY_SCORE: 39
ADLS_ACUITY_SCORE: 42
ADLS_ACUITY_SCORE: 39

## 2022-10-30 NOTE — PROGRESS NOTES
Pt arrived to the unit on a cart at 1845. Pt difficult to arouse. Able to transfer from cart to bed. Pt in lethargic during conversation with nurse. VSS. Pt resting comfortably in bed, handoff report given to oncoming nurse.

## 2022-10-30 NOTE — PROGRESS NOTES
"Pt says \" I want to sleep give me something to knock me out\", pt medicated and teaching done on safety pt verbal understanding. Warm blanket supplied and warm compression to the swollen left hand.  "

## 2022-10-30 NOTE — PROGRESS NOTES
PACU to Inpatient Nursing Handoff    Patient Queen Brittany Stack is a 32 year old female who speaks English.   Procedure Procedure(s):  IRRIGATION AND DEBRIDEMENT, HAND   Surgeon(s) Primary: Anshul Davis MD  Resident - Assisting: Antonette Mccartney MD; Mayra Ruiz MD     Allergies   Allergen Reactions     Bees Anaphylaxis     Morphine Hives     Other reaction(s): Hives       Oxycodone Hives and Itching     Pork Derived Products Other (See Comments)     Oriental orthodox reasons       Isolation  Contact     Past Medical History   has a past medical history of Abnormal Pap smear, Anxiety, Asthma, Bipolar 1 disorder (H), Bipolar affective (H), Fainting spell, Herpes simplex without mention of complication, Major depressive disorder, Pelvic inflammatory disease, and Polysubstance abuse (H).    Anesthesia General   Dermatome Level     Preop Meds Not applicable   Nerve block Not applicable   Intraop Meds fentanyl (Sublimaze): 100 mcg total  hydromorphone (Dilaudid): 0.5 mg total  ondansetron (Zofran): last given at 1033   Local Meds Yes   Antibiotics Not applicable     Pain Patient Currently in Pain: yes   PACU meds  hydromorphone (Dilaudid): 0.4 mg (total dose) last given at 1140    PCA / epidural No   Capnography     Telemetry     Inpatient Telemetry Monitor Ordered? No        Labs Glucose Lab Results   Component Value Date     10/30/2022    GLC 77 04/23/2021       Hgb Lab Results   Component Value Date    HGB 12.0 10/30/2022    HGB 11.4 05/03/2021       INR Lab Results   Component Value Date    INR 1.00 10/29/2022    INR 0.94 09/10/2012      PACU Imaging Not applicable     Wound/Incision Incision/Surgical Site 04/23/21 Bilateral Vagina (Active)   Number of days: 555       Incision/Surgical Site (Active)   Incision Assessment UTV 10/30/22 1130   Closure Sutures 10/30/22 1044   Number of days:        Incision/Surgical Site 10/30/22 Left Hand (Active)   Incision Assessment WDL 10/30/22 1130   Miladys-Incision Assessment UTV  10/30/22 1108   Dressing Intervention Clean, dry, intact 10/30/22 1130   Number of days: 0       Packing 10/30/22 Finger (comment which one) Qty Placed: 1 (Active)   Packing Assessment UTV 10/30/22 1130   Number of days: 0       Packing 10/30/22 Hand Qty Placed: 1 (Active)   Packing Assessment UTV 10/30/22 1130   Number of days: 0       Packing 10/30/22 Finger (comment which one) Qty Placed: 1 (Active)   Packing Assessment UTV 10/30/22 1108   Number of days: 0       Packing 10/30/22 Finger (comment which one) Qty Placed: 1 (Active)   Packing Assessment UTV 10/30/22 1130   Number of days: 0       Packing 10/30/22 Finger (comment which one) Qty Placed: 1 (Active)   Packing Assessment UTV 10/30/22 1130   Number of days: 0      CMS        Equipment Not applicable   Other LDA       IV Access Peripheral IV 10/29/22 Anterior;Right Lower forearm (Active)   Site Assessment WDL 10/30/22 1130   Line Status Infusing 10/30/22 1130   Phlebitis Scale 0-->no symptoms 10/30/22 1130   Infiltration Scale 0 10/30/22 1130   Infiltration Site Treatment Method  None 10/30/22 0500   If infiltrated, was a vesicant infusing? No 10/30/22 0500   Number of days: 1      Blood Products Not applicable EBL 0   mL   Intake/Output Date 10/30/22 0700 - 10/31/22 0659   Shift 6469-2176 8444-6926 7300-7609 24 Hour Total   INTAKE   I.V. 400   400   Shift Total 400   400   OUTPUT   Urine 300   300   Shift Total 300   300   Weight (kg)          Drains / Sims     Time of void PreOp Void Prior to Procedure:  (unsure) (10/30/22 0905)    PostOp Voided (mL): 300 mL (10/30/22 1250)    Diapered? No   Bladder Scan     PO  (2 puddings and juice) (10/30/22 1130)  tolerating sips, crackers and water     Vitals    B/P: 135/77  T:  (Refused because eating)    Temp src: Oral  P:  Pulse: 79 (10/30/22 1235)          R: 13  O2:  SpO2:  (Refused because eating)    O2 Device: None (Room air) (10/30/22 1562)              Family/support present na   Patient belongings      Patient transported on cart   DC meds/scripts (obs/outpt) Not applicable   Inpatient Pain Meds Released? No       Special needs/considerations None   Tasks needing completion None       Zoila Corado RN  [facility] [id number]

## 2022-10-30 NOTE — ANESTHESIA PROCEDURE NOTES
Airway       Patient location during procedure: OR       Procedure Start/Stop Times: 10/30/2022 9:29 AM  Staff -        CRNA: Margo Sahu APRN CRNA       Performed By: CRNA  Consent for Airway        Urgency: emergent       Consent: The procedure was performed in an emergent situation.  Indications and Patient Condition       Indications for airway management: tiny-procedural       Induction type:RSI       Mask difficulty assessment: 0 - not attempted    Final Airway Details       Final airway type: endotracheal airway       Successful airway: ETT - single and Oral  Endotracheal Airway Details        ETT size (mm): 7.0       Cuffed: yes       Successful intubation technique: direct laryngoscopy       DL Blade Type: MAC 3       Grade View of Cords: 2       Adjucts: stylet       Position: Right       Measured from: gums/teeth       Secured at (cm): 23       Bite block used: None    Post intubation assessment        Placement verified by: capnometry        Number of attempts at approach: 1       Secured with: silk tape       Ease of procedure: easy       Dentition: Intact and Unchanged    Medication(s) Administered   Medication Administration Time: 10/30/2022 9:29 AM

## 2022-10-30 NOTE — ANESTHESIA POSTPROCEDURE EVALUATION
Patient: Queen Brittany Stack    Procedure: Procedure(s):  IRRIGATION AND DEBRIDEMENT, HAND       Anesthesia Type:  General    Note:  Disposition: Outpatient   Postop Pain Control: Uneventful            Sign Out: Well controlled pain   PONV: No   Neuro/Psych: Uneventful            Sign Out: Acceptable/Baseline neuro status   Airway/Respiratory: Uneventful            Sign Out: Acceptable/Baseline resp. status   CV/Hemodynamics: Uneventful            Sign Out: Acceptable CV status; No obvious hypovolemia; No obvious fluid overload   Other NRE: NONE   DID A NON-ROUTINE EVENT OCCUR? No           Last vitals:  Vitals Value Taken Time   /87 10/30/22 1130   Temp 37  C (98.6  F) 10/30/22 1108   Pulse 81 10/30/22 1130   Resp 28 10/30/22 1130   SpO2 92 % 10/30/22 1141   Vitals shown include unvalidated device data.    Electronically Signed By: Maikel Menon MD  October 30, 2022  11:42 AM

## 2022-10-30 NOTE — BRIEF OP NOTE
[facility]    Brief Operative Note    Pre-operative diagnosis: Infection [B99.9]  Post-operative diagnosis Same as pre-operative diagnosis    Procedure: Procedure(s):  IRRIGATION AND DEBRIDEMENT, HAND  Surgeon: Surgeon(s) and Role:     * Anshul Davis MD - Primary     * Antonette Mccartney MD - Resident - Assisting     * Mayra Ruiz MD - Resident - Assisting  Anesthesia: General   Estimated Blood Loss: Less than 10 ml    Drains: None  Specimens:   ID Type Source Tests Collected by Time Destination   A : DORSAL FINGER Tissue Hand, Left ANAEROBIC BACTERIAL CULTURE ROUTINE, AEROBIC BACTERIAL CULTURE ROUTINE Anshul Davis MD 10/30/2022  9:52 AM    B : LEFT RING FINGER FLEXOR TENDON SHEATH Tissue Hand, Left ANAEROBIC BACTERIAL CULTURE ROUTINE, AEROBIC BACTERIAL CULTURE ROUTINE Anshul Davis MD 10/30/2022 10:03 AM      Findings:   Purlence of ring ringer flexor tendon sheath upon opening. Middle and ring finger with exposed PIP joints. Loss off extensor tendon over middle finger .  Complications: None.  Implants: * No implants in log *     Assessment and Plan: Queen Brittany Stack is a 32 year old female left hand infection now s/p I&D on 10/30/22 with Dr. Davis.      - Continue broad spectrum antibiotics, if patient leaves AMA discharge with PO antibiotics   - Pain control   - Rec ID consult (ordered)   - Keep splint c/d/i   - Packing to be removed tomorrow on rounds   - Follow cultures   - Follow-up TBD     Antonette Mccartney MD  Orthopaedic Surgery Resident, PGY-4  Pager: [phone]

## 2022-10-30 NOTE — ANESTHESIA CARE TRANSFER NOTE
Patient: Queen Brittany Stack    Procedure: Procedure(s):  IRRIGATION AND DEBRIDEMENT, HAND       Diagnosis: Infection [B99.9]  Diagnosis Additional Information: No value filed.    Anesthesia Type:   General     Note:    Oropharynx: oropharynx clear of all foreign objects and spontaneously breathing  Level of Consciousness: awake and drowsy  Oxygen Supplementation: blow-by O2  Level of Supplemental Oxygen (L/min / FiO2): 8  Independent Airway: airway patency satisfactory and stable  Dentition: dentition unchanged  Vital Signs Stable: post-procedure vital signs reviewed and stable  Report to RN Given: handoff report given  Patient transferred to: PACU    Handoff Report: Identifed the Patient, Identified the Reponsible Provider, Reviewed the pertinent medical history, Discussed the surgical course, Reviewed Intra-OP anesthesia mangement and issues during anesthesia, Set expectations for post-procedure period and Allowed opportunity for questions and acknowledgement of understanding      Vitals:  Vitals Value Taken Time   /75 10/30/22 1115   Temp 37  C (98.6  F) 10/30/22 1108   Pulse 82 10/30/22 1118   Resp 20 10/30/22 1118   SpO2 100 % 10/30/22 1118   Vitals shown include unvalidated device data.    Electronically Signed By: VANCE Syed CRNA  October 30, 2022  11:19 AM

## 2022-10-30 NOTE — OP NOTE
Patient: Queen Brittany Stack  : 1990  MRN: [id number]    DATE OF OPERATION: 2022      OPERATIVE REPORT       PREOPERATIVE DIAGNOSIS:  1) Left middle, ring, and small finger septic arthritis  2) Left ring finger pyogenic flexor tenosynovitis     POSTOPERATIVE DIAGNOSIS:  1) Left middle, ring, and small finger proximal interphalangeal joint septic arthritis  2) Left ring finger pyogenic flexor tenosynovitis     PROCEDURE:  1) Left middle, ring, and small finger arthrotomies with drainage CPT 26080 x 3  2) Irrigation and debridement left ring finger flexor tendon sheath CPT 58362    SURGEON:  Anshul Davis MD     ASSISTANT(S):  MD Mayra Bautista MD    ANESTHESIA:  General     IMPLANTS:   None    ESTIMATED BLOOD LOSS:  5cc    DVT PROPHYLAXIS:  SCDs    TOURNIQUET TIME:  60 minutes     SPECIMENS REMOVED:  Dorsal finger wound culture  Right ring finger flexor tendon sheath culture    INTRAOPERATIVE FINDINGS:  Open wounds with eschar and necrotic skin dorsal aspects of middle, ring, and small finger PIP joints. Small amount of gross purulence from each of these wounds which were in continuity with the PIP joints. Middle finger central slip not in continuity. Traumatic or prior surgical arthrotomies of middle, ring, and small finger PIP joints. Gross purulence within the flexor tendon sheath of the ring finger.    COMPLICATIONS:  None    DISPOSITION:  Stable to PACU.     INDICATIONS:  The patient is a 32-year-old right-hand-dominant female with a history of polysubstance abuse and bipolar disorder who had previously undergone irrigation debridement of left middle, ring, small finger proximal interphalangeal joint by Dr. Peck at [facility] on 10/17/22. The patient left against medical advice and it is unclear if she took her antibiotics. She presented to our emergency department with drainage of the prior surgical wounds, and increasing pain of the ring finger. Exam demonstrated gross purulence  from the dorsal aspect of the aforementioned proximal interphalangeal joints. She also demonstrated 4 out 4 Kanavel signs consistent with flexor tenosynovitis of the right ring finger.    Indications for surgery were discussed with the patient in detail. After discussing risks, benefits and alternatives to procedure, the patient elected to proceed with surgical intervention.     The patient understood that risks include, but are not limited to: Bleeding, persistent infection, damage to surrounding structures (such as nerve, vessel or tendon), loss of fingers without adequate wound care, need for additional procedures, pain, stiffness, need for therapy, and anesthetic complications. The patient expressed understanding and agreement and wished to proceed.     Consent was obtained for the procedure.     DESCRIPTION OF PROCEDURE IN DETAIL:  The patient was seen in the preoperative care unit. Patient identity, consent, procedure to be performed, and operative site were verified with the patient. The left upper extremity was marked.     The patient was brought to the operating room and placed supine on the operating room table. The left upper extremity was placed on an armboard. SCDs were placed on the bilateral lower extremities. A well-padded tourniquet was placed on the upper arm.     General anesthesia was induced.     The left upper extremity was prepped and draped in the usual sterile fashion. A timeout was performed confirming the correct patient, procedure, operative site, and antibiotics. All were in agreement.     The limb was elevated and the tourniquet inflated to 250 mmHg.  We began by debriding the necrotic eschar off the dorsal aspects of the left middle, ring and small finger proximal interphalangeal joints. Prior sutures were removed. A small amount of purulence and fibrinous debris was immediately encountered.  This was swabbed from the dorsal aspect of the middle and ring fingers and sent for culture.   All poor looking soupy tissue was debrided back to healthy tissue, which brought us down to the extensor mechanisms.  The dorsal transverse wound of the left ring finger was extended obliquely distally and proximally for a z-plasty. The central slip of the left middle finger was found to be completely in discontinuity with exposure of the proximal interphalangeal joint.  50% of the central slip of the left ring finger was found to be intact.  There was a defect between the remaining central slip and the ulnar lateral band with exposed joint.  Majority of the central slip of the left small finger was noted to be intact.  We identified the prior arthrotomy here as well. We bluntly spread these arthrotomies with a tenotomy scissors to allow for adequate irrigation.     Attention was then turned volarly to the ring finger. An oblique Brunner style incision was made over the volar pulp of the distal phalanx and distal interphalangeal joint. Blunt dissection was carried down to the flexor tendon sheath.  This was incised with immediate expression of gross purulence.  Attention was then turned proximally and a transverse incision was made in the distal palmar crease over the ring finger A1 pulley.  Blunt dissection was carried down to the flexor tendon sheath.  The neurovascular structures were identified on either side and protected throughout the remainder of the case.  The A1 pulley was incised longitudinally and released.  There was immediate expression of copious amount of gross purulence.  This was swabbed and sent for culture.  A 14-gauge Angiocath was then inserted into the flexor tendon sheath distally.  500 cc of normal saline was then irrigated through the sheath. At the conclusion of this portion, there was no further purulence and tissues appeared healthy.    Attention was then turned back dorsally. 6L of normal saline was irrigated into the PIP joints of the middle, ring, and small fingers. Tenotomy scissors  were used to maintain the arthrotomies open to allow for adequate irrigation. At the conclusion of this portion, all tissues appeared healthy.    The joints were each packed with a strip of iodoform dressing. The volar ring finger wounds were likewise each packed with an iodoform strip as well. The dorsal finger wounds were closed with interrupted 4-0 nylon sutures. The z-plasty of the dorsal aspect of the left ring finger was advanced and closed with full approximation of the skin over the tendon. The volar ring finger wounds were likewise closed with 4-0 nylon sutures. Adaptic and a sterile gauze dressing were placed. The left hand was then placed in a volar resting splint in full extension to decrease pressure on the dorsal finger wounds.     All needle and sponge counts were correct at the end of the procedure. There were no complications.     The patient was awoken from anesthesia and taken to the postoperative recovery unit in stable condition.     I was present and scrubbed for the entire procedure.     POSTOPERATIVE PLAN:  The patient will be admitted for IV antibiotics. Cultures will be followed. Infectious disease will be consulted. She will be non-weight bearing with strict elevation. Packing will be removed POD1 and wounds dressed. She will then be transitioned to a short arm cast with fingers in extension to to decrease tension on these wounds.    Anshul Davis MD     Hand, Upper Extremity & Microvascular Surgery  Department of Orthopedic Surgery  [facility]

## 2022-10-30 NOTE — PROGRESS NOTES
[facility]    Progress Note - [facility] [place] [facility]       Date of Admission:  10/29/2022    Main Plans for Today   - I&D with ortho  - Follow up ID recommendations  - Consider having PO antibiotics available on floor (if appropriate) if patient leaves AMA   - Discontinue IVF s/p OR    Assessment & Plan   Queen Brittany Stack is a 32 year old female w/ PMHx of polysubstance use, Bipolar Disorder Type I, Asthma, and recent ED visit for left 3rd, 4th, and 5th digit cellulitis, wound culture positive for MDRO s/p I&D (10/17/2022). Admitted 10/29/2022 for left hand cellulitis.    #Cellulitis of 3rd, 4th, and 5th digits of Left Hand  #Recent hospitalization for left hand cellulitis s/p I&D  #Leukocytosis  #HTN and Tachycardia  Recent ED visit for left 3rd, 4th, and 5th digit cellulitis, wound culture positive for multiple organism including MRSA (vanc sensitive), Enterococus, Enterobacter and Klebsiella. C/f further antibiotic resistance, and s/p I&D (10/17/2022). Treated inpt w/ IV Unasyn and Vancomycin. She left AMA, PO doxycycline sent to pharmacy, and was encouraged to complete. Notes not taking antibiotics since hospitalization. On this admission, patient with elevated CRP (96) and WBC (11.9) on admission. Initially started on cefepime and vancomycin. S/p I&D with ortho 10/30. Wound cultures collected in OR. Blood cultures pending.     - Ortho consult, appreciate recs   - Packing will be removed POD1 and wounds dressed    - Transitioned to short arm cast with fingers in extension  - Infectious disease consult, appreciate recs  - Daily cbc and cmp  - q48 hr crp  - Continuous 100ml/hr LR    Cultures:  - 10/29 Blood culture: NGTD  - 10/30 Tissue aerobic: NGTD  - 10/30 Tissue anaerobic: NGTD  - 10/30 Anaerobic culture: NGTD  - 10/30 Aerobic culture: NGTD     Antibiotics:  - IV cefepime 2g BID, IV vancomycin 1.25g BID     Pain:  - Tylenol 1000mg PO once  "daily  - Ibuprofen 600 mg Q6hr PRN  - Dilaudid 2-4 mg PO q4h PRN  - Dilaudid 0.2-0.4 mg IV q2h PRN    #Polysubstance use disorder  #Urine tox screen Cocaine positive  Pt reported cocaine use within past 24 hours and UDS positive for cocaine in ED. She denies any other drug or alcohol use and denies history of IVDU. Notes that she is not experiencing withdrawal symptoms at this time. Tox screens over past year notable for positive cocaine and cannabinoid only She is hypertensive and tachycardic, which has been resolved. Likely secondary to ongoing infection.   - No need to initiate withdrawal protocol at this time.   - Monitor for new onset symptoms   - Follow-up with PCP    # Hyponatremia, resolved  Admission Na of 132, improved with fluids.    # Hypocalcemia, corrects to normal  On admission, elevated when corrected for albumin. Likely secondary to hemoconcentration. With fluids, patient's correct calcium is 9.3, wnl.     Chronic  # Chronically unhoused  Patient confirmed she is currently \"living in a shelter.\" Offered  consult for possible resources however patient declined at this time. 10/30 AM patient stated she would like all the help she could get.   -  consult placed for housing resources, appreciate assistance      # Bipolar Disorder Type I  Notes that she is not taking any PTA medication x 2. Will continue to evaluate.   - Follow-up w/ PCP  - Recommend starting PTA risperidone and trazodone if patient agreeable      # Tobacco Use  Declines nicotine replacement for now     # Mild intermittent Asthma  Not experiencing symptoms at this time. Not on PTA medication. Stable.       Diet: Regular Diet Adult    DVT Prophylaxis: Pneumatic Compression Devices (PADUA 3)  Sims Catheter: Not present  Fluids: 100 ml/hr LR  Central Lines: None  Cardiac Monitoring: None  Code Status: Full Code      Disposition Plan      Expected Discharge Date: 11/01/2022        Discharge Comments: pending safe antibiotic regimen      "   The patient's care was discussed with the Attending Physician, Dr. Colon.    Nancy Porter MD  [facility]  [facility]  Securely message with the Vocera Web Console (learn more here)  Text page via [facility] Paging/Directory   Please see signed in provider for up to date coverage information      Clinically Significant Risk Factors         # Hyponatremia: Lowest Na = 132 mmol/L (Ref range: 136-145) in last 2 days, will monitor as appropriate   # Hypercalcemia: corrected calcium is >10.1, will monitor as appropriate    # Hypoalbuminemia: Lowest albumin = 2.5 g/dL (Ref range: 3.5-5.2) at 10/30/2022  6:30 AM, will monitor as appropriate                    ________________________________________________________________    Interval History   This morning, patient was sleeping. When asked questions, patient would answer and fall asleep again between questions. Denies taking medication outpatient. Would like assistance for housing. States she breaks out with oxycodone.     After procedure, patient is awake, in pain, asking for IV dilaudid. She also asks for us to take off her wrapping around her arm, stating it hurts and that if we don't, they infection will get worse.    Data reviewed today: I reviewed all medications, new labs and imaging results over the last 24 hours. I personally reviewed no images or EKG's today.    Physical Exam   Vital Signs: Temp: (!) 96.5  F (35.8  C) Temp src: Oral BP: 135/77 Pulse: 79   Resp: 13 SpO2: 100 % O2 Device: None (Room air)    Weight: 134 lbs 7.69 oz  Constitutional: alert, distracted with pain, not able to focus on questions or exam and mild distress  Respiratory: No increased work of breathing, breathing comfortably on room air  Cardiovascular: Regular rate and no edema  Musculoskeletal: left hand pre-op appeared grossly swollen, patient not moving fingers; post op wrapped with sterile gauze dressing; no lower extremity  pitting edema present  Neuropsychiatric: General: restless and normal eye contact  Level of consciousness: alert / normal  Affect: anxious  Memory and insight: impaired (concern that sterile dressing if left in today would cause infection)    Data   Recent Labs   Lab 10/30/22  0630 10/29/22  1348   WBC 9.1 11.9*   HGB 12.0 14.3   MCV 88 87    495*   INR  --  1.00    132*   POTASSIUM 4.0 3.8   CHLORIDE 110* 100   CO2 26 26   BUN 9 7   CR 0.66 0.69   ANIONGAP 3 6   TAZ 8.1* 9.5   * 114*   ALBUMIN 2.5* 3.4   PROTTOTAL 6.6* 8.4   BILITOTAL 0.2 0.4   ALKPHOS 63 83   ALT 30 42   AST 15 17     No results found for this or any previous visit (from the past 24 hour(s)).  Medications     lactated ringers 100 mL/hr at 10/29/22 2201       acetaminophen  975 mg Oral Q8H     ceFEPIme  2 g Intravenous Q12H     sodium chloride (PF)  3 mL Intracatheter Q8H     vancomycin  1,250 mg Intravenous Q12H

## 2022-10-30 NOTE — CONSULTS
General Infectious Disease Service Consultation - [facility] [place]    Patient:  Queen Brittany Stack, Date of birth 1990, Medical record number [id number]  Date of Admission: 10/29/2022  Date of Visit:  10/30/2022  Requesting Provider: Funmilayo Colon         Assessment and Recommendations:   Problem List:    # Left middle, ring, and small finger proximal interphalangeal joint septic arthritis and Left ring finger pyogenic flexor tenosynovitis   - S/p I&D (10/17/2022) at [facility] [place] with wound/tissue op culture positive for numerous organisms: MRSA, Enterococcus faecium, Enterococcus mundtii, Lactococcus, Streptococcus mitis/oralis group, Streptococcus parasanguinis, Streptococcus constellatus  Stpahylococcus xylosus, Granulicatella, Vagococcus fluvialis, Gemella morbillorum, Veillonella parvula group, Pseudoescherichia vulneris, Klebsiella oxytoca,  E. Coli,  Enterobacter cloacae (among the several specimens sent on 10/17).    - S/p new debridement on 10/30 and per report describes small amount of purulence and devitalized tissue which was debrided. Intra-op cultures were sent and are in process.     # Polysubstance use disorder    # Bipolar disorder          Discussion:  Queen Brittany Stack is a 32 year old female w/ PMHx of polysubstance use, Bipolar Disorder Type I, Asthma, and recent ED visit ([facility] [place] on 10/17) for left 3rd, 4th, and 5th digit infection s/p I&D (10/17/2022) with presumable op culture positive for numerous organisms: MRSA, Enterococcus faecium, Enterococcus mundtii, Lactococcus, Streptococcus mitis/oralis group, Streptococcus parasanguinis, Streptococcus constellatus  Stpahylococcus xylosus, Granulicatella, Vagococcus fluvialis, Gemella morbillorum, Veillonella parvula group, Pseudoescherichia vulneris, Klebsiella oxytoca,  E. Coli,  Enterobacter cloacae (among the several specimens sent on 10/17). Blood culture from 10/16 positive for Staphylococcus epidermidis in one isolated bottle (anaerobic  bottle) - likely cntaminant. Repeat blood cultures from 10/17 were negative. He was admitted to [facility] [place] and treated w/ IV Unasyn and Vancomycin. She left AMA on 10/18, with prescription of PO doxycycline sent to pharmacy.  She did not take the oral antibiotic. She was then admitted to [facility] on 10/29/2022 for left hand pain.     On arrival she was afebrile and had white count of 11.9. CRP was 96, ESR was 27. Lactic acid was normal. She was started on cefepime and vancomycin on 10/29/22. She underwent new I&D today by ortho team and op report describes small amount of purulence and devitalized tissue which was debrided. Post operative diagnosis was Left middle, ring, and small finger proximal interphalangeal joint septic arthritis and Left ring finger pyogenic flexor tenosynovitis. Intra-op cultures were sent and are in process.     I called [facility] micro lab and they informed me that they only performed susceptibilities for the MRSA and not for the other numerous organisms. They already discarded the specimen from 10/17.        Recommendations:    1. Given growth of Enterococcus faecium among the numerous organisms in wound/tissue cultures from 10/17 (no susceptibilities performed at outside lab) and tendency of Enterococcus faecium to be VRE, I recommend to stop vacomycin and start IV daptomycin 8mg/Kg q 24h - I will do for you    - I added CK to the labs from today    - Patient will need weekly CK wile on daptomycin    2. Please continue  IV cefepime    3. Please start metronidazole 500 mg po q 8h given growth of anaerobic organism (Veillonella) among the numerous organisms  in wound/tissue cultures from 10/17 in wound/tissue cultures from 10/17 - I will do for you    4. We will continue to follow op cultures from 10/30 and adjust antibiotics accordingly    5. Appreciate care from family and ortho      Recommendations discussed with primary team (Leila's)    Thank you for this consult. Thank you for this  consult. The General ID team will continue to follow this patient. Please feel free to call with any question. Dr. Dodd will be covering the ID service on 10/31.     Arlette Rivas MD  Date of Service: 10/30/22  Pager: [phone]       History of Present Illness:   Queen Brittany Stack is a 32 year old female w/ PMHx of polysubstance use, Bipolar Disorder Type I, Asthma, and recent ED visit ([facility] [place] on 10/17) for left 3rd, 4th, and 5th digit infection s/p I&D (10/17/2022) with presumable op culture positive for numerous organisms: MRSA, Enterococcus faecium, Enterococcus mundtii, Lactococcus, Streptococcus mitis/oralis group, Streptococcus parasanguinis, Streptococcus constellatus  Stpahylococcus xylosus, Granulicatella, Vagococcus fluvialis, Gemella morbillorum, Veillonella parvula group, Pseudoescherichia vulneris, Klebsiella oxytoca,  E. Coli,  Enterobacter cloacae (among the several specimens sent on 10/17). Blood culture from 10/16 positive for Staphylococcus epidermidis in one isolated bottle (anaerobic bottle) - likely cntaminant. Repeat blood cultures from 10/17 were negative. He was admitted to [facility] [place] and treated w/ IV Unasyn and Vancomycin. She left AMA on 10/18, with prescription of PO doxycycline sent to pharmacy.  She did not take the oral antibiotic. She was then admitted to [facility] on 10/29/2022 for left hand pain.     On arrival she was afebrile and had white count of 11.9. CRP was 96, ESR was 27. Lactic acid was normal. She was started on cefepime and vancomycin on 10/29/22. She underwent new I&D today by ortho team and op report describes small amount of purulence and devitalized tissue which was debrided. Post operative diagnosis was Left middle, ring, and small finger proximal interphalangeal joint septic arthritis and Left ring finger pyogenic flexor tenosynovitis. Intra-op cultures were sent and are in process.     I called [facility] micro lab and they informed me that they only performed  susceptibilities for the MRSA and not for the other numerous organisms. They already discarded the specimen from 10/17.             Review of Systems:     CONSTITUTIONAL:  No fevers or chills  INTEGUMENTARY/SKIN: See HPI  EYES: Negative for icterus, vision changes or irritation  ENT/MOUTH:  Negative for oral lesions and sore throat  RESPIRATORY:  Negative for cough and dyspnea  CARDIOVASCULAR:  Negative for chest pain, palpitations and  shortness of breath  GASTROINTESTINAL:  Negative for abdominal pain, nausea, vomiting, diarrhea and constipation  GENITOURINARY:  Negative for dysuria, hematuria, frequency and urgency  MUSCULOSKELETAL: See HPI  NEURO:  Negative for headache, altered mental status, numbness or weakness  PSYCHIATRIC: See HPI  HEMATOLOGIC/LYMPHATIC: negative for lymphadenopathy or bleeding  ALLERGIC/IMMUNOLOGIC: Negative for allergic reaction   ENDOCRINE: Negative for temperature intolerance, skin/hair changes       Past Medical History:     Past Medical History:   Diagnosis Date     Abnormal Pap smear     2010, f/u normal     Anxiety      Asthma     no ihaler use     Bipolar 1 disorder (H)      Bipolar affective (H)      Fainting spell      Herpes simplex without mention of complication     Pt reports last outbreak about 4 weeks ago     Major depressive disorder      Pelvic inflammatory disease      Polysubstance abuse (H)          Allergies:      Allergies   Allergen Reactions     Bees Anaphylaxis     Morphine Hives     Other reaction(s): Hives       Oxycodone Hives and Itching     Pork Derived Products Other (See Comments)     Congregational reasons          Family History:     Family History   Problem Relation Age of Onset     Unknown/Adopted Mother      Unknown/Adopted Father      Unknown/Adopted Maternal Grandmother      Unknown/Adopted Maternal Grandfather      Unknown/Adopted Paternal Grandmother      Unknown/Adopted Paternal Grandfather             Social History:     Social History      Socioeconomic History     Marital status: Single     Spouse name: Not on file     Number of children: Not on file     Years of education: Not on file     Highest education level: Not on file   Occupational History     Not on file   Tobacco Use     Smoking status: Every Day     Packs/day: 0.50     Years: 8.00     Pack years: 4.00     Types: Cigarettes     Smokeless tobacco: Never   Substance and Sexual Activity     Alcohol use: Yes     Comment: last drank yesterday, 2 shots     Drug use: Yes     Types: Cocaine     Comment: cocaine last used yesterday     Sexual activity: Yes     Partners: Male     Birth control/protection: None     Comment: NONE   Other Topics Concern     Parent/sibling w/ CABG, MI or angioplasty before 65F 55M? Not Asked   Social History Narrative     Not on file     Social Determinants of Health     Financial Resource Strain: Not on file   Food Insecurity: Not on file   Transportation Needs: Not on file   Physical Activity: Not on file   Stress: Not on file   Social Connections: Not on file   Intimate Partner Violence: Not on file   Housing Stability: Not on file                Physical Exam:   /77 (BP Location: Right arm)   Pulse 79   Temp (!) 96.5  F (35.8  C) (Oral)   Resp 13   SpO2 100%        Exam:  GENERAL:  Patient reports significant pain on he left hand and forearm  HEAD: Normocephalic and atraumatic  ENT:  No hearing impairment  EYES:  Eyes grossly normal to inspection, PERRL and conjunctivae and sclerae normal   LUNGS:  Clear to auscultation - no rales, rhonchi or wheezes  CARDIOVASCULAR:  Regular rate and rhythm, normal S1 S2, no murmur  ABDOMEN:  Soft, nontender  EXT/MS/SKIN: Left hand and forearm covered with surgical dressings           Laboratory Data:     Creatinine   Date Value Ref Range Status   10/30/2022 0.66 0.52 - 1.04 mg/dL Final   10/29/2022 0.69 0.52 - 1.04 mg/dL Final   07/20/2022 0.86 0.52 - 1.04 mg/dL Final   04/16/2022 0.81 0.52 - 1.04 mg/dL Final    04/14/2021 0.79 0.52 - 1.04 mg/dL Final   11/20/2019 0.82 0.52 - 1.04 mg/dL Final   05/30/2019 1.13 (H) 0.52 - 1.04 mg/dL Final   01/14/2018 0.80 0.60 - 1.10 mg/dL Final   10/23/2012 0.60 0.52 - 1.04 mg/dL Final   07/10/2012 0.60 0.52 - 1.04 mg/dL Final     WBC   Date Value Ref Range Status   05/03/2021 7.5 4.0 - 11.0 10e9/L Final   04/16/2021 11.0 4.0 - 11.0 10e9/L Final   04/14/2021 8.9 4.0 - 11.0 10e9/L Final   04/14/2021 Canceled, Test credited 4.0 - 11.0 10e9/L Final     Comment:     Unsatisfactory specimen - clotted  NOTIFIED TO CELESTE THOMAS RN ER ON 4/14/2021 AT 1205 BY ANM     11/20/2019 3.9 (L) 4.0 - 11.0 10e9/L Final     WBC Count   Date Value Ref Range Status   10/30/2022 9.1 4.0 - 11.0 10e3/uL Final   10/29/2022 11.9 (H) 4.0 - 11.0 10e3/uL Final   07/20/2022 5.6 4.0 - 11.0 10e3/uL Final   04/16/2022 5.0 4.0 - 11.0 10e3/uL Final     Hemoglobin   Date Value Ref Range Status   10/30/2022 12.0 11.7 - 15.7 g/dL Final   05/03/2021 11.4 (L) 11.7 - 15.7 g/dL Final     Platelet Count   Date Value Ref Range Status   10/30/2022 391 150 - 450 10e3/uL Final   05/03/2021 340 150 - 450 10e9/L Final     Lab Results   Component Value Date     10/30/2022    BUN 9 10/30/2022    CO2 26 10/30/2022     CRP Inflammation   Date Value Ref Range Status   10/29/2022 96.0 (H) 0.0 - 8.0 mg/L Final                 Imaging:     Recent Results (from the past 48 hour(s))   XR Hand Left G/E 3 Views    Narrative    EXAM: XR HAND LEFT G/E 3 VIEWS  LOCATION: [facility]  DATE/TIME: 10/29/2022 1:26 PM    INDICATION: hand pain, infection  COMPARISON: None.      Impression    IMPRESSION: There is diffuse soft tissue swelling over the hand. No acute fracture is seen. No periosteal reaction or osseous erosion to suggest osteomyelitis. If there is persistent concern, MRI would be more sensitive.

## 2022-10-30 NOTE — PROGRESS NOTES
Patient requests to call patients mom to give the update. Mom called, and is very concerned for the patient. 5A called and given  info to give to patients mom. Mom updated with  number.

## 2022-10-30 NOTE — ANESTHESIA PREPROCEDURE EVALUATION
Anesthesia Pre-Procedure Evaluation    Patient: Queen Brittany Stack   MRN: [id number] : 1990        Procedure : Procedure(s):  IRRIGATION AND DEBRIDEMENT, HAND          Past Medical History:   Diagnosis Date     Abnormal Pap smear     , f/u normal     Anxiety      Asthma     no ihaler use     Bipolar 1 disorder (H)      Bipolar affective (H)      Fainting spell      Herpes simplex without mention of complication     Pt reports last outbreak about 4 weeks ago     Major depressive disorder      Pelvic inflammatory disease      Polysubstance abuse (H)       Past Surgical History:   Procedure Laterality Date     DILATION AND CURETTAGE SUCTION N/A 2021    Procedure: DILATION AND CURETTAGE, UTERUS, USING SUCTION;  Surgeon: Lanie Yates MD;  Location: [facility] OR     NO HISTORY OF SURGERY        Allergies   Allergen Reactions     Bees Anaphylaxis     Morphine Hives     Other reaction(s): Hives       Oxycodone Hives and Itching     Pork Derived Products Other (See Comments)     Bahai reasons      Social History     Tobacco Use     Smoking status: Every Day     Packs/day: 0.50     Years: 8.00     Pack years: 4.00     Types: Cigarettes     Smokeless tobacco: Never   Substance Use Topics     Alcohol use: Yes     Comment: last drank yesterday, 2 shots      Wt Readings from Last 1 Encounters:   04/15/22 63 kg (138 lb 14.4 oz)        Anesthesia Evaluation   Pt has had prior anesthetic. Type: General.        ROS/MED HX  ENT/Pulmonary:     (+) asthma     Neurologic:  - neg neurologic ROS     Cardiovascular:  - neg cardiovascular ROS     METS/Exercise Tolerance:     Hematologic:  - neg hematologic  ROS     Musculoskeletal:  - neg musculoskeletal ROS     GI/Hepatic:  - neg GI/hepatic ROS     Renal/Genitourinary:  - neg Renal ROS     Endo:  - neg endo ROS     Psychiatric/Substance Use:  - neg psychiatric ROS     Infectious Disease:  - neg infectious disease ROS     Malignancy:  - neg malignancy ROS      Other:            Physical Exam    Airway   unable to assess          Respiratory Devices and Support         Dental           Cardiovascular    unable to assess         Pulmonary                   OUTSIDE LABS:  CBC:   Lab Results   Component Value Date    WBC 9.1 10/30/2022    WBC 11.9 (H) 10/29/2022    HGB 12.0 10/30/2022    HGB 14.3 10/29/2022    HCT 36.4 10/30/2022    HCT 41.9 10/29/2022     10/30/2022     (H) 10/29/2022     BMP:   Lab Results   Component Value Date     10/30/2022     (L) 10/29/2022    POTASSIUM 4.0 10/30/2022    POTASSIUM 3.8 10/29/2022    CHLORIDE 110 (H) 10/30/2022    CHLORIDE 100 10/29/2022    CO2 26 10/30/2022    CO2 26 10/29/2022    BUN 9 10/30/2022    BUN 7 10/29/2022    CR 0.66 10/30/2022    CR 0.69 10/29/2022     (H) 10/30/2022     (H) 10/29/2022     COAGS:   Lab Results   Component Value Date    PTT 29 09/10/2012    INR 1.00 10/29/2022    FIBR 443 (H) 09/10/2012     POC:   Lab Results   Component Value Date    HCG Negative 10/29/2022    HCGS Negative 07/20/2022     HEPATIC:   Lab Results   Component Value Date    ALBUMIN 2.5 (L) 10/30/2022    PROTTOTAL 6.6 (L) 10/30/2022    ALT 30 10/30/2022    AST 15 10/30/2022    ALKPHOS 63 10/30/2022    BILITOTAL 0.2 10/30/2022     OTHER:   Lab Results   Component Value Date    LACT 1.5 10/29/2022    TAZ 8.1 (L) 10/30/2022    MAG 2.0 01/14/2018    LIPASE 101 04/14/2021    TSH 0.87 04/16/2022    T4 0.92 04/16/2021    CRP 96.0 (H) 10/29/2022    SED 27 (H) 10/29/2022       Anesthesia Plan    ASA Status:  3      Anesthesia Type: General.     - Airway: ETT   Induction: Intravenous, RSI.   Maintenance: Inhalation.        Consents    Anesthesia Plan(s) and associated risks, benefits, and realistic alternatives discussed. Questions answered and patient/representative(s) expressed understanding.    - Discussed:     - Discussed with:  Patient      - Extended Intubation/Ventilatory Support Discussed: No.      -  Patient is DNR/DNI Status: No    Use of blood products discussed: No .     Postoperative Care    Pain management: Multi-modal analgesia.   PONV prophylaxis: Ondansetron (or other 5HT-3)     Comments:    Other Comments: Cocaine user with ingected fingers. Plan GA with ETT and std monitors            Radha Longo MD

## 2022-10-30 NOTE — PROGRESS NOTES
Before transfer to the floor, patient complaining of pain IV dilaudid given x2, patient tolerated well and appears more comfortable. Transferred back to unit 5 ortho and hand off given to nurse.

## 2022-10-30 NOTE — PROGRESS NOTES
Orthopedic surgery progress note    Date of service 10/30/2022    Subjective: Resting in bed comfortably, discussed the risks and benefits of today's surgery at length with the patient.  No improvement on IV antibiotics.  No systemic symptoms.  Continues to have pain at the left hand localized to the long, ring fingers.    Objective: Focused examination of the left upper extremity demonstrates swelling about the hand.  Particularly about the volar aspect of the ring and long proximal segment.  Exquisite pain with range of motion of the ring finger, moderate pain with range of motion of the long finger.  This pain is primarily localized to the proximal IP joint of these fingers.  Evidence of postsurgical mid axial incisions about the small, ring, long fingers of the left hand.  Some amount of purulent drainage from the dorsum of the long finger skin intact.  No significant tenderness to palpation about the proximal tendon sheath.  However exquisitely tender to palpation about the volar aspect of the ring and long fingers.  Fingers held in a resting position.  Fires deltoid, biceps, triceps, wrist extension/flexion, , EPL, intrinsics, FPL with 5/5 strength. SILT in axillary, musculocutaneous, radial, ulnar, and median nerve distribution.  Sensory is intact on the radial and ulnar aspects to light touch of the small, ring, long finger radial pulse 2+ and fingers warm and well-perfused with <2 seconds capillary refill.    Assessment:    32F with deep left long and ring finger infection, surrounding hand cellulitis, cannot exclude proximal interphalangeal joint septic arthritis, cannot exclude early flexor tenosynovitis.     Plan for irrigation and debridement of left hand on 10/30/2022.     Plan:  -N.p.o. OR today     Staff: MD Ryan

## 2022-10-31 ENCOUNTER — APPOINTMENT (OUTPATIENT)
Dept: CARDIOLOGY | Facility: CLINIC | Age: 32
End: 2022-10-31
Payer: COMMERCIAL

## 2022-10-31 LAB
ALBUMIN SERPL-MCNC: 2.2 G/DL (ref 3.4–5)
ALP SERPL-CCNC: 61 U/L (ref 40–150)
ALT SERPL W P-5'-P-CCNC: 39 U/L (ref 0–50)
ANION GAP SERPL CALCULATED.3IONS-SCNC: 3 MMOL/L (ref 3–14)
AST SERPL W P-5'-P-CCNC: 41 U/L (ref 0–45)
BILIRUB SERPL-MCNC: 0.1 MG/DL (ref 0.2–1.3)
BUN SERPL-MCNC: 8 MG/DL (ref 7–30)
CALCIUM SERPL-MCNC: 8.2 MG/DL (ref 8.5–10.1)
CHLORIDE BLD-SCNC: 111 MMOL/L (ref 94–109)
CO2 SERPL-SCNC: 27 MMOL/L (ref 20–32)
CREAT SERPL-MCNC: 0.59 MG/DL (ref 0.52–1.04)
CREAT SERPL-MCNC: 0.66 MG/DL (ref 0.52–1.04)
CRP SERPL-MCNC: 76 MG/L (ref 0–8)
ERYTHROCYTE [DISTWIDTH] IN BLOOD BY AUTOMATED COUNT: 13.9 % (ref 10–15)
GFR SERPL CREATININE-BSD FRML MDRD: >90 ML/MIN/1.73M2
GFR SERPL CREATININE-BSD FRML MDRD: >90 ML/MIN/1.73M2
GLUCOSE BLD-MCNC: 99 MG/DL (ref 70–99)
HCT VFR BLD AUTO: 34.3 % (ref 35–47)
HGB BLD-MCNC: 11.3 G/DL (ref 11.7–15.7)
LVEF ECHO: NORMAL
MCH RBC QN AUTO: 29.4 PG (ref 26.5–33)
MCHC RBC AUTO-ENTMCNC: 32.9 G/DL (ref 31.5–36.5)
MCV RBC AUTO: 89 FL (ref 78–100)
PLATELET # BLD AUTO: 389 10E3/UL (ref 150–450)
POTASSIUM BLD-SCNC: 3.8 MMOL/L (ref 3.4–5.3)
PROT SERPL-MCNC: 5.9 G/DL (ref 6.8–8.8)
RBC # BLD AUTO: 3.84 10E6/UL (ref 3.8–5.2)
SODIUM SERPL-SCNC: 141 MMOL/L (ref 133–144)
WBC # BLD AUTO: 8.4 10E3/UL (ref 4–11)

## 2022-10-31 PROCEDURE — 36415 COLL VENOUS BLD VENIPUNCTURE: CPT

## 2022-10-31 PROCEDURE — 99232 SBSQ HOSP IP/OBS MODERATE 35: CPT | Mod: GC | Performed by: FAMILY MEDICINE

## 2022-10-31 PROCEDURE — 85027 COMPLETE CBC AUTOMATED: CPT

## 2022-10-31 PROCEDURE — 80053 COMPREHEN METABOLIC PANEL: CPT

## 2022-10-31 PROCEDURE — 99233 SBSQ HOSP IP/OBS HIGH 50: CPT | Performed by: INTERNAL MEDICINE

## 2022-10-31 PROCEDURE — 250N000013 HC RX MED GY IP 250 OP 250 PS 637: Performed by: INTERNAL MEDICINE

## 2022-10-31 PROCEDURE — 93306 TTE W/DOPPLER COMPLETE: CPT

## 2022-10-31 PROCEDURE — 250N000013 HC RX MED GY IP 250 OP 250 PS 637: Performed by: STUDENT IN AN ORGANIZED HEALTH CARE EDUCATION/TRAINING PROGRAM

## 2022-10-31 PROCEDURE — 86140 C-REACTIVE PROTEIN: CPT

## 2022-10-31 PROCEDURE — 250N000011 HC RX IP 250 OP 636: Performed by: INTERNAL MEDICINE

## 2022-10-31 PROCEDURE — 93306 TTE W/DOPPLER COMPLETE: CPT | Mod: 26 | Performed by: INTERNAL MEDICINE

## 2022-10-31 PROCEDURE — 250N000013 HC RX MED GY IP 250 OP 250 PS 637

## 2022-10-31 PROCEDURE — 250N000011 HC RX IP 250 OP 636

## 2022-10-31 PROCEDURE — 258N000003 HC RX IP 258 OP 636: Performed by: INTERNAL MEDICINE

## 2022-10-31 PROCEDURE — 250N000011 HC RX IP 250 OP 636: Performed by: STUDENT IN AN ORGANIZED HEALTH CARE EDUCATION/TRAINING PROGRAM

## 2022-10-31 PROCEDURE — 120N000002 HC R&B MED SURG/OB UMMC

## 2022-10-31 RX ORDER — SULFAMETHOXAZOLE/TRIMETHOPRIM 800-160 MG
1 TABLET ORAL 2 TIMES DAILY
Qty: 28 TABLET | Refills: 0 | Status: SHIPPED | OUTPATIENT
Start: 2022-10-31 | End: 2022-11-14

## 2022-10-31 RX ORDER — HYDROMORPHONE HCL IN WATER/PF 6 MG/30 ML
.2-.4 PATIENT CONTROLLED ANALGESIA SYRINGE INTRAVENOUS EVERY 4 HOURS PRN
Status: DISCONTINUED | OUTPATIENT
Start: 2022-10-31 | End: 2022-11-01

## 2022-10-31 RX ORDER — HYDROXYZINE HYDROCHLORIDE 10 MG/1
10 TABLET, FILM COATED ORAL
Status: COMPLETED | OUTPATIENT
Start: 2022-10-31 | End: 2022-10-31

## 2022-10-31 RX ORDER — HYDROMORPHONE HCL IN WATER/PF 6 MG/30 ML
0.2 PATIENT CONTROLLED ANALGESIA SYRINGE INTRAVENOUS ONCE
Status: COMPLETED | OUTPATIENT
Start: 2022-10-31 | End: 2022-10-31

## 2022-10-31 RX ADMIN — CEFEPIME HYDROCHLORIDE 2 G: 2 INJECTION, POWDER, FOR SOLUTION INTRAVENOUS at 13:30

## 2022-10-31 RX ADMIN — IBUPROFEN 600 MG: 600 TABLET ORAL at 09:22

## 2022-10-31 RX ADMIN — HYDROMORPHONE HYDROCHLORIDE 4 MG: 2 TABLET ORAL at 21:10

## 2022-10-31 RX ADMIN — IBUPROFEN 600 MG: 600 TABLET ORAL at 01:04

## 2022-10-31 RX ADMIN — DAPTOMYCIN 500 MG: 500 INJECTION, POWDER, LYOPHILIZED, FOR SOLUTION INTRAVENOUS at 21:10

## 2022-10-31 RX ADMIN — ACETAMINOPHEN 975 MG: 325 TABLET, FILM COATED ORAL at 13:31

## 2022-10-31 RX ADMIN — HYDROMORPHONE HYDROCHLORIDE 0.2 MG: 0.2 INJECTION, SOLUTION INTRAMUSCULAR; INTRAVENOUS; SUBCUTANEOUS at 06:10

## 2022-10-31 RX ADMIN — METRONIDAZOLE 500 MG: 500 TABLET ORAL at 17:35

## 2022-10-31 RX ADMIN — HYDROXYZINE HYDROCHLORIDE 10 MG: 10 TABLET ORAL at 01:04

## 2022-10-31 RX ADMIN — METRONIDAZOLE 500 MG: 500 TABLET ORAL at 09:22

## 2022-10-31 RX ADMIN — ACETAMINOPHEN 975 MG: 325 TABLET, FILM COATED ORAL at 05:25

## 2022-10-31 RX ADMIN — ACETAMINOPHEN 975 MG: 325 TABLET, FILM COATED ORAL at 21:03

## 2022-10-31 RX ADMIN — CEFEPIME HYDROCHLORIDE 2 G: 2 INJECTION, POWDER, FOR SOLUTION INTRAVENOUS at 00:37

## 2022-10-31 ASSESSMENT — ACTIVITIES OF DAILY LIVING (ADL)
ADLS_ACUITY_SCORE: 26
WEAR_GLASSES_OR_BLIND: NO
TOILETING_ISSUES: NO
ADLS_ACUITY_SCORE: 25
DIFFICULTY_COMMUNICATING: NO
ADLS_ACUITY_SCORE: 26
CONCENTRATING,_REMEMBERING_OR_MAKING_DECISIONS_DIFFICULTY: NO
ADLS_ACUITY_SCORE: 26
ADLS_ACUITY_SCORE: 26
DRESSING/BATHING_DIFFICULTY: NO
ADLS_ACUITY_SCORE: 24
ADLS_ACUITY_SCORE: 26
HEARING_DIFFICULTY_OR_DEAF: NO
ADLS_ACUITY_SCORE: 26
DOING_ERRANDS_INDEPENDENTLY_DIFFICULTY: NO
DIFFICULTY_EATING/SWALLOWING: NO
CHANGE_IN_FUNCTIONAL_STATUS_SINCE_ONSET_OF_CURRENT_ILLNESS/INJURY: NO
ADLS_ACUITY_SCORE: 26
WALKING_OR_CLIMBING_STAIRS_DIFFICULTY: NO
ADLS_ACUITY_SCORE: 26
FALL_HISTORY_WITHIN_LAST_SIX_MONTHS: NO

## 2022-10-31 NOTE — PROGRESS NOTES
[facility]    Progress Note - [facility] Medicine Service       Date of Admission:  10/29/2022    Main Plans for Today   - Continue Abx per ID recs  - Space out IV Dilaudid based on patient need  - Plan B: PO Abx if AMA per ID rec      Assessment & Plan   Queen Brittany Stack is a 32 year old female w/ PMHx of polysubstance use, Bipolar Disorder Type I, Asthma, and recent ED visit for left 3rd, 4th, and 5th digit cellulitis, wound culture positive for MDRO s/p I&D (10/17/2022). Admitted 10/29/2022 for left hand cellulitis.    # Left 3rd, 4th, and 5th digits PIP joint septic arthritis   # Left ring finger pyogenic flexor tenosynovitis   # Recent hospitalization for left hand cellulitis s/p I&D  # Leukocytosis, resolved  # HTN and Tachycardia, resolved  Recent ED visit for left 3rd, 4th, and 5th digit cellulitis, wound culture positive for multiple organism including MRSA (vanc sensitive), Enterococus, Enterobacter and Klebsiella. C/f further antibiotic resistance, and s/p I&D (10/17/2022). Treated inpt w/ IV Unasyn and Vancomycin. She left AMA, PO doxycycline sent to pharmacy, but was not taking. On this admission, patient with elevated CRP and WBC, but lactate wnl on admission. S/p I&D with ortho 10/30. Wound cultures collected in OR. Leukocytosis resolved, CRP downtrending. Blood cultures NGTD. Antibiotics adjusted per ID recs for better microbial coverage. Spacing IV dilaudid as patient managing pain with less than what is available.     - Ortho consult, appreciate recs   - Packing will be removed POD2   - Transitioned to short arm cast with fingers in extension  - Infectious disease consult, appreciate recs  - Daily cbc and cmp  - q48 hr crp    Cultures:  - 10/29 Blood culture: NGTD  - 10/30 Tissue aerobic: NGTD  - 10/30 Tissue anaerobic: NGTD  - 10/30 Anaerobic culture: NGTD  - 10/30 Aerobic culture: NGTD     Antibiotics:  - IV cefepime 2g BID  - IV  "daptomycin 500 mg Q24hr  - IV metronidazole 500 mg PO TID  - discontinued IV vancomycin 1.25g BID    Plan B: ID recommends prepare PO antibiotics if considerable concern for leave AMA  - Augmentin 875/125mg PO BID   - Bactrim 1 DS tab PO BID x 14 days       Pain:  - Tylenol 1000mg PO once daily  - Ibuprofen 600 mg Q6hr PRN  - Dilaudid 2-4 mg PO q4h PRN  - Dilaudid 0.2-0.4 mg IV q4h PRN as patient is able to tolerate    #Polysubstance use disorder  #Urine tox screen Cocaine positive  Pt reported cocaine use within past 24 hours and UDS positive for cocaine in ED. She denies any other drug or alcohol use and denies history of IVDU. Notes that she is not experiencing withdrawal symptoms at this time. Tox screens over past year notable for positive cocaine and cannabinoid only. She was hypertensive and tachycardic, now resolved. Experienced itching overnight, which she noted occurs sometimes for her, and resolved with hydroxyzine.   - No need to initiate withdrawal protocol at this time.  - Hydroxyzine as needed for pruritis   - Monitor for new onset symptoms   - Follow-up with PCP    #Anemia, Normocytic in setting I&D procedure, and maintenance fluid   Hgb 11.3, slight decrease. Corresponds with decrease in other blood cell lines. Likely dilutional cause. Will continue to monitor.       # Hyponatremia, resolved  Admission Na of 132, improved with fluids.    # Hypocalcemia, corrects to normal  On admission, elevated when corrected for albumin. Likely secondary to hemoconcentration. With fluids, patient's correct calcium is 9.3, wnl.     Chronic  # Chronically unhoused  Patient confirmed she is currently \"living in a shelter.\" Offered  consult for possible resources however patient declined at this time. 10/30 AM patient stated she would like all the help she could get.   -  consult placed for housing resources, appreciate assistance      # Bipolar Disorder Type I  Notes that she is not taking any PTA medication x 2. " Will continue to evaluate.   - Follow-up w/ PCP  - Recommend starting PTA risperidone and trazodone if patient agreeable      # Tobacco Use  Declines nicotine replacement for now     # Mild intermittent Asthma  Not experiencing symptoms at this time. Not on PTA medication. Stable.       Diet: Regular Diet Adult    DVT Prophylaxis: Pneumatic Compression Devices (PADUA 3)  Sims Catheter: Not present  Fluids: 100 ml/hr LR  Central Lines: None  Cardiac Monitoring: None  Code Status: Full Code      Disposition Plan      Expected Discharge Date: 11/02/2022      Destination: home  Discharge Comments: pending safe antibiotic regimen        The patient's care was discussed with the Attending Physician, Dr. Colon.    Darlene Moe  [facility] BRIIDGE Trainee    I was present with the medical student who participated in the service and in the documentation of this note. I have verified the history and personally performed the physical exam and medical decision making, and have verified the content of the note, which accurately reflects my assessment of the patient and the plan of care.    Nancy Porter MD  [facility]  [facility]  Securely message with the Vocera Web Console (learn more here)  Text page via [facility] Paging/Directory   Please see signed in provider for up to date coverage information      Clinically Significant Risk Factors              # Hypoalbuminemia: Lowest albumin = 2.2 g/dL (Ref range: 3.5-5.2) at 10/31/2022  6:01 AM, will monitor as appropriate                    ________________________________________________________________    Interval History   No acute overnight events. She is laying, curled up in bed. Appears sleepy, is responsive to questions. Experiencing pain localized to the left hand, but is able to manage the pain. Otherwise no systemic symptoms at this time. Discussed the importance of staying in hospital to complete antibiotics  course, as well as risk of losing hand if not treated adequately. Patient expressed understanding.    Data reviewed today: I reviewed all medications, new labs and imaging results over the last 24 hours. I personally reviewed no images or EKG's today.    Physical Exam   Vital Signs: Temp: 97.5  F (36.4  C) Temp src: Oral BP: 122/65 Pulse: 85   Resp: 16 SpO2: 98 % O2 Device: None (Room air)    Weight: 134 lbs 7.69 oz  Constitutional: alert, awake, appears stated age  Respiratory: No increased work of breathing, breathing comfortably on room air  Cardiovascular: Regular rate and no edema  Musculoskeletal: left hand wrapped with sterile gauze dressing; no lower extremity pitting edema present  Neuropsychiatric: General: restless and normal eye contact  Level of consciousness: alert / normal  Affect: comfortable  Memory and insight: normal     Data   Recent Results (from the past 24 hour(s))   Comprehensive metabolic panel    Collection Time: 10/31/22  6:01 AM   Result Value Ref Range    Sodium 141 133 - 144 mmol/L    Potassium 3.8 3.4 - 5.3 mmol/L    Chloride 111 (H) 94 - 109 mmol/L    Carbon Dioxide (CO2) 27 20 - 32 mmol/L    Anion Gap 3 3 - 14 mmol/L    Urea Nitrogen 8 7 - 30 mg/dL    Creatinine 0.59 0.52 - 1.04 mg/dL    Calcium 8.2 (L) 8.5 - 10.1 mg/dL    Glucose 99 70 - 99 mg/dL    Alkaline Phosphatase 61 40 - 150 U/L    AST 41 0 - 45 U/L    ALT 39 0 - 50 U/L    Protein Total 5.9 (L) 6.8 - 8.8 g/dL    Albumin 2.2 (L) 3.4 - 5.0 g/dL    Bilirubin Total 0.1 (L) 0.2 - 1.3 mg/dL    GFR Estimate >90 >60 mL/min/1.73m2   CBC with platelets    Collection Time: 10/31/22  6:01 AM   Result Value Ref Range    WBC Count 8.4 4.0 - 11.0 10e3/uL    RBC Count 3.84 3.80 - 5.20 10e6/uL    Hemoglobin 11.3 (L) 11.7 - 15.7 g/dL    Hematocrit 34.3 (L) 35.0 - 47.0 %    MCV 89 78 - 100 fL    MCH 29.4 26.5 - 33.0 pg    MCHC 32.9 31.5 - 36.5 g/dL    RDW 13.9 10.0 - 15.0 %    Platelet Count 389 150 - 450 10e3/uL   CRP inflammation     Collection Time: 10/31/22  6:01 AM   Result Value Ref Range    CRP Inflammation 76.0 (H) 0.0 - 8.0 mg/L   Echocardiogram Complete    Collection Time: 10/31/22 12:32 PM   Result Value Ref Range    LVEF  60-65%      Recent Results (from the past 24 hour(s))   Echocardiogram Complete   Result Value    LVEF  60-65%    Narrative    [id number]  [id number]  [id number]  [id number]^ASHTYN^PHI     [facility]  Echocardiography Laboratory  [address] [place] [address]     Name: QUEEN ARMIN CUENCA  MRN: [id number]  : 1990  Study Date: 10/31/2022 09:10 AM  Age: 32 yrs  Gender: Female  [place] [facility] [place]  Reason For Study: Endocarditis  Ordering Physician: PHI CHAMORRO  Performed By: Lisa Urrutia     BSA: 1.7 m2  Height: 67 in  Weight: 134 lb  HR: 91  BP: 143/92 mmHg  ______________________________________________________________________________  Procedure  Complete Portable Echo Adult. Good quality two-dimensional was performed and  interpreted.  ______________________________________________________________________________  Interpretation Summary  No vegetation or mass identified. Global and regional left ventricular  function is normal with an EF of 60-65%.  Right ventricular function, chamber size, wall motion, and thickness are  normal.  No significant valvular abnormalities present.  There is no prior study for direct comparison.  ______________________________________________________________________________  Left Ventricle  Global and regional left ventricular function is normal with an EF of 60-65%.  Left ventricular wall thickness is normal. Left ventricular size is normal.  Left ventricular diastolic function is normal.     Right Ventricle  Right ventricular function, chamber size, wall motion, and thickness are  normal.     Atria  Mild left atrial enlargement is present. Mild right atrial enlargement is  present. The atrial septum is intact as assessed by color  Doppler .     Mitral Valve  The mitral valve is normal. Mild mitral insufficiency is present.     Aortic Valve  The aortic valve is tricuspid. Mild aortic insufficiency is present.     Tricuspid Valve  The tricuspid valve is normal. Trace tricuspid insufficiency is present.     Pulmonic Valve  On Doppler interrogation, there is no significant stenosis or regurgitation.  The valve leaflets are not well visualized.     Vessels  The aorta root is normal. The pulmonary artery and bifurcation cannot be  assessed. IVC diameter <2.1 cm collapsing >50% with sniff suggests a normal RA  pressure of 3 mmHg.     Pericardium  No pericardial effusion is present.     Miscellaneous  No significant valvular abnormalities present.     Compared to Previous Study  There is no prior study for direct comparison.     Attestation  I have personally viewed the imaging and agree with the interpretation and  report as documented by the fellow, Jaren Kingston, and/or edited by me.  ______________________________________________________________________________  MMode/2D Measurements & Calculations  IVSd: 0.91 cm  LVIDd: 4.8 cm  LVIDs: 2.8 cm  LVPWd: 0.85 cm  FS: 41.6 %  LV mass(C)d: 145.8 grams  LV mass(C)dI: 85.5 grams/m2  Ao root diam: 2.9 cm  LA dimension: 3.0 cm  LA/Ao: 1.1  LVOT diam: 2.0 cm  LVOT area: 3.0 cm2  LA Volume (BP): 64.0 ml  RWT: 0.35     Doppler Measurements & Calculations  MV E max garrick: 109.6 cm/sec  MV A max garrick: 76.0 cm/sec  MV E/A: 1.4  MV dec time: 0.21 sec  Ao V2 max: 176.2 cm/sec  Ao max P.0 mmHg  RAFA(V,D): 1.5 cm2  LV V1 max P.9 mmHg  LV V1 max: 85.4 cm/sec  TV V2 max: 239.5 cm/sec  TV max P.0 mmHg  PA acc time: 0.15 sec  TR max garrick: 195.5 cm/sec  TR max PG: 15.3 mmHg  AV Garrick Ratio (DI): 0.48  E/E' av.7  Lateral E/e': 8.3  Medial E/e': 9.1     ______________________________________________________________________________  Report approved by: Ravinder Ching 10/31/2022 01:21 PM           Medications        acetaminophen  975 mg Oral Q8H     ceFEPIme  2 g Intravenous Q12H     DAPTOmycin (CUBICIN) intermittent infusion  8 mg/kg Intravenous Q24H     metroNIDAZOLE  500 mg Oral Q8H     sodium chloride (PF)  3 mL Intracatheter Q8H

## 2022-10-31 NOTE — PROGRESS NOTES
"Orthopaedic Surgery Progress Note   October 31, 2022    Subjective: No acute events ON. Pain controlled. Drowsy and minimally interactive.     Objective: /72 (BP Location: Right arm)   Pulse 86   Temp 96.9  F (36.1  C) (Oral)   Resp 16   Ht 1.702 m (5' 7\")   Wt 61 kg (134 lb 7.7 oz)   SpO2 100%   BMI 21.06 kg/m      General: NAD, alert and oriented, cooperative with exam.   Cardio: RRR, extremities wwp.   Respiratory: Non-labored breathing.  MSK:   LUE: dressing and packings removed. Replaced with clean dressing. Hand with swelling about the fingers and palm. No purulence noted.     Labs:  Hemoglobin   Date Value Ref Range Status   10/30/2022 12.0 11.7 - 15.7 g/dL Final   05/03/2021 11.4 (L) 11.7 - 15.7 g/dL Final   ]  All cultures:  Cultures with NGTD     Assessment and Plan: Queen Brittany Stack is a 32 year old female left hand infection now s/p I&D on 10/30/22 with Dr. Davis.       - Continue broad spectrum antibiotics, if patient leaves AMA discharge with PO antibiotics   - Pain control   - Rec ID consult (ordered)   - Keep splint c/d/i   - Packing to be removed tomorrow on rounds   - Follow cultures   - Follow-up TBD     Antonette Mccartney MD  Orthopaedic Surgery Resident, PGY-4  Pager: [phone]    For questions about this patient during the day, please attempt to CONTACT ME at my pager ([phone]) prior to contacting the Orthopaedic Surgery resident on call. Thank you!     "

## 2022-10-31 NOTE — PROGRESS NOTES
"SPIRITUAL HEALTH SERVICES  SPIRITUAL ASSESSMENT Progress Note  [facility] ORTHO     REFERRAL SOURCE: Self initiated  visit, Sabianism pt assessment.     Pt  identifies with no Restoration and is of  descent. V    I visited her as in her medical chart she was listed as 'Sabianism'. Upon our spiritual assessment, she stated that, \"My father is Sabianism but I am not\". She permitted me to change he designation to reflect that she has no Restoration and I was able to do so.      did however as for me tot say a prayer for her. We prayed together at her request.     PLAN: No follow up needed at this time.    Mercedes Claudio  Lead Sabianism   Pager [phone]    [facility] remains available 24/7 for emergent requests/referrals, either by having the switchboard page the on-call  or by entering an ASAP/STAT consult in Epic (this will also page the on-call ).    "

## 2022-10-31 NOTE — PROGRESS NOTES
General Infectious Disease Service Progress Note - [facility]    Patient:  Queen Brittany Stack, Date of birth 1990, Medical record number [id number]  Date of Admission: 10/29/2022  Date of Visit:  10/31/2022   Requesting Provider: Funmilayo Colon         Assessment and Recommendations:   Problem List:    # Left middle, ring, and small finger proximal interphalangeal joint septic arthritis and Left ring finger pyogenic flexor tenosynovitis   - S/p I&D (10/17/2022) at [facility] with wound/tissue op culture positive for numerous organisms: MRSA, Enterococcus faecium, Enterococcus mundtii, Lactococcus, Streptococcus mitis/oralis group, Streptococcus parasanguinis, Streptococcus constellatus  Stpahylococcus xylosus, Granulicatella, Vagococcus fluvialis, Gemella morbillorum, Veillonella parvula group, Pseudoescherichia vulneris, Klebsiella oxytoca,  E. Coli,  Enterobacter cloacae (among the several specimens sent on 10/17).    - S/p new debridement on 10/30 and per report describes small amount of purulence and devitalized tissue which was debrided. Intra-op cultures were sent and are in progress.     # Polysubstance use disorder    # Bipolar disorder    Discussion:  Queen Brittany Stack is a 32 year old female w/ PMHx of polysubstance use, Bipolar Disorder Type I, Asthma, and recent ED visit ([facility] on 10/17) for left 3rd, 4th, and 5th digit infection s/p I&D (10/17/2022) with presumable op culture positive for numerous organisms: MRSA, Enterococcus faecium, Enterococcus mundtii, Lactococcus, Streptococcus mitis/oralis group, Streptococcus parasanguinis, Streptococcus constellatus  Stpahylococcus xylosus, Granulicatella, Vagococcus fluvialis, Gemella morbillorum, Veillonella parvula group, Pseudoescherichia vulneris, Klebsiella oxytoca,  E. Coli,  Enterobacter cloacae (among the several specimens sent on 10/17). Blood culture from 10/16 positive for Staphylococcus epidermidis in one isolated bottle (anaerobic bottle)  - likely cntaminant. Repeat blood cultures from 10/17 were negative. He was admitted to [facility] and treated w/ IV Unasyn and Vancomycin. She left AMA on 10/18, with prescription of PO doxycycline sent to pharmacy.  She did not take the oral antibiotic. She was then admitted to [facility] on 10/29/2022 for left hand pain.     On arrival she was afebrile and had white count of 11.9. CRP was 96, ESR was 27. Lactic acid was normal. She was started on cefepime and vancomycin on 10/29/22. She underwent new I&D today by ortho team and op report describes small amount of purulence and devitalized tissue which was debrided. Post operative diagnosis was Left middle, ring, and small finger proximal interphalangeal joint septic arthritis and left ring finger pyogenic flexor tenosynovitis. Intra-op cultures were sent and are in process.     Dr. Rivas called [facility] micro lab and they informed her that they only performed susceptibilities for the MRSA and not for the other numerous organisms. They already discarded the specimen from 10/17.        Recommendations:    1. Continue daptomycin, cefepime, and metronidazole unchanged pending repeat intraoperative cultures.   2. If patient leaves AMA, please provide Augmentin 875/125 mg PO BID and Bactrim 1 DS tab PO BID x 14 days. If considerable concern that she may leave AMA without much warning, consider having these available at bedside.     Recommendations discussed with primary team (Leila's)    ID will continue to follow.     Brenda Dodd MD  Infectious Diseases  Pager [phone]          Interval History:   No purulence noted during dressing change. Intraoperative cultures in progress. WBC improved to 8.4 from 11.9. Afebrile. Tolerating antibiotics without difficulty.         History of Present Illness:   Queen Brittany Stack is a 32 year old female w/ PMHx of polysubstance use, Bipolar Disorder Type I, Asthma, and recent ED visit ([facility] on 10/17) for left 3rd, 4th, and 5th digit  infection s/p I&D (10/17/2022) with presumable op culture positive for numerous organisms: MRSA, Enterococcus faecium, Enterococcus mundtii, Lactococcus, Streptococcus mitis/oralis group, Streptococcus parasanguinis, Streptococcus constellatus  Stpahylococcus xylosus, Granulicatella, Vagococcus fluvialis, Gemella morbillorum, Veillonella parvula group, Pseudoescherichia vulneris, Klebsiella oxytoca,  E. Coli,  Enterobacter cloacae (among the several specimens sent on 10/17). Blood culture from 10/16 positive for Staphylococcus epidermidis in one isolated bottle (anaerobic bottle) - likely cntaminant. Repeat blood cultures from 10/17 were negative. He was admitted to [facility] and treated w/ IV Unasyn and Vancomycin. She left AMA on 10/18, with prescription of PO doxycycline sent to pharmacy.  She did not take the oral antibiotic. She was then admitted to [facility] on 10/29/2022 for left hand pain.     On arrival she was afebrile and had white count of 11.9. CRP was 96, ESR was 27. Lactic acid was normal. She was started on cefepime and vancomycin on 10/29/22. She underwent new I&D today by ortho team and op report describes small amount of purulence and devitalized tissue which was debrided. Post operative diagnosis was Left middle, ring, and small finger proximal interphalangeal joint septic arthritis and Left ring finger pyogenic flexor tenosynovitis. Intra-op cultures were sent and are in process.     Dr. Rivas called [facility] micro lab on 10/30/22 and they informed her that they only performed susceptibilities for the MRSA and not for the other numerous organisms. They already discarded the specimen from 10/17.             Review of Systems:   4 point ROS including Respiratory, CV, GI and , other than that noted in the HPI,  is negative        Allergies:      Allergies   Allergen Reactions     Bees Anaphylaxis     Morphine Hives     Other reaction(s): Hives       Oxycodone Hives and Itching     Pork Derived  "Products Other (See Comments)     Confucianism reasons            Physical Exam:   /65 (BP Location: Right arm)   Pulse 85   Temp 97.5  F (36.4  C) (Oral)   Resp 16   Ht 1.702 m (5' 7\")   Wt 61 kg (134 lb 7.7 oz)   SpO2 98%   BMI 21.06 kg/m       Exam:  GENERAL:  Well-developed, well-nourished, sitting in bed in no acute distress.   ENT:  Head is normocephalic, atraumatic. Anterior oropharynx without ulcers.  EYES:  Eyes have anicteric sclerae.    NECK:  Supple.  LUNGS:  Normal respiratory effort.   ABDOMEN:  Non-distended.   EXT: Extremities without visible edema. Left hand with dressing in place - ortho managing dressings so not removed at this time.   SKIN:  No acute rashes.  Line is in place without any surrounding erythema.  NEUROLOGIC:  Grossly nonfocal.            Laboratory Data:     Creatinine   Date Value Ref Range Status   10/31/2022 0.59 0.52 - 1.04 mg/dL Final   10/30/2022 0.66 0.52 - 1.04 mg/dL Final   10/29/2022 0.69 0.52 - 1.04 mg/dL Final   07/20/2022 0.86 0.52 - 1.04 mg/dL Final   04/16/2022 0.81 0.52 - 1.04 mg/dL Final   04/14/2021 0.79 0.52 - 1.04 mg/dL Final   11/20/2019 0.82 0.52 - 1.04 mg/dL Final   05/30/2019 1.13 (H) 0.52 - 1.04 mg/dL Final   10/23/2012 0.60 0.52 - 1.04 mg/dL Final   07/10/2012 0.60 0.52 - 1.04 mg/dL Final     WBC   Date Value Ref Range Status   05/03/2021 7.5 4.0 - 11.0 10e9/L Final   04/16/2021 11.0 4.0 - 11.0 10e9/L Final   04/14/2021 8.9 4.0 - 11.0 10e9/L Final   04/14/2021 Canceled, Test credited 4.0 - 11.0 10e9/L Final     Comment:     Unsatisfactory specimen - clotted  NOTIFIED TO CELESTE THOMAS RN ER ON 4/14/2021 AT 1205 BY ANM     11/20/2019 3.9 (L) 4.0 - 11.0 10e9/L Final     WBC Count   Date Value Ref Range Status   10/31/2022 8.4 4.0 - 11.0 10e3/uL Final   10/30/2022 9.1 4.0 - 11.0 10e3/uL Final   10/29/2022 11.9 (H) 4.0 - 11.0 10e3/uL Final   07/20/2022 5.6 4.0 - 11.0 10e3/uL Final   04/16/2022 5.0 4.0 - 11.0 10e3/uL Final     Hemoglobin   Date Value Ref " Range Status   10/31/2022 11.3 (L) 11.7 - 15.7 g/dL Final   05/03/2021 11.4 (L) 11.7 - 15.7 g/dL Final     Platelet Count   Date Value Ref Range Status   10/31/2022 389 150 - 450 10e3/uL Final   05/03/2021 340 150 - 450 10e9/L Final     Lab Results   Component Value Date     10/31/2022    BUN 8 10/31/2022    CO2 27 10/31/2022     CRP Inflammation   Date Value Ref Range Status   10/31/2022 76.0 (H) 0.0 - 8.0 mg/L Final   10/29/2022 96.0 (H) 0.0 - 8.0 mg/L Final                 Imaging:     Recent Results (from the past 48 hour(s))   XR Hand Left G/E 3 Views    Narrative    EXAM: XR HAND LEFT G/E 3 VIEWS  LOCATION: [facility]  DATE/TIME: 10/29/2022 1:26 PM    INDICATION: hand pain, infection  COMPARISON: None.      Impression    IMPRESSION: There is diffuse soft tissue swelling over the hand. No acute fracture is seen. No periosteal reaction or osseous erosion to suggest osteomyelitis. If there is persistent concern, MRI would be more sensitive.

## 2022-11-01 LAB
ALBUMIN SERPL-MCNC: 2.4 G/DL (ref 3.4–5)
ALP SERPL-CCNC: 63 U/L (ref 40–150)
ALT SERPL W P-5'-P-CCNC: 33 U/L (ref 0–50)
ANION GAP SERPL CALCULATED.3IONS-SCNC: 3 MMOL/L (ref 3–14)
AST SERPL W P-5'-P-CCNC: 21 U/L (ref 0–45)
BILIRUB SERPL-MCNC: 0.1 MG/DL (ref 0.2–1.3)
BUN SERPL-MCNC: 11 MG/DL (ref 7–30)
CALCIUM SERPL-MCNC: 8.4 MG/DL (ref 8.5–10.1)
CHLORIDE BLD-SCNC: 106 MMOL/L (ref 94–109)
CO2 SERPL-SCNC: 29 MMOL/L (ref 20–32)
CREAT SERPL-MCNC: 0.55 MG/DL (ref 0.52–1.04)
ERYTHROCYTE [DISTWIDTH] IN BLOOD BY AUTOMATED COUNT: 13.4 % (ref 10–15)
GFR SERPL CREATININE-BSD FRML MDRD: >90 ML/MIN/1.73M2
GLUCOSE BLD-MCNC: 86 MG/DL (ref 70–99)
HCT VFR BLD AUTO: 35.3 % (ref 35–47)
HGB BLD-MCNC: 11.8 G/DL (ref 11.7–15.7)
MCH RBC QN AUTO: 29.4 PG (ref 26.5–33)
MCHC RBC AUTO-ENTMCNC: 33.4 G/DL (ref 31.5–36.5)
MCV RBC AUTO: 88 FL (ref 78–100)
PLATELET # BLD AUTO: 402 10E3/UL (ref 150–450)
POTASSIUM BLD-SCNC: 4 MMOL/L (ref 3.4–5.3)
PROT SERPL-MCNC: 6.5 G/DL (ref 6.8–8.8)
RBC # BLD AUTO: 4.02 10E6/UL (ref 3.8–5.2)
SODIUM SERPL-SCNC: 138 MMOL/L (ref 133–144)
WBC # BLD AUTO: 6.8 10E3/UL (ref 4–11)

## 2022-11-01 PROCEDURE — 85027 COMPLETE CBC AUTOMATED: CPT

## 2022-11-01 PROCEDURE — 250N000013 HC RX MED GY IP 250 OP 250 PS 637: Performed by: INTERNAL MEDICINE

## 2022-11-01 PROCEDURE — 120N000002 HC R&B MED SURG/OB UMMC

## 2022-11-01 PROCEDURE — 99233 SBSQ HOSP IP/OBS HIGH 50: CPT | Performed by: INTERNAL MEDICINE

## 2022-11-01 PROCEDURE — 250N000013 HC RX MED GY IP 250 OP 250 PS 637: Performed by: STUDENT IN AN ORGANIZED HEALTH CARE EDUCATION/TRAINING PROGRAM

## 2022-11-01 PROCEDURE — 250N000011 HC RX IP 250 OP 636: Performed by: INTERNAL MEDICINE

## 2022-11-01 PROCEDURE — 250N000013 HC RX MED GY IP 250 OP 250 PS 637

## 2022-11-01 PROCEDURE — 99232 SBSQ HOSP IP/OBS MODERATE 35: CPT | Mod: GC | Performed by: FAMILY MEDICINE

## 2022-11-01 PROCEDURE — 250N000011 HC RX IP 250 OP 636: Performed by: STUDENT IN AN ORGANIZED HEALTH CARE EDUCATION/TRAINING PROGRAM

## 2022-11-01 PROCEDURE — 80053 COMPREHEN METABOLIC PANEL: CPT

## 2022-11-01 PROCEDURE — 36415 COLL VENOUS BLD VENIPUNCTURE: CPT

## 2022-11-01 PROCEDURE — 258N000003 HC RX IP 258 OP 636: Performed by: INTERNAL MEDICINE

## 2022-11-01 PROCEDURE — 250N000011 HC RX IP 250 OP 636

## 2022-11-01 RX ORDER — HYDROMORPHONE HCL IN WATER/PF 6 MG/30 ML
.2-.4 PATIENT CONTROLLED ANALGESIA SYRINGE INTRAVENOUS 2 TIMES DAILY PRN
Status: DISCONTINUED | OUTPATIENT
Start: 2022-11-01 | End: 2022-11-02

## 2022-11-01 RX ADMIN — DAPTOMYCIN 500 MG: 500 INJECTION, POWDER, LYOPHILIZED, FOR SOLUTION INTRAVENOUS at 21:24

## 2022-11-01 RX ADMIN — IBUPROFEN 600 MG: 600 TABLET ORAL at 04:47

## 2022-11-01 RX ADMIN — HYDROMORPHONE HYDROCHLORIDE 4 MG: 2 TABLET ORAL at 21:23

## 2022-11-01 RX ADMIN — HYDROMORPHONE HYDROCHLORIDE 0.2 MG: 0.2 INJECTION, SOLUTION INTRAMUSCULAR; INTRAVENOUS; SUBCUTANEOUS at 06:21

## 2022-11-01 RX ADMIN — CEFEPIME HYDROCHLORIDE 2 G: 2 INJECTION, POWDER, FOR SOLUTION INTRAVENOUS at 00:30

## 2022-11-01 RX ADMIN — METRONIDAZOLE 500 MG: 500 TABLET ORAL at 16:27

## 2022-11-01 RX ADMIN — IBUPROFEN 800 MG: 600 TABLET ORAL at 20:28

## 2022-11-01 RX ADMIN — Medication 1 MG: at 21:23

## 2022-11-01 RX ADMIN — METRONIDAZOLE 500 MG: 500 TABLET ORAL at 00:30

## 2022-11-01 RX ADMIN — METRONIDAZOLE 500 MG: 500 TABLET ORAL at 08:57

## 2022-11-01 RX ADMIN — ACETAMINOPHEN 975 MG: 325 TABLET, FILM COATED ORAL at 04:47

## 2022-11-01 RX ADMIN — HYDROMORPHONE HYDROCHLORIDE 4 MG: 2 TABLET ORAL at 02:14

## 2022-11-01 RX ADMIN — IBUPROFEN 800 MG: 600 TABLET ORAL at 13:30

## 2022-11-01 RX ADMIN — ACETAMINOPHEN 975 MG: 325 TABLET, FILM COATED ORAL at 20:28

## 2022-11-01 RX ADMIN — CEFEPIME HYDROCHLORIDE 2 G: 2 INJECTION, POWDER, FOR SOLUTION INTRAVENOUS at 13:21

## 2022-11-01 RX ADMIN — ACETAMINOPHEN 975 MG: 325 TABLET, FILM COATED ORAL at 13:21

## 2022-11-01 ASSESSMENT — ACTIVITIES OF DAILY LIVING (ADL)
ADLS_ACUITY_SCORE: 24
ADLS_ACUITY_SCORE: 26
ADLS_ACUITY_SCORE: 24
ADLS_ACUITY_SCORE: 26

## 2022-11-01 NOTE — PROGRESS NOTES
[facility]    Progress Note - [facility] Medicine Service       Date of Admission:  10/29/2022    Main Plans for Today   - Continue Abx per ID recs  - Schedule Ibuprofen   - Space IV dilaudid to BID PRN  - Plan B: PO Abx if AMA per ID rec      Assessment & Plan   Queen Brittany Stack is a 32 year old female w/ PMHx of polysubstance use, Bipolar Disorder Type I, Asthma, and recent ED visit for left 3rd, 4th, and 5th digit cellulitis, wound culture positive for MDRO s/p I&D (10/17/2022). Admitted 10/29/2022 for left hand cellulitis.    # Left 3rd, 4th, and 5th digits PIP joint septic arthritis   # Left ring finger pyogenic flexor tenosynovitis   # Recent hospitalization for left hand cellulitis s/p I&D  # Leukocytosis, resolved  # HTN and Tachycardia, resolved  Recent ED visit for left 3rd, 4th, and 5th digit cellulitis with wound culture positive for MRSA (vanc sensitive), Enterococus, Enterobacter, and Klebsiella, s/p I&D on 10/17. Treated inpt w/ IV Unasyn and Vancomycin. She left AMA, PO doxycycline sent to pharmacy, but was not taking.     On this admission, patient with elevated CRP and WBC, but lactate wnl on admission. S/p I&D with ortho 10/30. Wound cultures collected in OR. Leukocytosis resolved, CRP downtrending. Blood cultures NGTD. Antibiotics adjusted per ID recs for better microbial coverage. Spacing IV dilaudid as patient managing pain with less than what is available.     - Ortho consult, appreciate recs   - Dressing change 11/2   - Transitioned to short arm cast with fingers in extension  - Infectious disease consult, appreciate recs   - Continue IV abx  - Daily cbc and cmp  - q48hr crp    Cultures:  - 10/29 Blood culture: NGTD  - 10/30 Tissue aerobic: Staph Aureus, and Strep Pyogenes (s ampicillin, penicillin, vancomycin and cephalosporins)   - 10/30 Tissue anaerobic: NGTD  - 10/30 Anaerobic culture: NGTD  - 10/30 Aerobic culture:  "NGTD     Antibiotics:  - IV cefepime 2g BID (10/29)  - IV daptomycin 500 mg Q24hr (10/30)  - IV metronidazole 500 mg PO TID (10/30)  - discontinued IV vancomycin 1.25g BID (10/29-10/30)    Plan B: ID recommends prepare PO antibiotics if considerable concern for leave AMA  - Augmentin 875/125mg PO BID   - Bactrim 1 DS tab PO BID x 14 days       Pain:  - Tylenol 1000mg PO TID  - Ibuprofen 800 mg PO TID  - Dilaudid 2-4 mg PO q4h PRN  - Dilaudid 0.2-0.4 mg IV BID PRN as patient is able to tolerate    #Polysubstance use disorder  #Urine tox screen Cocaine positive  Pt reported cocaine use within past 24 hours PTA and UDS positive for cocaine in ED. She denies any other drug or alcohol use and denies history of IVDU. Denies withdrawal symptoms this admission. Hypertensive and tachycardic resolved.   - Monitor for new onset symptoms   - Follow-up with PCP    Chronic/Resolved  #Anemia, Normocytic in setting I&D procedure, and maintenance fluid, resolved  Hgb 11.8 up from 11.3. Corresponding decrease in other blood cell lines can be explained by dilutional cause.    # Hyponatremia, resolved  Admission Na of 132, improved with fluids.    # Pseudohypocalcemia, corrects to normal  On admission, elevated when corrected for albumin. Likely secondary to hemoconcentration. With fluids, patient's correct calcium is 9.3, wnl.     # Chronically unhoused  Patient confirmed she is currently \"living in a shelter.\" Offered  consult for possible resources however patient declined at this time. 10/30 AM patient stated she would like all the help she could get.   -  consult placed for housing resources, appreciate assistance      # Bipolar Disorder Type I  Notes that she is not taking any PTA medication x 2. Will continue to evaluate.   - Follow-up w/ PCP  - Recommend starting PTA risperidone and trazodone if patient agreeable      # Tobacco Use  Declines nicotine replacement for now     # Mild intermittent Asthma  Not experiencing " symptoms at this time. Not on PTA medication. Stable.       Diet: Regular Diet Adult    DVT Prophylaxis: Pneumatic Compression Devices (PADUA 3)  Sims Catheter: Not present  Fluids: 100 ml/hr LR  Central Lines: None  Cardiac Monitoring: None  Code Status: Full Code      Disposition Plan      Expected Discharge Date: 11/16/2022    Discharge Delays: Other (Add Comment)  Destination: home  Discharge Comments: pending safe antibiotic regimen        The patient's care was discussed with the Attending Physician, Dr. Rodriguez.    Darlene Moe  [facility] [place] BRIIDGROMERO Trainee    I was present with the medical student who participated in the service and in the documentation of this note. I have verified the history and personally performed the physical exam and medical decision making, and have verified the content of the note, which accurately reflects my assessment of the patient and the plan of care.       Nancy Poretr MD  [facility]  [facility]  Securely message with the Vocera Web Console (learn more here)  Text page via [facility] Paging/Directory   Please see signed in provider for up to date coverage information      Clinically Significant Risk Factors              # Hypoalbuminemia: Lowest albumin = 2.2 g/dL (Ref range: 3.5-5.2) at 10/31/2022  6:01 AM, will monitor as appropriate                    ________________________________________________________________    Interval History   No acute overnight events. She is laying in bed. Alert on questioning, and responded cooperatively.    Notes that she is experiencing pain in left hand, and that she would like some pain meds. She is able to move hand and fingers minimally as pain and dressing allow. She is negative for fever, lightheadedness, syncope, N&V or changes in BM/urination. On regular diet, eating and drinking daily.       Data reviewed today: I reviewed all medications, new labs and imaging results over the  last 24 hours. I personally reviewed no images or EKG's today.    Physical Exam   Vital Signs: Temp: 96.8  F (36  C) Temp src: Oral BP: 126/62 Pulse: 92   Resp: 16 SpO2: 100 % O2 Device: None (Room air)    Weight: 114 lbs 14.4 oz  Constitutional: alert, awake, appears stated age  Respiratory: No increased work of breathing, breathing comfortably on room air. Chest clear to auscultation per BRIIDGE trainee exam  Cardiovascular: Regular rate; Regular rhythm, S1 and S2 audible, No other heart sounds, or murmurs per BRIIDGE trainee exam  Abdomen: BS audible. Soft, nontender, nondistended, no palpable masses per BRIIDGE trainee exam  Musculoskeletal: left hand wrapped with sterile gauze dressing, able to move fingers on left hand actively. Decreased ROM d/t pain, swelling, and dressing; no lower extremity pitting edema present  Neuropsychiatric: General: restless and normal eye contact  Level of consciousness: alert / normal  Affect: comfortable  Memory and insight: normal     Data   Recent Results (from the past 24 hour(s))   Comprehensive metabolic panel    Collection Time: 11/01/22  6:09 AM   Result Value Ref Range    Sodium 138 133 - 144 mmol/L    Potassium 4.0 3.4 - 5.3 mmol/L    Chloride 106 94 - 109 mmol/L    Carbon Dioxide (CO2) 29 20 - 32 mmol/L    Anion Gap 3 3 - 14 mmol/L    Urea Nitrogen 11 7 - 30 mg/dL    Creatinine 0.55 0.52 - 1.04 mg/dL    Calcium 8.4 (L) 8.5 - 10.1 mg/dL    Glucose 86 70 - 99 mg/dL    Alkaline Phosphatase 63 40 - 150 U/L    AST 21 0 - 45 U/L    ALT 33 0 - 50 U/L    Protein Total 6.5 (L) 6.8 - 8.8 g/dL    Albumin 2.4 (L) 3.4 - 5.0 g/dL    Bilirubin Total 0.1 (L) 0.2 - 1.3 mg/dL    GFR Estimate >90 >60 mL/min/1.73m2   CBC with platelets    Collection Time: 11/01/22  6:09 AM   Result Value Ref Range    WBC Count 6.8 4.0 - 11.0 10e3/uL    RBC Count 4.02 3.80 - 5.20 10e6/uL    Hemoglobin 11.8 11.7 - 15.7 g/dL    Hematocrit 35.3 35.0 - 47.0 %    MCV 88 78 - 100 fL    MCH 29.4 26.5 - 33.0 pg     MCHC 33.4 31.5 - 36.5 g/dL    RDW 13.4 10.0 - 15.0 %    Platelet Count 402 150 - 450 10e3/uL     No results found for this or any previous visit (from the past 24 hour(s)).  Medications       acetaminophen  975 mg Oral Q8H     ceFEPIme  2 g Intravenous Q12H     DAPTOmycin (CUBICIN) intermittent infusion  8 mg/kg Intravenous Q24H     ibuprofen  800 mg Oral TID     metroNIDAZOLE  500 mg Oral Q8H     sodium chloride (PF)  3 mL Intracatheter Q8H

## 2022-11-01 NOTE — RESULT ENCOUNTER NOTE
Report routed to hospitalist pool.  Patient admitted.  Team aware.  ID involved.  No action required.

## 2022-11-01 NOTE — PROGRESS NOTES
"Orthopaedic Surgery Progress Note   October 31, 2022    Subjective: No acute events ON. Pain controlled. Drowsy and minimally interactive.     Afebrile ON, VSS     Objective: /65 (BP Location: Right arm, Patient Position: Supine)   Pulse 85   Temp (!) 96.2  F (35.7  C) (Oral)   Resp 16   Ht 1.702 m (5' 7\")   Wt 61 kg (134 lb 7.7 oz)   SpO2 100%   BMI 21.06 kg/m      General: NAD, alert and oriented, cooperative with exam.   Cardio: RRR, extremities wwp.   Respiratory: Non-labored breathing.  MSK:   LUE: dressing c/d/i    Labs:  Hemoglobin   Date Value Ref Range Status   10/31/2022 11.3 (L) 11.7 - 15.7 g/dL Final   05/03/2021 11.4 (L) 11.7 - 15.7 g/dL Final   ]  All cultures:  1x intraop + Staph aureus    Assessment and Plan: Queen Brittany Stack is a 32 year old female left hand infection now s/p I&D on 10/30/22 with Dr. Davis.      Dressing change 11/2      - Continue broad spectrum antibiotics, if patient leaves AMA discharge with PO antibiotics   - Pain control   - Rec ID consult (ordered)   - Keep splint c/d/i   - Packing to be removed tomorrow on rounds   - Follow cultures   - Follow-up TBD     Antonette Mccartney MD  Orthopaedic Surgery Resident, PGY-4  Pager: [phone]    For questions about this patient during the day, please attempt to CONTACT ME at my pager ([phone]) prior to contacting the Orthopaedic Surgery resident on call. Thank you!     "

## 2022-11-01 NOTE — PROGRESS NOTES
General Infectious Disease Service Progress Note - [facility] [place]    Patient:  Queen Brittany Stack, Date of birth 1990, Medical record number [id number]  Date of Admission: 10/29/2022  Date of Visit:  11/01/2022   Requesting Provider: Funmilayo Colon         Assessment and Recommendations:   Problem List:    # Left middle, ring, and small finger proximal interphalangeal joint septic arthritis and Left ring finger pyogenic flexor tenosynovitis   - [place]/p I&[place] ([place]17[place]2022[place] [facility]tissue [place] culture positive for numerous organisms: MRSA, Enterococcus faecium, Enterococcus mundtii, Lactococcus, Streptococcus mitis/oralis group, Streptococcus parasanguinis, Streptococcus constellatus  Stpahylococcus xylosus, Granulicatella, Vagococcus fluvialis, Gemella morbillorum, Veillonella parvula group, Pseudoescherichia vulneris, Klebsiella oxytoca,  E. Coli,  Enterobacter cloacae (among the several specimens sent on 10/17).    - S/p new debridement on 10/30 and per report describes small amount of purulence and devitalized tissue which was debrided. Intra-op cultures were sent and are thus far growing S aureus and GAS.     # Polysubstance use disorder    # Bipolar disorder    Discussion:  Queen Brittany Stack is a 32 year old female w/ PMHx of polysubstance use, Bipolar Disorder Type I, Asthma, and recent [place] [place] for left 3rd, 4th, and 5th digit infection s/p I&D (10/17/2022) with presumable op culture positive for numerous organisms: MRSA, Enterococcus faecium, Enterococcus mundtii, Lactococcus, Streptococcus mitis/oralis group, Streptococcus parasanguinis, Streptococcus constellatus  Stpahylococcus xylosus, Granulicatella, Vagococcus fluvialis, Gemella morbillorum, Veillonella parvula group, Pseudoescherichia vulneris, Klebsiella oxytoca,  E. Coli,  Enterobacter cloacae (among the several specimens sent on 10/17). Blood culture from 10/16 positive for Staphylococcus epidermidis in one isolated  bottle (anaerobic bottle) - likely cntaminant. Repeat blood cultures from 10/17 were negative. He was admitted to [facility] [place] and treated w/ IV Unasyn and Vancomycin. She left AMA on 10/18, with prescription of PO doxycycline sent to pharmacy.  She did not take the oral antibiotic. She was then admitted to [facility] on 10/29/2022 for left hand pain.     On arrival she was afebrile and had white count of 11.9. CRP was 96, ESR was 27. Lactic acid was normal. She was started on cefepime and vancomycin on 10/29/22. She underwent new I&D today by ortho team and op report describes small amount of purulence and devitalized tissue which was debrided. Post operative diagnosis was Left middle, ring, and small finger proximal interphalangeal joint septic arthritis and left ring finger pyogenic flexor tenosynovitis. Intra-op cultures were sent and are in process.     Dr. Rivas called [facility] micro lab and they informed her that they only performed susceptibilities for the MRSA and not for the other numerous organisms. They already discarded the specimen from 10/17.        Recommendations:    1. Continue daptomycin, cefepime, and metronidazole unchanged pending repeat intraoperative cultures.   2. If patient leaves AMA, please provide Augmentin 875/125 mg PO BID and Bactrim 1 DS tab PO BID x 14 days. If considerable concern that she may leave AMA without much warning, consider having these available at bedside.   3. Anticipate narrowing antibiotics to IV Unasyn and PO Bactrim tomorrow (11/2) if no new pathogens identified. Will then tentatively plan for discharge on oral antibiotics when ready for discharge from ortho perspective.    ID will continue to follow.     Brenda Dodd MD  Infectious Diseases  Pager [phone]          Interval History:   Cultures with MRSA and GAS to date (still in progress). Plan for dressing change by ortho tomorrow.  reports that she has taken some microbiology classes and was curious about the genus  and species of bacteria found on her most recent cultures. We discussed these.        History of Present Illness:   Queen Brittany Stack is a 32 year old female w/ PMHx of polysubstance use, Bipolar Disorder Type I, Asthma, and recent ED visit ([facility] on 10/17) for left 3rd, 4th, and 5th digit infection s/p I&D (10/17/2022) with presumable op culture positive for numerous organisms: MRSA, Enterococcus faecium, Enterococcus mundtii, Lactococcus, Streptococcus mitis/oralis group, Streptococcus parasanguinis, Streptococcus constellatus  Stpahylococcus xylosus, Granulicatella, Vagococcus fluvialis, Gemella morbillorum, Veillonella parvula group, Pseudoescherichia vulneris, Klebsiella oxytoca,  E. Coli,  Enterobacter cloacae (among the several specimens sent on 10/17). Blood culture from 10/16 positive for Staphylococcus epidermidis in one isolated bottle (anaerobic bottle) - likely cntaminant. Repeat blood cultures from 10/17 were negative. He was admitted to [facility] and treated w/ IV Unasyn and Vancomycin. She left AMA on 10/18, with prescription of PO doxycycline sent to pharmacy.  She did not take the oral antibiotic. She was then admitted to [facility] on 10/29/2022 for left hand pain.     On arrival she was afebrile and had white count of 11.9. CRP was 96, ESR was 27. Lactic acid was normal. She was started on cefepime and vancomycin on 10/29/22. She underwent new I&D today by ortho team and op report describes small amount of purulence and devitalized tissue which was debrided. Post operative diagnosis was Left middle, ring, and small finger proximal interphalangeal joint septic arthritis and Left ring finger pyogenic flexor tenosynovitis. Intra-op cultures were sent and are in process.     Dr. Rivas called [facility] micro lab on 10/30/22 and they informed her that they only performed susceptibilities for the MRSA and not for the other numerous organisms. They already discarded the specimen from 10/17.              "Review of Systems:   4 point ROS including Respiratory, CV, GI and , other than that noted in the HPI,  is negative        Allergies:      Allergies   Allergen Reactions     Bees Anaphylaxis     Morphine Hives     Other reaction(s): Hives       Oxycodone Hives and Itching     Pork Derived Products Other (See Comments)     Hoahaoism reasons            Physical Exam:   /61 (BP Location: Right arm)   Pulse 91   Temp (!) 96.6  F (35.9  C) (Oral)   Resp 16   Ht 1.702 m (5' 7\")   Wt 52.1 kg (114 lb 14.4 oz)   SpO2 100%   BMI 18.00 kg/m       Exam:  GENERAL:  Well-developed, well-nourished, sitting in bed in no acute distress.   ENT:  Head is normocephalic, atraumatic. Anterior oropharynx without ulcers.  EYES:  Eyes have anicteric sclerae.    NECK:  Supple.  LUNGS:  Normal respiratory effort.   ABDOMEN:  Non-distended.   EXT: Extremities without visible edema. Left hand with dressing in place - ortho managing dressings so not removed at this time.   SKIN:  No acute rashes.  Line is in place without any surrounding erythema.  NEUROLOGIC:  Grossly nonfocal.            Laboratory Data:     Creatinine   Date Value Ref Range Status   11/01/2022 0.55 0.52 - 1.04 mg/dL Final   10/31/2022 0.59 0.52 - 1.04 mg/dL Final   10/30/2022 0.66 0.52 - 1.04 mg/dL Final   10/29/2022 0.69 0.52 - 1.04 mg/dL Final   10/29/2022 0.66 0.52 - 1.04 mg/dL Final   04/14/2021 0.79 0.52 - 1.04 mg/dL Final   11/20/2019 0.82 0.52 - 1.04 mg/dL Final   05/30/2019 1.13 (H) 0.52 - 1.04 mg/dL Final   10/23/2012 0.60 0.52 - 1.04 mg/dL Final   07/10/2012 0.60 0.52 - 1.04 mg/dL Final     WBC   Date Value Ref Range Status   05/03/2021 7.5 4.0 - 11.0 10e9/L Final   04/16/2021 11.0 4.0 - 11.0 10e9/L Final   04/14/2021 8.9 4.0 - 11.0 10e9/L Final   04/14/2021 Canceled, Test credited 4.0 - 11.0 10e9/L Final     Comment:     Unsatisfactory specimen - clotted  NOTIFIED TO CELESTE THOMAS RN ER ON 4/14/2021 AT 1205 BY ANM     11/20/2019 3.9 (L) 4.0 - 11.0 " 10e9/L Final     WBC Count   Date Value Ref Range Status   11/01/2022 6.8 4.0 - 11.0 10e3/uL Final   10/31/2022 8.4 4.0 - 11.0 10e3/uL Final   10/30/2022 9.1 4.0 - 11.0 10e3/uL Final   10/29/2022 11.9 (H) 4.0 - 11.0 10e3/uL Final   07/20/2022 5.6 4.0 - 11.0 10e3/uL Final     Hemoglobin   Date Value Ref Range Status   11/01/2022 11.8 11.7 - 15.7 g/dL Final   05/03/2021 11.4 (L) 11.7 - 15.7 g/dL Final     Platelet Count   Date Value Ref Range Status   11/01/2022 402 150 - 450 10e3/uL Final   05/03/2021 340 150 - 450 10e9/L Final     Lab Results   Component Value Date     11/01/2022    BUN 11 11/01/2022    CO2 29 11/01/2022     CRP Inflammation   Date Value Ref Range Status   10/31/2022 76.0 (H) 0.0 - 8.0 mg/L Final   10/29/2022 96.0 (H) 0.0 - 8.0 mg/L Final                 Imaging:     Recent Results (from the past 48 hour(s))   XR Hand Left G/E 3 Views    Narrative    EXAM: XR HAND LEFT G/E 3 VIEWS  LOCATION: [facility]  DATE/TIME: 10/29/2022 1:26 PM    INDICATION: hand pain, infection  COMPARISON: None.      Impression    IMPRESSION: There is diffuse soft tissue swelling over the hand. No acute fracture is seen. No periosteal reaction or osseous erosion to suggest osteomyelitis. If there is persistent concern, MRI would be more sensitive.

## 2022-11-01 NOTE — DISCHARGE SUMMARY
[facility]  Discharge Summary - Medicine & Pediatrics       Date of Admission:  10/29/2022  Date of Discharge:  11/2/2022 11:42 PM  Discharging Provider: Li Rodriguez MD  Discharge Service: [facility]    Discharge Diagnoses      Cellulitis of left hand  Tenosynovitis of hand  Pyogenic arthritis of left hand, due to staph aureus  Pyogenic arthritis of left hand, due to strep pyogenes    Follow-ups Needed After Discharge      Patient left AMA. She was able to be counseled prior to leaving hospital on risks of leaving and on return precautions. She was sent with PO antibiotics to take for 2 weeks, discussed below.    Patient will return to ED if she has concerns with pain and worsening infection.     The orthopedics team will attempt to reach patient to advise follow up.     No specific labs are needed at follow up unless there is concern for worsening local infection or bacteremia.    Unresulted Labs Ordered in the Past 30 Days of this Admission       Date and Time Order Name Status Description    10/30/2022 10:04 AM Tissue Aerobic Bacterial Culture Routine Preliminary     10/30/2022 10:04 AM Anaerobic Bacterial Culture Routine Preliminary     10/30/2022 10:04 AM Anaerobic Bacterial Culture Routine Preliminary     10/29/2022 12:55 PM Blood Culture Arm, Right Preliminary         These results will be followed up by [facility].    Discharge Disposition   Patient left AMA 11/2 PM.   Condition at discharge: Guarded    Hospital Course   Queen Brittany Stack with PMHx of polysubstance use, Bipolar Disorder Type I, asthma, and recent ED visit for cellulitis of the left 3rd, 4th, and 5th digit, wound culture positive for MDRO s/p I&D (10/17/2022) who was admitted on 10/29/2022 for left hand cellulitis. The following problems were addressed during her hospitalization:    # Left 3rd, 4th, and 5th digits PIP joint septic arthritis   # Left ring finger  pyogenic flexor tenosynovitis   # Recent hospitalization for left hand cellulitis s/p I&D  # Leukocytosis, resolved  # HTN and Tachycardia, resolved  Recent admission visit at [facility] on 10/15 for left 3rd, 4th, and 5th digit cellulitis, wound culture at [facility] positive for multiple organism including MRSA (vanc sensitive), Enterococus, Enterobacter and Klebsiella. C/f further antibiotic resistance, and s/p I&D (10/17/2022). Treated inpt w/ IV Unasyn and Vancomycin. She left AMA, PO doxycycline sent to pharmacy, but was not taking.     On this admission, patient with elevated CRP and WBC, but lactate wnl on admission. I&D with ortho 10/30. Wound cultures collected in OR notable for staphylococcus aureus and streptococcus pyogenes (Group A Strep). Blood cultures NGTD. Given polymicrobial growth at [facility] admission, ID consulted for guidance regarding antibiotic coverage. Treated with cefepime, daptomycin, and metronidazole initially. Reporting improvement clinically and normalization of white count. Patient left AMA before antibiotics were narrowed. She was followed by the orthopedics team throughout her admission. She was discharged with a backup PO antibiotic regimen per ID:   - Augmentin 875/125mg PO BID   - Bactrim 1 DS tab PO BID x 14 days    She was counseled extensively on importance of adhering to PO antibiotics and taking them through the full 14 days. She was counseled on risks of leaving AMA and not taking antibiotics. She expressed understanding and reflected reasons she would return including worsened pain, infection, or other gross changes.     Cultures:  - 10/29 Blood culture: NGTD  - 10/30 Tissue aerobic: Staph Aureus, and Strep Pyogenes (s ampicillin, penicillin, vancomycin and cephalosporins)   - 10/30 Tissue anaerobic: NGTD  - 10/30 Anaerobic culture: NGTD  - 10/30 Aerobic culture: NGTD     Antibiotics:  - IV cefepime 2g BID (10/29 - 11/2)  - IV daptomycin 500 mg Q24hr (10/30 - 11/2)  - IV metronidazole  500 mg PO TID (10/30 - 11/2)  - discontinued IV vancomycin 1.25g BID (10/29-10/30)    #Polysubstance use disorder  #Urine tox screen Cocaine positive  Pt reported cocaine use within past 24 hours PTA and UDS positive for cocaine in ED. She denies any other drug or alcohol use and denies history of IVDU. Denies withdrawal symptoms this admission.  Tox screens over past year notable for positive cocaine and cannabinoid only. She was hypertensive and tachycardic, now resolved.  No withdrawal protocol were initiated during this admission.     Chronic  # Chronically unhoused  Patient confirmed she is currently living in a shelter. Patient did engage with SW during admission.     # Bipolar Disorder Type I  Notes that she is not taking any PTA medications risperidone or trazodone. Recommend following up with a primary care provider to consider optimal treatment options.      # Tobacco Use  Declined nicotine replacement during this admission.     # Mild intermittent Asthma  Not experiencing symptoms at this time. Not on PTA medication. Stable.       Consultations This Hospital Stay   PHARMACY TO DOSE VANC  ORTHOPAEDIC SURGERY ADULT/PEDS IP CONSULT  INFECTIOUS DISEASE [facility] ADULT IP CONSULT  PHARMACY TO DOSE VANCO  SOCIAL WORK IP CONSULT    Code Status   Prior       The patient was discussed with Dr. Rodriguez.    Rina Daugherty MD  [facility] SURG ORTHOPEDIC  [address] [place] [id number]  Phone: [phone]  Fax: [phone]  ______________________________________________________________________    Physical Exam   Vital Signs:                    Weight: 114 lbs 14.4 oz    See vitals from 11/2 in flow sheets.     Physical Exam  Constitutional:       General: She is not in acute distress.     Appearance: She is not toxic-appearing or diaphoretic.      Comments: Appears tired   Eyes:      Conjunctiva/sclera: Conjunctivae normal.   Cardiovascular:      Rate and  Rhythm: Normal rate.   Pulmonary:      Effort: Pulmonary effort is normal.   Skin:     General: Skin is warm and dry.      Comments: No progression of infection beyond LUE cast. L hand not examined.   Neurological:      Mental Status: She is alert.   Psychiatric:         Mood and Affect: Affect is blunt.       Primary Care Physician   Physician No Ref-Primary    Discharge Orders   No discharge procedures on file.    Significant Results and Procedures   Most Recent 3 CBC's:  Recent Labs   Lab Test 11/02/22  0857 11/01/22  0609 10/31/22  0601   WBC 5.4 6.8 8.4   HGB 11.9 11.8 11.3*   MCV 88 88 89    402 389     Most Recent 3 BMP's:  Recent Labs   Lab Test 11/02/22  0857 11/01/22  0609 10/31/22  0601    138 141   POTASSIUM 4.3 4.0 3.8   CHLORIDE 107 106 111*   CO2 28 29 27   BUN 16 11 8   CR 0.58  0.60 0.55 0.59   ANIONGAP 3 3 3   TAZ 8.7 8.4* 8.2*   GLC 89 86 99     7-Day Micro Results       Collected Updated Procedure Result Status      10/30/2022 1003 11/02/2022 1220 Anaerobic Bacterial Culture Routine [[id number]]    (Abnormal)   Tissue from Hand, Left    Preliminary result Component Value   Culture No anaerobic organisms isolated after 2 days  [P]     Isolated in broth only Staphylococcus aureus  [P]     On day 2 of incubationNot isolated or reported on routine aerobic cultureSusceptibilities done on previous cultures               10/30/2022 1003 11/02/2022 0748 Tissue Aerobic Bacterial Culture Routine [[id number]]    Tissue from Hand, Left    Preliminary result Component Value   Culture No growth after 2 days  [P]                10/30/2022 0952 10/31/2022 1732 Anaerobic Bacterial Culture Routine [[id number]]    Tissue from Hand, Left    Preliminary result Component Value   Culture No anaerobic organisms isolated after 1 day  [P]                10/30/2022 0952 11/03/2022 1519 Tissue Aerobic Bacterial Culture Routine [[id number]]     (Abnormal)   Tissue from Hand, Left    Final result  Component Value   Culture 1+ Staphylococcus aureus MRSA    1+ Streptococcus pyogenes (Group A Streptococcus)    This organism is susceptible to ampicillin, penicillin, vancomycin and the cephalosporins. If treatment is required and your patient is allergic to penicillin, contact the microbiology lab within 5 days to request susceptibility testing.        Susceptibility        Staphylococcus aureus MRSA      RODRICK      Clindamycin <=0.25 ug/mL Susceptible      Erythromycin <=0.25 ug/mL Susceptible      Gentamicin <=0.5 ug/mL Susceptible      Linezolid 1 ug/mL Susceptible      Oxacillin >=4 ug/mL Resistant  [1]      Tetracycline <=1 ug/mL Susceptible      Trimethoprim/Sulfamethoxazole <=0.5/9.5 ug/mL Susceptible      Vancomycin 1 ug/mL Susceptible                   [1]  Oxacillin susceptible isolates are susceptible to cephalosporins (example: cefazolin and cephalexin) and beta lactam combination agents. Oxacillin resistant isolates are resistant to these agents.              Susceptibility Comments       Staphylococcus aureus MRSA    MRSA requires contact precautions.               10/29/2022 1501 10/29/2022 1604 Asymptomatic COVID-19 Virus (Coronavirus) by PCR Nasopharyngeal [[id number]]    Swab from Nasopharyngeal    Final result Component Value   SARS CoV2 PCR Negative   NEGATIVE: SARS-CoV-2 (COVID-19) RNA not detected, presumed negative.            10/29/2022 1348 11/02/2022 1746 Blood Culture Arm, Right [[id number]]   Blood from Arm, Right    Preliminary result Component Value   Culture No growth after 4 days  [P]                    ,   R  Most Recent ESR & CRP:  Recent Labs   Lab Test 11/02/22  0857 10/31/22  0601 10/29/22  1348   SED  --   --  27*   CRP 58.0*   < > 96.0*    < > = values in this interval not displayed.   esults for orders placed or performed during the hospital encounter of 10/29/22   XR Hand Left G/E 3 Views    Narrative    EXAM: XR HAND LEFT G/E 3 VIEWS  LOCATION: [facility]  DATE/TIME: 10/29/2022 1:26 PM    INDICATION: hand pain, infection  COMPARISON: None.      Impression    IMPRESSION: There is diffuse soft tissue swelling over the hand. No acute fracture is seen. No periosteal reaction or osseous erosion to suggest osteomyelitis. If there is persistent concern, MRI would be more sensitive.   Echocardiogram Complete     Value    LVEF  60-65%    Narrative    [id number]  [id number]  [id number]  [id number]^ASHTYN^PHI     [facility],[place]  Echocardiography Laboratory  [address] [place] [id number]     Name: QUEEN ARMIN CUENCA  MRN: [id number]  : 1990  Study Date: 10/31/2022 09:10 AM  Age: 32 yrs  Gender: Female  Patient Location: [facility]  Reason For Study: Endocarditis  Ordering Physician: PHI CHAMORRO  Performed By: Lisa Urrutia     BSA: 1.7 m2  Height: 67 in  Weight: 134 lb  HR: 91  BP: 143/92 mmHg  ______________________________________________________________________________  Procedure  Complete Portable Echo Adult. Good quality two-dimensional was performed and  interpreted.  ______________________________________________________________________________  Interpretation Summary  No vegetation or mass identified. Global and regional left ventricular  function is normal with an EF of 60-65%.  Right ventricular function, chamber size, wall motion, and thickness are  normal.  No significant valvular abnormalities present.  There is no prior study for direct comparison.  ______________________________________________________________________________  Left Ventricle  Global and regional left ventricular function is normal with an EF of 60-65%.  Left ventricular wall thickness is normal. Left ventricular size is normal.  Left ventricular diastolic function is normal.     Right Ventricle  Right ventricular function, chamber size, wall motion, and thickness are  normal.     Atria  Mild left atrial enlargement  is present. Mild right atrial enlargement is  present. The atrial septum is intact as assessed by color Doppler .     Mitral Valve  The mitral valve is normal. Mild mitral insufficiency is present.     Aortic Valve  The aortic valve is tricuspid. Mild aortic insufficiency is present.     Tricuspid Valve  The tricuspid valve is normal. Trace tricuspid insufficiency is present.     Pulmonic Valve  On Doppler interrogation, there is no significant stenosis or regurgitation.  The valve leaflets are not well visualized.     Vessels  The aorta root is normal. The pulmonary artery and bifurcation cannot be  assessed. IVC diameter <2.1 cm collapsing >50% with sniff suggests a normal RA  pressure of 3 mmHg.     Pericardium  No pericardial effusion is present.     Miscellaneous  No significant valvular abnormalities present.     Compared to Previous Study  There is no prior study for direct comparison.     Attestation  I have personally viewed the imaging and agree with the interpretation and  report as documented by the fellow, Jaren Kingston, and/or edited by me.  ______________________________________________________________________________  MMode/2D Measurements & Calculations  IVSd: 0.91 cm  LVIDd: 4.8 cm  LVIDs: 2.8 cm  LVPWd: 0.85 cm  FS: 41.6 %  LV mass(C)d: 145.8 grams  LV mass(C)dI: 85.5 grams/m2  Ao root diam: 2.9 cm  LA dimension: 3.0 cm  LA/Ao: 1.1  LVOT diam: 2.0 cm  LVOT area: 3.0 cm2  LA Volume (BP): 64.0 ml  RWT: 0.35     Doppler Measurements & Calculations  MV E max garrick: 109.6 cm/sec  MV A max garrick: 76.0 cm/sec  MV E/A: 1.4  MV dec time: 0.21 sec  Ao V2 max: 176.2 cm/sec  Ao max P.0 mmHg  RAFA(V,D): 1.5 cm2  LV V1 max P.9 mmHg  LV V1 max: 85.4 cm/sec  TV V2 max: 239.5 cm/sec  TV max P.0 mmHg  PA acc time: 0.15 sec  TR max garrick: 195.5 cm/sec  TR max PG: 15.3 mmHg  AV Garrick Ratio (DI): 0.48  E/E' av.7  Lateral E/e': 8.3  Medial E/e': 9.1      ______________________________________________________________________________  Report approved by: Ravinder Ching 10/31/2022 01:21 PM               Discharge Medications   Discharge Medication List as of 11/2/2022 11:42 PM        START taking these medications    Details   amoxicillin-clavulanate (AUGMENTIN) 875-125 MG tablet Take 1 tablet by mouth 2 times daily for 14 days, Disp-28 tablet, R-0, E-Prescribe      sulfamethoxazole-trimethoprim (BACTRIM DS) 800-160 MG tablet Take 1 tablet by mouth 2 times daily for 14 days, Disp-28 tablet, R-0, E-Prescribe           CONTINUE these medications which have NOT CHANGED    Details   albuterol (PROAIR HFA/PROVENTIL HFA/VENTOLIN HFA) 108 (90 Base) MCG/ACT inhaler Inhale 2 puffs into the lungs every 6 hours as needed for shortness of breath / dyspnea or wheezing, Historical      hydrOXYzine (ATARAX) 25 MG tablet Take 1 tablet (25 mg) by mouth every 4 hours as needed for anxiety, Disp-60 tablet, R-1, E-Prescribe      medroxyPROGESTERone (DEPO-PROVERA) 150 MG/ML IM injection Inject 1 mL (150 mg) into the muscle every 3 months Due 7/26/2021., No Print Out      risperiDONE (RISPERDAL) 1 MG tablet Take 1 tablet (1 mg) by mouth At Bedtime, Disp-30 tablet, R-1, E-Prescribe           Allergies   Allergies   Allergen Reactions    Bees Anaphylaxis    Morphine Hives     Other reaction(s): Hives      Oxycodone Hives and Itching    Pork Derived Products Other (See Comments)     Jain reasons

## 2022-11-02 VITALS
SYSTOLIC BLOOD PRESSURE: 117 MMHG | WEIGHT: 114.9 LBS | TEMPERATURE: 97.7 F | HEART RATE: 83 BPM | RESPIRATION RATE: 16 BRPM | OXYGEN SATURATION: 96 % | DIASTOLIC BLOOD PRESSURE: 75 MMHG | BODY MASS INDEX: 18.03 KG/M2 | HEIGHT: 67 IN

## 2022-11-02 LAB
ALBUMIN SERPL-MCNC: 2.5 G/DL (ref 3.4–5)
ALP SERPL-CCNC: 60 U/L (ref 40–150)
ALT SERPL W P-5'-P-CCNC: 28 U/L (ref 0–50)
ANION GAP SERPL CALCULATED.3IONS-SCNC: 3 MMOL/L (ref 3–14)
AST SERPL W P-5'-P-CCNC: 16 U/L (ref 0–45)
BILIRUB SERPL-MCNC: 0.2 MG/DL (ref 0.2–1.3)
BUN SERPL-MCNC: 16 MG/DL (ref 7–30)
CALCIUM SERPL-MCNC: 8.7 MG/DL (ref 8.5–10.1)
CHLORIDE BLD-SCNC: 107 MMOL/L (ref 94–109)
CO2 SERPL-SCNC: 28 MMOL/L (ref 20–32)
CREAT SERPL-MCNC: 0.58 MG/DL (ref 0.52–1.04)
CREAT SERPL-MCNC: 0.6 MG/DL (ref 0.52–1.04)
CRP SERPL-MCNC: 58 MG/L (ref 0–8)
ERYTHROCYTE [DISTWIDTH] IN BLOOD BY AUTOMATED COUNT: 13.3 % (ref 10–15)
GFR SERPL CREATININE-BSD FRML MDRD: >90 ML/MIN/1.73M2
GFR SERPL CREATININE-BSD FRML MDRD: >90 ML/MIN/1.73M2
GLUCOSE BLD-MCNC: 89 MG/DL (ref 70–99)
HCT VFR BLD AUTO: 35.7 % (ref 35–47)
HGB BLD-MCNC: 11.9 G/DL (ref 11.7–15.7)
MCH RBC QN AUTO: 29.2 PG (ref 26.5–33)
MCHC RBC AUTO-ENTMCNC: 33.3 G/DL (ref 31.5–36.5)
MCV RBC AUTO: 88 FL (ref 78–100)
PLATELET # BLD AUTO: 441 10E3/UL (ref 150–450)
POTASSIUM BLD-SCNC: 4.3 MMOL/L (ref 3.4–5.3)
PROT SERPL-MCNC: 6.6 G/DL (ref 6.8–8.8)
RBC # BLD AUTO: 4.08 10E6/UL (ref 3.8–5.2)
SODIUM SERPL-SCNC: 138 MMOL/L (ref 133–144)
WBC # BLD AUTO: 5.4 10E3/UL (ref 4–11)

## 2022-11-02 PROCEDURE — 250N000013 HC RX MED GY IP 250 OP 250 PS 637: Performed by: INTERNAL MEDICINE

## 2022-11-02 PROCEDURE — 250N000013 HC RX MED GY IP 250 OP 250 PS 637: Performed by: STUDENT IN AN ORGANIZED HEALTH CARE EDUCATION/TRAINING PROGRAM

## 2022-11-02 PROCEDURE — 80053 COMPREHEN METABOLIC PANEL: CPT

## 2022-11-02 PROCEDURE — 36415 COLL VENOUS BLD VENIPUNCTURE: CPT

## 2022-11-02 PROCEDURE — 82040 ASSAY OF SERUM ALBUMIN: CPT

## 2022-11-02 PROCEDURE — 86140 C-REACTIVE PROTEIN: CPT

## 2022-11-02 PROCEDURE — 250N000013 HC RX MED GY IP 250 OP 250 PS 637

## 2022-11-02 PROCEDURE — 85027 COMPLETE CBC AUTOMATED: CPT

## 2022-11-02 PROCEDURE — 99238 HOSP IP/OBS DSCHRG MGMT 30/<: CPT | Mod: GC | Performed by: FAMILY MEDICINE

## 2022-11-02 PROCEDURE — 250N000011 HC RX IP 250 OP 636

## 2022-11-02 RX ORDER — SENNOSIDES 8.6 MG
8.6 TABLET ORAL DAILY
Status: DISCONTINUED | OUTPATIENT
Start: 2022-11-02 | End: 2022-11-03 | Stop reason: HOSPADM

## 2022-11-02 RX ADMIN — HYDROMORPHONE HYDROCHLORIDE 4 MG: 2 TABLET ORAL at 03:56

## 2022-11-02 RX ADMIN — METRONIDAZOLE 500 MG: 500 TABLET ORAL at 08:59

## 2022-11-02 RX ADMIN — METRONIDAZOLE 500 MG: 500 TABLET ORAL at 00:33

## 2022-11-02 RX ADMIN — CEFEPIME HYDROCHLORIDE 2 G: 2 INJECTION, POWDER, FOR SOLUTION INTRAVENOUS at 13:28

## 2022-11-02 RX ADMIN — SENNOSIDES 8.6 MG: 8.6 TABLET, FILM COATED ORAL at 12:00

## 2022-11-02 RX ADMIN — ACETAMINOPHEN 975 MG: 325 TABLET, FILM COATED ORAL at 12:00

## 2022-11-02 RX ADMIN — IBUPROFEN 800 MG: 600 TABLET ORAL at 08:59

## 2022-11-02 RX ADMIN — IBUPROFEN 800 MG: 600 TABLET ORAL at 13:28

## 2022-11-02 RX ADMIN — ACETAMINOPHEN 975 MG: 325 TABLET, FILM COATED ORAL at 03:56

## 2022-11-02 RX ADMIN — CEFEPIME HYDROCHLORIDE 2 G: 2 INJECTION, POWDER, FOR SOLUTION INTRAVENOUS at 00:56

## 2022-11-02 ASSESSMENT — ACTIVITIES OF DAILY LIVING (ADL)
ADLS_ACUITY_SCORE: 22
ADLS_ACUITY_SCORE: 20
ADLS_ACUITY_SCORE: 20
ADLS_ACUITY_SCORE: 22
ADLS_ACUITY_SCORE: 20
ADLS_ACUITY_SCORE: 20
ADLS_ACUITY_SCORE: 22
ADLS_ACUITY_SCORE: 20
ADLS_ACUITY_SCORE: 26
ADLS_ACUITY_SCORE: 20
ADLS_ACUITY_SCORE: 22
ADLS_ACUITY_SCORE: 22

## 2022-11-02 NOTE — PROGRESS NOTES
"Orthopaedic Surgery Progress Note   October 31, 2022    Subjective: No acute events ON. Pain controlled. Drowsy and but interactive    Afebrile ON, VSS     Objective: /89 (BP Location: Right arm, Patient Position: Supine)   Pulse 78   Temp 97  F (36.1  C) (Oral)   Resp 15   Ht 1.702 m (5' 7\")   Wt 52.1 kg (114 lb 14.4 oz)   SpO2 95%   BMI 18.00 kg/m      General: NAD, alert and oriented, cooperative with exam.   Cardio: RRR, extremities wwp.   Respiratory: Non-labored breathing.  MSK:   LUE: dressing c/d/i, removed with improved appearance of volar hand. PIP joints with some mucinous/early granulation tissue     Labs:  Hemoglobin   Date Value Ref Range Status   11/01/2022 11.8 11.7 - 15.7 g/dL Final   05/03/2021 11.4 (L) 11.7 - 15.7 g/dL Final   ]  All cultures:  1x intraop + Staph aureus + strep pyogenes     Assessment and Plan: Queen Brittany Stack is a 32 year old female left hand infection now s/p I&D on 10/30/22 with Dr. Davis.      Continue to monitor wound and continue IV Abx      - Continue broad spectrum antibiotics, if patient leaves AMA discharge with PO antibiotics   - Pain control   - Rec ID consult (ordered)   - Keep splint c/d/i   - Packing to be removed tomorrow on rounds   - Follow cultures   - Follow-up TBD     Antonette Mccartney MD  Orthopaedic Surgery Resident, PGY-4  Pager: [phone]    For questions about this patient during the day, please attempt to CONTACT ME at my pager ([phone]) prior to contacting the Orthopaedic Surgery resident on call. Thank you!     "

## 2022-11-02 NOTE — PROGRESS NOTES
Brief Note    Patient wanting to leave hospital AMA this afternoon around 1430. Had discussion about risks of leaving. Patient found to have capacity. Discussed ways to make her feel more comfortable here so she didn't need to leave. Discussed walking around hospital and going outside for fresh air. She liked this idea, wanted to come back either tonight or tomorrow for continued care. Counseled on timing that would allow her to return to her room/our team (<12 hours), and timing that would force her to be readmitted through ED (>12 hours). She expressed understanding.     Given her substance use history and history of leaving AMA, there is high concern she will not return. Because of this, she was given her home meds including PO abx (per ID recs) to take with her and her PIV was removed. She did sign AMA paperwork. Discussed with ID and ortho, requested ortho arrange outpatient follow up with patient if she does not return.    If she presents later than 0300 11/3, she will need to be readmitted through ED.      Rina Daugherty MD  [facility], PGY-2

## 2022-11-02 NOTE — PROGRESS NOTES
[facility]    Progress Note - [facility] Medicine Service       Date of Admission:  10/29/2022    Main Plans for Today   - Anticipate narrowing abx today, per ID  - Schedule senna given PRN PO hydromorhpone  - Discontinue IV hydromorphone       Assessment & Plan   Queen Brittany tSack is a 32 year old female w/ PMHx of polysubstance use, Bipolar Disorder Type I, Asthma, and recent ED visit for left 3rd, 4th, and 5th digit cellulitis, wound culture positive for MDRO s/p I&D (10/17/2022). Admitted 10/29/2022 for left hand cellulitis.    # Left 3rd, 4th, and 5th digits PIP joint septic arthritis   # Left ring finger pyogenic flexor tenosynovitis   # Recent hospitalization for left hand cellulitis s/p I&D  # Leukocytosis, resolved  # HTN and Tachycardia, resolved  Recent ED visit for left 3rd, 4th, and 5th digit cellulitis with wound culture positive for MRSA (vanc sensitive), Enterococus, Enterobacter, and Klebsiella, s/p I&D on 10/17. Treated inpt w/ IV Unasyn and Vancomycin. She left AMA, PO doxycycline sent to pharmacy, but was not taking. On this admission, patient with elevated CRP and WBC, but lactate wnl on admission. S/p I&D with ortho 10/30. Wound cultures collected in OR. Leukocytosis resolved, CRP downtrending. Blood cultures NGTD. Antibiotics adjusted per ID recs for better microbial coverage.     - Ortho consult, appreciate recs   - Packing to be removed 11/3 per ortho   - Transitioned to short arm cast with fingers in extension  - Infectious disease consult, appreciate recs   - Continue IV abx  - Daily cbc and cmp  - q48hr crp    Cultures:  - 10/29 Blood culture: NGTD  - 10/30 Tissue aerobic: MRSA, and Strep Pyogenes (s ampicillin, penicillin, vancomycin and cephalosporins)   - 10/30 Tissue anaerobic: NGTD  - 10/30 Anaerobic culture: NGTD  - 10/30 Aerobic culture: NGTD     Antibiotics:  - IV cefepime 2g BID (10/29- )  - IV daptomycin 500 mg Q24hr  (10/30- )  - IV metronidazole 500 mg PO TID (10/30- )  - discontinued IV vancomycin 1.25g BID (10/29-10/30)    Plan B: ID recommends prepare PO antibiotics if considerable concern for leave AMA  - Augmentin 875/125mg PO BID   - Bactrim 1 DS tab PO BID x 14 days       Pain:  - Tylenol 1000mg PO TID  - Ibuprofen 800 mg PO TID  - Dilaudid 2-4 mg PO q4h PRN  - DISCONTINUE Dilaudid 0.2-0.4 mg IV BID PRN     #Polysubstance use disorder  #Urine tox screen Cocaine positive  Pt reported cocaine use within past 24 hours PTA and UDS positive for cocaine in ED. She denies any other drug or alcohol use and denies history of IVDU. Denies withdrawal symptoms this admission. Hypertensive and tachycardic resolved.   - Monitor for new onset symptoms   - Follow-up with PCP    Chronic/Resolved    # Chronically unhoused  Patient confirmed she is currently living in a shelter.  - SW consult placed for housing resources, appreciate assistance      # Bipolar Disorder Type I  Notes that she is not taking any PTA medication x 2. Will continue to evaluate.   - Follow-up w/ PCP  - Recommend starting PTA risperidone and trazodone if patient agreeable      # Tobacco Use  Declines nicotine replacement for now     # Mild intermittent Asthma  Not experiencing symptoms at this time. Not on PTA medication. Stable.       Diet: Regular Diet Adult    DVT Prophylaxis: Pneumatic Compression Devices (PADUA 3)  Sims Catheter: Not present  Fluids: PO  Central Lines: None  Cardiac Monitoring: None  Code Status: Full Code      Disposition Plan      Expected Discharge Date: 11/16/2022    Discharge Delays: Other (Add Comment)  Destination: home  Discharge Comments: pending safe antibiotic regimen. Ideally would be on IV abx at respite facility        The patient's care was discussed with the Attending Physician, Dr. Rodriguez .      Rina Daugherty MD  [facility]  [facility]  Securely message  with the Help[employer] Web Console (learn more here)  Text page via [facility] Paging/Directory   Please see signed in provider for up to date coverage information      Clinically Significant Risk Factors              # Hypoalbuminemia: Lowest albumin = 2.2 g/dL (Ref range: 3.5-5.2) at 10/31/2022  6:01 AM, will monitor as appropriate                    ________________________________________________________________    Interval History   No acute overnight events.     Feeling tired this morning. No concerns this AM. No change in pain, not feeling feverish.     Keeps room very warm -- says she likes it that way.    Data reviewed today: I reviewed all medications, new labs and imaging results over the last 24 hours. I personally reviewed no images or EKG's today.    Physical Exam   Vital Signs: Temp: 97  F (36.1  C) Temp src: Oral BP: 100/89 Pulse: 78   Resp: 15 SpO2: 95 % O2 Device: None (Room air)    Weight: 114 lbs 14.4 oz    Physical Exam  Constitutional:       General: She is not in acute distress.     Appearance: She is not toxic-appearing or diaphoretic.      Comments: Appears tired   Eyes:      Conjunctiva/sclera: Conjunctivae normal.   Cardiovascular:      Rate and Rhythm: Normal rate.   Pulmonary:      Effort: Pulmonary effort is normal.   Skin:     General: Skin is warm and dry.      Comments: No progression of infection beyond LUE cast. L hand not examined.   Neurological:      Mental Status: She is alert.   Psychiatric:         Mood and Affect: Affect is blunt.         Data   No results found for this or any previous visit (from the past 24 hour(s)).  No results found for this or any previous visit (from the past 24 hour(s)).  Medications      acetaminophen  975 mg Oral Q8H    ceFEPIme  2 g Intravenous Q12H    DAPTOmycin (CUBICIN) intermittent infusion  8 mg/kg Intravenous Q24H    ibuprofen  800 mg Oral TID    metroNIDAZOLE  500 mg Oral Q8H    sodium chloride (PF)  3 mL Intracatheter Q8H

## 2022-11-03 LAB
BACTERIA BLD CULT: NO GROWTH
BACTERIA TISS BX CULT: ABNORMAL
BACTERIA TISS BX CULT: ABNORMAL

## 2022-11-03 NOTE — PROGRESS NOTES
11/2/2022   Brief ID Note    Patient left the unit this afternoon and signed AMA paperwork. She did take 2 weeks of Bactrim and Augmentin with her. This should provide good coverage for her GAS and MRSA isolated during 10/30 surgery as well as for most of the organisms isolated [place] [facility] [place] Ideally, we would likely treat her for a total of 4 weeks and check labs to ensure normal K on Bactrim, etc during the course. But if she completes the 2 weeks she took with her there is a good chance her infection will be fully treated.  If she returns, please re-consult ID.     Brenda Dodd MD  Infectious Diseases  Pager [phone]

## 2022-11-04 ENCOUNTER — TELEPHONE (OUTPATIENT)
Dept: ORTHOPEDICS | Facility: CLINIC | Age: 32
End: 2022-11-04

## 2022-11-04 LAB — BACTERIA TISS BX CULT: ABNORMAL

## 2022-11-04 NOTE — TELEPHONE ENCOUNTER
Called and LVM for patient twice instructing patient to schedule follow-up with Lauryn Paz or Dr. Davis today or net week. Left patient the scheduling number to call and get an appointment.    SAULO Mcneil

## 2022-11-04 NOTE — TELEPHONE ENCOUNTER
----- Message from Antonette Mccartney MD sent at 11/3/2022  9:40 PM CDT -----  Regarding: Hand Infection Follow-up  Please schedule follow-up within 5 days with hand team for wound check. Dr. Davis patient.

## 2022-11-06 LAB
BACTERIA TISS BX CULT: ABNORMAL
BACTERIA TISS BX CULT: ABNORMAL
BACTERIA TISS BX CULT: NORMAL

## 2022-11-09 ENCOUNTER — HOSPITAL ENCOUNTER (INPATIENT)
Facility: CLINIC | Age: 32
LOS: 3 days | Discharge: LEFT BEFORE DISCHARGE | End: 2022-11-12
Attending: EMERGENCY MEDICINE | Admitting: STUDENT IN AN ORGANIZED HEALTH CARE EDUCATION/TRAINING PROGRAM
Payer: COMMERCIAL

## 2022-11-09 ENCOUNTER — APPOINTMENT (OUTPATIENT)
Dept: GENERAL RADIOLOGY | Facility: CLINIC | Age: 32
End: 2022-11-09
Payer: COMMERCIAL

## 2022-11-09 DIAGNOSIS — M65.90 TENOSYNOVITIS: ICD-10-CM

## 2022-11-09 DIAGNOSIS — Z11.52 ENCOUNTER FOR SCREENING LABORATORY TESTING FOR SEVERE ACUTE RESPIRATORY SYNDROME CORONAVIRUS 2 (SARS-COV-2): ICD-10-CM

## 2022-11-09 LAB
ABO/RH(D): NORMAL
ANION GAP SERPL CALCULATED.3IONS-SCNC: 6 MMOL/L (ref 3–14)
ANTIBODY SCREEN: NEGATIVE
BASOPHILS # BLD AUTO: 0 10E3/UL (ref 0–0.2)
BASOPHILS NFR BLD AUTO: 0 %
BUN SERPL-MCNC: 19 MG/DL (ref 7–30)
CALCIUM SERPL-MCNC: 8.9 MG/DL (ref 8.5–10.1)
CHLORIDE BLD-SCNC: 106 MMOL/L (ref 94–109)
CO2 SERPL-SCNC: 29 MMOL/L (ref 20–32)
CREAT SERPL-MCNC: 0.86 MG/DL (ref 0.52–1.04)
CRP SERPL-MCNC: 4.1 MG/L (ref 0–8)
EOSINOPHIL # BLD AUTO: 0.1 10E3/UL (ref 0–0.7)
EOSINOPHIL NFR BLD AUTO: 1 %
ERYTHROCYTE [DISTWIDTH] IN BLOOD BY AUTOMATED COUNT: 13.4 % (ref 10–15)
ERYTHROCYTE [SEDIMENTATION RATE] IN BLOOD BY WESTERGREN METHOD: 21 MM/HR (ref 0–20)
FLUAV RNA SPEC QL NAA+PROBE: NEGATIVE
FLUBV RNA RESP QL NAA+PROBE: NEGATIVE
GFR SERPL CREATININE-BSD FRML MDRD: >90 ML/MIN/1.73M2
GLUCOSE BLD-MCNC: 101 MG/DL (ref 70–99)
GLUCOSE BLDC GLUCOMTR-MCNC: 89 MG/DL (ref 70–99)
HCT VFR BLD AUTO: 38.7 % (ref 35–47)
HGB BLD-MCNC: 13 G/DL (ref 11.7–15.7)
IMM GRANULOCYTES # BLD: 0 10E3/UL
IMM GRANULOCYTES NFR BLD: 0 %
INR PPP: 0.92 (ref 0.85–1.15)
LYMPHOCYTES # BLD AUTO: 3.8 10E3/UL (ref 0.8–5.3)
LYMPHOCYTES NFR BLD AUTO: 56 %
MCH RBC QN AUTO: 29.4 PG (ref 26.5–33)
MCHC RBC AUTO-ENTMCNC: 33.6 G/DL (ref 31.5–36.5)
MCV RBC AUTO: 88 FL (ref 78–100)
MONOCYTES # BLD AUTO: 0.6 10E3/UL (ref 0–1.3)
MONOCYTES NFR BLD AUTO: 9 %
NEUTROPHILS # BLD AUTO: 2.3 10E3/UL (ref 1.6–8.3)
NEUTROPHILS NFR BLD AUTO: 34 %
NRBC # BLD AUTO: 0 10E3/UL
NRBC BLD AUTO-RTO: 0 /100
PLATELET # BLD AUTO: 385 10E3/UL (ref 150–450)
POTASSIUM BLD-SCNC: 4.2 MMOL/L (ref 3.4–5.3)
RBC # BLD AUTO: 4.42 10E6/UL (ref 3.8–5.2)
RSV RNA SPEC NAA+PROBE: NEGATIVE
SARS-COV-2 RNA RESP QL NAA+PROBE: NEGATIVE
SODIUM SERPL-SCNC: 141 MMOL/L (ref 133–144)
SPECIMEN EXPIRATION DATE: NORMAL
WBC # BLD AUTO: 6.8 10E3/UL (ref 4–11)

## 2022-11-09 PROCEDURE — 87637 SARSCOV2&INF A&B&RSV AMP PRB: CPT | Performed by: EMERGENCY MEDICINE

## 2022-11-09 PROCEDURE — 250N000011 HC RX IP 250 OP 636: Performed by: EMERGENCY MEDICINE

## 2022-11-09 PROCEDURE — 86140 C-REACTIVE PROTEIN: CPT | Performed by: EMERGENCY MEDICINE

## 2022-11-09 PROCEDURE — 85652 RBC SED RATE AUTOMATED: CPT | Performed by: EMERGENCY MEDICINE

## 2022-11-09 PROCEDURE — 86850 RBC ANTIBODY SCREEN: CPT

## 2022-11-09 PROCEDURE — 73130 X-RAY EXAM OF HAND: CPT | Mod: LT

## 2022-11-09 PROCEDURE — 99285 EMERGENCY DEPT VISIT HI MDM: CPT | Mod: CS,25 | Performed by: EMERGENCY MEDICINE

## 2022-11-09 PROCEDURE — 85610 PROTHROMBIN TIME: CPT

## 2022-11-09 PROCEDURE — 99285 EMERGENCY DEPT VISIT HI MDM: CPT | Mod: CS | Performed by: EMERGENCY MEDICINE

## 2022-11-09 PROCEDURE — 86901 BLOOD TYPING SEROLOGIC RH(D): CPT

## 2022-11-09 PROCEDURE — 80048 BASIC METABOLIC PNL TOTAL CA: CPT | Performed by: EMERGENCY MEDICINE

## 2022-11-09 PROCEDURE — 36415 COLL VENOUS BLD VENIPUNCTURE: CPT | Performed by: EMERGENCY MEDICINE

## 2022-11-09 PROCEDURE — 85025 COMPLETE CBC W/AUTO DIFF WBC: CPT | Performed by: EMERGENCY MEDICINE

## 2022-11-09 PROCEDURE — C9803 HOPD COVID-19 SPEC COLLECT: HCPCS | Performed by: EMERGENCY MEDICINE

## 2022-11-09 PROCEDURE — 36415 COLL VENOUS BLD VENIPUNCTURE: CPT

## 2022-11-09 PROCEDURE — 120N000002 HC R&B MED SURG/OB UMMC

## 2022-11-09 RX ORDER — VANCOMYCIN HYDROCHLORIDE 1 G/200ML
1000 INJECTION, SOLUTION INTRAVENOUS EVERY 12 HOURS
Status: DISCONTINUED | OUTPATIENT
Start: 2022-11-10 | End: 2022-11-09

## 2022-11-09 RX ORDER — VANCOMYCIN HYDROCHLORIDE 1 G/200ML
1000 INJECTION, SOLUTION INTRAVENOUS ONCE
Status: COMPLETED | OUTPATIENT
Start: 2022-11-09 | End: 2022-11-10

## 2022-11-09 RX ADMIN — CEFEPIME HYDROCHLORIDE 2 G: 2 INJECTION, POWDER, FOR SOLUTION INTRAVENOUS at 22:23

## 2022-11-09 ASSESSMENT — ACTIVITIES OF DAILY LIVING (ADL)
ADLS_ACUITY_SCORE: 37

## 2022-11-09 NOTE — ED TRIAGE NOTES
Brought in by EMS from a [facility] on [address]; pt was there getting pregnancy test and laid on the ground there telling staff she couldn't move d/t body pain. Pt c/o of full body aches and left hand pain reports having a couple more days of her abx left over but noting increased swelling or pain.  Hx of IVDA.  VSS for EMS     /77        84 pulse

## 2022-11-09 NOTE — ED PROVIDER NOTES
"    [facility] EMERGENCY DEPARTMENT ([facility])    11/09/22        History     Chief Complaint   Patient presents with     Hand Pain     Had recent hand surgery and reports generalized hand pain. Sent home with abx after surgery and reports taking 3-4 days of meds but then \"ran out.'     HPI      presents via EMS from a free clinic reporting increased hand pain and whole body pain.  She reports that she lost her antibiotics since not been taking them regularly.  Remainder of the HPI is limited due to her altered mental status.      Queen Brittany Stack is a 32 year old female with past medical history significant for polysubstance use (cocaine & tobacco), asthma, homelessness, herpes, and bipolar disorder type 1 who presents to the ED for hand pain. Per chart review, patient had recent hospitalizations [place] [facility] [place] for cellulitis of the left 3rd, 4th and 5th digit and a wound culture positive for MDRO s/p I&D (10/17/2022 at [facility] [place] Patient was also admitted at [facility] (10/29-11/2/22) under similar circumstances for cellulitis and pyogenic arthritis of left hand due to Staph aureus and Strep pyogenes. Of note, patient left AMA before antibiotics were narrowed and was discharged with Augmentin and Bactrim 1.      [facility] Records reviewed.     Cultures:  - 10/29 Blood culture: NGTD  - 10/30 Tissue aerobic: Staph Aureus, and Strep Pyogenes (s ampicillin, penicillin, vancomycin and cephalosporins)   - 10/30 Tissue anaerobic: NGTD  - 10/30 Anaerobic culture: NGTD  - 10/30 Aerobic culture: NGTD    XR HAND LEFT G/E 3 VIEWS, [facility] (10/29/2022 )  IMPRESSION: There is diffuse soft tissue swelling over the hand. No acute fracture is seen. No periosteal reaction or osseous erosion to suggest osteomyelitis. If there is persistent concern, MRI would be more sensitive.    Past Medical History  Past Medical History:   Diagnosis Date     Abnormal Pap smear     2010, f/u " normal     Anxiety      Asthma     no ihaler use     Bipolar 1 disorder (H)      Bipolar affective (H)      Fainting spell      Herpes simplex without mention of complication     Pt reports last outbreak about 4 weeks ago     Major depressive disorder      Pelvic inflammatory disease      Polysubstance abuse (H)      Past Surgical History:   Procedure Laterality Date     DILATION AND CURETTAGE SUCTION N/A 4/23/2021    Procedure: DILATION AND CURETTAGE, UTERUS, USING SUCTION;  Surgeon: Lanie Yates MD;  Location: [facility] OR     IRRIGATION AND DEBRIDEMENT HAND, COMBINED Left 10/30/2022    Procedure: IRRIGATION AND DEBRIDEMENT, LEFT HAND;  Surgeon: Anshul Davis MD;  Location: [facility] OR     NO HISTORY OF SURGERY       albuterol (PROAIR HFA/PROVENTIL HFA/VENTOLIN HFA) 108 (90 Base) MCG/ACT inhaler  amoxicillin-clavulanate (AUGMENTIN) 875-125 MG tablet  hydrOXYzine (ATARAX) 25 MG tablet  medroxyPROGESTERone (DEPO-PROVERA) 150 MG/ML IM injection  risperiDONE (RISPERDAL) 1 MG tablet  sulfamethoxazole-trimethoprim (BACTRIM DS) 800-160 MG tablet      Allergies   Allergen Reactions     Bees Anaphylaxis     Morphine Hives     Other reaction(s): Hives       Oxycodone Hives and Itching     Pork Derived Products Other (See Comments)     Tenriism reasons     Family History  Family History   Problem Relation Age of Onset     Unknown/Adopted Mother      Unknown/Adopted Father      Unknown/Adopted Maternal Grandmother      Unknown/Adopted Maternal Grandfather      Unknown/Adopted Paternal Grandmother      Unknown/Adopted Paternal Grandfather      Social History   Social History     Tobacco Use     Smoking status: Every Day     Packs/day: 0.50     Years: 8.00     Pack years: 4.00     Types: Cigarettes     Smokeless tobacco: Never   Substance Use Topics     Alcohol use: Yes     Comment: last drank yesterday, 2 shots     Drug use: Yes     Types: Cocaine     Comment: cocaine last used yesterday      Past medical history, past surgical  "history, medications, allergies, family history, and social history were reviewed with the patient. No additional pertinent items.       Review of Systems  A complete review of systems was attempted though due to patient's altered mental status was not able to obtain a full HPI.    Physical Exam   BP: 124/79  Pulse: 91  Temp: 98.1  F (36.7  C)  Resp: 16  Height: 167.6 cm (5' 6\")  Weight: 61 kg (134 lb 6.4 oz)  SpO2: 98 %  Physical Exam  General: Somnolent.  Arouses to verbal stimulation but then falls back to sleep with minimal answering of questions.  Head: normal cephalic  HEENT: pupils equal, conjugate gaze intact  Neck: Supple  CV: regular rate and rhythm without murmur  Lungs: clear to auscultation  Abd: soft, non-tender, no guarding, no peritoneal signs  EXT: Patient's left hand with postsurgical changes noted, sutures still intact.  There is swelling, warmth, and fluctuance noted around most of the surgical incisions.  No drainage.    Neuro: awake, answers questions appropriately. No focal deficits noted       ED Course      Procedures       The medical record was reviewed and interpreted.  Current labs reviewed and interpreted.  Previous labs reviewed and interpreted.              Results for orders placed or performed during the hospital encounter of 11/09/22   XR Hand Left G/E 3 Views     Status: None    Narrative    EXAM: XR HAND LEFT G/E 3 VIEWS  LOCATION: [facility]  DATE/TIME: 11/9/2022 10:13 PM    INDICATION: pain   infectious  COMPARISON: 10/29/2022      Impression    IMPRESSION: No visible fracture, dislocation or destructive change. Slight chronic cortical irregularity of the fourth distal phalanx. Soft tissue edema. If there is persistent concern for osteomyelitis then MRI would be more sensitive.   Basic metabolic panel     Status: Abnormal   Result Value Ref Range    Sodium 141 133 - 144 mmol/L    Potassium 4.2 3.4 - 5.3 mmol/L    Chloride 106 94 - " 109 mmol/L    Carbon Dioxide (CO2) 29 20 - 32 mmol/L    Anion Gap 6 3 - 14 mmol/L    Urea Nitrogen 19 7 - 30 mg/dL    Creatinine 0.86 0.52 - 1.04 mg/dL    Calcium 8.9 8.5 - 10.1 mg/dL    Glucose 101 (H) 70 - 99 mg/dL    GFR Estimate >90 >60 mL/min/1.73m2   CRP inflammation     Status: Normal   Result Value Ref Range    CRP Inflammation 4.1 0.0 - 8.0 mg/L   Erythrocyte sedimentation rate auto     Status: Abnormal   Result Value Ref Range    Erythrocyte Sedimentation Rate 21 (H) 0 - 20 mm/hr   Glucose by meter     Status: Normal   Result Value Ref Range    GLUCOSE BY METER POCT 89 70 - 99 mg/dL   CBC with platelets and differential     Status: None   Result Value Ref Range    WBC Count 6.8 4.0 - 11.0 10e3/uL    RBC Count 4.42 3.80 - 5.20 10e6/uL    Hemoglobin 13.0 11.7 - 15.7 g/dL    Hematocrit 38.7 35.0 - 47.0 %    MCV 88 78 - 100 fL    MCH 29.4 26.5 - 33.0 pg    MCHC 33.6 31.5 - 36.5 g/dL    RDW 13.4 10.0 - 15.0 %    Platelet Count 385 150 - 450 10e3/uL    % Neutrophils 34 %    % Lymphocytes 56 %    % Monocytes 9 %    % Eosinophils 1 %    % Basophils 0 %    % Immature Granulocytes 0 %    NRBCs per 100 WBC 0 <1 /100    Absolute Neutrophils 2.3 1.6 - 8.3 10e3/uL    Absolute Lymphocytes 3.8 0.8 - 5.3 10e3/uL    Absolute Monocytes 0.6 0.0 - 1.3 10e3/uL    Absolute Eosinophils 0.1 0.0 - 0.7 10e3/uL    Absolute Basophils 0.0 0.0 - 0.2 10e3/uL    Absolute Immature Granulocytes 0.0 <=0.4 10e3/uL    Absolute NRBCs 0.0 10e3/uL   Symptomatic; Unknown Influenza A/B & SARS-CoV2 (COVID-19) Virus PCR Multiplex Nasopharyngeal     Status: Normal    Specimen: Nasopharyngeal; Swab   Result Value Ref Range    Influenza A PCR Negative Negative    Influenza B PCR Negative Negative    RSV PCR Negative Negative    SARS CoV2 PCR Negative Negative    Narrative    Testing was performed using the Xpert Xpress CoV2/Flu/RSV Assay on the Cepheid GeneXpert Instrument. This test should be ordered for the detection of SARS-CoV-2 and influenza  viruses in individuals who meet clinical and/or epidemiological criteria. Test performance is unknown in asymptomatic patients. This test is for in vitro diagnostic use under the FDA EUA for laboratories certified under CLIA to perform high or moderate complexity testing. This test has not been FDA cleared or approved. A negative result does not rule out the presence of PCR inhibitors in the specimen or target RNA in concentration below the limit of detection for the assay. If only one viral target is positive but coinfection with multiple targets is suspected, the sample should be re-tested with another FDA cleared, approved, or authorized test, if coinfection would change clinical management. This test was validated by the [facility]. These laboratories are certified under the Clinical Laboratory Improvement Amendments of 1988 (CLIA-88) as qualified to perform high complexity laboratory testing.   INR     Status: Normal   Result Value Ref Range    INR 0.92 0.85 - 1.15   Adult Type and Screen     Status: None   Result Value Ref Range    ABO/RH(D) A POS     Antibody Screen Negative Negative    SPECIMEN EXPIRATION DATE [id number]    CBC with platelets differential     Status: None    Narrative    The following orders were created for panel order CBC with platelets differential.  Procedure                               Abnormality         Status                     ---------                               -----------         ------                     CBC with platelets and d...[[id number]]                      Final result                 Please view results for these tests on the individual orders.   ABO/Rh type and screen     Status: None    Narrative    The following orders were created for panel order ABO/Rh type and screen.  Procedure                               Abnormality         Status                     ---------                               -----------         ------                 "     Adult Type and Screen[[id number]]                            Edited Result - FINAL        Please view results for these tests on the individual orders.     Medications   vancomycin (VANCOCIN) 1000 mg in dextrose 5% 200 mL PREMIX (1,000 mg Intravenous New Bag 11/10/22 0015)   ceFEPIme (MAXIPIME) 2 g vial to attach to  ml bag for ADULTS or 50 ml bag for PEDS (0 g Intravenous Stopped 11/9/22 1176)        Assessments & Plan (with Medical Decision Making)    is a 32-year-old female with past medical history significant for polysubstance abuse and recent hospitalization for tenosynovitis and leaving AMA who presents from  clinic complaining of worsening hand pain.    On exam she has normal vital signs, is sleeping and is arousable to voice but does not provide any significant history.  Point-of-care blood glucose was 108.  Given her history of substance abuse suspect some type of intoxication as contributing to her altered mental status but would also consider things such as a sepsis.  She does not have any adjuvants of trauma on exam.    Initial evaluation include laboratory studies and serial neurologic exams to see if patient clears clinically.    Patient does become more arousable throughout her ER visit.  She endorses complete hand pain and body aches.  Patient's laboratory studies are notable for normal white count, no left shift, is significantly improved sed rate and a normal CRP.    On repeat exam patient is more clear, alert.  She notes that she has had increased hand pain starting today.  She reports she lost her antibiotics over a week ago.  When asked about whole body pain she states that started \"when we started the normal saline IV\".  She denies any other complaints or concerns at this time.    Orthopedics came and evaluated the patient.  They recommended starting broad-spectrum antibiotics, n.p.o. at midnight and admit to medicine.  Orthopedics also requested an x-ray; preliminary reads with " concerning for early osteomyelitis.    I have reviewed the nursing notes. I have reviewed the findings, diagnosis, plan and need for follow up with the patient.    New Prescriptions    No medications on file       Final diagnoses:   Tenosynovitis       --  Timbo SANTO [facility] EMERGENCY DEPARTMENT  11/9/2022     Timbo Templeton MD  11/10/22 0019

## 2022-11-10 PROCEDURE — 120N000002 HC R&B MED SURG/OB UMMC

## 2022-11-10 PROCEDURE — 250N000011 HC RX IP 250 OP 636: Performed by: STUDENT IN AN ORGANIZED HEALTH CARE EDUCATION/TRAINING PROGRAM

## 2022-11-10 PROCEDURE — 99221 1ST HOSP IP/OBS SF/LOW 40: CPT | Mod: AI | Performed by: STUDENT IN AN ORGANIZED HEALTH CARE EDUCATION/TRAINING PROGRAM

## 2022-11-10 PROCEDURE — 250N000013 HC RX MED GY IP 250 OP 250 PS 637: Performed by: STUDENT IN AN ORGANIZED HEALTH CARE EDUCATION/TRAINING PROGRAM

## 2022-11-10 PROCEDURE — 250N000011 HC RX IP 250 OP 636: Performed by: EMERGENCY MEDICINE

## 2022-11-10 PROCEDURE — 250N000011 HC RX IP 250 OP 636: Performed by: INTERNAL MEDICINE

## 2022-11-10 PROCEDURE — 99223 1ST HOSP IP/OBS HIGH 75: CPT | Performed by: INTERNAL MEDICINE

## 2022-11-10 RX ORDER — IBUPROFEN 600 MG/1
600 TABLET, FILM COATED ORAL EVERY 6 HOURS PRN
Status: DISCONTINUED | OUTPATIENT
Start: 2022-11-10 | End: 2022-11-12 | Stop reason: HOSPADM

## 2022-11-10 RX ORDER — CEFTRIAXONE 2 G/1
2 INJECTION, POWDER, FOR SOLUTION INTRAMUSCULAR; INTRAVENOUS EVERY 24 HOURS
Status: DISCONTINUED | OUTPATIENT
Start: 2022-11-10 | End: 2022-11-12 | Stop reason: HOSPADM

## 2022-11-10 RX ORDER — ONDANSETRON 2 MG/ML
4 INJECTION INTRAMUSCULAR; INTRAVENOUS EVERY 6 HOURS PRN
Status: DISCONTINUED | OUTPATIENT
Start: 2022-11-10 | End: 2022-11-12 | Stop reason: HOSPADM

## 2022-11-10 RX ORDER — VANCOMYCIN HYDROCHLORIDE 1 G/200ML
1000 INJECTION, SOLUTION INTRAVENOUS EVERY 12 HOURS
Status: DISCONTINUED | OUTPATIENT
Start: 2022-11-10 | End: 2022-11-11

## 2022-11-10 RX ORDER — LIDOCAINE 40 MG/G
CREAM TOPICAL
Status: DISCONTINUED | OUTPATIENT
Start: 2022-11-10 | End: 2022-11-12 | Stop reason: HOSPADM

## 2022-11-10 RX ORDER — ACETAMINOPHEN 325 MG/1
975 TABLET ORAL EVERY 8 HOURS
Status: DISCONTINUED | OUTPATIENT
Start: 2022-11-10 | End: 2022-11-12 | Stop reason: HOSPADM

## 2022-11-10 RX ORDER — HYDROXYZINE HYDROCHLORIDE 25 MG/1
25 TABLET, FILM COATED ORAL EVERY 4 HOURS PRN
Status: DISCONTINUED | OUTPATIENT
Start: 2022-11-10 | End: 2022-11-12 | Stop reason: HOSPADM

## 2022-11-10 RX ORDER — ONDANSETRON 4 MG/1
4 TABLET, ORALLY DISINTEGRATING ORAL EVERY 6 HOURS PRN
Status: DISCONTINUED | OUTPATIENT
Start: 2022-11-10 | End: 2022-11-12 | Stop reason: HOSPADM

## 2022-11-10 RX ORDER — RISPERIDONE 1 MG/1
1 TABLET ORAL AT BEDTIME
Status: DISCONTINUED | OUTPATIENT
Start: 2022-11-10 | End: 2022-11-12 | Stop reason: HOSPADM

## 2022-11-10 RX ORDER — ALBUTEROL SULFATE 90 UG/1
2 AEROSOL, METERED RESPIRATORY (INHALATION) EVERY 6 HOURS PRN
Status: DISCONTINUED | OUTPATIENT
Start: 2022-11-10 | End: 2022-11-12 | Stop reason: HOSPADM

## 2022-11-10 RX ADMIN — RISPERIDONE 1 MG: 1 TABLET ORAL at 22:41

## 2022-11-10 RX ADMIN — ACETAMINOPHEN 975 MG: 325 TABLET, FILM COATED ORAL at 04:06

## 2022-11-10 RX ADMIN — VANCOMYCIN HYDROCHLORIDE 1000 MG: 1 INJECTION, SOLUTION INTRAVENOUS at 13:27

## 2022-11-10 RX ADMIN — RISPERIDONE 1 MG: 1 TABLET ORAL at 04:07

## 2022-11-10 RX ADMIN — CEFEPIME 2 G: 2 INJECTION, POWDER, FOR SOLUTION INTRAVENOUS at 11:05

## 2022-11-10 RX ADMIN — VANCOMYCIN HYDROCHLORIDE 1000 MG: 1 INJECTION, SOLUTION INTRAVENOUS at 00:15

## 2022-11-10 RX ADMIN — ACETAMINOPHEN 975 MG: 325 TABLET, FILM COATED ORAL at 20:00

## 2022-11-10 RX ADMIN — CEFTRIAXONE SODIUM 2 G: 2 INJECTION, POWDER, FOR SOLUTION INTRAMUSCULAR; INTRAVENOUS at 17:38

## 2022-11-10 ASSESSMENT — ACTIVITIES OF DAILY LIVING (ADL)
ADLS_ACUITY_SCORE: 29
WALKING_OR_CLIMBING_STAIRS_DIFFICULTY: NO
ADLS_ACUITY_SCORE: 37
WEAR_GLASSES_OR_BLIND: NO
ADLS_ACUITY_SCORE: 29
HEARING_DIFFICULTY_OR_DEAF: NO
CONCENTRATING,_REMEMBERING_OR_MAKING_DECISIONS_DIFFICULTY: OTHER (SEE COMMENTS)
TOILETING_ISSUES: NO
ADLS_ACUITY_SCORE: 37
ADLS_ACUITY_SCORE: 29
DIFFICULTY_EATING/SWALLOWING: OTHER (SEE COMMENTS)
ADLS_ACUITY_SCORE: 29
WEAR_GLASSES_OR_BLIND: NO
HEARING_DIFFICULTY_OR_DEAF: NO
ADLS_ACUITY_SCORE: 29
ADLS_ACUITY_SCORE: 29

## 2022-11-10 NOTE — PROGRESS NOTES
Orthopedic Surgery Progress Note 11/10/2022    S: Patient re-evaluated this morning. Her exam is really reassuring. Denies any concerns    O:  Temp: 98.5  F (36.9  C) Temp src: Oral BP: 106/70 Pulse: 85   Resp: 18 SpO2: 100 % O2 Device: None (Room air)      Exam:  Gen: alert and oriented, responds to questions appropriately  Resp: non-labored on RA  MSK:  LUE:  - Stiffness at the PIP 3rd,4th fingers, but otherwise no TTP. No erythema. Minimal swelling.  - SILT medial/radial/ulnar/axillary nerves  - Fires EPL, FPL, intrinsics  - Radial pulse 2+, hand wwp      Recent Labs   Lab 11/09/22  1806   WBC 6.8   HGB 13.0      CRP 4.1       Assessment: Queen Brittany Stack is a 32 year old female with concern for left hand infection.    Plan:  - No plan for operative intervention   - Exam reassuring this morning  - Can continue to monitor at this time  - Okay for diet from ortho perspective  - Orthopaedic team will continue to follow pt peripherally. Feel free to reach out to orthopaedic on call with questions.      --  Jake Connors MD  Orthopedic Surgery PGY-2    Tenzin Lynne MD  Orthopaedic Surgery PGY-4

## 2022-11-10 NOTE — PROGRESS NOTES
"  VS: Blood pressure 106/70, pulse 85, temperature 98.5  F (36.9  C), temperature source Oral, resp. rate 18, height 1.676 m (5' 6\"), weight 61 kg (134 lb 6.4 oz), SpO2 100 %.   O2: > 95% RA, denies SOB   Output: Void spontaneously in bathroom, reminded to call to urine sample.   Last BM: unknown   Activity: Stand by assist X 1, call light within reach, refused to answer admission questions   Up for meals? NPO for surgical procedure   Skin: Cracked, bruised ,scab all over left, right, upper lower extremities, back, buttock. Refused full body assessment.   Pain: Managed pain with  schedule tylenol   CMS: Alert and oriented, uncooperative with cares.   Dressing: Left swollen fingers left to air.   Diet: NPO anticipating surgical procedure.   LDA: PIV saline locked right arm, IV pole   Equipment: Personal belongings   Plan: Anticipating surgical procedure on left fingers.   Additional Info: Need urine sample, Remind patient to call staff when having urge to void.       "

## 2022-11-10 NOTE — PROGRESS NOTES
HONEY Pearl reported to writer, Patient arrive the unit from ED at 0230, with her personal belongings mostly clothes and shoe.Patient refused to answer questions.Demanding to eat, writer explained to patient she's on NPO overnight for possible surgical procedure on her left arm. She stated she's hungry and want to eat. Won't answer any more question. Refused full body assessment. Has cracked, Scab and bruised on her back, buttock, upper,lower extremities. Need urine specimen, Reminded to call when needed for void. Uncooperative with cares.

## 2022-11-10 NOTE — H&P
[facility]    History and Physical - Hospitalist Service       Date of Admission:  11/9/2022    Assessment & Plan      Queen Brittany Stack is a 32 year old female with PMHx significant for polysubstance abuse, bipolar disorder and recent I&D for septic tenosynovitis, who presented with worsening hand discomfort, now readmitted for consideration of repeat washout and wound care    #Flexor tenosynovitis  #Possible osteomyelits  Previously partially treated, known MRSA involvement. Films reassuring for absence of pathologic fractures.    - Continue cefepime/vanc  - Orthopedics consulted, will consider re-intervention in AM  - Will consider ID involvement pending ortho plan.   - Scheduled APAP, PRN ibuprofen  - Wound care per ortho  - NPO at midnight    Chronic:   #Housing insecurity: pt most recent living in shelter, SW consult for dispo planning  # Bipolar Disorder Type I: Reportedly has not been taking medications, pt would not confirm on our discussion, PTA risperidone ordered  # Mild intermittent Asthma: PRN albuterol available  # Tobacco Use: Nicotine patch ordered      Diet: NPO per Anesthesia Guidelines for Procedure/Surgery Except for: Meds    DVT Prophylaxis: Pneumatic Compression Devices  Sims Catheter: Not present  Central Lines: None  Cardiac Monitoring: None  Code Status:   Full    Clinically Significant Risk Factors Present on Admission                              Disposition Plan      Expected Discharge Date: 11/13/2022                The patient's care was discussed with the Bedside Nurse.    Danny Alcantara MD  Hospitalist Service  [facility]  Securely message with the [employer] Web Console (learn more here)  Text page via [employer] Paging/Directory         ______________________________________________________________________    Chief Complaint   Hand Pain.     History is obtained from the electronic health  "record.  Hx limited by patient's choice not to engage in history.     History of Present Illness   33 yo female with PMHx significant for polysubstance abuse, bipolar disorder and recent I&D for septic tenosynovitis, presented with increased hand pain and systemic symptoms.  She had initially undergone irrigation debridement of left middle, ring, small finger proximal interphalangeal joint by Dr. Peck at [facility] on 10/17/22, but left the hospital AMA and did not continue antibiotics.  She was subsequently hospitalized at this facility 10/30 for worsening hand pain, and underwent I&D with Dr. Davis on 10/30/22, during which she was found to have flexor tendon synovitis involving the left ring finger, and septic arthritis of the 3rd,4th finger.  Intraoperative cultures were notable for MRSA and Strep pyogenes. The patient again left the hospital prior to completing and adequate course of antibiotics, and reportedly did not complete outpatient PO abx.  Per ED, she presented today acutely intoxicated with some left hand pain.  She also reportedly complained of generalized \"whole body\" pain.     In the ED, she was hemodynamically stable with normal WBC.  She was evaluated by ortho who recommended antibiotics (cefepime/vanc) and admission for consideration of repeat washout an further debridement.      Review of Systems    Review of systems not obtained due to patient factors - lack of cooperation    Past Medical History    I have reviewed this patient's medical history and updated it with pertinent information if needed.   Past Medical History:   Diagnosis Date     Abnormal Pap smear     2010, f/u normal     Anxiety      Asthma     no ihaler use     Bipolar 1 disorder (H)      Bipolar affective (H)      Fainting spell      Herpes simplex without mention of complication     Pt reports last outbreak about 4 weeks ago     Major depressive disorder      Pelvic inflammatory disease      Polysubstance abuse (H)        Past " Surgical History   I have reviewed this patient's surgical history and updated it with pertinent information if needed.  Past Surgical History:   Procedure Laterality Date     DILATION AND CURETTAGE SUCTION N/A 4/23/2021    Procedure: DILATION AND CURETTAGE, UTERUS, USING SUCTION;  Surgeon: Lanie Yates MD;  Location: [facility] OR     IRRIGATION AND DEBRIDEMENT HAND, COMBINED Left 10/30/2022    Procedure: IRRIGATION AND DEBRIDEMENT, LEFT HAND;  Surgeon: Anshul Davis MD;  Location: [facility] OR     NO HISTORY OF SURGERY         Social History   I have reviewed this patient's social history and updated it with pertinent information if needed.  Social History     Tobacco Use     Smoking status: Every Day     Packs/day: 0.50     Years: 8.00     Pack years: 4.00     Types: Cigarettes     Smokeless tobacco: Never   Substance Use Topics     Alcohol use: Yes     Comment: last drank yesterday, 2 shots     Drug use: Yes     Types: Cocaine     Comment: cocaine last used yesterday       Family History   I have reviewed this patient's family history and updated it with pertinent information if needed.  Family History   Problem Relation Age of Onset     Unknown/Adopted Mother      Unknown/Adopted Father      Unknown/Adopted Maternal Grandmother      Unknown/Adopted Maternal Grandfather      Unknown/Adopted Paternal Grandmother      Unknown/Adopted Paternal Grandfather        Prior to Admission Medications   Prior to Admission Medications   Prescriptions Last Dose Informant Patient Reported? Taking?   albuterol (PROAIR HFA/PROVENTIL HFA/VENTOLIN HFA) 108 (90 Base) MCG/ACT inhaler   Yes No   Sig: Inhale 2 puffs into the lungs every 6 hours as needed for shortness of breath / dyspnea or wheezing   amoxicillin-clavulanate (AUGMENTIN) 875-125 MG tablet   No No   Sig: Take 1 tablet by mouth 2 times daily for 14 days   hydrOXYzine (ATARAX) 25 MG tablet   No No   Sig: Take 1 tablet (25 mg) by mouth every 4 hours as needed for anxiety    medroxyPROGESTERone (DEPO-PROVERA) 150 MG/ML IM injection  Self No No   Sig: Inject 1 mL (150 mg) into the muscle every 3 months Due 7/26/2021.   risperiDONE (RISPERDAL) 1 MG tablet   No No   Sig: Take 1 tablet (1 mg) by mouth At Bedtime   sulfamethoxazole-trimethoprim (BACTRIM DS) 800-160 MG tablet   No No   Sig: Take 1 tablet by mouth 2 times daily for 14 days      Facility-Administered Medications: None     Allergies   Allergies   Allergen Reactions     Bees Anaphylaxis     Morphine Hives     Other reaction(s): Hives       Oxycodone Hives and Itching     Pork Derived Products Other (See Comments)     Restorationism reasons       Physical Exam   Vital Signs: Temp: 97.4  F (36.3  C) Temp src: Oral BP: 115/57 Pulse: 91   Resp: 18 SpO2: 99 % O2 Device: None (Room air)    Weight: 134 lbs 6.4 oz    GA: Resting comfortably in bed sleeping  Pulm: Breathing comfortably on RA, CTAB  CV: RRR, no m/r/g  Abd soft, NT/ND  MSK: L hand with stiches in place over 2nd, 3rd, and 4th finger, each with some edema and warmth, skin desquamation, 4th digit with small amount of purulent drainage around stitches  Neuro: Sleepy, unengaged.    Data   Data reviewed today: I reviewed all medications, new labs and imaging results over the last 24 hours. I personally reviewed no images or EKG's today. Pt declined EKG    Recent Labs   Lab 11/09/22  2148 11/09/22  1806 11/09/22  1755   WBC  --  6.8  --    HGB  --  13.0  --    MCV  --  88  --    PLT  --  385  --    INR 0.92  --   --    NA  --  141  --    POTASSIUM  --  4.2  --    CHLORIDE  --  106  --    CO2  --  29  --    BUN  --  19  --    CR  --  0.86  --    ANIONGAP  --  6  --    TAZ  --  8.9  --    GLC  --  101* 89     Recent Results (from the past 24 hour(s))   XR Hand Left G/E 3 Views    Narrative    EXAM: XR HAND LEFT G/E 3 VIEWS  LOCATION: [facility]  DATE/TIME: 11/9/2022 10:13 PM    INDICATION: pain   infectious  COMPARISON: 10/29/2022       Impression    IMPRESSION: No visible fracture, dislocation or destructive change. Slight chronic cortical irregularity of the fourth distal phalanx. Soft tissue edema. If there is persistent concern for osteomyelitis then MRI would be more sensitive.

## 2022-11-10 NOTE — CONSULTS
[facility] Orthopedic Surgery Consultation    Queen Brittany Stack MRN# [id number]   Age: 32 year old YOB: 1990   Date of Admission: 11/9/2022    Reason for consult: Left hand pain   Requesting physician: Timbo Templeton MD   Level of consult: One-time consult to assist in determining a diagnosis and to recommend an appropriate treatment plan          Assessment and Plan:   Assessment:  Queen Brittany Stack is a 32 year old female with cellulitis of the left hand involving the 3rd-5th fingers. Normal CRP and downtrending ESR, but cannot exclude flexor tenosynovitis or septic PIP joints.    Plan:  - Admit to medicine for IV abx and medical clearance.  - Plan for OR: TBD  - Will observe and re-evaluate pt in the morning for definitive surgical plan pending improvement on IV abx.  - Antibiotics: Per primary.  - Weight bearing: WBAT.  - Diet: NPO at midnight.    Discussed with Dr. Maged CARVALHO PGY4. Orthopedic surgery staff is Dr. Brady.    --  Jake Connors MD  Orthopedic Surgery PGY-2  [id number]    Please page me directly via [facility] with any questions/concerns during regular weekday hours before 7pm. If there is no response, if it is a weekend, or if it is during evening hours, then please page the orthopedic surgery resident on call.      Please page me with any questions/concerns during regular weekday hours before 7pm. If there is no response, if it is a weekend, or if it is during evening/night hours, then please page the orthopedic surgery resident on call.         History of Present Illness:   33 yo RHD female with PMHx significant for polysubstance abuse, bipolar disorder who had previously undergone irrigation debridement of left middle, ring, small finger proximal interphalangeal joint by Dr. Peck [place] [facility] [place]. Patient left AMA shortly after and did not continue abx treatment. She underwent I&D with Dr. Davis on 10/30/22 and was found to have FTS involving the left ring finger, septic  arthritis of the 3rd,4th finger. Patient left AMA again shortly after and did not continue oral abx treatment. She presents today intoxicated with some left hand pain. She notes that it has been slightly increasing in pain. Denies numbness or tingling or any other symptoms at this time.      A 10 point review of systems was otherwise negative other than as noted in history above.         Past Medical History:     Past Medical History:   Diagnosis Date     Abnormal Pap smear     2010, f/u normal     Anxiety      Asthma     no ihaler use     Bipolar 1 disorder (H)      Bipolar affective (H)      Fainting spell      Herpes simplex without mention of complication     Pt reports last outbreak about 4 weeks ago     Major depressive disorder      Pelvic inflammatory disease      Polysubstance abuse (H)        Patient denies any personal history of bleeding disorders, clotting disorders, or adverse reactions to anesthesia.         Past Surgical History:     Past Surgical History:   Procedure Laterality Date     DILATION AND CURETTAGE SUCTION N/A 4/23/2021    Procedure: DILATION AND CURETTAGE, UTERUS, USING SUCTION;  Surgeon: Lanie Yates MD;  [facility]     IRRIGATION AND DEBRIDEMENT HAND, COMBINED Left 10/30/2022    Procedure: IRRIGATION AND DEBRIDEMENT, LEFT HAND;  Surgeon: Anshul Davis MD;  Location[facility] OR     NO HISTORY OF SURGERY              Social History:     Social History     Socioeconomic History     Marital status: Single     Spouse name: Not on file     Number of children: Not on file     Years of education: Not on file     Highest education level: Not on file   Occupational History     Not on file   Tobacco Use     Smoking status: Every Day     Packs/day: 0.50     Years: 8.00     Pack years: 4.00     Types: Cigarettes     Smokeless tobacco: Never   Substance and Sexual Activity     Alcohol use: Yes     Comment: last drank yesterday, 2 shots     Drug use: Yes     Types: Cocaine     Comment:  cocaine last used yesterday     Sexual activity: Yes     Partners: Male     Birth control/protection: None     Comment: NONE   Other Topics Concern     Parent/sibling w/ CABG, MI or angioplasty before 65F 55M? Not Asked   Social History Narrative     Not on file     Social Determinants of Health     Financial Resource Strain: Not on file   Food Insecurity: Not on file   Transportation Needs: Not on file   Physical Activity: Not on file   Stress: Not on file   Social Connections: Not on file   Intimate Partner Violence: Not on file   Housing Stability: Not on file             Family History:     Family History   Problem Relation Age of Onset     Unknown/Adopted Mother      Unknown/Adopted Father      Unknown/Adopted Maternal Grandmother      Unknown/Adopted Maternal Grandfather      Unknown/Adopted Paternal Grandmother      Unknown/Adopted Paternal Grandfather        Patient denies known family history of bleeding, clotting, or anesthesia-related complications.            Allergies:     Allergies   Allergen Reactions     Bees Anaphylaxis     Morphine Hives     Other reaction(s): Hives       Oxycodone Hives and Itching     Pork Derived Products Other (See Comments)     Religion reasons             Medications:     Prior to Admission medications    Medication Sig Last Dose Taking? Auth Provider Long Term End Date   albuterol (PROAIR HFA/PROVENTIL HFA/VENTOLIN HFA) 108 (90 Base) MCG/ACT inhaler Inhale 2 puffs into the lungs every 6 hours as needed for shortness of breath / dyspnea or wheezing   Unknown, Entered By History     amoxicillin-clavulanate (AUGMENTIN) 875-125 MG tablet Take 1 tablet by mouth 2 times daily for 14 days   Nancy Porter MD  11/14/22   hydrOXYzine (ATARAX) 25 MG tablet Take 1 tablet (25 mg) by mouth every 4 hours as needed for anxiety   Rigoberto Hicks MD     medroxyPROGESTERone (DEPO-PROVERA) 150 MG/ML IM injection Inject 1 mL (150 mg) into the muscle every 3 months Due 7/26/2021.    "Soo Jackman, VANCE CNP Yes    risperiDONE (RISPERDAL) 1 MG tablet Take 1 tablet (1 mg) by mouth At Bedtime   Rigoberto Hicks MD Yes    sulfamethoxazole-trimethoprim (BACTRIM DS) 800-160 MG tablet Take 1 tablet by mouth 2 times daily for 14 days   Nancy Porter MD  11/14/22     Anticoagulation noted: None           Physical Exam:     Vitals:    11/09/22 1650 11/09/22 2006   BP: 124/79 106/69   Pulse: 91    Resp: 16 16   Temp: 98.1  F (36.7  C) 98.2  F (36.8  C)   TempSrc: Oral Oral   SpO2: 98% 100%   Weight: 61 kg (134 lb 6.4 oz)    Height: 1.676 m (5' 6\")        General: Somnolent but arousable. Slurred speech but responds to questions.  Neuro: EOM grossly intact  Lungs: breathing comfortably on RA  Heart/Cardiovascular: well perfused    Left Upper Extremity:   - No gross deformity, skin intact. Nylon sutures in place. No drainage at all from any of the wounds.  - Some swelling with no palpable fluctuance in the 3-5th fingers. No notable erythema noted.   - Mild tenderness to palpation along the flexor sheath, but this doesn't extend proximal to the MCP joint. No tenderness to palpation over the dorsal aspect  - Minimal pain with passive stretch of the fingers.  - Active ROM preserved but limited due to discomfort  - Motor intact distally with deltoid, FPL, EPL, and IO   - SILT median, ulnar, radial, axillary nerve distributions.  - Radial pulse palpable, fingers warm and well perfused.            Imaging:   All imaging independently reviewed.            Labs:   CBC:  Lab Results   Component Value Date    WBC 6.8 11/09/2022    HGB 13.0 11/09/2022     11/09/2022    INR 1.00 10/29/2022       BMP:  Lab Results   Component Value Date     11/09/2022    POTASSIUM 4.2 11/09/2022    CHLORIDE 106 11/09/2022    CO2 29 11/09/2022    BUN 19 11/09/2022    CR 0.86 11/09/2022    ANIONGAP 6 11/09/2022    TAZ 8.9 11/09/2022     (H) 11/09/2022       Inflammatory Markers:  Lab Results   Component " Value Date    WBC 6.8 11/09/2022    CRP 4.1 11/09/2022    SED 21 (H) 11/09/2022

## 2022-11-10 NOTE — PROGRESS NOTES
[facility]    Medicine Progress Note - Hospitalist Service, GOLD TEAM 17    Date of Admission:  11/9/2022    Assessment & Plan     A: Patient is a 33 y/o woman who has polysubstance abuse, bipolar disorder and mild intermittent asthma. Patient had been admitted to [facility] on 15-Oct-2022 for left hand cellulitis, had I+D on 17-Oct-2022 and left against medical advice on 18-Oct-2022. Patient was prescribed doxycycline but patient reportedly did not take prescribed antibiotics. Wound cultures from this hospital stay were apparently positive for multiple organisms.     Patient next presented to [facility] on 29-Oct-2022 for left 3rd, 4th and 5th digit PIP joint septic arthritis and left 4th digit pyogenic flexor tenosynovitis. Patient underwent I+D on 30-Oct-2022. Patient was initially treated with cefepime, daptomycin and metronidazole. Patient left against medical advice on 02-Nov-2022 before antibiotics could be narrowed. Patient was prescribed a 14 day course of amoxicillin/clavunate and bactrim when she left the hospital; patient reportedly did not complete outpatient antibiotic course. Tissue cultures from 30-Oct-2022 grew MRSA and Streptococcus pyogenes.    P:  1.) Left hand cellulitis; recent hospitalization for left 3rd, 4th and 5th digit PIP joint septic arthritis and left 4th digit pyogenic flexor tenosynovitis where patient left against medical advice: Patient empirically on cefepime and vancomycin. No current plans for surgical intervention per Orthopaedic Surgery. In light of culture results from recent hospital stays, will consult Infectious Disease for antibiotic recommendations.  2.) Mild intermittent asthma, compensated: Patient on albuterol as needed.  3.) Bipolar disorder type I: Patient on risperidone. Patient to f/u as outpatient.  4.) Polysubstance abuse: Monitoring for evidence of withdrawal.      Galindo Eugene MD  Hospitalist Service, GOLD TEAM 17    [facility]  Securely message with the [employer] Web Console (learn more here)  Text page via [facility] Paging/Directory   Please see signed in provider for up to date coverage information      Clinically Significant Risk Factors Present on Admission                              ______________________________________________________________________    Interval History     Patient noted feeling well. Patient noted no pain, no dyspnea, no fever, no chills, no nausea and no vomiting. Patient noted no new problems.    Physical Exam   Vital Signs: Temp: 98.5  F (36.9  C) Temp src: Oral BP: 106/70 Pulse: 85   Resp: 18 SpO2: 100 % O2 Device: None (Room air)    Weight: 134 lbs 6.4 oz     General: Patient comfortable, NAD.  Heart: RRR, S1 S2 w/o murmurs.  Lungs: Breath sounds present. No crackles/wheezes heard.  Abdomen: Soft, nontender.  Extremities: No visible drainage from left hand.    Data

## 2022-11-10 NOTE — ED NOTES
Pt resting in hallway bed, refusing to answer questions, allowed this writer to place PIV but remains silent and not answering questions.  Rolls toward wall and ignores staff.

## 2022-11-10 NOTE — PHARMACY-VANCOMYCIN DOSING SERVICE
Pharmacy Vancomycin Initial Note  Date of Service 2022  Patient's  1990  32 year old, female    Indication: Skin and Soft Tissue Infection; new consult indicate bone and joint infection.    Current estimated CrCl = Estimated Creatinine Clearance: 90.4 mL/min (based on SCr of 0.86 mg/dL).    Creatinine for last 3 days  2022:  6:06 PM Creatinine 0.86 mg/dL    Recent Vancomycin Level(s) for last 3 days  No results found for requested labs within last 72 hours.      Vancomycin IV Administrations (past 72 hours)      No vancomycin orders with administrations in past 72 hours.                Nephrotoxins and other renal medications (From now, onward)    Start     Dose/Rate Route Frequency Ordered Stop    11/10/22 0000  vancomycin (VANCOCIN) 1000 mg in dextrose 5% 200 mL PREMIX         1,000 mg  200 mL/hr over 1 Hours Intravenous EVERY 12 HOURS 22 2349            Contrast Orders - past 72 hours (72h ago, onward)    None          [employer] Prediction of Planned Initial Vancomycin Regimen    Loading dose: N/A  Regimen: 1000 mg IV every 12 hours.  Start time: 00:46 on 11/10/2022  Exposure target: AUC24 (range)400-600 mg/L.hr   AUC24,ss: 543 mg/L.hr  Probability of AUC24 > 400: 81 %  Ctrough,ss: 16.7 mg/L  Probability of Ctrough,ss > 20: 35 %  Probability of nephrotoxicity (Lodise ELSIE ): 12 %        Plan:  1. Start vancomycin  1000 mg IV q12h.   2. Vancomycin monitoring method: AUC  3. Vancomycin therapeutic monitoring goal: 400-600 mg*h/L  4. Pharmacy will check vancomycin levels as appropriate in 1-3 Days.    5. Serum creatinine levels will be ordered daily for the first week of therapy and at least twice weekly for subsequent weeks.      Rashid Velazquez [facility]

## 2022-11-10 NOTE — CONSULTS
General Infectious Disease Service Consultation - [facility] [place]    Patient:  Queen Brittany Stack, Date of birth 1990, Medical record number [id number]  Date of Admission: 11/9/2022  Date of Visit:  11/10/2022  Requesting Provider: Danny Alcantara         Assessment and Recommendations:   Problem List:  # Left middle, ring, and small finger proximal interphalangeal joint septic arthritis and Left ring finger pyogenic flexor tenosynovitis   - S/p I&D (10/17/2022) at [facility] [place] with wound/tissue op culture positive for numerous organisms: MRSA, Enterococcus faecium, Enterococcus mundtii, Lactococcus, Streptococcus mitis/oralis group, Streptococcus parasanguinis, Streptococcus constellatus  Stpahylococcus xylosus, Granulicatella, Vagococcus fluvialis, Gemella morbillorum, Veillonella parvula group, Pseudoescherichia vulneris, Klebsiella oxytoca,  E. Coli,  Enterobacter cloacae (among the several specimens sent on 10/17).     - S/p new debridement on 10/30 and per report describes small amount of purulence and devitalized tissue which was debrided. Intra-op cultures were sent and are in process.      # Polysubstance use disorder     # Bipolar disorder              Discussion:  Queen Brittany Stack is a 32 year old female w/ PMHx of polysubstance use, Bipolar Disorder Type I, Asthma, and recent ED visit ([facility] [place] on 10/17) for left 3rd, 4th, and 5th digit infection s/p I&D (10/17/2022) with presumable op culture positive for numerous organisms: MRSA, Enterococcus faecium, Enterococcus mundtii, Lactococcus, Streptococcus mitis/oralis group, Streptococcus parasanguinis, Streptococcus constellatus  Stpahylococcus xylosus, Granulicatella, Vagococcus fluvialis, Gemella morbillorum, Veillonella parvula group, Pseudoescherichia vulneris, Klebsiella oxytoca,  E. Coli,  Enterobacter cloacae (among the several specimens sent on 10/17). I called [facility] micro lab and they informed me that they only performed susceptibilities for the  MRSA and not for the other numerous organisms. They already discarded the specimen from 10/17. Not clear to me which organisms grew from op cultures and which organisms grew from wound (superficial) cultures.  Blood culture from 10/16 positive for Staphylococcus epidermidis in one isolated bottle (anaerobic bottle) - likely contaminant. Repeat blood cultures from 10/17 were negative. He was admitted to [facility] and treated w/ IV Unasyn and Vancomycin. She left AMA on 10/18, with prescription of PO doxycycline sent to pharmacy.  She did not take the oral antibiotic. She was then re-hospitalized to [facility] on 10/29/2022 for left hand pain. She underwent new I&D on 1/30 by ortho team and op report describes small amount of purulence and devitalized tissue which was debrided. Post operative diagnosis was Left middle, ring, and small finger proximal interphalangeal joint septic arthritis and Left ring finger pyogenic flexor tenosynovitis. Intra-op cultures were sent and are in process.  She was started on daptomycin, cefepime and metronidazole. Op cultures from the I&D from 10/30/22 were positive for MRSA and Streptococcus pyogenes. Unfortunately the patient left AMA again on 11/2/22 and she was given a prescription of oral antibiotics (augmentin and bactrim) for 4 weeks. She did not take the oral antibiotics. Now she presents again to ED on 11/9. Per reports she was acutely intoxicated and reported left hand pain.      On arrival she was afebrile and had white count of 6.8. CRP was 4.1 (normal), ESR was 21. She was started on cefepime and vancomycin on 11/9/22. Renal function is normal. Ortho evaluated the patient and per ortho assessment the exam was reassuring this morning anf they are not planning to perform new I&D. They will continue to follow peripherally. On my evaluation today the left hand looks much improved and is healing well. No drainage. No significant swelling. No drainage.            Recommendations:    1. I will stop cefepime and narrow to ceftriaxone 2 grams IV q 24h    2. Please continue IV vancomycin    3. Given local improvement it is possible that we might be able to shorten the treatment to 2 weeks    Recommendations discussed with medicine    Thank you for this consult. The General ID team will continue to follow this patient. Please feel free to call with any questions.     Arlette Rivas MD  Date of Service: 11/10/22  Pager: [phone]       History of Present Illness:   Queen Brittany Stack is a 32 year old female w/ PMHx of polysubstance use, Bipolar Disorder Type I, Asthma, and [place] [place] for left 3rd, 4th, and 5th digit infection s/p I&D (10/17/2022) with presumable op culture positive for numerous organisms: MRSA, Enterococcus faecium, Enterococcus mundtii, Lactococcus, Streptococcus mitis/oralis group, Streptococcus parasanguinis, Streptococcus constellatus  Stpahylococcus xylosus, Granulicatella, Vagococcus fluvialis, Gemella morbillorum, Veillonella parvula group, Pseudoescherichia vulneris, Klebsiella oxytoca,  E. Coli,  Enterobacter cloacae (among the several specimens sent on 10/17). I called [facility] [place] and they informed me that they only performed susceptibilities for the MRSA and not for the other numerous organisms. They already discarded the specimen from 10/17. Not clear to me which organisms grew from op cultures and which organisms grew from wound (superficial) cultures.  Blood culture from 10/16 positive for Staphylococcus epidermidis in one isolated bottle (anaerobic bottle) - likely contaminant. Repeat blood cultures from 10/17 were negative. He was [place] to [facility] and treated w/ IV Unasyn and Vancomycin. She left AMA on 10/18, with prescription of PO doxycycline sent to pharmacy.  She did not take the oral antibiotic. She was then re-hospitalized to [facility] on 10/29/2022 for left hand pain. She underwent new I&D on 1/30 by  ortho team and op report describes small amount of purulence and devitalized tissue which was debrided. Post operative diagnosis was Left middle, ring, and small finger proximal interphalangeal joint septic arthritis and Left ring finger pyogenic flexor tenosynovitis. Intra-op cultures were sent and are in process.  She was started on daptomycin, cefepime and metronidazole. Op cultures from the I&D from 10/30/22 were positive for MRSA and Streptococcus pyogenes. Unfortunately the patient left AMA again on 11/2/22 and she was given a prescription of oral antibiotics (augmentin and bactrim) for 4 weeks. She did not take the oral antibiotics. Now she presents again to ED on 11/9. Per reports she was acutely intoxicated and reported left hand pain.      On arrival she was afebrile and had white count of 6.8. CRP was 4.1 (normal), ESR was 21. She was started on cefepime and vancomycin on 11/9/22. Renal function is normal. Ortho evaluated the patient and per ortho assessment the exam was reassuring this morning anf they are not planning to perform new I&D. They will continue to follow peripherally.            Review of Systems:     CONSTITUTIONAL:  No fevers or chills  INTEGUMENTARY/SKIN: See HPI  EYES: Negative for icterus, vision changes or irritation  ENT/MOUTH:  Negative for oral lesions and sore throat  RESPIRATORY:  Negative for cough and dyspnea  CARDIOVASCULAR:  Negative for chest pain, palpitations and  shortness of breath  GASTROINTESTINAL:  Negative for abdominal pain, nausea, vomiting, diarrhea and constipation  GENITOURINARY:  Negative for dysuria, hematuria, frequency and urgency  MUSCULOSKELETAL: Negative for joint pain, swelling, motion restriction, negative for musculoskeletal pain  NEURO:  Negative for headache, altered mental status, numbness or weakness  PSYCHIATRIC: See HPI  HEMATOLOGIC/LYMPHATIC: negative for lymphadenopathy or bleeding  ALLERGIC/IMMUNOLOGIC: Negative for allergic reaction    ENDOCRINE: Negative for temperature intolerance, skin/hair changes       Past Medical History:     Past Medical History:   Diagnosis Date     Abnormal Pap smear     2010, f/u normal     Anxiety      Asthma     no ihaler use     Bipolar 1 disorder (H)      Bipolar affective (H)      Fainting spell      Herpes simplex without mention of complication     Pt reports last outbreak about 4 weeks ago     Major depressive disorder      Pelvic inflammatory disease      Polysubstance abuse (H)          Allergies:      Allergies   Allergen Reactions     Bees Anaphylaxis     Morphine Hives     Other reaction(s): Hives       Oxycodone Hives and Itching     Pork Derived Products Other (See Comments)     Temple reasons           Recent Antimicrobials::        Family History:     Family History   Problem Relation Age of Onset     Unknown/Adopted Mother      Unknown/Adopted Father      Unknown/Adopted Maternal Grandmother      Unknown/Adopted Maternal Grandfather      Unknown/Adopted Paternal Grandmother      Unknown/Adopted Paternal Grandfather             Social History:     Social History     Socioeconomic History     Marital status: Single     Spouse name: Not on file     Number of children: Not on file     Years of education: Not on file     Highest education level: Not on file   Occupational History     Not on file   Tobacco Use     Smoking status: Every Day     Packs/day: 0.50     Years: 8.00     Pack years: 4.00     Types: Cigarettes     Smokeless tobacco: Never   Substance and Sexual Activity     Alcohol use: Yes     Comment: last drank yesterday, 2 shots     Drug use: Yes     Types: Cocaine     Comment: cocaine last used yesterday     Sexual activity: Yes     Partners: Male     Birth control/protection: None     Comment: NONE   Other Topics Concern     Parent/sibling w/ CABG, MI or angioplasty before 65F 55M? Not Asked   Social History Narrative     Not on file     Social Determinants of Health     Financial Resource  "Strain: Not on file   Food Insecurity: Not on file   Transportation Needs: Not on file   Physical Activity: Not on file   Stress: Not on file   Social Connections: Not on file   Intimate Partner Violence: Not on file   Housing Stability: Not on file              Physical Exam:   /70 (BP Location: Left arm, Patient Position: Supine, Cuff Size: Adult Regular)   Pulse 85   Temp 98.5  F (36.9  C) (Oral)   Resp 18   Ht 1.676 m (5' 6\")   Wt 61 kg (134 lb 6.4 oz)   LMP  (LMP Unknown)   SpO2 100%   BMI 21.69 kg/m         Exam:  GENERAL:  Not in acute distress.   HEAD: Normocephalic and atraumatic  LUNGS:  Clear to auscultation - no rales, rhonchi or wheezes  CARDIOVASCULAR:  Regular rate and rhythm, normal S1 S2,  no murmur,   ABDOMEN:  Soft, nontender  EXT: Extremities warm and without edema.  MS: No gross musculoskeletal defects noted, no edema  SKIN:   On my evaluation today the left hand looks much improved and is healing well. No drainage. No significant swelling. No drainage.             Laboratory Data:     Creatinine   Date Value Ref Range Status   11/09/2022 0.86 0.52 - 1.04 mg/dL Final   11/02/2022 0.60 0.52 - 1.04 mg/dL Final   11/02/2022 0.58 0.52 - 1.04 mg/dL Final   11/01/2022 0.55 0.52 - 1.04 mg/dL Final   10/31/2022 0.59 0.52 - 1.04 mg/dL Final   04/14/2021 0.79 0.52 - 1.04 mg/dL Final   11/20/2019 0.82 0.52 - 1.04 mg/dL Final   05/30/2019 1.13 (H) 0.52 - 1.04 mg/dL Final   10/23/2012 0.60 0.52 - 1.04 mg/dL Final   07/10/2012 0.60 0.52 - 1.04 mg/dL Final     WBC   Date Value Ref Range Status   05/03/2021 7.5 4.0 - 11.0 10e9/L Final   04/16/2021 11.0 4.0 - 11.0 10e9/L Final   04/14/2021 8.9 4.0 - 11.0 10e9/L Final   04/14/2021 Canceled, Test credited 4.0 - 11.0 10e9/L Final     Comment:     Unsatisfactory specimen - clotted  NOTIFIED TO CELESTE THOMAS RN ER ON 4/14/2021 AT 1205 BY ANM     11/20/2019 3.9 (L) 4.0 - 11.0 10e9/L Final     WBC Count   Date Value Ref Range Status   11/09/2022 6.8 4.0 - " 11.0 10e3/uL Final   11/02/2022 5.4 4.0 - 11.0 10e3/uL Final   11/01/2022 6.8 4.0 - 11.0 10e3/uL Final   10/31/2022 8.4 4.0 - 11.0 10e3/uL Final   10/30/2022 9.1 4.0 - 11.0 10e3/uL Final     Hemoglobin   Date Value Ref Range Status   11/09/2022 13.0 11.7 - 15.7 g/dL Final   05/03/2021 11.4 (L) 11.7 - 15.7 g/dL Final     Platelet Count   Date Value Ref Range Status   11/09/2022 385 150 - 450 10e3/uL Final   05/03/2021 340 150 - 450 10e9/L Final     Lab Results   Component Value Date     11/09/2022    BUN 19 11/09/2022    CO2 29 11/09/2022     CRP Inflammation   Date Value Ref Range Status   11/09/2022 4.1 0.0 - 8.0 mg/L Final   11/02/2022 58.0 (H) 0.0 - 8.0 mg/L Final   10/31/2022 76.0 (H) 0.0 - 8.0 mg/L Final   10/29/2022 96.0 (H) 0.0 - 8.0 mg/L Final           Pertinent Recent Microbiology Data:   No results for input(s): CULT, SDES in the last 168 hours.         Imaging:     Recent Results (from the past 48 hour(s))   XR Hand Left G/E 3 Views    Narrative    EXAM: XR HAND LEFT G/E 3 VIEWS  LOCATION: [facility]  DATE/TIME: 11/9/2022 10:13 PM    INDICATION: pain   infectious  COMPARISON: 10/29/2022      Impression    IMPRESSION: No visible fracture, dislocation or destructive change. Slight chronic cortical irregularity of the fourth distal phalanx. Soft tissue edema. If there is persistent concern for osteomyelitis then MRI would be more sensitive.

## 2022-11-10 NOTE — ED NOTES
"Cussing at staff about being NPO. \"Ya'll are fucking retarded. Stupid bitches, you ruined my life.\" Transported to floor with security.   "

## 2022-11-10 NOTE — PROGRESS NOTES
"Care Management     Length of Stay (days): 1    Expected Discharge Date: 11/13/2022     Concerns to be Addressed:       Patient plan of care discussed at interdisciplinary rounds: Yes    Additional information:  Attempted to meet with  she would like to talk another time.  She reports being too tired and \"just wants to sleep\".    .  Marissa Mckeon RN, BA  Care Coordinator  6 Med Surg  [phone], pager [phone]            "

## 2022-11-10 NOTE — PROGRESS NOTES
"/70 (BP Location: Left arm, Patient Position: Supine, Cuff Size: Adult Regular)   Pulse 85   Temp 98.5  F (36.9  C) (Oral)   Resp 18   Ht 1.676 m (5' 6\")   Wt 61 kg (134 lb 6.4 oz)   LMP  (LMP Unknown)   SpO2 100%   BMI 21.69 kg/m      Alert and oriented X4     Has been up for meals appetite has been good, denies nausea    Voids spontaneously     Denies pain, numbness, and tingling    Fingers of left hand swollen and open to air    Juvenal KINNEY RN 6Med/Surg    "

## 2022-11-11 LAB
ANION GAP SERPL CALCULATED.3IONS-SCNC: 5 MMOL/L (ref 3–14)
BUN SERPL-MCNC: 15 MG/DL (ref 7–30)
CALCIUM SERPL-MCNC: 8.8 MG/DL (ref 8.5–10.1)
CHLORIDE BLD-SCNC: 104 MMOL/L (ref 94–109)
CO2 SERPL-SCNC: 28 MMOL/L (ref 20–32)
CREAT SERPL-MCNC: 0.66 MG/DL (ref 0.52–1.04)
ERYTHROCYTE [DISTWIDTH] IN BLOOD BY AUTOMATED COUNT: 13.6 % (ref 10–15)
GFR SERPL CREATININE-BSD FRML MDRD: >90 ML/MIN/1.73M2
GLUCOSE BLD-MCNC: 84 MG/DL (ref 70–99)
GLUCOSE BLDC GLUCOMTR-MCNC: 128 MG/DL (ref 70–99)
HCT VFR BLD AUTO: 39.7 % (ref 35–47)
HGB BLD-MCNC: 13.5 G/DL (ref 11.7–15.7)
INR PPP: 0.89 (ref 0.85–1.15)
MCH RBC QN AUTO: 29.4 PG (ref 26.5–33)
MCHC RBC AUTO-ENTMCNC: 34 G/DL (ref 31.5–36.5)
MCV RBC AUTO: 87 FL (ref 78–100)
PLATELET # BLD AUTO: 353 10E3/UL (ref 150–450)
POTASSIUM BLD-SCNC: 4.4 MMOL/L (ref 3.4–5.3)
RBC # BLD AUTO: 4.59 10E6/UL (ref 3.8–5.2)
SODIUM SERPL-SCNC: 137 MMOL/L (ref 133–144)
VANCOMYCIN SERPL-MCNC: 8.2 MG/L
WBC # BLD AUTO: 6.4 10E3/UL (ref 4–11)

## 2022-11-11 PROCEDURE — 36415 COLL VENOUS BLD VENIPUNCTURE: CPT | Performed by: STUDENT IN AN ORGANIZED HEALTH CARE EDUCATION/TRAINING PROGRAM

## 2022-11-11 PROCEDURE — 85027 COMPLETE CBC AUTOMATED: CPT | Performed by: STUDENT IN AN ORGANIZED HEALTH CARE EDUCATION/TRAINING PROGRAM

## 2022-11-11 PROCEDURE — 120N000002 HC R&B MED SURG/OB UMMC

## 2022-11-11 PROCEDURE — 80202 ASSAY OF VANCOMYCIN: CPT | Performed by: STUDENT IN AN ORGANIZED HEALTH CARE EDUCATION/TRAINING PROGRAM

## 2022-11-11 PROCEDURE — 250N000011 HC RX IP 250 OP 636: Performed by: STUDENT IN AN ORGANIZED HEALTH CARE EDUCATION/TRAINING PROGRAM

## 2022-11-11 PROCEDURE — 250N000011 HC RX IP 250 OP 636: Performed by: INTERNAL MEDICINE

## 2022-11-11 PROCEDURE — 258N000003 HC RX IP 258 OP 636: Performed by: STUDENT IN AN ORGANIZED HEALTH CARE EDUCATION/TRAINING PROGRAM

## 2022-11-11 PROCEDURE — 82310 ASSAY OF CALCIUM: CPT | Performed by: STUDENT IN AN ORGANIZED HEALTH CARE EDUCATION/TRAINING PROGRAM

## 2022-11-11 PROCEDURE — 99207 PR APP CREDIT; MD BILLING SHARED VISIT: CPT | Performed by: INTERNAL MEDICINE

## 2022-11-11 PROCEDURE — 85610 PROTHROMBIN TIME: CPT | Performed by: STUDENT IN AN ORGANIZED HEALTH CARE EDUCATION/TRAINING PROGRAM

## 2022-11-11 PROCEDURE — 99207 PR NO CHARGE FOLLOW UP PS: CPT | Performed by: INTERNAL MEDICINE

## 2022-11-11 PROCEDURE — 250N000013 HC RX MED GY IP 250 OP 250 PS 637: Performed by: STUDENT IN AN ORGANIZED HEALTH CARE EDUCATION/TRAINING PROGRAM

## 2022-11-11 RX ADMIN — ACETAMINOPHEN 975 MG: 325 TABLET, FILM COATED ORAL at 04:13

## 2022-11-11 RX ADMIN — ACETAMINOPHEN 975 MG: 325 TABLET, FILM COATED ORAL at 19:24

## 2022-11-11 RX ADMIN — RISPERIDONE 1 MG: 1 TABLET ORAL at 22:32

## 2022-11-11 RX ADMIN — VANCOMYCIN HYDROCHLORIDE 1000 MG: 1 INJECTION, SOLUTION INTRAVENOUS at 00:45

## 2022-11-11 RX ADMIN — CEFTRIAXONE SODIUM 2 G: 2 INJECTION, POWDER, FOR SOLUTION INTRAMUSCULAR; INTRAVENOUS at 16:18

## 2022-11-11 RX ADMIN — VANCOMYCIN HYDROCHLORIDE 1500 MG: 10 INJECTION, POWDER, LYOPHILIZED, FOR SOLUTION INTRAVENOUS at 11:39

## 2022-11-11 ASSESSMENT — ACTIVITIES OF DAILY LIVING (ADL)
ADLS_ACUITY_SCORE: 29

## 2022-11-11 NOTE — PROGRESS NOTES
Brief note:     The ID team continues to formally follow the case, but we will follow peripherally today. We will continue to perform chart review (vital signs, medications, labs, and imaging, notes). If any active issue, we will see the patient formally and place full note. Recommendations remain the same as stated in my note from 11/10/22. Please call us if any question or need.        Arlette Rivas  11/11/22  Pager: [phone]

## 2022-11-11 NOTE — PHARMACY-VANCOMYCIN DOSING SERVICE
Pharmacy Vancomycin Note  Date of Service 2022  Patient's  1990   32 year old, female    Indication: Osteomyelitis possible, skin & soft tissue infection  Day of Therapy: started 2022  Current vancomycin regimen:  1000 mg IV q12h  Current vancomycin monitoring method: AUC  Current vancomycin therapeutic monitoring goal: 400-600 mg*h/L    InsightRX Prediction of Current Vancomycin Regimen  Regimen: 1000 mg IV every 12 hours.  Start time: 11:46 on 2022  Exposure target: AUC24 (range)400-600 mg/L.hr   AUC24,ss: 345 mg/L.hr  Probability of AUC24 > 400: 25 %  Ctrough,ss: 8.1 mg/L  Probability of Ctrough,ss > 20: 1 %  Probability of nephrotoxicity (Lodise ELSIE ): 5 %      Current estimated CrCl = Estimated Creatinine Clearance: 117.8 mL/min (based on SCr of 0.66 mg/dL).    Creatinine for last 3 days  2022:  6:06 PM Creatinine 0.86 mg/dL  2022:  6:42 AM Creatinine 0.66 mg/dL    Recent Vancomycin Levels (past 3 days)  2022:  8:52 AM Vancomycin 8.2 mg/L    Vancomycin IV Administrations (past 72 hours)                   vancomycin (VANCOCIN) 1000 mg in dextrose 5% 200 mL PREMIX (mg) 1,000 mg New Bag 22 0045     1,000 mg New Bag 11/10/22 1327    vancomycin (VANCOCIN) 1000 mg in dextrose 5% 200 mL PREMIX (mg) 1,000 mg New Bag 11/10/22 0015                Nephrotoxins and other renal medications (From now, onward)    Start     Dose/Rate Route Frequency Ordered Stop    11/10/22 1200  vancomycin (VANCOCIN) 1000 mg in dextrose 5% 200 mL PREMIX         1,000 mg  200 mL/hr over 1 Hours Intravenous EVERY 12 HOURS 11/10/22 0308      11/10/22 0228  ibuprofen (ADVIL/MOTRIN) tablet 600 mg         600 mg Oral EVERY 6 HOURS PRN 11/10/22 0228               Contrast Orders - past 72 hours (72h ago, onward)    None          Interpretation of levels and current regimen:  Vancomycin level is reflective of AUC less than 400    Has serum creatinine changed greater than 50% in last 72 hours:  No    Urine output:  unable to determine    Renal Function: Stable    InsightRX Prediction of Planned New Vancomycin Regimen    Regimen: 1500 mg IV every 12 hours.  Start time: 11:46 on 11/11/2022  Exposure target: AUC24 (range)400-600 mg/L.hr   AUC24,ss: 516 mg/L.hr  Probability of AUC24 > 400: 88 %  Ctrough,ss: 12.5 mg/L  Probability of Ctrough,ss > 20: 8 %  Probability of nephrotoxicity (Lodise ELSIE 2009): 8 %      Plan:  1. Increase dose to 1500mg q12h (due to age/good kidney fxn/tendency to leave AMA & MRSA/possible osteomyelitis)  2. Vancomycin monitoring method: AUC  3. Vancomycin therapeutic monitoring goal: 400-600 mg*h/L  4. Pharmacy will check vancomycin levels as appropriate in 1-3 Days.  5. Serum creatinine levels will be ordered daily for the first week of therapy and at least twice weekly for subsequent weeks.    Rubi Toeny, [facility]

## 2022-11-11 NOTE — PROGRESS NOTES
End of shift Summary: See flowsheet for VS and detail assessments.    Changes this Shift: none    Pulmonary: lung sounds clear bilaterally     Output: patient voids independently and spontaneously     Activity: independent    Skin: noted scabs, bruising, and cracked skin on all extremities     Pain: pt didn't complain of pain throughout shift, scheduled tylenol given    Neuro/CMS: AxO x 4, no N/T reported     IV/Drains: L PIV running 0.9% TKO     Plan: working on discharge planning

## 2022-11-11 NOTE — PROGRESS NOTES
[facility]    Medicine Progress Note - Hospitalist Service, GOLD TEAM 17    Date of Admission:  11/9/2022    Assessment & Plan     A: Patient is a 33 y/o woman who has polysubstance abuse, bipolar disorder and mild intermittent asthma. [place] [facility] [place] hand cellulitis, had I+D on 17-Oct-2022 and left against medical advice on 18-Oct-2022. Patient was prescribed doxycycline but patient reportedly did not take prescribed antibiotics. Wound cultures from this hospital stay were apparently positive for multiple organisms.      Patient next presented to [facility] on 29-Oct-2022 for left 3rd, 4th and 5th digit PIP joint septic arthritis and left 4th digit pyogenic flexor tenosynovitis. Patient underwent I+D on 30-Oct-2022. Patient was initially treated with cefepime, daptomycin and metronidazole. Patient left against medical advice on 02-Nov-2022 before antibiotics could be narrowed. Patient was prescribed a 14 day course of amoxicillin/clavunate and bactrim when she left the hospital; patient reportedly did not complete outpatient antibiotic course. Tissue cultures from 30-Oct-2022 grew MRSA and Streptococcus pyogenes.     P:  1.) Left hand cellulitis; recent hospitalization for left 3rd, 4th and 5th digit PIP joint septic arthritis and left 4th digit pyogenic flexor tenosynovitis where patient left against medical advice: No current plans for surgical intervention per Orthopaedic Surgery. Patient on ceftriaxone and vancomycin per Infectious Disease.  2.) Mild intermittent asthma, compensated: Patient on albuterol as needed.  3.) Bipolar disorder type I: Patient on risperidone. Patient to f/u as outpatient.  4.) Polysubstance abuse: Monitoring for evidence of withdrawal.       Diet: Regular Diet Adult    DVT Prophylaxis: Pneumatic compression device  Sims Catheter: Not present  Central Lines: None  Cardiac Monitoring: None  Code  Status: Full Code        Galindo Eugene MD  Hospitalist Service, GOLD TEAM 17  [facility]  Securely message with the [employer] Web Console (learn more here)  Text page via [employer] Paging/Directory   Please see signed in provider for up to date coverage information      Clinically Significant Risk Factors                               ______________________________________________________________________    Interval History     Patient noted feeling tired. Patient noted no pain, no fever and no chills.    Physical Exam   Vital Signs: Temp: 97.8  F (36.6  C) Temp src: Oral BP: 123/84 Pulse: 78   Resp: 16 SpO2: 100 % O2 Device: None (Room air)    Weight: 134 lbs 6.4 oz    General: Patient comfortable, NAD.  Heart: RRR, S1 S2 w/o murmurs.  Lungs: Breath sounds present. No crackles/wheezes heard.  Abdomen: Soft, nontender.    Data   Labs noted.  Sodium 137; Potassium 4.4; Creatinine 0.66;  WBC 6.4; Hb 13.5; Platelets 353

## 2022-11-11 NOTE — PROGRESS NOTES
"VS: /62 (BP Location: Left arm)   Pulse 100   Temp 97.9  F (36.6  C) (Oral)   Resp 16   Ht 1.676 m (5' 6\")   Wt 61 kg (134 lb 6.4 oz)   LMP  (LMP Unknown)   SpO2 99%   BMI 21.69 kg/m       O2: Sating >90% on RA, denies SOB   Output: Voiding spontaneously in bathroom, patient asked to fill specimen cup for UA, patient has not yet complied   Last BM: Patient refuses to answer   Activity: Stand by assist x1, call light w/in reach   Up for meals? No   Skin: Cracked, bruising and scab to all extremities, patient refuses full skin assessment   Pain: Pain was managed with scheduled Tylenol   CMS: AO x4, uncooperative with cares   Dressing: L swollen fingers ANGEL   Diet: Regular diet   LDA: PIV to R forearm infiltrated on shift, RN flyer came in and placed new PIV to L forearm currently running 0.9% TKO  Former R IV site visibly swollen and painful for patient, ice applied to reduce swelling   Equipment:  IV pole, FWW, gait belt, personal belongings   Plan: Patient refused to meet with care coordinator 11/10, TBD   Additional Info: Patient will need reminders to order meals      Danna Sandoval RN on 11/10/2022 at 9:47 PM    "

## 2022-11-11 NOTE — PLAN OF CARE
"/84 (BP Location: Left arm)   Pulse 78   Temp 97.8  F (36.6  C) (Oral)   Resp 16   Ht 1.676 m (5' 6\")   Wt 61 kg (134 lb 6.4 oz)   LMP  (LMP Unknown)   SpO2 100%   BMI 21.69 kg/m      Pt withdrawn with a flat affect. Continuing IVABx. Left hand is dry with swollen joints. No redness. Pt denies pain, chest pain, SoB, numbness and tingling. Pt uncooperative with assessment. Pt up to shower with minimal assistance. PIV in LUE.  "

## 2022-11-11 NOTE — PROGRESS NOTES
Care Management:    Attempted to meet with patient again.  She was sleeping and did not answer when I called her name several times.     Marissa Mckeon RN, BA  Care Coordinator  6 Med Surg  [phone], pager [phone]

## 2022-11-12 VITALS
HEART RATE: 80 BPM | WEIGHT: 134.4 LBS | TEMPERATURE: 97.6 F | RESPIRATION RATE: 18 BRPM | OXYGEN SATURATION: 100 % | HEIGHT: 66 IN | DIASTOLIC BLOOD PRESSURE: 51 MMHG | BODY MASS INDEX: 21.6 KG/M2 | SYSTOLIC BLOOD PRESSURE: 118 MMHG

## 2022-11-12 PROCEDURE — 250N000013 HC RX MED GY IP 250 OP 250 PS 637: Performed by: STUDENT IN AN ORGANIZED HEALTH CARE EDUCATION/TRAINING PROGRAM

## 2022-11-12 PROCEDURE — 250N000011 HC RX IP 250 OP 636: Performed by: STUDENT IN AN ORGANIZED HEALTH CARE EDUCATION/TRAINING PROGRAM

## 2022-11-12 PROCEDURE — 258N000003 HC RX IP 258 OP 636: Performed by: STUDENT IN AN ORGANIZED HEALTH CARE EDUCATION/TRAINING PROGRAM

## 2022-11-12 RX ADMIN — Medication 1 MG: at 01:33

## 2022-11-12 RX ADMIN — VANCOMYCIN HYDROCHLORIDE 1500 MG: 10 INJECTION, POWDER, LYOPHILIZED, FOR SOLUTION INTRAVENOUS at 00:01

## 2022-11-12 RX ADMIN — ACETAMINOPHEN 975 MG: 325 TABLET, FILM COATED ORAL at 03:23

## 2022-11-12 ASSESSMENT — ACTIVITIES OF DAILY LIVING (ADL)
ADLS_ACUITY_SCORE: 29

## 2022-11-12 NOTE — PLAN OF CARE
[id number]      VS: VSS. afebrile   O2: >95 on RA. No SOB or chest pain   Output: Voids spontaneously without pain   Last BM: Pt refused assessment   Activity: Independent   Up for meals? Yes   Skin: Intact. Scabbing on L hand   Pain: Denies pain   CMS: A&Ox4. Flat affect.    Dressing: N/A   Diet: regular   LDA: L PIV removed   Equipment: Personal items, call light within reach.       Pt left AMA (form signed). Pt refused to speak with the physician before leaving. Pt told writer that the belongings left in the room could be thrown away. Belongings kept on unit for now. IV removed before pt left.

## 2022-11-12 NOTE — PLAN OF CARE
"Goal Outcome Evaluation:         VS: /84 (BP Location: Left arm)   Pulse 78   Temp 97.8  F (36.6  C) (Oral)   Resp 16   Ht 1.676 m (5' 6\")   Wt 61 kg (134 lb 6.4 oz)   LMP  (LMP Unknown)   SpO2 100%   BMI 21.69 kg/m       O2: Room air    Output: Voids spontanously and adquelelty    Last BM: Pt refused assessment    Activity: Independent   Up for meals? Yes   Skin: Intact   Pain: Denies pain . Swollen joints in L. Hand    CMS: A/Ox4- Flat affect. Denies sob, chest pain and n/t   Dressing: N/a   Diet: Regular   LDA: L. PIV SL    Equipment: IV pole, personal items , call light within reach    Plan: Continue POC    Additional Info:                       "

## 2022-11-12 NOTE — PLAN OF CARE
[id number]    A&Ox4.  Pt exhibits flat affect in general, cooperative and answers questions appropriately.    VSS.  Pt able to make needs known.  L PIV saline locked, dressing CDI.    Pt slept most of shift, only became agitated when IV alarm was going off, however pt did not call to inform this was happening.  When writer arrived and silenced alarm, pt immediately calmed down and writer re-educated pt to call if it happens again, which it did not.    IV abx administered per MAR.  IV CDI, saline locked.    Focused skin assessment per pt request,  L hand dry and minor swelling.    Continue tx and monitoring and promoting productive communication.

## 2022-11-13 NOTE — DISCHARGE SUMMARY
[facility]  Hospitalist Discharge Summary      Date of Admission:  09-Nov-2022  Date of Patient Left Against Medical Advice: 12-Nov-2022  Discharging Provider: Galindo Eugene MD  Discharge Service: Hospitalist Service, GOLD TEAM 17    Discharge Diagnoses   1.) Left hand cellulitis  2.) Mild intermittent asthma  3.) Bipolar disorder type I  4.) Polysubstance abuse      Discharge Disposition   - Patient left against medical advice    Hospital Course   Patient is a 33 y/o woman who has polysubstance abuse, bipolar disorder and mild intermittent asthma. Patient had been admitted to [facility] on 15-Oct-2022 for left hand cellulitis, had I+D on 17-Oct-2022 and left against medical advice on 18-Oct-2022. Patient was prescribed doxycycline but patient reportedly did not take prescribed antibiotics. Wound cultures from this hospital stay were apparently positive for multiple organisms.      Patient next presented to [facility] on 29-Oct-2022 for left 3rd, 4th and 5th digit PIP joint septic arthritis and left 4th digit pyogenic flexor tenosynovitis. Patient underwent I+D on 30-Oct-2022. Patient was initially treated with cefepime, daptomycin and metronidazole. Patient left against medical advice on 02-Nov-2022 before antibiotics could be narrowed. Patient was prescribed a 14 day course of amoxicillin/clavunate and bactrim when she left the hospital; patient reportedly did not complete outpatient antibiotic course. Tissue cultures from 30-Oct-2022 grew MRSA and Streptococcus pyogenes.    1.) Left hand cellulitis; recent hospitalization for left 3rd, 4th and 5th digit PIP joint septic arthritis and left 4th digit pyogenic flexor tenosynovitis where patient left against medical advice: Patient was evaluated by Orthopaedic Surgery but did not require surgical intervention. Patient was started on empiric antibiotics and was being evaluated by Infectious Disease. Patient left against  medical advice before a final antibiotic plan could be put in place.  2.) Mild intermittent asthma: This was compensated during hospital stay.      Consultations This Hospital Stay   PHARMACY TO DOSE VANCO  PHARMACY TO DOSE VANCO  SOCIAL WORK IP CONSULT  CARE MANAGEMENT / SOCIAL WORK IP CONSULT  INFECTIOUS DISEASE [facility] ADULT IP CONSULT    Code Status   Prior         Galindo Eugene MD  [facility] MED SURG  [address] [address]  Phone: [phone]  Fax: [phone]  ______________________________________________________________________    Physical Exam   Vital Signs: Temp: 97.6  F (36.4  C) Temp src: Oral BP: 118/51 Pulse: 80   Resp: 18 SpO2: 100 % O2 Device: None (Room air)    Weight: 134 lbs 6.4 oz         Primary Care Physician   Physician No Ref-Primary    Discharge Orders   No discharge procedures on file.    Significant Results and Procedures   - None    Discharge Medications   Discharge Medication List as of 11/12/2022 10:37 AM      CONTINUE these medications which have NOT CHANGED    Details   albuterol (PROAIR HFA/PROVENTIL HFA/VENTOLIN HFA) 108 (90 Base) MCG/ACT inhaler Inhale 2 puffs into the lungs every 6 hours as needed for shortness of breath / dyspnea or wheezing, Historical      amoxicillin-clavulanate (AUGMENTIN) 875-125 MG tablet Take 1 tablet by mouth 2 times daily for 14 days, Disp-28 tablet, R-0, E-Prescribe      hydrOXYzine (ATARAX) 25 MG tablet Take 1 tablet (25 mg) by mouth every 4 hours as needed for anxiety, Disp-60 tablet, R-1, E-Prescribe      medroxyPROGESTERone (DEPO-PROVERA) 150 MG/ML IM injection Inject 1 mL (150 mg) into the muscle every 3 months Due 7/26/2021., No Print Out      risperiDONE (RISPERDAL) 1 MG tablet Take 1 tablet (1 mg) by mouth At Bedtime, Disp-30 tablet, R-1, E-Prescribe      sulfamethoxazole-trimethoprim (BACTRIM DS) 800-160 MG tablet Take 1 tablet by mouth 2 times daily for 14 days, Disp-28 tablet, R-0, E-Prescribe            Allergies   Allergies   Allergen Reactions     Bees Anaphylaxis     Morphine Hives     Other reaction(s): Hives       Oxycodone Hives and Itching     Pork Derived Products Other (See Comments)     Islam reasons

## 2022-11-15 ENCOUNTER — PATIENT OUTREACH (OUTPATIENT)
Dept: CARE COORDINATION | Facility: CLINIC | Age: 32
End: 2022-11-15

## 2022-11-15 NOTE — PROGRESS NOTES
Connected Care Resource Center Contact  [facility]/Voicemail     Clinical Data: Transitional Care Management Outreach     Outreach attempted x 2.Unable to leave a message on patient's voicemail, providing [facility]'s 24/7 scheduling and nurse triage phone number [phone] ([phone]) for questions/concerns and/or to schedule an appt with an [facility] provider, if they do not have a PCP.      Plan:  [facility] will do no further outreaches at this time.       Attila Rae  Connected Care Resource Center, [facility]    *Connected Care Resource Team does NOT follow patient ongoing. Referrals are identified based on internal discharge reports and the outreach is to ensure patient has an understanding of their discharge instructions.

## 2022-11-17 ENCOUNTER — HOSPITAL ENCOUNTER (EMERGENCY)
Facility: CLINIC | Age: 32
Discharge: HOME | End: 2022-11-18
Attending: EMERGENCY MEDICINE | Admitting: EMERGENCY MEDICINE
Payer: COMMERCIAL

## 2022-11-17 DIAGNOSIS — R41.82 ALTERED MENTAL STATUS, UNSPECIFIED ALTERED MENTAL STATUS TYPE: ICD-10-CM

## 2022-11-17 PROCEDURE — 99283 EMERGENCY DEPT VISIT LOW MDM: CPT | Performed by: EMERGENCY MEDICINE

## 2022-11-17 ASSESSMENT — ACTIVITIES OF DAILY LIVING (ADL): ADLS_ACUITY_SCORE: 35

## 2022-11-18 VITALS
RESPIRATION RATE: 16 BRPM | SYSTOLIC BLOOD PRESSURE: 127 MMHG | HEART RATE: 91 BPM | OXYGEN SATURATION: 97 % | TEMPERATURE: 97.6 F | DIASTOLIC BLOOD PRESSURE: 82 MMHG

## 2022-11-18 VITALS
RESPIRATION RATE: 18 BRPM | DIASTOLIC BLOOD PRESSURE: 71 MMHG | SYSTOLIC BLOOD PRESSURE: 110 MMHG | TEMPERATURE: 98.6 F | HEART RATE: 93 BPM | OXYGEN SATURATION: 96 %

## 2022-11-18 DIAGNOSIS — M79.10 MYALGIA: Primary | ICD-10-CM

## 2022-11-18 LAB
ANION GAP SERPL CALCULATED.3IONS-SCNC: 5 MMOL/L (ref 3–14)
BASOPHILS # BLD AUTO: 0 10E3/UL (ref 0–0.2)
BASOPHILS NFR BLD AUTO: 1 %
BUN SERPL-MCNC: 16 MG/DL (ref 7–30)
CALCIUM SERPL-MCNC: 8.7 MG/DL (ref 8.5–10.1)
CHLORIDE BLD-SCNC: 110 MMOL/L (ref 94–109)
CO2 SERPL-SCNC: 23 MMOL/L (ref 20–32)
CREAT SERPL-MCNC: 0.71 MG/DL (ref 0.52–1.04)
EOSINOPHIL # BLD AUTO: 0.1 10E3/UL (ref 0–0.7)
EOSINOPHIL NFR BLD AUTO: 1 %
ERYTHROCYTE [DISTWIDTH] IN BLOOD BY AUTOMATED COUNT: 14.1 % (ref 10–15)
FLUAV RNA SPEC QL NAA+PROBE: NEGATIVE
FLUBV RNA RESP QL NAA+PROBE: NEGATIVE
GFR SERPL CREATININE-BSD FRML MDRD: >90 ML/MIN/1.73M2
GLUCOSE BLD-MCNC: 76 MG/DL (ref 70–99)
HCT VFR BLD AUTO: 37.9 % (ref 35–47)
HGB BLD-MCNC: 12.6 G/DL (ref 11.7–15.7)
HOLD SPECIMEN: NORMAL
HOLD SPECIMEN: NORMAL
IMM GRANULOCYTES # BLD: 0 10E3/UL
IMM GRANULOCYTES NFR BLD: 0 %
LACTATE SERPL-SCNC: 1 MMOL/L (ref 0.7–2)
LYMPHOCYTES # BLD AUTO: 2.5 10E3/UL (ref 0.8–5.3)
LYMPHOCYTES NFR BLD AUTO: 45 %
MCH RBC QN AUTO: 29.4 PG (ref 26.5–33)
MCHC RBC AUTO-ENTMCNC: 33.2 G/DL (ref 31.5–36.5)
MCV RBC AUTO: 88 FL (ref 78–100)
MONOCYTES # BLD AUTO: 0.6 10E3/UL (ref 0–1.3)
MONOCYTES NFR BLD AUTO: 11 %
NEUTROPHILS # BLD AUTO: 2.3 10E3/UL (ref 1.6–8.3)
NEUTROPHILS NFR BLD AUTO: 42 %
NRBC # BLD AUTO: 0 10E3/UL
NRBC BLD AUTO-RTO: 0 /100
PLATELET # BLD AUTO: 325 10E3/UL (ref 150–450)
POTASSIUM BLD-SCNC: 4.7 MMOL/L (ref 3.4–5.3)
RBC # BLD AUTO: 4.29 10E6/UL (ref 3.8–5.2)
RSV RNA SPEC NAA+PROBE: NEGATIVE
SARS-COV-2 RNA RESP QL NAA+PROBE: NEGATIVE
SODIUM SERPL-SCNC: 138 MMOL/L (ref 133–144)
WBC # BLD AUTO: 5.6 10E3/UL (ref 4–11)

## 2022-11-18 PROCEDURE — 99283 EMERGENCY DEPT VISIT LOW MDM: CPT | Mod: CS | Performed by: EMERGENCY MEDICINE

## 2022-11-18 PROCEDURE — 250N000013 HC RX MED GY IP 250 OP 250 PS 637: Performed by: EMERGENCY MEDICINE

## 2022-11-18 PROCEDURE — 87637 SARSCOV2&INF A&B&RSV AMP PRB: CPT

## 2022-11-18 PROCEDURE — 85025 COMPLETE CBC W/AUTO DIFF WBC: CPT

## 2022-11-18 PROCEDURE — C9803 HOPD COVID-19 SPEC COLLECT: HCPCS | Performed by: EMERGENCY MEDICINE

## 2022-11-18 PROCEDURE — 80048 BASIC METABOLIC PNL TOTAL CA: CPT

## 2022-11-18 PROCEDURE — 87637 SARSCOV2&INF A&B&RSV AMP PRB: CPT | Mod: 59

## 2022-11-18 PROCEDURE — 36415 COLL VENOUS BLD VENIPUNCTURE: CPT

## 2022-11-18 PROCEDURE — 83605 ASSAY OF LACTIC ACID: CPT

## 2022-11-18 RX ORDER — ACETAMINOPHEN 500 MG
1000 TABLET ORAL ONCE
Status: COMPLETED | OUTPATIENT
Start: 2022-11-18 | End: 2022-11-18

## 2022-11-18 RX ADMIN — ACETAMINOPHEN 1000 MG: 500 TABLET ORAL at 07:03

## 2022-11-18 ASSESSMENT — ACTIVITIES OF DAILY LIVING (ADL)
ADLS_ACUITY_SCORE: 37
ADLS_ACUITY_SCORE: 35
ADLS_ACUITY_SCORE: 37
ADLS_ACUITY_SCORE: 37

## 2022-11-18 NOTE — DISCHARGE INSTRUCTIONS
Please reach out to a primary care clinic and establish with a primary care physician for continuity of care.  Additionally please consult the list of homeless shelters that you were provided with in the emergency department for shelter in the future.

## 2022-11-18 NOTE — ED NOTES
Patient was cleared for discharge by MD, pt refuse to discharge. Security called to escort pt to exit

## 2022-11-18 NOTE — DISCHARGE INSTRUCTIONS
Avoid alcohol and drug use.    Follow-up for rule 25 chemical dependency assessment    Follow-up with your orthopedist.    Return if any concerns.

## 2022-11-18 NOTE — ED TRIAGE NOTES
Triage Assessment     Row Name 11/17/22 4551       Triage Assessment (Adult)    Airway WDL WDL       Respiratory WDL    Respiratory WDL WDL       Skin Circulation/Temperature WDL    Skin Circulation/Temperature WDL WDL       Cardiac WDL    Cardiac WDL WDL       Peripheral/Neurovascular WDL    Peripheral Neurovascular WDL WDL       Cognitive/Neuro/Behavioral WDL    Cognitive/Neuro/Behavioral WDL X    Arousal Level arouses to voice       Andreina Coma Scale    Best Eye Response 4-->(E4) spontaneous    Best Motor Response 6-->(M6) obeys commands    Best Verbal Response 5-->(V5) oriented    Andreina Coma Scale Score 15

## 2022-11-18 NOTE — ED PROVIDER NOTES
"ED Provider Note  [facility]      History     Chief Complaint   Patient presents with     Generalized Body Aches     Reports body aches all over; sharp pain in abd.      HPI  Queen Brittany Stack is a 32 year old female with PMH significant for polysubstance use, asthma, homelessness, herpes, bipolar disorder type 1 and left hand cellulitis (3rd, 4th & 5th digits) s.p I&D (10/17/22) who presents to Emergency Department for generalized body aches and abdominal pain. Per chart review, patient has had 2 recent hospitalization for tenosynovitis of left hand (10/29/22 and 11/09-11/12/22) and was seen in the emergency department last night (11/17) with altered mental status but without specific complaint.  Was uncooperative with exam.  On exam this morning patient was somnolent, and still uncooperative with exam. Was unwilling to answer questions regarding her body aches or abdominal pain.  She did present her left hand briefly to demonstrate her chronic infection.  She stated that \"I just need to sleep for 2 or 3 days and let my body heal, you people need to stop pumping chemicals into me that stop it from healing\".  When the  role of antibiotics and their importance were explained for her healing she shrugged and agreed and added that \"fine if you get the mixture right\".  Patient then covered her head in a blanket and refused to answer any more questions.    Past Medical History  Past Medical History:   Diagnosis Date     Abnormal Pap smear     2010, f/u normal     Anxiety      Asthma     no ihaler use     Bipolar 1 disorder (H)      Bipolar affective (H)      Fainting spell      Herpes simplex without mention of complication     Pt reports last outbreak about 4 weeks ago     Major depressive disorder      Pelvic inflammatory disease      Polysubstance abuse (H)      Past Surgical History:   Procedure Laterality Date     DILATION AND CURETTAGE SUCTION N/A 4/23/2021    Procedure: DILATION AND " CURETTAGE, UTERUS, USING SUCTION;  Surgeon: Lanie Yates MD;  Location: [facility] OR     IRRIGATION AND DEBRIDEMENT HAND, COMBINED Left 10/30/2022    Procedure: IRRIGATION AND DEBRIDEMENT, LEFT HAND;  Surgeon: Anshul Davis MD;  Location: [facility] OR     NO HISTORY OF SURGERY       albuterol (PROAIR HFA/PROVENTIL HFA/VENTOLIN HFA) 108 (90 Base) MCG/ACT inhaler  hydrOXYzine (ATARAX) 25 MG tablet  medroxyPROGESTERone (DEPO-PROVERA) 150 MG/ML IM injection  risperiDONE (RISPERDAL) 1 MG tablet      Allergies   Allergen Reactions     Bees Anaphylaxis     Morphine Hives     Other reaction(s): Hives       Oxycodone Hives and Itching     Pork Derived Products Other (See Comments)     Holiness reasons     Family History  Family History   Problem Relation Age of Onset     Unknown/Adopted Mother      Unknown/Adopted Father      Unknown/Adopted Maternal Grandmother      Unknown/Adopted Maternal Grandfather      Unknown/Adopted Paternal Grandmother      Unknown/Adopted Paternal Grandfather      Social History   Social History     Tobacco Use     Smoking status: Every Day     Packs/day: 0.50     Years: 8.00     Pack years: 4.00     Types: Cigarettes     Smokeless tobacco: Never   Substance Use Topics     Alcohol use: Not Currently     Comment: denies use     Drug use: Not Currently     Types: Cocaine     Comment: denies use      Past medical history, past surgical history, medications, allergies, family history, and social history were reviewed with the patient. No additional pertinent items.       Review of Systems  A complete review of systems was performed with pertinent positives and negatives noted in the HPI, and all other systems negative.    Physical Exam   BP: 110/71  Pulse: 93  Temp: 98.6  F (37  C)  Resp: 18  SpO2: 96 %  Physical Exam  Vitals reviewed.   Constitutional:       General: She is not in acute distress.     Appearance: Normal appearance. She is not ill-appearing.   HENT:      Head: Normocephalic and atraumatic.       Right Ear: External ear normal.      Left Ear: External ear normal.      Nose: Nose normal.   Eyes:      Extraocular Movements: Extraocular movements intact.      Conjunctiva/sclera: Conjunctivae normal.   Cardiovascular:      Rate and Rhythm: Normal rate.   Pulmonary:      Effort: Pulmonary effort is normal. No respiratory distress.   Musculoskeletal:         General: Swelling, tenderness and signs of injury (Left third, fourth, fifth digits) present. Normal range of motion.      Cervical back: Normal range of motion.   Skin:     Findings: No bruising, lesion or rash.   Neurological:      General: No focal deficit present.   Psychiatric:      Comments: Patient was somnolent and uncooperative with exam.         ED Course      Results for orders placed or performed during the hospital encounter of 11/18/22   Symptomatic; Unknown Influenza A/B & SARS-CoV2 (COVID-19) Virus PCR Multiplex Nasopharyngeal     Status: Normal    Specimen: Nasopharyngeal; Swab   Result Value Ref Range    Influenza A PCR Negative Negative    Influenza B PCR Negative Negative    RSV PCR Negative Negative    SARS CoV2 PCR Negative Negative    Narrative    Testing was performed using the Xpert Xpress CoV2/Flu/RSV Assay on the [employer] GeneXpert Instrument. This test should be ordered for the detection of SARS-CoV-2 and influenza viruses in individuals who meet clinical and/or epidemiological criteria. Test performance is unknown in asymptomatic patients. This test is for in vitro diagnostic use under the FDA EUA for laboratories certified under CLIA to perform high or moderate complexity testing. This test has not been FDA cleared or approved. A negative result does not rule out the presence of PCR inhibitors in the specimen or target RNA in concentration below the limit of detection for the assay. If only one viral target is positive but coinfection with multiple targets is suspected, the sample should be re-tested with another FDA cleared,  approved, or authorized test, if coinfection would change clinical management. This test was validated by the [facility]. These laboratories are certified under the Clinical Laboratory Improvement Amendments of 1988 (CLIA-88) as qualified to perform high complexity laboratory testing.   Basic metabolic panel     Status: Abnormal   Result Value Ref Range    Sodium 138 133 - 144 mmol/L    Potassium 4.7 3.4 - 5.3 mmol/L    Chloride 110 (H) 94 - 109 mmol/L    Carbon Dioxide (CO2) 23 20 - 32 mmol/L    Anion Gap 5 3 - 14 mmol/L    Urea Nitrogen 16 7 - 30 mg/dL    Creatinine 0.71 0.52 - 1.04 mg/dL    Calcium 8.7 8.5 - 10.1 mg/dL    Glucose 76 70 - 99 mg/dL    GFR Estimate >90 >60 mL/min/1.73m2   Lactic acid whole blood     Status: Normal   Result Value Ref Range    Lactic Acid 1.0 0.7 - 2.0 mmol/L   CBC with platelets and differential     Status: None   Result Value Ref Range    WBC Count 5.6 4.0 - 11.0 10e3/uL    RBC Count 4.29 3.80 - 5.20 10e6/uL    Hemoglobin 12.6 11.7 - 15.7 g/dL    Hematocrit 37.9 35.0 - 47.0 %    MCV 88 78 - 100 fL    MCH 29.4 26.5 - 33.0 pg    MCHC 33.2 31.5 - 36.5 g/dL    RDW 14.1 10.0 - 15.0 %    Platelet Count 325 150 - 450 10e3/uL    % Neutrophils 42 %    % Lymphocytes 45 %    % Monocytes 11 %    % Eosinophils 1 %    % Basophils 1 %    % Immature Granulocytes 0 %    NRBCs per 100 WBC 0 <1 /100    Absolute Neutrophils 2.3 1.6 - 8.3 10e3/uL    Absolute Lymphocytes 2.5 0.8 - 5.3 10e3/uL    Absolute Monocytes 0.6 0.0 - 1.3 10e3/uL    Absolute Eosinophils 0.1 0.0 - 0.7 10e3/uL    Absolute Basophils 0.0 0.0 - 0.2 10e3/uL    Absolute Immature Granulocytes 0.0 <=0.4 10e3/uL    Absolute NRBCs 0.0 10e3/uL   Extra Tube     Status: None    Narrative    The following orders were created for panel order Extra Tube.  Procedure                               Abnormality         Status                     ---------                               -----------         ------                      Extra Blue Top Tube[[id number]]                              Final result               Extra Red Top Tube[[id number]]                               Final result                 Please view results for these tests on the individual orders.   Extra Blue Top Tube     Status: None   Result Value Ref Range    Hold Specimen JIC    Extra Red Top Tube     Status: None   Result Value Ref Range    Hold Specimen JIC    CBC with platelets differential     Status: None    Narrative    The following orders were created for panel order CBC with platelets differential.  Procedure                               Abnormality         Status                     ---------                               -----------         ------                     CBC with platelets and d...[[id number]]                      Final result                 Please view results for these tests on the individual orders.     Medications - No data to display     Assessments & Plan (with Medical Decision Making)   Queen Brittany Stack is a 32 year old female with PMH significant for polysubstance use, asthma, homelessness, herpes, bipolar disorder type 1 and left hand cellulitis (3rd, 4th & 5th digits) s.p I&D (10/17/22) who presents to Emergency Department for generalized body aches and abdominal pain.  As patient was not cooperative with initial interview and did not wish to discuss her chief complaints, general work-up was initiated including CBC, BMP, lactate, influenza/COVID/RSV swabs and UDS to assess for any potential acute intoxication syndrome.  CBC, BMP, lactate all within normal limits.  Negative for influenza, RSV, COVID.  Patient did not provide a urine sample for UDS.  Patient slept for most of her time in the ED except for when she had a sandwich.  She repeatedly asked for food and did not endorse any additional complaints.  Patient has been medically cleared to discharge and was provided with a list of homeless shelters and community resources to  help address her helplessness and food insecurity.    I have reviewed the nursing notes. I have reviewed the findings, diagnosis, plan and need for follow up with the patient.    Discharge Medication List as of 11/18/2022  1:57 PM          Final diagnoses:   Myalgia       ____________________________  Jamaal Gonzalez DO - PGY1  [facility] Resident  Pronouns: He/Him/His  --  --    ED Attending Physician Attestation    I Swati Vegas MD, cared for this patient with the Resident. I have performed a history and physical examination of the patient and discussed management with the resident. I reviewed the resident's documentation above and agree with the documented findings and plan of care.    Summary of HPI, PE, ED Course   Patient is a 32 year old female with a history of polysubstance abuse, asthma, homelessness, bipolar disorder and left hand infection evaluated in the emergency department for diffuse body aches.  Patient is somnolent and uncooperative with the history.  She states she needs to sleep for a couple of days.      Chart review notable for the patient was evaluated in the emergency department yesterday during the evening shift.  She was just discharged approximately 2 hours before returning to the emergency department  . Exam notable for somnolence.  Patient initially uncooperative with blood draws, stating she just wanted to sleep..  However when she was told that we will discharge her, she agreed to blood draws.  ED course notable for unremarkable CBC and basic metabolic panel.  Lactate is normal at 1.0.  Urine tox screen was ordered but patient did not give a urine sample.  Patient was allowed to rest in the ED for several hours.  She was fed lunch.  She was discharged with a list of homeless shelters and community resources.  She was instructed to follow-up with her regular doctor for reevaluation within 1 to 2 weeks       After the completion of care in the emergency department,  the patient was discharged.      Critical Care & Procedures  Not applicable.    Medical Decision Making  The medical record was reviewed and interpreted.  Current labs reviewed and interpreted.      Swati Vegas MD  Emergency Medicine      Norman Vegas MD  [facility] EMERGENCY DEPARTMENT  11/18/2022     Swati Vegas MD  11/18/22 1446

## 2022-11-18 NOTE — ED PROVIDER NOTES
"Patient received as sign-out at change of shift.  Please see initial note for complete details.  32 year old female who presented to the ED with altered mental status.  She was uncooperative with evaluation..  Awaiting reassessment  Acute events during this shift: none.  Patient is now easily arousable.  She awakens and talks with normal speech.  She is expressing displeasure that she has been asking for something to eat.  She denies any other complaints.  She states \"I would like something to eat and then we will talk.\"  Patient was given a sandwich.  She is denying any abdominal pain or acute medical concerns.  She will be discharged home.     Daniel Lakhani MD  11/18/22 0616    "

## 2022-11-18 NOTE — ED NOTES
Patient was observed wandering asking multiple people for food; when asked to wear a mask in the hallway patient was swearing and yelling at staff. Patient redirected back to her bed, was continuing to ask for food and drinks.

## 2022-11-18 NOTE — ED PROVIDER NOTES
[facility] EMERGENCY DEPARTMENT ([facility])    11/17/22          History   No chief complaint on file.    HPI  Queen Brittany Stack is a 32 year old female with PMH significant for polysubstance use, asthma, homelessness, herpes, bipolar disorder type 1 and left hand cellulitis (3rd, 4th & 5th digits) s.p I&D (10/17/22) who presents to Emergency Department for evaluation. Per chart review, patient has had 2 recent hospitalization for tenosynovitis of left hand (10/29/22 and 11/09-11/12/22).   On arrival in ED, patient is somnolent and uncooperative with history, but appears to have no specific complaints. Denies hand pain wont answer questions about abd pain      Epic Records Reviewed.     XR HAND LEFT G/E 3 VIEWS, [facility]  (11/9/2022)                                                                 IMPRESSION: No visible fracture, dislocation or destructive change. Slight chronic cortical irregularity of the fourth distal phalanx. Soft tissue edema. If there is persistent concern for osteomyelitis then MRI would be more sensitive.    XR HAND LEFT G/E 3 VIEWS, [facility]  (10/29/2022)                              IMPRESSION: There is diffuse soft tissue swelling over the hand. No acute fracture is seen. No periosteal reaction or osseous erosion to suggest osteomyelitis. If there is persistent concern, MRI would be more sensitive.      Past Medical History  Past Medical History:   Diagnosis Date     Abnormal Pap smear     2010, f/u normal     Anxiety      Asthma     no ihaler use     Bipolar 1 disorder (H)      Bipolar affective (H)      Fainting spell      Herpes simplex without mention of complication     Pt reports last outbreak about 4 weeks ago     Major depressive disorder      Pelvic inflammatory disease      Polysubstance abuse (H)      Past Surgical History:   Procedure Laterality Date     DILATION AND  CURETTAGE SUCTION N/A 4/23/2021    Procedure: DILATION AND CURETTAGE, UTERUS, USING SUCTION;  Surgeon: Lanie Yates MD;  Location: [facility] OR     IRRIGATION AND DEBRIDEMENT HAND, COMBINED Left 10/30/2022    Procedure: IRRIGATION AND DEBRIDEMENT, LEFT HAND;  Surgeon: Anshul Davis MD;  Location: [facility] OR     NO HISTORY OF SURGERY       albuterol (PROAIR HFA/PROVENTIL HFA/VENTOLIN HFA) 108 (90 Base) MCG/ACT inhaler  hydrOXYzine (ATARAX) 25 MG tablet  medroxyPROGESTERone (DEPO-PROVERA) 150 MG/ML IM injection  risperiDONE (RISPERDAL) 1 MG tablet      Allergies   Allergen Reactions     Bees Anaphylaxis     Morphine Hives     Other reaction(s): Hives       Oxycodone Hives and Itching     Pork Derived Products Other (See Comments)     [employer] reasons     Family History  Family History   Problem Relation Age of Onset     Unknown/Adopted Mother      Unknown/Adopted Father      Unknown/Adopted Maternal Grandmother      Unknown/Adopted Maternal Grandfather      Unknown/Adopted Paternal Grandmother      Unknown/Adopted Paternal Grandfather      Social History   Social History     Tobacco Use     Smoking status: Every Day     Packs/day: 0.50     Years: 8.00     Pack years: 4.00     Types: Cigarettes     Smokeless tobacco: Never   Substance Use Topics     Alcohol use: Yes     Comment: last drank yesterday, 2 shots     Drug use: Yes     Types: Cocaine     Comment: cocaine last used yesterday      Past medical history, past surgical history, medications, allergies, family history, and social history were reviewed with the patient. No additional pertinent items.       Review of Systems  A complete review of systems was attempted but limited due to altered mental status.    Physical Exam      Physical Exam  Vitals and nursing note reviewed.   Constitutional:       Comments: Somnolent, arousable, uncooperative, appears intoxicated   HENT:      Head: Normocephalic and atraumatic.      Mouth/Throat:      Mouth: Mucous membranes are  moist.      Pharynx: Oropharynx is clear.   Eyes:      Extraocular Movements: Extraocular movements intact.      Pupils: Pupils are equal, round, and reactive to light.   Cardiovascular:      Rate and Rhythm: Normal rate and regular rhythm.      Heart sounds: Normal heart sounds.   Pulmonary:      Effort: Pulmonary effort is normal.      Breath sounds: Normal breath sounds.   Abdominal:      General: Abdomen is flat. There is no distension.      Palpations: Abdomen is soft.      Tenderness: There is no abdominal tenderness.   Skin:     General: Skin is warm and dry.   Neurological:      General: No focal deficit present.   Psychiatric:      Comments: Somnolent, arousable but unccopeartive         ED Course      Procedures                No results found for any visits on 11/17/22.  Medications - No data to display     Assessments & Plan (with Medical Decision Making)   Dec loc apparrently due to substance use, will observe for change in mental status    I have reviewed the nursing notes. I have reviewed the findings, diagnosis, plan and need for follow up with the patient.    New Prescriptions    No medications on file       Final diagnoses:   None       --    [facility] EMERGENCY DEPARTMENT  11/17/2022     Maikel Zurita MD  11/18/22 0110

## 2022-11-19 ENCOUNTER — HEALTH MAINTENANCE LETTER (OUTPATIENT)
Age: 32
End: 2022-11-19

## 2022-11-30 ENCOUNTER — HOSPITAL ENCOUNTER (INPATIENT)
Facility: CLINIC | Age: 32
LOS: 3 days | Discharge: LEFT BEFORE DISCHARGE | End: 2022-12-03
Attending: EMERGENCY MEDICINE | Admitting: INTERNAL MEDICINE
Payer: COMMERCIAL

## 2022-11-30 ENCOUNTER — APPOINTMENT (OUTPATIENT)
Dept: CT IMAGING | Facility: CLINIC | Age: 32
End: 2022-11-30
Attending: EMERGENCY MEDICINE
Payer: COMMERCIAL

## 2022-11-30 DIAGNOSIS — Z11.52 ENCOUNTER FOR SCREENING LABORATORY TESTING FOR SEVERE ACUTE RESPIRATORY SYNDROME CORONAVIRUS 2 (SARS-COV-2): ICD-10-CM

## 2022-11-30 DIAGNOSIS — F11.10 OPIOID ABUSE, CONTINUOUS (H): ICD-10-CM

## 2022-11-30 DIAGNOSIS — L03.114 CELLULITIS OF LEFT HAND EXCLUDING FINGERS AND THUMB: ICD-10-CM

## 2022-11-30 DIAGNOSIS — L03.119 CELLULITIS OF HAND: ICD-10-CM

## 2022-11-30 DIAGNOSIS — L03.114 CELLULITIS OF LEFT HAND: Primary | ICD-10-CM

## 2022-11-30 LAB
ANION GAP SERPL CALCULATED.3IONS-SCNC: 4 MMOL/L (ref 3–14)
BASOPHILS # BLD AUTO: 0 10E3/UL (ref 0–0.2)
BASOPHILS NFR BLD AUTO: 1 %
BUN SERPL-MCNC: 18 MG/DL (ref 7–30)
CALCIUM SERPL-MCNC: 9 MG/DL (ref 8.5–10.1)
CHLORIDE BLD-SCNC: 110 MMOL/L (ref 94–109)
CO2 SERPL-SCNC: 29 MMOL/L (ref 20–32)
CREAT SERPL-MCNC: 0.76 MG/DL (ref 0.52–1.04)
CRP SERPL-MCNC: <2.9 MG/L (ref 0–8)
EOSINOPHIL # BLD AUTO: 0.1 10E3/UL (ref 0–0.7)
EOSINOPHIL NFR BLD AUTO: 1 %
ERYTHROCYTE [DISTWIDTH] IN BLOOD BY AUTOMATED COUNT: 14.1 % (ref 10–15)
ERYTHROCYTE [SEDIMENTATION RATE] IN BLOOD BY WESTERGREN METHOD: 10 MM/HR (ref 0–20)
GFR SERPL CREATININE-BSD FRML MDRD: >90 ML/MIN/1.73M2
GLUCOSE BLD-MCNC: 100 MG/DL (ref 70–99)
HCG SERPL QL: NEGATIVE
HCT VFR BLD AUTO: 37.7 % (ref 35–47)
HGB BLD-MCNC: 12.8 G/DL (ref 11.7–15.7)
IMM GRANULOCYTES # BLD: 0 10E3/UL
IMM GRANULOCYTES NFR BLD: 0 %
LACTATE SERPL-SCNC: 1.5 MMOL/L (ref 0.7–2)
LYMPHOCYTES # BLD AUTO: 3.6 10E3/UL (ref 0.8–5.3)
LYMPHOCYTES NFR BLD AUTO: 62 %
MCH RBC QN AUTO: 29.6 PG (ref 26.5–33)
MCHC RBC AUTO-ENTMCNC: 34 G/DL (ref 31.5–36.5)
MCV RBC AUTO: 87 FL (ref 78–100)
MONOCYTES # BLD AUTO: 0.5 10E3/UL (ref 0–1.3)
MONOCYTES NFR BLD AUTO: 8 %
NEUTROPHILS # BLD AUTO: 1.6 10E3/UL (ref 1.6–8.3)
NEUTROPHILS NFR BLD AUTO: 28 %
NRBC # BLD AUTO: 0 10E3/UL
NRBC BLD AUTO-RTO: 0 /100
PLATELET # BLD AUTO: 324 10E3/UL (ref 150–450)
POTASSIUM BLD-SCNC: 4.1 MMOL/L (ref 3.4–5.3)
RBC # BLD AUTO: 4.32 10E6/UL (ref 3.8–5.2)
SARS-COV-2 RNA RESP QL NAA+PROBE: NEGATIVE
SODIUM SERPL-SCNC: 143 MMOL/L (ref 133–144)
WBC # BLD AUTO: 5.7 10E3/UL (ref 4–11)

## 2022-11-30 PROCEDURE — 84703 CHORIONIC GONADOTROPIN ASSAY: CPT | Performed by: EMERGENCY MEDICINE

## 2022-11-30 PROCEDURE — 83605 ASSAY OF LACTIC ACID: CPT | Performed by: EMERGENCY MEDICINE

## 2022-11-30 PROCEDURE — 99222 1ST HOSP IP/OBS MODERATE 55: CPT | Mod: AI | Performed by: INTERNAL MEDICINE

## 2022-11-30 PROCEDURE — C9803 HOPD COVID-19 SPEC COLLECT: HCPCS | Performed by: EMERGENCY MEDICINE

## 2022-11-30 PROCEDURE — 99285 EMERGENCY DEPT VISIT HI MDM: CPT | Performed by: EMERGENCY MEDICINE

## 2022-11-30 PROCEDURE — 80048 BASIC METABOLIC PNL TOTAL CA: CPT | Performed by: EMERGENCY MEDICINE

## 2022-11-30 PROCEDURE — 250N000013 HC RX MED GY IP 250 OP 250 PS 637: Performed by: EMERGENCY MEDICINE

## 2022-11-30 PROCEDURE — 120N000002 HC R&B MED SURG/OB UMMC

## 2022-11-30 PROCEDURE — U0003 INFECTIOUS AGENT DETECTION BY NUCLEIC ACID (DNA OR RNA); SEVERE ACUTE RESPIRATORY SYNDROME CORONAVIRUS 2 (SARS-COV-2) (CORONAVIRUS DISEASE [COVID-19]), AMPLIFIED PROBE TECHNIQUE, MAKING USE OF HIGH THROUGHPUT TECHNOLOGIES AS DESCRIBED BY CMS-2020-01-R: HCPCS | Performed by: EMERGENCY MEDICINE

## 2022-11-30 PROCEDURE — 85652 RBC SED RATE AUTOMATED: CPT | Performed by: EMERGENCY MEDICINE

## 2022-11-30 PROCEDURE — 258N000003 HC RX IP 258 OP 636: Performed by: EMERGENCY MEDICINE

## 2022-11-30 PROCEDURE — 86140 C-REACTIVE PROTEIN: CPT | Performed by: EMERGENCY MEDICINE

## 2022-11-30 PROCEDURE — 87040 BLOOD CULTURE FOR BACTERIA: CPT | Performed by: EMERGENCY MEDICINE

## 2022-11-30 PROCEDURE — 250N000011 HC RX IP 250 OP 636: Performed by: EMERGENCY MEDICINE

## 2022-11-30 PROCEDURE — 36415 COLL VENOUS BLD VENIPUNCTURE: CPT | Performed by: EMERGENCY MEDICINE

## 2022-11-30 PROCEDURE — 99285 EMERGENCY DEPT VISIT HI MDM: CPT | Mod: 25 | Performed by: EMERGENCY MEDICINE

## 2022-11-30 PROCEDURE — 73201 CT UPPER EXTREMITY W/DYE: CPT | Mod: LT

## 2022-11-30 PROCEDURE — 85025 COMPLETE CBC W/AUTO DIFF WBC: CPT | Performed by: EMERGENCY MEDICINE

## 2022-11-30 PROCEDURE — 250N000009 HC RX 250: Performed by: EMERGENCY MEDICINE

## 2022-11-30 PROCEDURE — 258N000003 HC RX IP 258 OP 636

## 2022-11-30 RX ORDER — IOPAMIDOL 755 MG/ML
100 INJECTION, SOLUTION INTRAVASCULAR ONCE
Status: COMPLETED | OUTPATIENT
Start: 2022-11-30 | End: 2022-11-30

## 2022-11-30 RX ORDER — SODIUM CHLORIDE 9 MG/ML
INJECTION, SOLUTION INTRAVENOUS
Status: COMPLETED
Start: 2022-11-30 | End: 2022-11-30

## 2022-11-30 RX ORDER — SODIUM CHLORIDE 9 MG/ML
INJECTION, SOLUTION INTRAVENOUS CONTINUOUS
Status: DISCONTINUED | OUTPATIENT
Start: 2022-11-30 | End: 2022-12-02

## 2022-11-30 RX ORDER — LIDOCAINE 40 MG/G
CREAM TOPICAL
Status: DISCONTINUED | OUTPATIENT
Start: 2022-11-30 | End: 2022-12-03 | Stop reason: HOSPADM

## 2022-11-30 RX ORDER — ONDANSETRON 2 MG/ML
4 INJECTION INTRAMUSCULAR; INTRAVENOUS EVERY 6 HOURS PRN
Status: DISCONTINUED | OUTPATIENT
Start: 2022-11-30 | End: 2022-12-03 | Stop reason: HOSPADM

## 2022-11-30 RX ORDER — ONDANSETRON 4 MG/1
4 TABLET, ORALLY DISINTEGRATING ORAL EVERY 6 HOURS PRN
Status: DISCONTINUED | OUTPATIENT
Start: 2022-11-30 | End: 2022-12-03 | Stop reason: HOSPADM

## 2022-11-30 RX ORDER — ACETAMINOPHEN 500 MG
1000 TABLET ORAL EVERY 8 HOURS PRN
Status: DISCONTINUED | OUTPATIENT
Start: 2022-11-30 | End: 2022-12-03 | Stop reason: HOSPADM

## 2022-11-30 RX ADMIN — IOPAMIDOL 100 ML: 755 INJECTION, SOLUTION INTRAVENOUS at 20:18

## 2022-11-30 RX ADMIN — SODIUM CHLORIDE, PRESERVATIVE FREE: 5 INJECTION INTRAVENOUS at 23:53

## 2022-11-30 RX ADMIN — SODIUM CHLORIDE: 9 INJECTION, SOLUTION INTRAVENOUS at 23:53

## 2022-11-30 RX ADMIN — VANCOMYCIN HYDROCHLORIDE 1500 MG: 10 INJECTION, POWDER, LYOPHILIZED, FOR SOLUTION INTRAVENOUS at 21:18

## 2022-11-30 RX ADMIN — SODIUM CHLORIDE 80 ML: 9 INJECTION, SOLUTION INTRAVENOUS at 20:18

## 2022-11-30 RX ADMIN — NALOXONE HYDROCHLORIDE 4 MG: 4 SPRAY NASAL at 15:36

## 2022-11-30 ASSESSMENT — ACTIVITIES OF DAILY LIVING (ADL)
ADLS_ACUITY_SCORE: 37

## 2022-11-30 NOTE — ED TRIAGE NOTES
Pt brought in by ambulance, found sleeping on a thrift store couch. Pt requested an ambulance to take them to the hospital for hand pain. Pt somnolent upon assessment, not responding to questions. Requires vigorous stimulation to arous. Left hand joints appear swollen.      Triage Assessment     Row Name 11/30/22 6744       Triage Assessment (Adult)    Airway WDL WDL       Respiratory WDL    Respiratory WDL WDL       Skin Circulation/Temperature WDL    Skin Circulation/Temperature WDL X       Cardiac WDL    Cardiac WDL WDL       Peripheral/Neurovascular WDL    Peripheral Neurovascular WDL WDL       Cognitive/Neuro/Behavioral WDL    Cognitive/Neuro/Behavioral WDL X;level of consciousness    Level of Consciousness somnolent    Arousal Level arouses to vigorous stimulation    Mood/Behavior agitated

## 2022-11-30 NOTE — ED PROVIDER NOTES
[facility] EMERGENCY DEPARTMENT ([facility])    11/30/22      ED PROVIDER NOTE  ED 3  3:01 PM     History     Chief Complaint   Patient presents with     Hand Pain     The history is provided by medical records.     Queen Brittany Stack is a 32 year old female with history of substance use disorder, cellulitis and tenosynovitis of the left hand complicated by leaving hospital AMA X 2, who presents for evaluation after being found somnolent on a thrift store couch. 911 was called, she told police to call an ambulance for her because her left hand hurts. Here history is limited, she is sleepy and laying on her left side, moaned when her left hand was being evaluated but otherwise noncontributory to history. Is able to rouse to vigorous stimulation.      Past Medical and Surgical History, Medications, Allergies, and Social History were reviewed in the electronic medical record. Review with the patient was attempted but limited due to altered mental status.       Review of Systems  A complete review of systems was attempted but limited due to altered mental status.    Physical Exam   BP: (!) 142/83  Pulse: 84  Temp: 98.2  F (36.8  C)  Resp: 16  SpO2: 99 %  Physical Exam  GEN:  Well developed, sleeping, appears in no distress,  HEENT:  Pinpoint pupils, Mucous membranes are moist.   Cardio:  RRR, no murmur, radial pulses equal bilaterally  PULM:  Lungs clear, good air movement, no wheezes, rales  Abd:  Soft, normal bowel sounds, no focal tenderness  Musculoskeletal: There is swelling of the left hand, especially the third, fourth and fifth digits.  She has been treated in the past for tenosynovitis with I&D, antibiotics and left the hospital AMA.  Patient does not cooperate with the exam, but seems to have increased pain when I checked passive range of motion of the fingers of the left hand.  No tenderness along the C-spine, T-spine, L-spine  Neuro: Sleeping, lying on her side, left hand was between her thighs.   Moves all extremities spontaneously, does not respond to questions  Skin:  Warm, dry    ED Course     ED Course as of 11/30/22 1744   Wed Nov 30, 2022   1608 Patient reassessed 30 minutes after receiving Narcan. Patient is asleep     Procedures  After the nurse gave her Narcan, the patient woke up and asked for a turkey sandwich, 2 cranberry juices with ice and hot chocolate.  She fell back to sleep after this.  When I went into the room later, her eyes were open and she responded to voice, however, when I asked her what she was here for, the patient closed her eyes and seemingly pretended to be asleep again.    She will be observed in the ED until she is awake and alert and able to provide a history.  Given her history of noncompliance, leaving AMA, I suspect she will want to be discharged.       No results found for any visits on 11/30/22.  Medications   naloxone (NARCAN) nasal spray 4 mg (4 mg Alternating Nostrils Given 11/30/22 1536)        Assessments & Plan (with Medical Decision Making)   Patient presents by ambulance because she was asleep in a thrift store.  She has not told us why she is here or if she has any complaints.  She did however wake up and ask for food and hot chocolate.  There is no sign of injury, no fever.  She does have obvious swelling of the left hand, however, this is chronic.  Patient has had 2 previous hospitalizations for this left hand swelling where she left AMA.  Is not clear to me if she is taking antibiotics for it or not.  Since patient is not responding to questions, not cooperating with exam, she will be observed.  I suspect that she wants a warm, dry place to sleep for the time being.    I have reviewed the nursing notes. I have reviewed the findings, diagnosis, plan and need for follow up with the patient.    New Prescriptions    No medications on file       Final diagnoses:   Opioid abuse (H)     I, Janis Hill, am serving as a trained medical scribe to document services  personally performed by Carolina Bazzi MD based on the provider's statements to me on November 30, 2022.  This document has been checked and approved by the attending provider.    I, Carolina Bazzi MD, was physically present and have reviewed and verified the accuracy of this note documented by Janis Hill, medical scribe.      Carolina Bazzi MD       [facility] EMERGENCY DEPARTMENT  11/30/2022     Carolina Bazzi MD  11/30/22 1743

## 2022-12-01 LAB
ALBUMIN SERPL-MCNC: 3.1 G/DL (ref 3.4–5)
ALP SERPL-CCNC: 58 U/L (ref 40–150)
ALT SERPL W P-5'-P-CCNC: 29 U/L (ref 0–50)
ANION GAP SERPL CALCULATED.3IONS-SCNC: 6 MMOL/L (ref 3–14)
AST SERPL W P-5'-P-CCNC: 18 U/L (ref 0–45)
BILIRUB SERPL-MCNC: 0.3 MG/DL (ref 0.2–1.3)
BUN SERPL-MCNC: 17 MG/DL (ref 7–30)
CALCIUM SERPL-MCNC: 8.8 MG/DL (ref 8.5–10.1)
CHLORIDE BLD-SCNC: 109 MMOL/L (ref 94–109)
CO2 SERPL-SCNC: 26 MMOL/L (ref 20–32)
CREAT SERPL-MCNC: 0.68 MG/DL (ref 0.52–1.04)
ERYTHROCYTE [DISTWIDTH] IN BLOOD BY AUTOMATED COUNT: 14.2 % (ref 10–15)
GFR SERPL CREATININE-BSD FRML MDRD: >90 ML/MIN/1.73M2
GLUCOSE BLD-MCNC: 93 MG/DL (ref 70–99)
HCT VFR BLD AUTO: 37.7 % (ref 35–47)
HGB BLD-MCNC: 12.7 G/DL (ref 11.7–15.7)
MCH RBC QN AUTO: 29.3 PG (ref 26.5–33)
MCHC RBC AUTO-ENTMCNC: 33.7 G/DL (ref 31.5–36.5)
MCV RBC AUTO: 87 FL (ref 78–100)
PLATELET # BLD AUTO: 318 10E3/UL (ref 150–450)
POTASSIUM BLD-SCNC: 4.2 MMOL/L (ref 3.4–5.3)
PROT SERPL-MCNC: 6.7 G/DL (ref 6.8–8.8)
RBC # BLD AUTO: 4.33 10E6/UL (ref 3.8–5.2)
SODIUM SERPL-SCNC: 141 MMOL/L (ref 133–144)
WBC # BLD AUTO: 5.5 10E3/UL (ref 4–11)

## 2022-12-01 PROCEDURE — 99223 1ST HOSP IP/OBS HIGH 75: CPT | Mod: FS | Performed by: REGISTERED NURSE

## 2022-12-01 PROCEDURE — 99253 IP/OBS CNSLTJ NEW/EST LOW 45: CPT | Mod: 24

## 2022-12-01 PROCEDURE — 85027 COMPLETE CBC AUTOMATED: CPT | Performed by: INTERNAL MEDICINE

## 2022-12-01 PROCEDURE — 99232 SBSQ HOSP IP/OBS MODERATE 35: CPT | Performed by: INTERNAL MEDICINE

## 2022-12-01 PROCEDURE — 36415 COLL VENOUS BLD VENIPUNCTURE: CPT | Performed by: INTERNAL MEDICINE

## 2022-12-01 PROCEDURE — 120N000002 HC R&B MED SURG/OB UMMC

## 2022-12-01 PROCEDURE — 250N000011 HC RX IP 250 OP 636: Performed by: INTERNAL MEDICINE

## 2022-12-01 PROCEDURE — 258N000003 HC RX IP 258 OP 636: Performed by: INTERNAL MEDICINE

## 2022-12-01 PROCEDURE — 999N000127 HC STATISTIC PERIPHERAL IV START W US GUIDANCE

## 2022-12-01 PROCEDURE — 82040 ASSAY OF SERUM ALBUMIN: CPT | Performed by: INTERNAL MEDICINE

## 2022-12-01 PROCEDURE — 80053 COMPREHEN METABOLIC PANEL: CPT | Performed by: INTERNAL MEDICINE

## 2022-12-01 PROCEDURE — 250N000013 HC RX MED GY IP 250 OP 250 PS 637: Performed by: INTERNAL MEDICINE

## 2022-12-01 RX ORDER — SODIUM CHLORIDE 9 MG/ML
INJECTION, SOLUTION INTRAVENOUS
Status: DISPENSED
Start: 2022-12-01 | End: 2022-12-01

## 2022-12-01 RX ORDER — HYDROXYZINE HYDROCHLORIDE 25 MG/1
25 TABLET, FILM COATED ORAL EVERY 4 HOURS PRN
Status: DISCONTINUED | OUTPATIENT
Start: 2022-12-01 | End: 2022-12-03 | Stop reason: HOSPADM

## 2022-12-01 RX ORDER — ALBUTEROL SULFATE 90 UG/1
2 AEROSOL, METERED RESPIRATORY (INHALATION) EVERY 6 HOURS PRN
Status: DISCONTINUED | OUTPATIENT
Start: 2022-12-01 | End: 2022-12-03 | Stop reason: HOSPADM

## 2022-12-01 RX ORDER — RISPERIDONE 1 MG/1
1 TABLET ORAL AT BEDTIME
Status: DISCONTINUED | OUTPATIENT
Start: 2022-12-01 | End: 2022-12-03 | Stop reason: HOSPADM

## 2022-12-01 RX ORDER — VANCOMYCIN HYDROCHLORIDE 1 G/200ML
1000 INJECTION, SOLUTION INTRAVENOUS EVERY 12 HOURS
Status: DISCONTINUED | OUTPATIENT
Start: 2022-12-01 | End: 2022-12-01

## 2022-12-01 RX ADMIN — RISPERIDONE 1 MG: 1 TABLET ORAL at 21:50

## 2022-12-01 RX ADMIN — VANCOMYCIN HYDROCHLORIDE 1250 MG: 10 INJECTION, POWDER, LYOPHILIZED, FOR SOLUTION INTRAVENOUS at 11:33

## 2022-12-01 RX ADMIN — SODIUM CHLORIDE: 9 INJECTION, SOLUTION INTRAVENOUS at 11:31

## 2022-12-01 ASSESSMENT — ACTIVITIES OF DAILY LIVING (ADL)
ADLS_ACUITY_SCORE: 20
WALKING_OR_CLIMBING_STAIRS_DIFFICULTY: NO
WEAR_GLASSES_OR_BLIND: NO
ADLS_ACUITY_SCORE: 20
FALL_HISTORY_WITHIN_LAST_SIX_MONTHS: NO
ADLS_ACUITY_SCORE: 20
CONCENTRATING,_REMEMBERING_OR_MAKING_DECISIONS_DIFFICULTY: NO
DOING_ERRANDS_INDEPENDENTLY_DIFFICULTY: NO
ADLS_ACUITY_SCORE: 20
CHANGE_IN_FUNCTIONAL_STATUS_SINCE_ONSET_OF_CURRENT_ILLNESS/INJURY: NO
TOILETING_ISSUES: NO
ADLS_ACUITY_SCORE: 20
DRESSING/BATHING_DIFFICULTY: NO
ADLS_ACUITY_SCORE: 20
DIFFICULTY_EATING/SWALLOWING: NO

## 2022-12-01 NOTE — PHARMACY-VANCOMYCIN DOSING SERVICE
Pharmacy Vancomycin Initial Note  Date of Service 2022  Patient's  1990  32 year old, female    Indication: chronic septic arthritis    Current estimated CrCl = Estimated Creatinine Clearance: 100.8 mL/min (based on SCr of 0.76 mg/dL).    Creatinine for last 3 days  2022:  7:14 PM Creatinine 0.76 mg/dL    Recent Vancomycin Level(s) for last 3 days  No results found for requested labs within last 72 hours.      Vancomycin IV Administrations (past 72 hours)                   vancomycin (VANCOCIN) 1,500 mg in 0.9% NaCl 250 mL intermittent infusion (mg) 1,500 mg New Bag 22 211                Nephrotoxins and other renal medications (From now, onward)    Start     Dose/Rate Route Frequency Ordered Stop    22 0800  vancomycin (VANCOCIN) 1,250 mg in 0.9% NaCl 250 mL intermittent infusion         1,250 mg  over 90 Minutes Intravenous EVERY 12 HOURS 22            Contrast Orders - past 72 hours (72h ago, onward)    Start     Dose/Rate Route Frequency Stop    22 1930  iopamidol (ISOVUE-370) solution 100 mL         100 mL Intravenous ONCE 22          Insight[employer] Prediction of Planned Initial Vancomycin Regimen  Did not use insight rx at this time and dosed based on past levels/regimens from earlier this month.        Plan:  1. Start vancomycin  1500mg given last night and continue at 1250mg IV q12h (20.8mg/kg/dose).   2. Vancomycin monitoring method: AUC  3. Vancomycin therapeutic monitoring goal: 400-600 mg*h/L  4. Pharmacy will check vancomycin levels as appropriate in 1-3 Days.    5. Serum creatinine levels will be ordered every 48 hours.      Funmilayo Olvera, PharmD, BCPS

## 2022-12-01 NOTE — CONSULTS
Care Management Follow Up    Length of Stay (days): 1    SW consulted for Rule 25 Assessment, Community Resources, Complex Psychosocial Issues, Crisis Intervention, Discharge Planning, and Mental and/or Chemical Health Issues.    SW spoke to pt briefly by phone, as pt wanted to end phone call to eat. Pt stated she is not interested in pursuing CD Tx. Pt reported that she does not have a residence, and is interested in shelter resources. Pt stated she does not have a suboxone provider.    Medical shelter would be a potential placement option for pt (if IV abx were needed, pt would need to be IND with medication management and all other ADLs). Otherwise, pt will need to arrange her own shelter via [facility]. CM Team will be able to assist more with discharge planning once recommendations are established.    If pt would like to purse IP CD Tx, then CD  can be consulted to complete Rule of 25 Assessment (SW does not complete). SW can provide OP CD Tx options and locations of where Rule 25 assessments can be completed upon discharge, if pt is interested.    SW/RNCC will remain available for discharge planning.        MARVEL Carmen, LSW  [facility]   PHONE: [phone]  Pager: [phone]

## 2022-12-01 NOTE — CONSULTS
[facility]Y GENERAL INFECTIOUS DISEASES CONSULTATION     Patient:  Queen Brittany Stack   Date of birth 1990, Medical record number [id number]  Date of Visit:  12/01/2022  Date of Admission: 11/30/2022  Consult Requester:Evans Espinosa DO          Assessment and Recommendations:   ASSESSMENT:  # Left middle, ring, and small finger proximal interphalangeal joint septic arthritis and Left ring finger pyogenic flexor tenosynovitis   - S/p I&D (10/17/2022) at [facility] with wound/tissue op culture positive for numerous organisms: MRSA, Enterococcus faecium, Enterococcus mundtii, Lactococcus, Streptococcus mitis/oralis group, Streptococcus parasanguinis, Streptococcus constellatus  Stpahylococcus xylosus, Granulicatella, Vagococcus fluvialis, Gemella morbillorum, Veillonella parvula group, Pseudoescherichia vulneris, Klebsiella oxytoca,  E. Coli,  Enterobacter cloacae (among the several specimens sent on 10/17).     - S/p new debridement on 10/30 and per report describes small amount of purulence and devitalized tissue which was debrided. Intra-op cultures: MRSA and Streptococcus pyogenes (broth only)    # Polysubstance use disorder  - denies past or recent use     # Bipolar disorder       DISCUSSION:   Queen Brittany Stack is a 32 year old female with a PMH of housing instability, substance abuse disorder, and cellulitis/tenosynovitis of the left hand s/p I&D 10/17 at [facility] and on 10/30 at [facility] c/b leaving AMA x2 who presents after being found with decreased level of consciousness on a thrift store couch. She has remained afebrile and vitally stable here. Denies any previous or recent IVDU. On exam her left hand is less concerning for an acute infectious process with no active drainage, erythema, or pain on examination. Would, however, recommend wound care to help with the ulcerated areas of her PIP joints. Labs are unremarkable with no elevated WBC, normal ESR and CRP. Blood cultures (11/30) are no growth to date. Her most  recent intra-op cultures are positive for MRSA and Strep pyogenes (broth only) and she is currently covered on IV vancomycin. CT left hand shows soft tissue swelling throughout the hand (worse 3rd, 4th, and 5th fingers) new mild bony erosive changes involving the heads of the proximal phalanges at the PIP joints of th 3rd, 4th, and 5th fingers, which given the history of sub optimal treatment for her left hand infection is concerning for possible osteomyelitis, no obvious fluid collection was seen. Orthopedics has been consulted. If I&D is planned, then would appreciate intra-op cultures to help guide antibiotic choices. Patient is amenable currently to staying for treatment with IV antibiotics. Will not change antibiotics at this point. If the patient does decided to check out AMA, would recommend doxycycline 100 mg PO bid for 4 weeks. We are happy to follow the patient in our outpatient ID clinic if she decides to leave AMA.    RECOMMENDATION:  1. Continue IV vancomycin, dosed per pharmacy  2. Will continue to follow blood cultures  3. Recommend wound care consult to help manage left PIP joint ulcerations  4. Appreciate orthopedic surgery recommendations and if I&D is planned, would appreciate intra-op cultures to help guide antibiotic choices  5. If patient decides to leave AMA, would recommend doxycyline 100 mg PO BID for 4 weeks      Thank you for this consult. ID will continue to follow.     Patient was discussed with Dr. Ruth.     VANCE Palma, CNP  Infectious Diseases  Pager# [phone]  ________________________________________________________________    Consult Question: Septic arthritis.  Admission Diagnosis: Cellulitis of hand [L03.119]         History of Present Illness:     HPI obtained from chart review and somewhat limited by patient's lack of cooperation with interview.    Queen Brittany Stack is a 32 year old female with a PMH of housing instability, substance abuse disorder, and  "cellulitis/tenosynovitis of the left hand s/p I&D 10/17 at [facility] and on 10/30 at [facility] c/b leaving AMA christina who presents after being found with decreased level of consciousness on a thrift store couch.    She was afebrile and vitally stable in the ED, given narcan with improvement, and with obvious swelling of her left hand. Was recently prescribed 7 day course of oral doxycyline on 11/22 from [facility] for a known left hand infection (septic arthritis of left 3rd, 4th, and 5th PIP joints and left 4th digit pyogenic flexor tenosynovitis). Seen at [facility] ED on 11/22 for a drug overdose and 11/27 sleeping in triage area, but was ejected from the ED when she became verbally abusive and uncooperative. Given a prescription for doxycycline 100 mg PO BID for 7 days by [facility] on 11/22 on discharge but states she did not take any of this prescription    Here she presents afebrile, vitally stable and without any complaints. She denies Denies headache, fever, chills, night sweats, fatigue, dyspnea, nausea, vomiting, abdominal pain, diarrhea, dysuria, myalgias, arthralgias, lymphadenopathy, acute rash, or new wounds.        Per Dr. Rivas's note:  \"Recent ED visit ([facility] on 10/17) for left 3rd, 4th, and 5th digit infection s/p I&D (10/17/2022) with presumable op culture positive for numerous organisms: MRSA, Enterococcus faecium, Enterococcus mundtii, Lactococcus, Streptococcus mitis/oralis group, Streptococcus parasanguinis, Streptococcus constellatus  Staphylococcus xylosus, Granulicatella, Vagococcus fluvialis, Gemella morbillorum, Veillonella parvula group, Pseudoescherichia vulneris, Klebsiella oxytoca,  E. Coli,  Enterobacter cloacae (among the several specimens sent on 10/17). I called [facility] micro lab and they informed me that they only performed susceptibilities for the MRSA and not for the other numerous organisms. They already discarded the specimen from 10/17. Not clear to me which organisms grew from op cultures and which organisms " "grew from wound (superficial) cultures.  Blood culture from 10/16 positive for Staphylococcus epidermidis in one isolated bottle (anaerobic bottle) - likely contaminant. Repeat blood cultures from 10/17 were negative. He was admitted to [facility] and treated w/ IV Unasyn and Vancomycin. She left AMA on 10/18, with prescription of PO doxycycline sent to pharmacy.  She did not take the oral antibiotic. She was then re-hospitalized to [facility] on 10/29/2022 for left hand pain. She underwent new I&D on 1/30 by ortho team and op report describes small amount of purulence and devitalized tissue which was debrided. Post operative diagnosis was Left middle, ring, and small finger proximal interphalangeal joint septic arthritis and Left ring finger pyogenic flexor tenosynovitis. Intra-op cultures were sent and are in process.  She was started on daptomycin, cefepime and metronidazole. Op cultures from the I&D from 10/30/22 were positive for MRSA and Streptococcus pyogenes. Unfortunately the patient left AMA again on 11/2/22 and she was given a prescription of oral antibiotics (augmentin and bactrim) for 4 weeks. She did not take the oral antibiotics. Now she presents again to ED on 11/9. Per reports she was acutely intoxicated and reported left hand pain.     On arrival she was afebrile and had white count of 6.8. CRP was 4.1 (normal), ESR was 21. She was started on cefepime and vancomycin on 11/9/22. Renal function is normal. Ortho evaluated the patient and per ortho assessment the exam was reassuring this morning anf they are not planning to perform new I&D. They will continue to follow peripherally. On my evaluation today the left hand looks much improved and is healing well. No drainage. No significant swelling. No drainage\"           Review of Systems:   Review of systems is otherwise negative. Pertinent positives and negatives listed above.           Past Medical History:     Past Medical History:   Diagnosis Date     " Abnormal Pap smear     2010, f/u normal     Anxiety      Asthma     no ihaler use     Bipolar 1 disorder (H)      Bipolar affective (H)      Fainting spell      Herpes simplex without mention of complication     Pt reports last outbreak about 4 weeks ago     Major depressive disorder      Pelvic inflammatory disease      Polysubstance abuse (H)             Past Surgical History:     Past Surgical History:   Procedure Laterality Date     DILATION AND CURETTAGE SUCTION N/A 4/23/2021    Procedure: DILATION AND CURETTAGE, UTERUS, USING SUCTION;  Surgeon: Lanie Yates MD;  Location: [facility] OR     IRRIGATION AND DEBRIDEMENT HAND, COMBINED Left 10/30/2022    Procedure: IRRIGATION AND DEBRIDEMENT, LEFT HAND;  Surgeon: Anshul Davis MD;  Location: [facility] OR     NO HISTORY OF SURGERY              Family History:   Reviewed and non-contributory.   Family History   Problem Relation Age of Onset     Unknown/Adopted Mother      Unknown/Adopted Father      Unknown/Adopted Maternal Grandmother      Unknown/Adopted Maternal Grandfather      Unknown/Adopted Paternal Grandmother      Unknown/Adopted Paternal Grandfather             Social History:     Social History     Tobacco Use     Smoking status: Every Day     Packs/day: 0.50     Years: 8.00     Pack years: 4.00     Types: Cigarettes     Smokeless tobacco: Never   Substance Use Topics     Alcohol use: Not Currently     Comment: denies use     History   Sexual Activity     Sexual activity: Yes     Partners: Male     Birth control/ protection: None     Comment: NONE            Current Medications:       risperiDONE  1 mg Oral At Bedtime     sodium chloride (PF)  3 mL Intracatheter Q8H     vancomycin  1,250 mg Intravenous Q12H            Allergies:     Allergies   Allergen Reactions     Bees Anaphylaxis     Morphine Hives     Other reaction(s): Hives       Oxycodone Hives and Itching     Pork Derived Products Other (See Comments)     Scientologist reasons            Physical Exam:    Vitals were reviewed  Patient Vitals for the past 24 hrs:   BP Temp Temp src Pulse Resp SpO2 Weight   12/01/22 0842 126/87 97.1  F (36.2  C) Oral 89 17 92 % --   12/01/22 0032 125/73 98  F (36.7  C) Oral 71 18 100 % 60.1 kg (132 lb 9.6 oz)   12/01/22 0003 134/80 98.2  F (36.8  C) Oral -- -- 98 % --   11/30/22 1536 (!) 142/78 -- -- 96 -- -- --   11/30/22 1504 -- 98.2  F (36.8  C) Oral -- -- -- --   11/30/22 1459 (!) 142/83 -- -- 84 16 99 % --       Physical Examination:  GENERAL: in bed in no acute distress. Laying in bed and difficult to wake  HEENT:  Head is normocephalic, atraumatic   EYES:  Eyes have anicteric sclerae without conjunctival injection.    ENT:  Oropharynx is moist without exudates or ulcers. Tongue is midline  NECK:  Supple. No cervical lymphadenopathy  LUNGS:  Clear to auscultation bilateral.   CARDIOVASCULAR:  Regular rate and rhythm with no murmurs, gallops or rubs.  ABDOMEN:  Normal bowel sounds, soft, nontender. No appreciable hepatosplenomegaly  SKIN: Left hand with swollen PIP joints of 3rd, 4th, and 5th fingers with old scabbed ulcerations on the dorsal surface of these PIP joints, non erythematous, no fluctuance, non tender, no drainage, negative for rash and pruritus. No acute rashes.  Line(s) are in place without any surrounding erythema or exudate. No stigmata of endocarditis. Bilateral great toes with subungual discoloration  NEUROLOGIC:  Grossly nonfocal. Active x4 extremities  MUSCULOSKELETAL: Decreased flexion of left 3rd, 4th and 5th fingers. Negative for myalgias or arthralgias, joint swelling, erythema, or tenderness         Laboratory Data:     Inflammatory Markers    Recent Labs   Lab Test 11/30/22  1914 11/09/22  1806 11/02/22  0857 10/31/22  0601 10/29/22  1348   SED 10 21*  --   --  27*   CRP <2.9 4.1 58.0* 76.0* 96.0*       Hematology Studies    Recent Labs   Lab Test 11/30/22  1914 11/18/22  1107 11/11/22  0645 11/09/22  1806 11/02/22  0857 11/01/22  0609 04/16/22  0659  05/03/21  0757 04/23/21  0938 04/16/21  1229 04/14/21  1500 04/14/21  1011 11/20/19  0828 05/30/19  2317 01/14/18  1741 06/13/15  1236   WBC 5.7 5.6 6.4 6.8 5.4 6.8   < > 7.5  --  11.0 8.9   < > 3.9* 6.6   < > 5.0   ANEU  --   --   --   --   --   --   --  4.2  --  8.7* 6.1  --  1.3* 3.6  --  2.1   AEOS  --   --   --   --   --   --   --  0.0  --  0.0 0.0  --  0.0 0.0  --  0.0   HGB 12.8 12.6 13.5 13.0 11.9 11.8   < > 11.4*   < > 12.0 10.2*   < > 14.8 15.5   < > 13.6   MCV 87 88 87 88 88 88   < > 93  --  91 90   < > 88 93   < > 85    325 353 385 441 402   < > 340  --  365 335   < > 225 249   < > 242    < > = values in this interval not displayed.       Metabolic Studies     Recent Labs   Lab Test 11/30/22  1914 11/18/22  1107 11/11/22  0642 11/09/22  1806 11/02/22  0857    138 137 141 138   POTASSIUM 4.1 4.7 4.4 4.2 4.3   CHLORIDE 110* 110* 104 106 107   CO2 29 23 28 29 28   BUN 18 16 15 19 16   CR 0.76 0.71 0.66 0.86 0.58  0.60   GFRESTIMATED >90 >90 >90 >90 >90  >90       Hepatic Studies    Recent Labs   Lab Test 11/02/22  0857 11/01/22  0609 10/31/22  0601 10/30/22  0630 10/29/22  1348 04/16/22  0659   BILITOTAL 0.2 0.1* 0.1* 0.2 0.4 0.2   ALKPHOS 60 63 61 63 83 58   ALBUMIN 2.5* 2.4* 2.2* 2.5* 3.4 3.2*   AST 16 21 41 15 17 14   ALT 28 33 39 30 42 22       Microbiology:  Culture   Date Value Ref Range Status   11/30/2022 No growth after 12 hours  Preliminary   10/30/2022 No anaerobic organisms isolated  Final   10/30/2022 Isolated in broth only Staphylococcus aureus (A)  Final     Comment:     On day 2 of incubationNot isolated or reported on routine aerobic cultureSusceptibilities done on previous cultures   10/30/2022 (A)  Final    Isolated in broth only Streptococcus pyogenes (Group A Streptococcus)     Comment:     Cultured on the 3rd day of incubationThis organism is susceptible to ampicillin, penicillin, vancomycin and the cephalosporins. If treatment is required and your patient is allergic to  penicillin, contact the microbiology lab within 5 days to reques  t susceptibility testing.   10/30/2022 No anaerobic organisms isolated  Final   10/30/2022 1+ Staphylococcus aureus MRSA (A)  Final   10/30/2022 1+ Streptococcus pyogenes (Group A Streptococcus) (A)  Final     Comment:     This organism is susceptible to ampicillin, penicillin, vancomycin and the cephalosporins. If treatment is required and your patient is allergic to penicillin, contact the microbiology lab within 5 days to request susceptibility testing.   10/29/2022 No Growth  Final       Urine Studies    Recent Labs   Lab Test 10/29/22  1503 04/14/21  1028 08/13/16  1520   LEUKEST Negative Large* Negative   WBCU <1 50*  --        Vancomycin Levels    Recent Labs   Lab Test 11/11/22  0852   VANCOMYCIN 8.2       Hepatitis B Testing   Recent Labs   Lab Test 04/14/21  1011   HEPBANG Nonreactive     Hepatitis C Testing     Hepatitis C Antibody   Date Value Ref Range Status   04/16/2021 Nonreactive NR^Nonreactive Final     Comment:     Assay performance characteristics have not been established for newborns,   infants, and children     03/29/2012 Negative NEG Final     Respiratory Virus Testing    No results found for: RS, FLUAG          Imaging:   EXAM: CT HAND LEFT W CONTRAST  LOCATION: [facility]  DATE/TIME: 11/30/2022 8:24 PM                                                                      IMPRESSION:  1.  Nonspecific soft tissue swelling throughout the hand, particularly the third, fourth, and fifth fingers, with associated ulcerations over the dorsal aspects of the third, fourth, and fifth fingers at the level of the PIP joints.     2.  Mild bony erosive changes involving the heads of the proximal phalanges at the PIP joints, in the third, fourth, and fifth fingers. The appearance and location would suggest that the eroded portions of the bones are intra-articular, but no large joint effusion is  evident. Findings could be due to gout, but an infectious or non-crystal deposition inflammatory cause would not be excluded by this study.     3.  No obvious fluid collection is evident as a target for aspiration, although CT imaging is limited for evaluation of the small soft tissue structures of the hand and wrist. MRI might be helpful to more sensitively evaluate the joint spaces and tendon   sheaths, if clinically appropriate.      XR Left Hand from OSH 11/21/22    Impression:. Fusiform soft tissue swelling involving the left third-fifth digits centered on the PIP joints with periarticular osteopenia which may be related to underlying hyperemia. No bony destruction, fracture or subcutaneous gas identified.

## 2022-12-01 NOTE — PROGRESS NOTES
[facility]    Medicine Progress Note - Hospitalist Service, GOLD TEAM 18    Date of Admission:  11/30/2022    Assessment & Plan     Queen Brittany Stack is a 32 year old female admitted on 11/30/2022. Patient has had repeat visits to this facility and others for left hand cellulitis and septic arthritis. Patient often leaves against medical advice. Per chart review patient was admitted to [facility] on 10/15/22 for left hand cellulitis.  Patient underwent I&D of left middle, ring, small finger proximal interphalangeal joint on 10/17/2022 but left the hospital AMA and did not continue antibiotics. She was prescribed doxycycline but she reportedly did not take prescribed antibiotics.  Wound cultures from this hospital stay were apparently positive for multiple organisms.     Patient next presented to [facility] on 10/29/2022 for worsening hand pain and underwent I&D during which she was found to have left 3rd, 4th and 5th digit PIP joint septic arthritis and left 4th digit pyogenic flexor tenosynovitis.  Tissue cultures were notable for MRSA and Strep pyogenes. Patient was treated with cefepime, daptomycin and metronidazole. Patient left AMA on 11/2/2022 prior to completing course of antibiotics. She was prescribed a 14-day course of amoxicillin/clavulanate and Bactrim before leaving the hospital.  Patient did not complete outpatient antibiotic course.     Per reports, today patient was found obtunded at a thrift store. Patient responded well to Narcan in the emergency department. Patient provided no medical history. Patient will be admitted to the hospitalist service for further evaluation and management.     #Chronic septic arthritis  -- Continue vancomycin IV (dosing per pharmacy) and cefepime. Cultures from 11/30 pos for MRSA and strep pyogenes   -- Infectious disease consultation  -- Orthopedic surgery consultation  -- Check blood cultures     #Unspecified psychiatric  disorder  -- Continue PTA risperidone at bedtime  -- Continue PTA Atarax     #Complex psychosocial scenario  -- Case management consultation     #Opioid use disorder  -- Work to minimize opioid use  -- Consider Suboxone        Diet: NPO per Anesthesia Guidelines for Procedure/Surgery Except for: Meds    DVT Prophylaxis: Pneumatic Compression Devices  Sims Catheter: Not present  Central Lines: None  Cardiac Monitoring: None  Code Status: Full Code          Diet: Regular Diet Adult    DVT Prophylaxis: Pneumatic Compression Devices  Sims Catheter: Not present  Central Lines: None  Cardiac Monitoring: None  Code Status: Full Code      Disposition Plan      Expected Discharge Date: 12/02/2022                The patient's care was discussed with the Patient.    Brock Currie MD  Hospitalist Service, GOLD TEAM 18  [facility]  Securely message with the Vocera Web Console (learn more here)  Text page via [facility] Paging/Directory   Please see signed in provider for up to date coverage information      Clinically Significant Risk Factors Present on Admission              # Hypoalbuminemia: Lowest albumin = 3.1 g/dL (Ref range: 3.5-5.2) at 12/1/2022  8:36 AM, will monitor as appropriate                 ______________________________________________________________________    Interval History   Pt was somnolent and not talking much when I came to see her. She reportedly ate lunch and fell asleep after wards. She is afebrile. On IV vancomycin and cefepime. ID consulted and seen pt. Inflammatory markers are low , further I and D procedure is not anticipated at this moment. No diarrhea, no vomiting.     Data reviewed today: I reviewed all medications, new labs and imaging results over the last 24 hours. I personally reviewed no images or EKG's today.    Physical Exam   Vital Signs: Temp: 97.1  F (36.2  C) Temp src: Oral BP: 126/87 Pulse: 89   Resp: 17 SpO2: 92 % O2 Device: None (Room  air)    Weight: 132 lbs 9.6 oz  General Appearance: Somnolent, in no apparent distress   Respiratory: CTAB  Cardiovascular: RRR, S1 and S2 normal   GI: soft and non distended. NT  Skin: no rashes   Other: Swollen but non erythematous left middle index and little finger proximal IP joints     Data   Recent Labs   Lab 12/01/22  0836 11/30/22  1914   WBC 5.5 5.7   HGB 12.7 12.8   MCV 87 87    324    143   POTASSIUM 4.2 4.1   CHLORIDE 109 110*   CO2 26 29   BUN 17 18   CR 0.68 0.76   ANIONGAP 6 4   TAZ 8.8 9.0   GLC 93 100*   ALBUMIN 3.1*  --    PROTTOTAL 6.7*  --    BILITOTAL 0.3  --    ALKPHOS 58  --    ALT 29  --    AST 18  --      Recent Results (from the past 24 hour(s))   CT Hand Left w Contrast    Narrative    EXAM: CT HAND LEFT W CONTRAST  LOCATION: [facility]  DATE/TIME: 11/30/2022 8:24 PM    INDICATION: Severe swelling deformity to multiple areas of left hand. History of incomplete treatment of septic arthritis of multiple joints of hand.  COMPARISON: Hand radiographs 11/9/2022 and 10/29/2022.  TECHNIQUE: IV contrast. Axial, sagittal and coronal thin-section reconstruction. Dose reduction techniques were used.   CONTRAST: Isovue-370 100 mL.    FINDINGS:    There is soft tissue swelling throughout the hand, but particularly the third, fourth, and fifth fingers. There is skin irregularity over the dorsal aspects of the third, fourth, and fifth fingers, compatible with areas of ulceration. This is most   significantly over the dorsal aspects of the third and fourth fingers, superficial to the PIP joints.    CT evaluation is limited for assessment of the soft tissues due to poor contrast and spatial resolution. There is questionably a small tenosynovial effusion in the flexor tendon sheath of the fourth finger. No clear tenosynovial effusion otherwise.    Normal joint alignment. No large joint effusion/fluid collection identified, but small and even  moderate joint effusions may be difficult to visualize on CT.    Nonspecific bony erosion of the head of the proximal phalanx in the fifth finger, at the PIP joint (sagittal series 7 images 36-47).    Additional erosion of the head of the head of the proximal phalanx in the fourth finger at the PIP joint (coronal series 5 images 65-69, sagittal series 7 images 13-19).    Additional more mild erosive change of the head of the proximal phalanx in the third finger at the PIP joint (sagittal series 7 images 62-72).    No erosions identified at the other bones or joints of the hand and wrist.      Impression    IMPRESSION:  1.  Nonspecific soft tissue swelling throughout the hand, particularly the third, fourth, and fifth fingers, with associated ulcerations over the dorsal aspects of the third, fourth, and fifth fingers at the level of the PIP joints.    2.  Mild bony erosive changes involving the heads of the proximal phalanges at the PIP joints, in the third, fourth, and fifth fingers. The appearance and location would suggest that the eroded portions of the bones are intra-articular, but no large   joint effusion is evident. Findings could be due to gout, but an infectious or non-crystal deposition inflammatory cause would not be excluded by this study.    3.  No obvious fluid collection is evident as a target for aspiration, although CT imaging is limited for evaluation of the small soft tissue structures of the hand and wrist. MRI might be helpful to more sensitively evaluate the joint spaces and tendon   sheaths, if clinically appropriate.     Medications     sodium chloride 100 mL/hr at 12/01/22 1131       sodium chloride         risperiDONE  1 mg Oral At Bedtime     sodium chloride (PF)  3 mL Intracatheter Q8H     vancomycin  1,250 mg Intravenous Q12H

## 2022-12-01 NOTE — H&P
[facility]    History and Physical - Hospitalist Service, GOLD TEAM        Date of Admission:  11/30/2022    Assessment & Plan      Queen Brittany Stack is a 32 year old female admitted on 11/30/2022. Patient has had repeat visits to this facility and others for left hand cellulitis and septic arthritis. Patient often leaves against medical advice. Per chart review patient was admitted to [facility] on 10/15/22 for left hand cellulitis.  Patient underwent I&D of left middle, ring, small finger proximal interphalangeal joint on 10/17/2022 but left the hospital AMA and did not continue antibiotics. She was prescribed doxycycline but she reportedly did not take prescribed antibiotics.  Wound cultures from this hospital stay were apparently positive for multiple organisms.     Patient next presented to [facility] on 10/29/2022 for worsening hand pain and underwent I&D during which she was found to have left 3rd, 4th and 5th digit PIP joint septic arthritis and left 4th digit pyogenic flexor tenosynovitis.  Tissue cultures were notable for MRSA and Strep pyogenes. Patient was treated with cefepime, daptomycin and metronidazole. Patient left AMA on 11/2/2022 prior to completing course of antibiotics. She was prescribed a 14-day course of amoxicillin/clavulanate and Bactrim before leaving the hospital.  Patient did not complete outpatient antibiotic course.    Per reports, today patient was found obtunded at a thrift store. Patient responded well to Narcan in the emergency department. Patient provided no medical history. Patient will be admitted to the hospitalist service for further evaluation and management.    #Chronic septic arthritis  -- Continue vancomycin IV (dosing per pharmacy)  -- Infectious disease consultation  -- Orthopedic surgery consultation  -- Check blood cultures    #Unspecified psychiatric disorder  -- Continue PTA risperidone at bedtime  -- Continue PTA  Atarax    #Complex psychosocial scenario  -- Case management consultation    #Opioid use disorder  -- Work to minimize opioid use  -- Consider Suboxone     Diet: NPO per Anesthesia Guidelines for Procedure/Surgery Except for: Meds    DVT Prophylaxis: Pneumatic Compression Devices  Sims Catheter: Not present  Central Lines: None  Cardiac Monitoring: None  Code Status: Full Code      Clinically Significant Risk Factors Present on Admission                              Disposition Plan      Expected Discharge Date: 12/02/2022                The patient's care was discussed with the Bedside Nurse and Patient.    Evans Espinosa DO  Hospitalist Service, [facility]  Securely message with the Vocera Web Console (learn more here)  Text page via [facility] Paging/Directory   Please see signed in provider for up to date coverage information      ______________________________________________________________________    Chief Complaint   Found obtunded at [facility]; chronic left hand septic arthritis.    History is obtained from the patient & electronic medical record.    History of Present Illness   Queen Brittany Stack is a 32 year old female admitted on 11/30/2022. Patient has had repeat visits to this facility and others for left hand cellulitis and septic arthritis. Patient often leaves against medical advice. Per chart review patient was [place] [facility] [place] for left hand cellulitis.  Patient underwent I&D of left middle, ring, small finger proximal interphalangeal joint on 10/17/2022 but left the hospital AMA and did not continue antibiotics. She was prescribed doxycycline but she reportedly did not take prescribed antibiotics.  Wound cultures from this hospital stay were apparently positive for multiple organisms.     Patient next presented to [facility] on 10/29/2022 for worsening hand pain and underwent I&D during which she was found to have left 3rd,  4th and 5th digit PIP joint septic arthritis and left 4th digit pyogenic flexor tenosynovitis.  Tissue cultures were notable for MRSA and Strep pyogenes. Patient was treated with cefepime, daptomycin and metronidazole. Patient left AMA on 11/2/2022 prior to completing course of antibiotics. She was prescribed a 14-day course of amoxicillin/clavulanate and Bactrim before leaving the hospital.  Patient did not complete outpatient antibiotic course.    Per reports, today patient was found obtunded at a thrift store. Patient responded well to Narcan in the emergency department. Patient provided no medical history. Patient will be admitted to the hospitalist service for further evaluation and management.    Review of Systems    Unable to obtain comprehensive ROS at present 2/2 somnolence.    Past Medical History    I have reviewed this patient's medical history and updated it with pertinent information if needed.   Past Medical History:   Diagnosis Date     Abnormal Pap smear     2010, f/u normal     Anxiety      Asthma     no ihaler use     Bipolar 1 disorder (H)      Bipolar affective (H)      Fainting spell      Herpes simplex without mention of complication     Pt reports last outbreak about 4 weeks ago     Major depressive disorder      Pelvic inflammatory disease      Polysubstance abuse (H)        Past Surgical History   I have reviewed this patient's surgical history and updated it with pertinent information if needed.  Past Surgical History:   Procedure Laterality Date     DILATION AND CURETTAGE SUCTION N/A 4/23/2021    Procedure: DILATION AND CURETTAGE, UTERUS, USING SUCTION;  Surgeon: Lanie Yates MD;  Location: [facility] OR     IRRIGATION AND DEBRIDEMENT HAND, COMBINED Left 10/30/2022    Procedure: IRRIGATION AND DEBRIDEMENT, LEFT HAND;  Surgeon: Anshul Davis MD;  Location: [facility] OR     NO HISTORY OF SURGERY         Social History   I have reviewed this patient's social history and updated it with pertinent  information if needed.  Social History     Tobacco Use     Smoking status: Every Day     Packs/day: 0.50     Years: 8.00     Pack years: 4.00     Types: Cigarettes     Smokeless tobacco: Never   Substance Use Topics     Alcohol use: Not Currently     Comment: denies use     Drug use: Not Currently     Types: Cocaine     Comment: denies use       Family History   I have reviewed this patient's family history and updated it with pertinent information if needed.  Family History   Problem Relation Age of Onset     Unknown/Adopted Mother      Unknown/Adopted Father      Unknown/Adopted Maternal Grandmother      Unknown/Adopted Maternal Grandfather      Unknown/Adopted Paternal Grandmother      Unknown/Adopted Paternal Grandfather        Prior to Admission Medications   Prior to Admission Medications   Prescriptions Last Dose Informant Patient Reported? Taking?   albuterol (PROAIR HFA/PROVENTIL HFA/VENTOLIN HFA) 108 (90 Base) MCG/ACT inhaler Unknown  Yes Yes   Sig: Inhale 2 puffs into the lungs every 6 hours as needed for shortness of breath / dyspnea or wheezing   hydrOXYzine (ATARAX) 25 MG tablet Unknown  No Yes   Sig: Take 1 tablet (25 mg) by mouth every 4 hours as needed for anxiety   medroxyPROGESTERone (DEPO-PROVERA) 150 MG/ML IM injection Unknown Self No Yes   Sig: Inject 1 mL (150 mg) into the muscle every 3 months Due 7/26/2021.   risperiDONE (RISPERDAL) 1 MG tablet Unknown  No Yes   Sig: Take 1 tablet (1 mg) by mouth At Bedtime      Facility-Administered Medications: None     Allergies   Allergies   Allergen Reactions     Bees Anaphylaxis     Morphine Hives     Other reaction(s): Hives       Oxycodone Hives and Itching     Pork Derived Products Other (See Comments)     Amish reasons       Physical Exam   Vital Signs: Temp: 98.2  F (36.8  C) Temp src: Oral BP: (!) 142/78 Pulse: 96   Resp: 16 SpO2: 99 % O2 Device: None (Room air)    Weight: 0 lbs 0 oz    GENERAL: Patient obtunded; minimal responsiveness to  cue.  HEENT: Normocephalic; atraumatic; PERRLA; MMM.  CV: RRR; normal S1, S2; no rubs, murmurs, or gallops.  RESP: Lung fields clear to aucultation B/L; no wheezing or crepitations.  GI: Abdomen is soft, nontender, nondistended; no organomegaly; normal bowel sounds.  : Deferred genital examination.   MSK: Swollen left hand joints (see pictures).   DERM: Skin is intact; no rash, lesions, or skin breakdown.  NEURO: No focal deficits appreciated; strength & sensorium are grossly intact.  PSYCH: No active hallucinations; affect, insight appear within normal limits.    Data   Data reviewed today: I reviewed all medications, new labs and imaging results over the last 24 hours. I personally reviewed no images or EKG's today.    Recent Labs   Lab 11/30/22 1914   WBC 5.7   HGB 12.8   MCV 87         POTASSIUM 4.1   CHLORIDE 110*   CO2 29   BUN 18   CR 0.76   ANIONGAP 4   TAZ 9.0   *     Recent Results (from the past 24 hour(s))   CT Hand Left w Contrast    Narrative    EXAM: CT HAND LEFT W CONTRAST  LOCATION: [facility]  DATE/TIME: 11/30/2022 8:24 PM    INDICATION: Severe swelling deformity to multiple areas of left hand. History of incomplete treatment of septic arthritis of multiple joints of hand.  COMPARISON: Hand radiographs 11/9/2022 and 10/29/2022.  TECHNIQUE: IV contrast. Axial, sagittal and coronal thin-section reconstruction. Dose reduction techniques were used.   CONTRAST: Isovue-370 100 mL.    FINDINGS:    There is soft tissue swelling throughout the hand, but particularly the third, fourth, and fifth fingers. There is skin irregularity over the dorsal aspects of the third, fourth, and fifth fingers, compatible with areas of ulceration. This is most   significantly over the dorsal aspects of the third and fourth fingers, superficial to the PIP joints.    CT evaluation is limited for assessment of the soft tissues due to poor contrast and spatial  resolution. There is questionably a small tenosynovial effusion in the flexor tendon sheath of the fourth finger. No clear tenosynovial effusion otherwise.    Normal joint alignment. No large joint effusion/fluid collection identified, but small and even moderate joint effusions may be difficult to visualize on CT.    Nonspecific bony erosion of the head of the proximal phalanx in the fifth finger, at the PIP joint (sagittal series 7 images 36-47).    Additional erosion of the head of the head of the proximal phalanx in the fourth finger at the PIP joint (coronal series 5 images 65-69, sagittal series 7 images 13-19).    Additional more mild erosive change of the head of the proximal phalanx in the third finger at the PIP joint (sagittal series 7 images 62-72).    No erosions identified at the other bones or joints of the hand and wrist.      Impression    IMPRESSION:  1.  Nonspecific soft tissue swelling throughout the hand, particularly the third, fourth, and fifth fingers, with associated ulcerations over the dorsal aspects of the third, fourth, and fifth fingers at the level of the PIP joints.    2.  Mild bony erosive changes involving the heads of the proximal phalanges at the PIP joints, in the third, fourth, and fifth fingers. The appearance and location would suggest that the eroded portions of the bones are intra-articular, but no large   joint effusion is evident. Findings could be due to gout, but an infectious or non-crystal deposition inflammatory cause would not be excluded by this study.    3.  No obvious fluid collection is evident as a target for aspiration, although CT imaging is limited for evaluation of the small soft tissue structures of the hand and wrist. MRI might be helpful to more sensitively evaluate the joint spaces and tendon   sheaths, if clinically appropriate.

## 2022-12-01 NOTE — PROGRESS NOTES
[facility]    Medicine Progress Note - Hospitalist Service, GOLD TEAM 18    Date of Admission:  11/30/2022    Assessment & Plan     Queen Brittany Stack is a 32 year old female admitted on 11/30/2022. Patient has had repeat visits to this facility and others for left hand cellulitis and septic arthritis. Patient often leaves against medical advice. Per chart review patient was [place] [facility] [place] for left hand cellulitis.  Patient underwent I&D of left middle, ring, small finger proximal interphalangeal joint on 10/17/2022 but left the hospital AMA and did not continue antibiotics. She was prescribed doxycycline but she reportedly did not take prescribed antibiotics.  Wound cultures from this hospital stay were apparently positive for multiple organisms.     Patient next presented to [facility] on 10/29/2022 for worsening hand pain and underwent I&D during which she was found to have left 3rd, 4th and 5th digit PIP joint septic arthritis and left 4th digit pyogenic flexor tenosynovitis.  Tissue cultures were notable for MRSA and Strep pyogenes. Patient was treated with cefepime, daptomycin and metronidazole. Patient left AMA on 11/2/2022 prior to completing course of antibiotics. She was prescribed a 14-day course of amoxicillin/clavulanate and Bactrim before leaving the hospital.  Patient did not complete outpatient antibiotic course.     Per reports, today patient was found obtunded at a thrift store. Patient responded well to Narcan in the emergency department. Patient provided no medical history. Patient will be admitted to the hospitalist service for further evaluation and management.     #Chronic septic arthritis  -- Continue vancomycin IV (dosing per pharmacy)  -- Infectious disease consultation  -- Orthopedic surgery consultation  -- Check blood cultures     #Unspecified psychiatric disorder  -- Continue PTA risperidone at bedtime  -- Continue PTA  Atarax     #Complex psychosocial scenario  -- Case management consultation     #Opioid use disorder  -- Work to minimize opioid use  -- Consider Suboxone        Diet: NPO per Anesthesia Guidelines for Procedure/Surgery Except for: Meds    DVT Prophylaxis: Pneumatic Compression Devices  Sims Catheter: Not present  Central Lines: None  Cardiac Monitoring: None  Code Status: Full Code          Diet: Regular Diet Adult    DVT Prophylaxis: Pneumatic Compression Devices  Sims Catheter: Not present  Central Lines: None  Cardiac Monitoring: None  Code Status: Full Code      Disposition Plan      Expected Discharge Date: 12/02/2022                The patient's care was discussed with the Patient.    Brock Currie MD  Hospitalist Service, GOLD TEAM 18  [facility]  Securely message with the Vocera Web Console (learn more here)  Text page via [employer] Paging/Directory   Please see signed in provider for up to date coverage information      Clinically Significant Risk Factors Present on Admission              # Hypoalbuminemia: Lowest albumin = 3.1 g/dL (Ref range: 3.5-5.2) at 12/1/2022  8:36 AM, will monitor as appropriate                 ______________________________________________________________________    Interval History   Pt is very somnolent, would open eyes and tried to talk but could not stay awake , denies pain, denies vomiting or diarrhea. Started on Vancomycin and cefepime IV. Ate lunch and sooner after found to be very somnolent. Did not admit use of any drugs.     Data reviewed today: I reviewed all medications, new labs and imaging results over the last 24 hours. I personally reviewed no images or EKG's today.    Physical Exam   Vital Signs: Temp: 97.1  F (36.2  C) Temp src: Oral BP: 126/87 Pulse: 89   Resp: 17 SpO2: 92 % O2 Device: None (Room air)    Weight: 132 lbs 9.6 oz  General Appearance: Somnolent , no distress  Respiratory: CTAB  Cardiovascular: RRR. No  Murmur  GI: soft and NT  Skin: no rashes or jaundice  Other: No edema of the BLE     Data   Recent Labs   Lab 12/01/22  0836 11/30/22  1914   WBC 5.5 5.7   HGB 12.7 12.8   MCV 87 87    324    143   POTASSIUM 4.2 4.1   CHLORIDE 109 110*   CO2 26 29   BUN 17 18   CR 0.68 0.76   ANIONGAP 6 4   TAZ 8.8 9.0   GLC 93 100*   ALBUMIN 3.1*  --    PROTTOTAL 6.7*  --    BILITOTAL 0.3  --    ALKPHOS 58  --    ALT 29  --    AST 18  --      Recent Results (from the past 24 hour(s))   CT Hand Left w Contrast    Narrative    EXAM: CT HAND LEFT W CONTRAST  LOCATION: [facility]  DATE/TIME: 11/30/2022 8:24 PM    INDICATION: Severe swelling deformity to multiple areas of left hand. History of incomplete treatment of septic arthritis of multiple joints of hand.  COMPARISON: Hand radiographs 11/9/2022 and 10/29/2022.  TECHNIQUE: IV contrast. Axial, sagittal and coronal thin-section reconstruction. Dose reduction techniques were used.   CONTRAST: Isovue-370 100 mL.    FINDINGS:    There is soft tissue swelling throughout the hand, but particularly the third, fourth, and fifth fingers. There is skin irregularity over the dorsal aspects of the third, fourth, and fifth fingers, compatible with areas of ulceration. This is most   significantly over the dorsal aspects of the third and fourth fingers, superficial to the PIP joints.    CT evaluation is limited for assessment of the soft tissues due to poor contrast and spatial resolution. There is questionably a small tenosynovial effusion in the flexor tendon sheath of the fourth finger. No clear tenosynovial effusion otherwise.    Normal joint alignment. No large joint effusion/fluid collection identified, but small and even moderate joint effusions may be difficult to visualize on CT.    Nonspecific bony erosion of the head of the proximal phalanx in the fifth finger, at the PIP joint (sagittal series 7 images 36-47).    Additional  erosion of the head of the head of the proximal phalanx in the fourth finger at the PIP joint (coronal series 5 images 65-69, sagittal series 7 images 13-19).    Additional more mild erosive change of the head of the proximal phalanx in the third finger at the PIP joint (sagittal series 7 images 62-72).    No erosions identified at the other bones or joints of the hand and wrist.      Impression    IMPRESSION:  1.  Nonspecific soft tissue swelling throughout the hand, particularly the third, fourth, and fifth fingers, with associated ulcerations over the dorsal aspects of the third, fourth, and fifth fingers at the level of the PIP joints.    2.  Mild bony erosive changes involving the heads of the proximal phalanges at the PIP joints, in the third, fourth, and fifth fingers. The appearance and location would suggest that the eroded portions of the bones are intra-articular, but no large   joint effusion is evident. Findings could be due to gout, but an infectious or non-crystal deposition inflammatory cause would not be excluded by this study.    3.  No obvious fluid collection is evident as a target for aspiration, although CT imaging is limited for evaluation of the small soft tissue structures of the hand and wrist. MRI might be helpful to more sensitively evaluate the joint spaces and tendon   sheaths, if clinically appropriate.     Medications     sodium chloride 100 mL/hr at 12/01/22 1131       sodium chloride         risperiDONE  1 mg Oral At Bedtime     sodium chloride (PF)  3 mL Intracatheter Q8H     vancomycin  1,250 mg Intravenous Q12H

## 2022-12-01 NOTE — PROGRESS NOTES
SPIRITUAL HEALTH SERVICES  SPIRITUAL ASSESSMENT Progress Note  [facility] ([facility]) 5  A ORTHO R 0513 12/1/22      REFERRAL SOURCE: Self Referral    Pt declined visit with Unit . Pt refused to wake up when greet a few times by .     PLAN: No follow up necessary.    Shreyas Ashraf MA, MPA  Associate   Pager: [phone]

## 2022-12-01 NOTE — CONSULTS
[facility] Orthopedic Consultation    Queen Brittany Stack MRN# [id number]   Age: 32 year old YOB: 1990   Date of Admission:  11/30/2022    Reason for consult: Hx of left hand septic arthritis and flexor tenosynovitis   Requesting physician: Evans Espinosa DO              Impression and Recommendation:     Impression:  Queen Brittany Stack is a 32 year old right-hand dominant female with PMH significant for polysubstance abuse, bipolar affective disorder, left hand cellulitis, septic arthritis, and flexor tenosynovitis s/p I&D ([place] [facility], 10/30/22 at [facility] with Dr. Davis) who was admitted to [facility] 11/30/22 after being found obtunded at a thrift store. Patient is afebrile, vital signs are stable, CRP, ESR, & WBC are normal. Denies left hand pain. No areas of fluctuance, drainage, or induration. No fluid collection on CT. Very low concern for active infection at this time.    Recomendations:  -No operative intervention indicated at this time, ok for a regular diet from an orthopedic surgery perspective.   -We discussed this patient would benefit from hand therapy to improve stiffness at the PIP joints of the middle and ring fingers. Patient declined at this time.   -No further imaging necessary from an orthopedic surgery perspective  -Antibiotics per ID  -Remainder of cares per primary team     Assessment and Plan discussed with Dr. Mejia PGY 4 and Dr. Sherman, Orthopedic Surgery attending.          Chief Complaint:   History of left hand cellulitis, septic arthritis, & flexor tenosynovitis         History of Present Illness:   Queen Brittany Stack is a 32 year old right-hand dominant female with PMH significant for polysubstance abuse, bipolar affective disorder, left hand cellulitis, septic arthritis, and flexor tenosynovitis s/p I&D (10/17/22 at [facility], 10/30/22 at [facility] with Dr. Davis) who was admitted to [facility] 11/30/22 after being found obtunded at a thrift store. She  responded well to Narcan.   Patient underwent I&D of left long, ring, and small fingers at [facility] 10/17/22 with Dr. Peck. Patient left AMA, was prescribed oral doxycycline prior to discharge, reportedly did not take prescribed antibiotics.   Patient most recently underwent left long, ring, and small finger arthrotomies with drainage, I&D left ring finger flexor tendon sheath 10/30/22 at [facility] with Dr. Davis. Tissue cultures positive for MRSA and strep pyogenes. Patient started on cefepime, daptomycin, and metronidazole. Left AMA 11/2/22 prior to completing course of antibiotics. Prescribed amoxicillin/clavulanate and Bactrim, did not complete outpatient antibiotic course. Patient was admitted to [facility] 11/9/22 - 11/12/22 for treatment of cellulitis of the left middle and ring fingers, left AMA prior to final antibiotic plan. Did not require operative intervention.     Today, patient denies any left hand pain at rest. Notes her fingers have been very stiff since her last I&D. Denies new injury to the left hand. Denies fevers, chills, malaise, or shortness of breath. Denies numbness or tingling to the left hand. Patient is somnolent throughout interview, does not provide additional history.     History obtained from patient interview and chart review.        Past Medical History:     Past Medical History:   Diagnosis Date     Abnormal Pap smear     2010, f/u normal     Anxiety      Asthma     no ihaler use     Bipolar 1 disorder (H)      Bipolar affective (H)      Fainting spell      Herpes simplex without mention of complication     Pt reports last outbreak about 4 weeks ago     Major depressive disorder      Pelvic inflammatory disease      Polysubstance abuse (H)              Past Surgical History:     Past Surgical History:   Procedure Laterality Date     DILATION AND CURETTAGE SUCTION N/A 4/23/2021    Procedure: DILATION AND CURETTAGE, UTERUS, USING SUCTION;  Surgeon: Lanie Yates MD;  Location:  OR      IRRIGATION AND DEBRIDEMENT HAND, COMBINED Left 10/30/2022    Procedure: IRRIGATION AND DEBRIDEMENT, LEFT HAND;  Surgeon: Anshul Davis MD;  Location: [facility] OR     NO HISTORY OF SURGERY               Social History:   Tobacco use: Declines to answer  Alcohol use: Declines to answer  Elicit drug use: Declines to answer, history of polysubstance abuse   Living situation: Declines to answer          Family History:   No family history of anesthesia, bleeding or clotting complications.           Allergies:     Allergies   Allergen Reactions     Bees Anaphylaxis     Morphine Hives     Other reaction(s): Hives       Oxycodone Hives and Itching     Pork Derived Products Other (See Comments)     Ep[employer]opalian reasons             Medications:   Medication reviewed with patient and in chart.  Anticoagulation: None  Antibiotics: Started on IV vancomycin and cefepime per primary team           Review of Systems:   See HPI. 10 point review of systems is otherwise negative.          Physical Exam:     Vitals:    11/30/22 1536 12/01/22 0003 12/01/22 0032 12/01/22 0842   BP: (!) 142/78 134/80 125/73 126/87   BP Location:    Left arm   Pulse: 96  71 89   Resp:   18 17   Temp:  98.2  F (36.8  C) 98  F (36.7  C) 97.1  F (36.2  C)   TempSrc:  Oral Oral Oral   SpO2:  98% 100% 92%   Weight:   60.1 kg (132 lb 9.6 oz)      General: Somnolent, appropriate, following commands, NAD.  Neuro: EOM grossly intact  HEENT: Normal.   Lungs: Breathing comfortably and nonlabored, no wheezes or stridor noted.  Heart/Cardiovascular: Regular pulse, no peripheral cyanosis.    Right Upper Extremity:   - No gross deformity, skin intact.  - No significant tenderness to palpation over shoulder, arm, elbow, forearm, wrist, hand, fingers.  - No pain with ROM shoulder, elbow, wrist.  - Motor intact distally deltoid, FPL, EPL, IO with 5/5 strength.  - SILT median, ulnar, radial, axillary nerve distributions.  - Radial pulse palpable, fingers warm and well perfused.    Left  Upper Extremity:   - Incisions are closed and healing, ulcerations over PIP joints of middle, ring, and small fingers  - No warmth, fluctuance or erythema about the dorsal or volar aspect of the hand  - No significant tenderness to palpation over sternoclavicular joint, clavicle, acromioclavicular joint, shoulder, arm, elbow, forearm, wrist, hand, fingers.  - No pain with ROM of the wrist or fingers   -Holds middle and ring finger flexed at the PIP, unable to actively extend. Full passive ROM without discomfort   - Motor intact distally deltoid, FPL, EPL, IO with 5/5 strength.  - SILT median, ulnar, radial, axillary nerve distributions.  - Radial pulse palpable, fingers warm and well perfused.              Imaging:   All imaging independently reviewed.  IMPRESSION:  1.  Nonspecific soft tissue swelling throughout the hand, particularly the third, fourth, and fifth fingers, with associated ulcerations over the dorsal aspects of the third, fourth, and fifth fingers at the level of the PIP joints.     2.  Mild bony erosive changes involving the heads of the proximal phalanges at the PIP joints, in the third, fourth, and fifth fingers. The appearance and location would suggest that the eroded portions of the bones are intra-articular, but no large   joint effusion is evident. Findings could be due to gout, but an infectious or non-crystal deposition inflammatory cause would not be excluded by this study.     3.  No obvious fluid collection is evident as a target for aspiration, although CT imaging is limited for evaluation of the small soft tissue structures of the hand and wrist          Laboratory date:   CBC:  Lab Results   Component Value Date    WBC 5.5 12/01/2022    HGB 12.7 12/01/2022     12/01/2022       BMP:  Lab Results   Component Value Date     12/01/2022    POTASSIUM 4.2 12/01/2022    CHLORIDE 109 12/01/2022    CO2 26 12/01/2022    BUN 17 12/01/2022    CR 0.68 12/01/2022    ANIONGAP 6  12/01/2022    TAZ 8.8 12/01/2022    GLC 93 12/01/2022       Inflammatory Markers:  Lab Results   Component Value Date    WBC 5.5 12/01/2022    CRP <2.9 11/30/2022    SED 10 11/30/2022       Cultures:  No results for input(s): CULT in the last 168 hours.        Margy Adams PA-C  12/1/2022 9:58 AM  Orthopaedic Surgery

## 2022-12-01 NOTE — PLAN OF CARE
[id number]    VS: Temp: 97.6  F (36.4  C) Temp src: Oral BP: 122/76 Pulse: 74   Resp: 16 SpO2: 96 % O2 Device: None (Room air)       O2: 96% on room air, denies SOB and CP, no cough present. Lung sounds clear.   Output: Voids spontaneously and without difficulty   Last BM: 11/30/2022 per pt report   Activity: Up independently   Skin: L) hand cellulitis and lacerations to bilateral hands   Pain: Denies   Neuro: A & O x4, denies numbness and tingling. Pt has been very drowsy this shift but able to arouse to voice.   Dressing: N/A   Diet: Regular   LDA: PIV infusing   Equipment: Personal belongings, call light, IV pump/pole   Plan: TBD   Additional Info:

## 2022-12-01 NOTE — ED NOTES
Patient declining blood draw at this time.  Would like to eat more food and states will have blood draw afterward.

## 2022-12-01 NOTE — ED NOTES
Patient received in signout from Dr. Smalls.  See her note for full documentation.  Back to the ED after she was found asleep on a couch in a thrift store.  Complained of hand pain so was brought in for evaluation.  On arrival, patient extremely somnolent, received Narcan and mental status improved.  She was reportedly unwilling to provide any history to the previous provider.  Plan is to reassess when able disposition accordingly.      Per chart review patient was admitted to [facility] on 10/15/22 for left hand cellulitis.  Patient underwent I&D of left middle, ring, small finger proximal interphalangeal joint on 10/17/2022 but left the hospital AMA and did not continue antibiotics. She was prescribed doxycycline but she reportedly did not take prescribed antibiotics.  Wound cultures from this hospital stay were apparently positive for multiple organisms.    Patient next presented to [facility] on 10/29/2022 for worsening hand pain and underwent I&D during which she was found to have left 3rd, 4th and 5th digit PIP joint septic arthritis and left 4th digit pyogenic flexor tenosynovitis.  Tissue cultures were notable for MRSA and Strep pyogenes   Patient was treated with cefepime, daptomycin and metronidazole.  Patient left AMA on 11/2/2022 prior to completing course of antibiotics. She was prescribed a 14-day course of amoxicillin/clavulanate and Bactrim before leaving the hospital.  Patient did not complete outpatient antibiotic course.    Patient is now awake and alert.  She was reassessed by me.  She complains of significant worsening of hand pain.  She also endorses fever/chills have been worsening over the past week.  As above, patient has significant infectious processes in her hand.  Was recently recommended to stay in the hospital for 2 weeks for IV abx therapy.  She ultimately left AMA.  She has not been taking oral antibiotics.    On exam, she has significant swelling, erythema, and flexion contractures,  see pictures below, concerning for cellulitis, possible tenosynovitis, and or septic arthritis.  Plan for readmission to the hospital.  Discussed with infectious disease.  Recommended monotherapy with vancomycin for now.  They will continue to follow in consult.  Patient will also need orthopedics consult while in hospital although I do not feel this needs to be done emergently in the ER.              Laboratory work-up is largely reassuring.  WBC, ESR, CRP, are improved from previous.  Lactic acid is normal.  No evidence of septic shock.  CT scan shows diffuse soft tissue swelling, mild bony erosions.  No drainable fluid collection.    Patient will need readmission to the hospital for IV antibiotics and ID/orthopedics consultation.  Case discussed with the medicine team.    Results for orders placed or performed during the hospital encounter of 11/30/22   CT Hand Left w Contrast     Status: None    Narrative    EXAM: CT HAND LEFT W CONTRAST  LOCATION: [facility]  DATE/TIME: 11/30/2022 8:24 PM    INDICATION: Severe swelling deformity to multiple areas of left hand. History of incomplete treatment of septic arthritis of multiple joints of hand.  COMPARISON: Hand radiographs 11/9/2022 and 10/29/2022.  TECHNIQUE: IV contrast. Axial, sagittal and coronal thin-section reconstruction. Dose reduction techniques were used.   CONTRAST: Isovue-370 100 mL.    FINDINGS:    There is soft tissue swelling throughout the hand, but particularly the third, fourth, and fifth fingers. There is skin irregularity over the dorsal aspects of the third, fourth, and fifth fingers, compatible with areas of ulceration. This is most   significantly over the dorsal aspects of the third and fourth fingers, superficial to the PIP joints.    CT evaluation is limited for assessment of the soft tissues due to poor contrast and spatial resolution. There is questionably a small tenosynovial effusion in the  flexor tendon sheath of the fourth finger. No clear tenosynovial effusion otherwise.    Normal joint alignment. No large joint effusion/fluid collection identified, but small and even moderate joint effusions may be difficult to visualize on CT.    Nonspecific bony erosion of the head of the proximal phalanx in the fifth finger, at the PIP joint (sagittal series 7 images 36-47).    Additional erosion of the head of the head of the proximal phalanx in the fourth finger at the PIP joint (coronal series 5 images 65-69, sagittal series 7 images 13-19).    Additional more mild erosive change of the head of the proximal phalanx in the third finger at the PIP joint (sagittal series 7 images 62-72).    No erosions identified at the other bones or joints of the hand and wrist.      Impression    IMPRESSION:  1.  Nonspecific soft tissue swelling throughout the hand, particularly the third, fourth, and fifth fingers, with associated ulcerations over the dorsal aspects of the third, fourth, and fifth fingers at the level of the PIP joints.    2.  Mild bony erosive changes involving the heads of the proximal phalanges at the PIP joints, in the third, fourth, and fifth fingers. The appearance and location would suggest that the eroded portions of the bones are intra-articular, but no large   joint effusion is evident. Findings could be due to gout, but an infectious or non-crystal deposition inflammatory cause would not be excluded by this study.    3.  No obvious fluid collection is evident as a target for aspiration, although CT imaging is limited for evaluation of the small soft tissue structures of the hand and wrist. MRI might be helpful to more sensitively evaluate the joint spaces and tendon   sheaths, if clinically appropriate.   Basic metabolic panel     Status: Abnormal   Result Value Ref Range    Sodium 143 133 - 144 mmol/L    Potassium 4.1 3.4 - 5.3 mmol/L    Chloride 110 (H) 94 - 109 mmol/L    Carbon Dioxide (CO2) 29  20 - 32 mmol/L    Anion Gap 4 3 - 14 mmol/L    Urea Nitrogen 18 7 - 30 mg/dL    Creatinine 0.76 0.52 - 1.04 mg/dL    Calcium 9.0 8.5 - 10.1 mg/dL    Glucose 100 (H) 70 - 99 mg/dL    GFR Estimate >90 >60 mL/min/1.73m2   Lactic acid whole blood     Status: Normal   Result Value Ref Range    Lactic Acid 1.5 0.7 - 2.0 mmol/L   CRP inflammation     Status: Normal   Result Value Ref Range    CRP Inflammation <2.9 0.0 - 8.0 mg/L   Erythrocyte sedimentation rate auto     Status: Normal   Result Value Ref Range    Erythrocyte Sedimentation Rate 10 0 - 20 mm/hr   HCG qualitative Blood     Status: Normal   Result Value Ref Range    hCG Serum Qualitative Negative Negative   CBC with platelets and differential     Status: None   Result Value Ref Range    WBC Count 5.7 4.0 - 11.0 10e3/uL    RBC Count 4.32 3.80 - 5.20 10e6/uL    Hemoglobin 12.8 11.7 - 15.7 g/dL    Hematocrit 37.7 35.0 - 47.0 %    MCV 87 78 - 100 fL    MCH 29.6 26.5 - 33.0 pg    MCHC 34.0 31.5 - 36.5 g/dL    RDW 14.1 10.0 - 15.0 %    Platelet Count 324 150 - 450 10e3/uL    % Neutrophils 28 %    % Lymphocytes 62 %    % Monocytes 8 %    % Eosinophils 1 %    % Basophils 1 %    % Immature Granulocytes 0 %    NRBCs per 100 WBC 0 <1 /100    Absolute Neutrophils 1.6 1.6 - 8.3 10e3/uL    Absolute Lymphocytes 3.6 0.8 - 5.3 10e3/uL    Absolute Monocytes 0.5 0.0 - 1.3 10e3/uL    Absolute Eosinophils 0.1 0.0 - 0.7 10e3/uL    Absolute Basophils 0.0 0.0 - 0.2 10e3/uL    Absolute Immature Granulocytes 0.0 <=0.4 10e3/uL    Absolute NRBCs 0.0 10e3/uL   CBC with platelets differential     Status: None    Narrative    The following orders were created for panel order CBC with platelets differential.  Procedure                               Abnormality         Status                     ---------                               -----------         ------                     CBC with platelets and d...[[id number]]                      Final result                 Please view results for  these tests on the individual orders.          Abdullahi Hooper,   11/30/22 2129

## 2022-12-01 NOTE — PLAN OF CARE
Pt A/O X 4 and is on Contact Precautions. Afebrile. VSS. Lungs-Clear bilaterally with both anterior and posterior. Bowels-Active in all four quadrants, last BM 11-30-22. Voids spontaneously without difficulty in the bathroom. Denies nausea and vomiting. CMS and Neuro's are intact. Denies numbness and tingling in all extremities. Denies pain. Is on a Regular diet and appetite was Excellent this shift. Pt had 2 packed lunches and also ordered from the kitchen. Left hand ring finger and middle finger have +2-+3 edema and have wounds with scabs in place that are C/D/I and open to air. Pt up in room independently. PIV patent in the left arm and infusing continuous fluids. Pt has been sleepy, and easy to awaken at times to voice. Pt determines which questions to answer. Bilateral heels are elevated off the bed. Pt is able to make needs known, and call light is within reach. Continue to monitor.

## 2022-12-02 LAB
AMPHETAMINES UR QL SCN: ABNORMAL
BARBITURATES UR QL: ABNORMAL
BENZODIAZ UR QL: ABNORMAL
CANNABINOIDS UR QL SCN: ABNORMAL
COCAINE UR QL: ABNORMAL
CREAT SERPL-MCNC: 0.66 MG/DL (ref 0.52–1.04)
GFR SERPL CREATININE-BSD FRML MDRD: >90 ML/MIN/1.73M2
HOLD SPECIMEN: NORMAL
OPIATES UR QL SCN: ABNORMAL
PCP UR QL SCN: ABNORMAL
VANCOMYCIN SERPL-MCNC: 4.5 MG/L

## 2022-12-02 PROCEDURE — 80307 DRUG TEST PRSMV CHEM ANLYZR: CPT | Performed by: INTERNAL MEDICINE

## 2022-12-02 PROCEDURE — 99232 SBSQ HOSP IP/OBS MODERATE 35: CPT | Performed by: INTERNAL MEDICINE

## 2022-12-02 PROCEDURE — 250N000013 HC RX MED GY IP 250 OP 250 PS 637: Performed by: INTERNAL MEDICINE

## 2022-12-02 PROCEDURE — 999N000127 HC STATISTIC PERIPHERAL IV START W US GUIDANCE

## 2022-12-02 PROCEDURE — 80353 DRUG SCREENING COCAINE: CPT | Performed by: EMERGENCY MEDICINE

## 2022-12-02 PROCEDURE — 82565 ASSAY OF CREATININE: CPT | Performed by: INTERNAL MEDICINE

## 2022-12-02 PROCEDURE — 999N000040 HC STATISTIC CONSULT NO CHARGE VASC ACCESS

## 2022-12-02 PROCEDURE — 250N000011 HC RX IP 250 OP 636: Performed by: INTERNAL MEDICINE

## 2022-12-02 PROCEDURE — 99233 SBSQ HOSP IP/OBS HIGH 50: CPT | Mod: FS | Performed by: REGISTERED NURSE

## 2022-12-02 PROCEDURE — 258N000003 HC RX IP 258 OP 636: Performed by: INTERNAL MEDICINE

## 2022-12-02 PROCEDURE — 80202 ASSAY OF VANCOMYCIN: CPT | Performed by: INTERNAL MEDICINE

## 2022-12-02 PROCEDURE — 120N000002 HC R&B MED SURG/OB UMMC

## 2022-12-02 PROCEDURE — 36415 COLL VENOUS BLD VENIPUNCTURE: CPT | Performed by: INTERNAL MEDICINE

## 2022-12-02 RX ORDER — VANCOMYCIN HYDROCHLORIDE 1 G/200ML
1000 INJECTION, SOLUTION INTRAVENOUS EVERY 8 HOURS
Status: DISCONTINUED | OUTPATIENT
Start: 2022-12-02 | End: 2022-12-03 | Stop reason: HOSPADM

## 2022-12-02 RX ORDER — DOXYCYCLINE 100 MG/1
100 CAPSULE ORAL 2 TIMES DAILY
Qty: 60 CAPSULE | Refills: 0 | Status: SHIPPED | OUTPATIENT
Start: 2022-12-02 | End: 2022-12-03

## 2022-12-02 RX ADMIN — VANCOMYCIN HYDROCHLORIDE 1000 MG: 1 INJECTION, SOLUTION INTRAVENOUS at 19:29

## 2022-12-02 RX ADMIN — RISPERIDONE 1 MG: 1 TABLET ORAL at 21:47

## 2022-12-02 RX ADMIN — VANCOMYCIN HYDROCHLORIDE 1250 MG: 10 INJECTION, POWDER, LYOPHILIZED, FOR SOLUTION INTRAVENOUS at 00:04

## 2022-12-02 RX ADMIN — VANCOMYCIN HYDROCHLORIDE 1000 MG: 1 INJECTION, SOLUTION INTRAVENOUS at 12:15

## 2022-12-02 ASSESSMENT — ACTIVITIES OF DAILY LIVING (ADL)
ADLS_ACUITY_SCORE: 20

## 2022-12-02 NOTE — PROGRESS NOTES
"  VS: /66 (BP Location: Right arm, Patient Position: Supine)   Pulse 94   Temp 97.3  F (36.3  C) (Oral)   Resp 13   Wt 60.1 kg (132 lb 9.6 oz)   LMP  (LMP Unknown)   SpO2 99%   BMI 21.40 kg/m     O2: Room air saturations 99%   Output: Pt used hat to void pale yellow urine.    Last BM:    Activity: Up ad colleen   Skin: Left hand has abrasion and dry scabbed areas on fingers. Open to air.    Pain: Pt denies pain   CMS:    Dressing: NA   Diet: Regular   LDA: IV. New IV started for antibiotics Coban was placed over left SL   Equipment: Isolation, Belongings cart that locks.    Plan:    Additional Info: Pt was very sedated and sleeping and not answering staffs questions this am. Obtained order for room search and urine screen. Security arrive to unit to conduct a room search. Patients belongings were searched. A locked cart for belongings were brought to unit to place patients belongings in. Pt stated\" I don't give a F##K what you guys do.\" Pt was very indifferent to staff the rest of shift.         "

## 2022-12-02 NOTE — PLAN OF CARE
VS: /59 (BP Location: Right arm)   Pulse 94   Temp (!) 95.5  F (35.3  C) (Oral)   Resp 17   Wt 60.1 kg (132 lb 9.6 oz)   LMP  (LMP Unknown)   SpO2 97%   BMI 21.40 kg/m         O2: O2 sats >90% on room air, denies SOB or chest pain   Output: Voids spontaneously without difficulty   Last BM: 11/30/2022  bowel sounds present all quadrants   Activity: Up independently   Skin: L) hand cellulitis plus  lacerations to bilateral hands   Pain: Denies pain   Neuro: A & O x4, denies numbness and tingling. Pt has been sleepy  but arousal to voice , calls appropriately    Dressing: N/A   Diet: Regular diet    LDA: PIV infiltrated pt refuses new placement    Equipment: Personal belongings, call light, IV pump/pole   Plan: TBD, continue with ABX as ordered    Additional Info:  L PIV infiltrated, mild swelling and pain to the site, cold compress applied, Flyer tried to start a new IV line pt refused, staff to try again later . Provider aware .

## 2022-12-02 NOTE — PROGRESS NOTES
[facility] GENER[facility] INFECTIOUS DISEASES Progress Note     Patient:  Queen Brittany Stack   Date of birth 1990, Medical record number [id number]  Date of Visit:  12/02/2022  Date of Admission: 11/30/2022  Consult Requester:Evans Espinosa DO          Assessment and Recommendations:   ASSESSMENT:  # Left middle, ring, and small finger proximal interphalangeal joint septic arthritis and Left ring finger pyogenic flexor tenosynovitis   - [place]20[place] [facility] [place]tissue op culture positive for numerous organisms: MRSA, Enterococcus faecium, Enterococcus mundtii, Lactococcus, Streptococcus mitis/oralis group, Streptococcus parasanguinis, Streptococcus constellatus  Stpahylococcus xylosus, Granulicatella, Vagococcus fluvialis, Gemella morbillorum, Veillonella parvula group, Pseudoescherichia vulneris, Klebsiella oxytoca,  E. Coli,  Enterobacter cloacae (among the several specimens sent on 10/17).     - S/p new debridement on 10/30 and per report describes small amount of purulence and devitalized tissue which was debrided. Intra-op cultures: MRSA and Streptococcus pyogenes (broth only)    # Polysubstance use disorder  - denies past or recent use     # Bipolar disorder       DISCUSSION:   Queen Brittany Stack is a 32 year old female with a PMH of housing instability, substance abuse disorder, and cellulitis/tenosynovitis of the left hand [place] [facility] [place]/30 at [facility] c/b leaving AMA x2 who presents after being found with decreased level of consciousness on a thrift store couch. She has remained afebrile and vitally stable here. Denies any previous or recent IVDU. On exam her left hand is less concerning for an acute infectious process with no active drainage, erythema, or pain on examination. Would, however, recommend wound care to help with the ulcerated areas of her PIP joints. Labs are unremarkable with no elevated WBC, normal ESR and CRP. Blood cultures (11/30) are no growth to date. Her most  recent intra-op cultures are positive for MRSA and Strep pyogenes (broth only) and she is currently covered on IV vancomycin. CT left hand shows soft tissue swelling throughout the hand (worse 3rd, 4th, and 5th fingers) new mild bony erosive changes involving the heads of the proximal phalanges at the PIP joints of th 3rd, 4th, and 5th fingers, which given the history of sub optimal treatment for her left hand infection is concerning for possible osteomyelitis, no obvious fluid collection was seen. Orthopedics has been consulted and not recommending surgical intervention at this time. PIV was replaced to restart IV vancomycin today; from our standpoint she is ok to transition to oral antibiotics at any time, though she will likely benefit from another dose of IV vancomycin given the history of non-compliance with outpatient oral antibiotics. Based on previous cultures and excellent bioavailability of PO doxycycline. For transition from IV to oral treatment will recommend doxycycline 100 mg PO bid for 4 weeks. We are happy to follow the patient in our outpatient ID clinic when she discharges. Agree with recommendation for PT/OT and may also benefit from a wound clinic consult. ID will sign off at this time.    RECOMMENDATION:  1. Continue IV vancomycin, dosed per pharmacy  2. Will continue to follow blood cultures  3. Agree with recommendation for PT/OT consult to help patient regain some movement of her left digits   4. Wound clinic consult may also benefit this patient  5. Ok to stop IV vancomycin at any point and transition to oral regimen listed below  6. Would recommend doxycyline 100 mg PO BID for 4 weeks when transitioning patient to oral from IV.  7. Would be happy to follow up in outpatient ID clinic if patient is interested; she will benefit from follow up outpatient to ensure adequate treatment         Thank you for allowing us to participate in the care of this patient. ID will sign off at this time,  please don't hesitate to contact the [employer] ID team should further questions arise.    Dr. Ben Valle will be on call for General ID [facility] starting tomorrow - please page Dr. Valle via [facility] with questions over the weekend. Dr. Arlette Rivas will assume care of this patient on Monday.      Patient was discussed with Dr. Ruth.     VANCE Palma, CNP  Infectious Diseases  Pager# [phone]  ________________________________________________________________    Consult Question: Septic arthritis.  Admission Diagnosis: Cellulitis of hand [L03.119]        Interval History:   History limited due to patient's current mood and lack of elaboration on current symptoms and history     Patient was again sleeping when I arrived today and frustrated that she was unsure what the plan was going forward. She has no other complaints today other than some left hand pain.    Denies headache, fever, chills, night sweats, dyspnea, nausea, vomiting, abdominal pain, diarrhea, myalgias, arthralgias, lymphadenopathy, acute rash.           History of Present Illness:     HPI obtained from chart review and somewhat limited by patient's lack of cooperation with interview.    Queen Brittany Stack is a 32 year old female with a PMH of housing instability, substance abuse disorder, and cellulitis/tenosynovitis of the left hand s/p I&D 10/17 at [facility] and on 10/30 at [facility] c/b leaving AMA x2 who presents after being found with decreased level of consciousness on a thrift store couch.    She was afebrile and vitally stable in the ED, given narcan with improvement, and with obvious swelling of her left hand. Was recently prescribed 7 day course of oral doxycyline on 11/22 from [facility] for a known left hand infection (septic arthritis of left 3rd, 4th, and 5th PIP joints and left 4th digit pyogenic flexor tenosynovitis). Seen at [facility] ED on 11/22 for a drug overdose and 11/27 sleeping in triage area, but was ejected from the ED when she became  "verbally abusive and uncooperative. Given a prescription for doxycycline 100 mg PO BID for 7 days by [facility] on 11/22 on discharge but states she did not take any of this prescription    Here she presents afebrile, vitally stable and without any complaints. She denies Denies headache, fever, chills, night sweats, fatigue, dyspnea, nausea, vomiting, abdominal pain, diarrhea, dysuria, myalgias, arthralgias, lymphadenopathy, acute rash, or new wounds.    Denies a history of recent or previous IVDU    Per Dr. Rivas's note:  \"Recent ED visit ([facility] on 10/17) for left 3rd, 4th, and 5th digit infection s/p I&D (10/17/2022) with presumable op culture positive for numerous organisms: MRSA, Enterococcus faecium, Enterococcus mundtii, Lactococcus, Streptococcus mitis/oralis group, Streptococcus parasanguinis, Streptococcus constellatus  Staphylococcus xylosus, Granulicatella, Vagococcus fluvialis, Gemella morbillorum, Veillonella parvula group, Pseudoescherichia vulneris, Klebsiella oxytoca,  E. Coli,  Enterobacter cloacae (among the several specimens sent on 10/17). I called [facility] micro lab and they informed me that they only performed susceptibilities for the MRSA and not for the other numerous organisms. They already discarded the specimen from 10/17. Not clear to me which organisms grew from op cultures and which organisms grew from wound (superficial) cultures.  Blood culture from 10/16 positive for Staphylococcus epidermidis in one isolated bottle (anaerobic bottle) - likely contaminant. Repeat blood cultures from 10/17 were negative. He was admitted to [facility] and treated w/ IV Unasyn and Vancomycin. She left AMA on 10/18, with prescription of PO doxycycline sent to pharmacy.  She did not take the oral antibiotic. She was then re-hospitalized to [facility] on 10/29/2022 for left hand pain. She underwent new I&D on 1/30 by ortho team and op report describes small amount of purulence and devitalized tissue which was debrided. " "Post operative diagnosis was Left middle, ring, and small finger proximal interphalangeal joint septic arthritis and Left ring finger pyogenic flexor tenosynovitis. Intra-op cultures were sent and are in process.  She was started on daptomycin, cefepime and metronidazole. Op cultures from the I&D from 10/30/22 were positive for MRSA and Streptococcus pyogenes. Unfortunately the patient left AMA again on 11/2/22 and she was given a prescription of oral antibiotics (augmentin and bactrim) for 4 weeks. She did not take the oral antibiotics. Now she presents again to ED on 11/9. Per reports she was acutely intoxicated and reported left hand pain.     On arrival she was afebrile and had white count of 6.8. CRP was 4.1 (normal), ESR was 21. She was started on cefepime and vancomycin on 11/9/22. Renal function is normal. Ortho evaluated the patient and per ortho assessment the exam was reassuring this morning anf they are not planning to perform new I&D. They will continue to follow peripherally. On my evaluation today the left hand looks much improved and is healing well. No drainage. No significant swelling. No drainage\"           Review of Systems:   Review of systems is otherwise negative. Pertinent positives and negatives listed above.           Past Medical History:     Past Medical History:   Diagnosis Date     Abnormal Pap smear     2010, f/u normal     Anxiety      Asthma     no ihaler use     Bipolar 1 disorder (H)      Bipolar affective (H)      Fainting spell      Herpes simplex without mention of complication     Pt reports last outbreak about 4 weeks ago     Major depressive disorder      Pelvic inflammatory disease      Polysubstance abuse (H)             Past Surgical History:     Past Surgical History:   Procedure Laterality Date     DILATION AND CURETTAGE SUCTION N/A 4/23/2021    Procedure: DILATION AND CURETTAGE, UTERUS, USING SUCTION;  Surgeon: Lanie Yates MD;  Location: [facility] OR     IRRIGATION " AND DEBRIDEMENT HAND, COMBINED Left 10/30/2022    Procedure: IRRIGATION AND DEBRIDEMENT, LEFT HAND;  Surgeon: Anshul Davis MD;  Location: [facility] OR     NO HISTORY OF SURGERY              Family History:   Reviewed and non-contributory.   Family History   Problem Relation Age of Onset     Unknown/Adopted Mother      Unknown/Adopted Father      Unknown/Adopted Maternal Grandmother      Unknown/Adopted Maternal Grandfather      Unknown/Adopted Paternal Grandmother      Unknown/Adopted Paternal Grandfather             Social History:     Social History     Tobacco Use     Smoking status: Every Day     Packs/day: 0.50     Years: 8.00     Pack years: 4.00     Types: Cigarettes     Smokeless tobacco: Never   Substance Use Topics     Alcohol use: Not Currently     Comment: denies use     History   Sexual Activity     Sexual activity: Yes     Partners: Male     Birth control/ protection: None     Comment: NONE            Current Medications:       risperiDONE  1 mg Oral At Bedtime     sodium chloride (PF)  3 mL Intracatheter Q8H     vancomycin  1,000 mg Intravenous Q8H            Allergies:     Allergies   Allergen Reactions     Bees Anaphylaxis     Morphine Hives     Other reaction(s): Hives       Oxycodone Hives and Itching     Pork Derived Products Other (See Comments)     Mormon reasons            Physical Exam:   Vitals were reviewed  Patient Vitals for the past 24 hrs:   BP Temp Temp src Pulse Resp SpO2   12/02/22 0951 127/66 97.3  F (36.3  C) Oral -- 13 99 %   12/02/22 0025 113/59 (!) 95.5  F (35.3  C) Oral 94 17 97 %   12/01/22 2203 117/60 97  F (36.1  C) Oral 88 16 97 %   12/01/22 1603 122/76 97.6  F (36.4  C) Oral 74 16 96 %       Physical Examination:  GENERAL: in bed in no acute distress. Laying in bed asleep  HEENT:  Head is normocephalic, atraumatic   EYES:  Eyes have anicteric sclerae without conjunctival injection.    ENT:  Oropharynx is moist. Tongue is midline  NECK:  Supple. No cervical  lymphadenopathy  LUNGS:  Clear to auscultation bilaterally.   CARDIOVASCULAR:  Regular rate and rhythm with no murmurs, gallops or rubs.  ABDOMEN:  Normal bowel sounds, soft, nontender. No appreciable hepatosplenomegaly  SKIN: Left hand with swollen PIP joints of 3rd, 4th, and 5th fingers with old scabbed ulcerations on the dorsal surface of these PIP joints, non erythematous, no fluctuance, non tender, no drainage, negative for rash and pruritus. No acute rashes.  Line(s) are in place without any surrounding erythema or exudate. No stigmata of endocarditis. Bilateral great toes with subungual discoloration  NEUROLOGIC:  Grossly nonfocal. Active x4 extremities  MUSCULOSKELETAL: Decreased flexion of left 3rd, 4th and 5th fingers. Negative for myalgias or arthralgias, joint swelling, erythema, or tenderness         Laboratory Data:     Inflammatory Markers    Recent Labs   Lab Test 11/30/22  1914 11/09/22  1806 11/02/22  0857 10/31/22  0601 10/29/22  1348   SED 10 21*  --   --  27*   CRP <2.9 4.1 58.0* 76.0* 96.0*       Hematology Studies    Recent Labs   Lab Test 12/01/22  0836 11/30/22  1914 11/18/22  1107 11/11/22  0645 11/09/22  1806 11/02/22  0857 04/16/22  0659 05/03/21  0757 04/23/21  0938 04/16/21  1229 04/14/21  1500 04/14/21  1011 11/20/19  0828 05/30/19  2317 01/14/18  1741 06/13/15  1236   WBC 5.5 5.7 5.6 6.4 6.8 5.4   < > 7.5  --  11.0 8.9   < > 3.9* 6.6   < > 5.0   ANEU  --   --   --   --   --   --   --  4.2  --  8.7* 6.1  --  1.3* 3.6  --  2.1   AEOS  --   --   --   --   --   --   --  0.0  --  0.0 0.0  --  0.0 0.0  --  0.0   HGB 12.7 12.8 12.6 13.5 13.0 11.9   < > 11.4*   < > 12.0 10.2*   < > 14.8 15.5   < > 13.6   MCV 87 87 88 87 88 88   < > 93  --  91 90   < > 88 93   < > 85    324 325 353 385 441   < > 340  --  365 335   < > 225 249   < > 242    < > = values in this interval not displayed.       Metabolic Studies     Recent Labs   Lab Test 12/02/22  0958 12/01/22  0836 11/30/22  1915  11/18/22  1107 11/11/22  0642 11/09/22  1806   NA  --  141 143 138 137 141   POTASSIUM  --  4.2 4.1 4.7 4.4 4.2   CHLORIDE  --  109 110* 110* 104 106   CO2  --  26 29 23 28 29   BUN  --  17 18 16 15 19   CR 0.66 0.68 0.76 0.71 0.66 0.86   GFRESTIMATED >90 >90 >90 >90 >90 >90       Hepatic Studies    Recent Labs   Lab Test 12/01/22  0836 11/02/22  0857 11/01/22  0609 10/31/22  0601 10/30/22  0630 10/29/22  1348   BILITOTAL 0.3 0.2 0.1* 0.1* 0.2 0.4   ALKPHOS 58 60 63 61 63 83   ALBUMIN 3.1* 2.5* 2.4* 2.2* 2.5* 3.4   AST 18 16 21 41 15 17   ALT 29 28 33 39 30 42       Microbiology:  Culture   Date Value Ref Range Status   11/30/2022 No growth after 1 day  Preliminary   11/30/2022 No growth after 1 day  Preliminary   10/30/2022 No anaerobic organisms isolated  Final   10/30/2022 Isolated in broth only Staphylococcus aureus (A)  Final     Comment:     On day 2 of incubationNot isolated or reported on routine aerobic cultureSusceptibilities done on previous cultures   10/30/2022 (A)  Final    Isolated in broth only Streptococcus pyogenes (Group A Streptococcus)     Comment:     Cultured on the 3rd day of incubationThis organism is susceptible to ampicillin, penicillin, vancomycin and the cephalosporins. If treatment is required and your patient is allergic to penicillin, contact the microbiology lab within 5 days to reques  t susceptibility testing.   10/30/2022 No anaerobic organisms isolated  Final   10/30/2022 1+ Staphylococcus aureus MRSA (A)  Final   10/30/2022 1+ Streptococcus pyogenes (Group A Streptococcus) (A)  Final     Comment:     This organism is susceptible to ampicillin, penicillin, vancomycin and the cephalosporins. If treatment is required and your patient is allergic to penicillin, contact the microbiology lab within 5 days to request susceptibility testing.   10/29/2022 No Growth  Final       Urine Studies    Recent Labs   Lab Test 10/29/22  1503 04/14/21  1028 08/13/16  1520   LEUKEST Negative Large*  Negative   WBCU <1 50*  --        Vancomycin Levels    Recent Labs   Lab Test 12/02/22  0958 11/11/22  0852   VANCOMYCIN 4.5 8.2       Hepatitis B Testing   Recent Labs   Lab Test 04/14/21  1011   HEPBANG Nonreactive     Hepatitis C Testing     Hepatitis C Antibody   Date Value Ref Range Status   04/16/2021 Nonreactive NR^Nonreactive Final     Comment:     Assay performance characteristics have not been established for newborns,   infants, and children     03/29/2012 Negative NEG Final     Respiratory Virus Testing    No results found for: RS, FLUAG          Imaging:   EXAM: CT HAND LEFT W CONTRAST  LOCATION: [facility]  DATE/TIME: 11/30/2022 8:24 PM                                                                      IMPRESSION:  1.  Nonspecific soft tissue swelling throughout the hand, particularly the third, fourth, and fifth fingers, with associated ulcerations over the dorsal aspects of the third, fourth, and fifth fingers at the level of the PIP joints.     2.  Mild bony erosive changes involving the heads of the proximal phalanges at the PIP joints, in the third, fourth, and fifth fingers. The appearance and location would suggest that the eroded portions of the bones are intra-articular, but no large joint effusion is evident. Findings could be due to gout, but an infectious or non-crystal deposition inflammatory cause would not be excluded by this study.     3.  No obvious fluid collection is evident as a target for aspiration, although CT imaging is limited for evaluation of the small soft tissue structures of the hand and wrist. MRI might be helpful to more sensitively evaluate the joint spaces and tendon   sheaths, if clinically appropriate.      XR Left Hand from OSH 11/21/22    Impression:. Fusiform soft tissue swelling involving the left third-fifth digits centered on the PIP joints with periarticular osteopenia which may be related to underlying  hyperemia. No bony destruction, fracture or subcutaneous gas identified.

## 2022-12-02 NOTE — PHARMACY-VANCOMYCIN DOSING SERVICE
Pharmacy Vancomycin Note  Date of Service 2022  Patient's  1990   32 year old, female    Indication: Chronic septic arthritis  Day of Therapy: 3, started 11 PM  Current vancomycin regimen:  1250 mg IV q12h  Current vancomycin monitoring method: AUC  Current vancomycin therapeutic monitoring goal: 400-600 mg*h/L    [employer] Prediction of Current Vancomycin Regimen  Loading dose: N/A  Regimen: 1250 mg IV every 12 hours.  Exposure target: AUC24 (range)400-600 mg/L.hr   AUC24,ss: 370 mg/L.hr  Probability of AUC24 > 400: 35 %  Ctrough,ss: 7.3 mg/L  Probability of Ctrough,ss > 20: 0 %  Probability of nephrotoxicity (Lodise ELSIE ): 4 %    Current estimated CrCl = Estimated Creatinine Clearance: 116.1 mL/min (based on SCr of 0.66 mg/dL).    Creatinine for last 3 days  2022:  7:14 PM Creatinine 0.76 mg/dL  2022:  8:36 AM Creatinine 0.68 mg/dL  2022:  9:58 AM Creatinine 0.66 mg/dL    Recent Vancomycin Levels (past 3 days)  2022:  9:58 AM Vancomycin 4.5 mg/L    Vancomycin IV Administrations (past 72 hours)                   vancomycin (VANCOCIN) 1,250 mg in 0.9% NaCl 250 mL intermittent infusion (mg) 1,250 mg New Bag 22 0004     1,250 mg New Bag 22 1133    vancomycin (VANCOCIN) 1,500 mg in 0.9% NaCl 250 mL intermittent infusion (mg) 1,500 mg New Bag 22 2118                Nephrotoxins and other renal medications (From now, onward)    Start     Dose/Rate Route Frequency Ordered Stop    22 1200  vancomycin (VANCOCIN) 1000 mg in dextrose 5% 200 mL PREMIX         1,000 mg  200 mL/hr over 1 Hours Intravenous EVERY 8 HOURS 22 1113               Contrast Orders - past 72 hours (72h ago, onward)    Start     Dose/Rate Route Frequency Stop    22 1930  iopamidol (ISOVUE-370) solution 100 mL         100 mL Intravenous ONCE 22        Interpretation of levels and current regimen:  Vancomycin level is reflective of AUC less than 400    Has serum  creatinine changed greater than 50% in last 72 hours: No    Urine output:  unable to determine    Renal Function: Stable    [employer] Prediction of Planned New Vancomycin Regimen  Loading dose: N/A  Regimen: 1000 mg IV every 8 hours.  Start time: 12:00 on 12/02/2022  Exposure target: AUC24 (range)400-600 mg/L.hr   AUC24,ss: 444 mg/L.hr  Probability of AUC24 > 400: 70 %  Ctrough,ss: 10.7 mg/L  Probability of Ctrough,ss > 20: 1 %  Probability of nephrotoxicity (Lodise ELSIE 2009): 6 %    Plan:  1. Increase Dose to 1000mg IV Q8H (16.6mg/kg/dose);   1250mg IV Q8H predicted an AUC of 555. Did not want to quite jump up to that dose just yet since changing the dosing interval.   2. Vancomycin monitoring method: AUC  3. Vancomycin therapeutic monitoring goal: 400-600 mg*h/L  4. Pharmacy will check vancomycin levels as appropriate in 1-3 Days.  5. Serum creatinine levels will be ordered every 48 hours.    Funmilayo Olvera, TariD, BCPS

## 2022-12-02 NOTE — PROGRESS NOTES
Focus: Patients drowsiness  DB: pt was very drowsy when going into patients room.   I: Cleaned up patients room and took patients Vital signs. Called Dr Currie to notify of patients drowsiness. Sent a urine sample per Dr Kc orders.   E: Plan is for patient to have a Therapeutic room search ordered per Dr Currie.

## 2022-12-02 NOTE — PROGRESS NOTES
[facility]    Medicine Progress Note - Hospitalist Service, GOLD TEAM 18    Date of Admission:  11/30/2022    Assessment & Plan     Queen Brittany Stack is a 32 year old female admitted on 11/30/2022. Patient has had repeat visits to this facility and others for left hand cellulitis and septic arthritis. Patient often leaves against medical advice. Per chart review patient was [place] to [facility] [place] for left hand cellulitis.  Patient underwent I&D of left middle, ring, small finger proximal interphalangeal joint on 10/17/2022 but left the hospital AMA and did not continue antibiotics. She was prescribed doxycycline but she reportedly did not take prescribed antibiotics.  Wound cultures from this hospital stay were apparently positive for multiple organisms.     Patient next presented to [facility] on 10/29/2022 for worsening hand pain and underwent I&D during which she was found to have left 3rd, 4th and 5th digit PIP joint septic arthritis and left 4th digit pyogenic flexor tenosynovitis.  Tissue cultures were notable for MRSA and Strep pyogenes. Patient was treated with cefepime, daptomycin and metronidazole. Patient left AMA on 11/2/2022 prior to completing course of antibiotics. She was prescribed a 14-day course of amoxicillin/clavulanate and Bactrim before leaving the hospital.  Patient did not complete outpatient antibiotic course.     Per reports, today patient was found obtunded at a thrift store. Patient responded well to Narcan in the emergency department. Patient provided no medical history. Patient will be admitted to the hospitalist service for further evaluation and management.     #Chronic septic arthritis  --Cx from 11/30 pos for MRSA and strept pyogenes , started on Vancomycin and cefepime. ID and Ortho consulted. Pt is not septic at admission  # Acute encephalopathy suspect toxic due to use of drugs of abuse. Urine drug screen pos for  cocaine. Room and belongings searched , no drugs found. I place an order of No VISITOR allowed for suspicion of DUD.      #Unspecified psychiatric disorder  -- Continue PTA risperidone at bedtime  -- Continue PTA Atarax     #Complex psychosocial scenario  -- Case management consultation     #Opioid use disorder  -- Work to minimize opioid use  -- Consider Suboxone        Diet: NPO per Anesthesia Guidelines for Procedure/Surgery Except for: Meds    DVT Prophylaxis: Pneumatic Compression Devices  Sims Catheter: Not present  Central Lines: None  Cardiac Monitoring: None  Code Status: Full Code          Diet: Regular Diet Adult    DVT Prophylaxis: Pneumatic Compression Devices  Sims Catheter: Not present  Central Lines: None  Cardiac Monitoring: None  Code Status: Full Code      Disposition Plan     Expected Discharge Date: 12/02/2022                The patient's care was discussed with the Patient.    Brock Currie MD  Hospitalist Service, [address]  Securely message with the Vocera Web Console (learn more here)  Text page via [employer] Paging/Directory   Please see signed in provider for up to date coverage information      Clinically Significant Risk Factors              # Hypoalbuminemia: Lowest albumin = 3.1 g/dL (Ref range: 3.5-5.2) at 12/1/2022  8:36 AM, will monitor as appropriate                   ______________________________________________________________________    Interval History   Pt is very somnolent, would open eyes and tried to talk but could not stay awake , denies pain, denies vomiting or diarrhea. Started on Vancomycin and cefepime IV. Ate lunch and sooner after found to be very somnolent. Did not admit use of any drugs.     Data reviewed today: I reviewed all medications, new labs and imaging results over the last 24 hours. I personally reviewed no images or EKG's today.    Physical Exam   Vital Signs: Temp: 97.4  F (36.3  C) Temp src:  Oral BP: 112/60 Pulse: 86   Resp: 16 SpO2: 99 % O2 Device: None (Room air)    Weight: 132 lbs 9.6 oz  General Appearance: Somnolent , no distress  Respiratory: CTAB  Cardiovascular: RRR. No Murmur  GI: soft and NT  Skin: no rashes or jaundice  Other: No edema of the BLE     Data   Recent Labs   Lab 12/02/22  0958 12/01/22  0836 11/30/22  1914   WBC  --  5.5 5.7   HGB  --  12.7 12.8   MCV  --  87 87   PLT  --  318 324   NA  --  141 143   POTASSIUM  --  4.2 4.1   CHLORIDE  --  109 110*   CO2  --  26 29   BUN  --  17 18   CR 0.66 0.68 0.76   ANIONGAP  --  6 4   TAZ  --  8.8 9.0   GLC  --  93 100*   ALBUMIN  --  3.1*  --    PROTTOTAL  --  6.7*  --    BILITOTAL  --  0.3  --    ALKPHOS  --  58  --    ALT  --  29  --    AST  --  18  --      No results found for this or any previous visit (from the past 24 hour(s)).  Medications       risperiDONE  1 mg Oral At Bedtime     sodium chloride (PF)  3 mL Intracatheter Q8H     vancomycin  1,000 mg Intravenous Q8H

## 2022-12-03 VITALS
RESPIRATION RATE: 16 BRPM | HEART RATE: 95 BPM | OXYGEN SATURATION: 98 % | TEMPERATURE: 95.7 F | SYSTOLIC BLOOD PRESSURE: 127 MMHG | WEIGHT: 132.6 LBS | BODY MASS INDEX: 21.4 KG/M2 | DIASTOLIC BLOOD PRESSURE: 71 MMHG

## 2022-12-03 LAB — VANCOMYCIN SERPL-MCNC: 12.6 MG/L

## 2022-12-03 PROCEDURE — 99238 HOSP IP/OBS DSCHRG MGMT 30/<: CPT | Performed by: INTERNAL MEDICINE

## 2022-12-03 PROCEDURE — 250N000011 HC RX IP 250 OP 636: Performed by: INTERNAL MEDICINE

## 2022-12-03 PROCEDURE — 36415 COLL VENOUS BLD VENIPUNCTURE: CPT | Performed by: INTERNAL MEDICINE

## 2022-12-03 PROCEDURE — 80202 ASSAY OF VANCOMYCIN: CPT | Performed by: INTERNAL MEDICINE

## 2022-12-03 RX ORDER — DOXYCYCLINE 100 MG/1
100 CAPSULE ORAL 2 TIMES DAILY
Qty: 60 CAPSULE | Refills: 0 | Status: SHIPPED | OUTPATIENT
Start: 2022-12-03

## 2022-12-03 RX ADMIN — VANCOMYCIN HYDROCHLORIDE 1000 MG: 1 INJECTION, SOLUTION INTRAVENOUS at 04:09

## 2022-12-03 RX ADMIN — VANCOMYCIN HYDROCHLORIDE 1000 MG: 1 INJECTION, SOLUTION INTRAVENOUS at 13:24

## 2022-12-03 ASSESSMENT — ACTIVITIES OF DAILY LIVING (ADL)
ADLS_ACUITY_SCORE: 20

## 2022-12-03 NOTE — PROGRESS NOTES
"Patient woke at 1100 am, stated \"I want to leave.\" Educated on risks of death and other illnesses if infx untreated. Pt stated \"I want the pills. Get my clothes for me.\"     Dr Currie spoke with pt, patient adament on leaving AMA. AMA form signed, patient completed most of her dose of Vanco. Was informed oral abx would not be ready for 20 minutes, pt stated \"I will come back for them.\" Pt informed may not be able to just pick them up downstairs and leaving early could make getting these very difficult, pt verbalized acceptance left unit ~1420  "

## 2022-12-03 NOTE — PLAN OF CARE
VS: Temp: 96.9  F (36.1  C) Temp src: Oral BP: 117/64 Pulse: 75   Resp: 16 SpO2: 98 % O2 Device: None (Room air)       O2: On room air, denies SOB, No cough noted   Output: Voids spontaneously in bathroom   Last BM: 11/30   Activity: Up independently in room and in hallways   Skin: Intact. Scars on left hand finger knuckle bones. swollen   Pain: Denies   CMS: Alert and oriented X4. Has been very sleepy the entire shift   Dressing: N/A   Diet: REG   LDA: PIV to left forearm, patent , SL   Equipment: IV pole/pump   Plan: Undergoing IV ABX treatment-TBD   Additional Info: NO VISITORS.

## 2022-12-03 NOTE — PLAN OF CARE
VS: Temp: 97.4  F (36.3  C) Temp src: Oral BP: 112/60 Pulse: 86   Resp: 16 SpO2: 99 % O2 Device: None (Room air)  l   O2: On room air, denies SOB, mo cough noted   Output: Voids spontaneously in bathroom   Last BM: 11/30   Activity: Up independently   Skin: Intact. Scars on left hand finger knuckle bones   Pain: Denies   CMS: Alert and oriented X4   Dressing: N/A   Diet: REG   LDA: PIV to left forearm, patent , SL   Equipment: IV pole/pump   Plan: Undergoing IV ABX treatment-TBD   Additional Info: Patient is on Isolation, NO VISITORS. Slept most of the shift. Denies pain.

## 2022-12-03 NOTE — PHARMACY-VANCOMYCIN DOSING SERVICE
Pharmacy Vancomycin Note  Date of Service December 3, 2022  Patient's  1990   32 year old, female    Indication: Chronic septic arthritis  Day of Therapy: 4, started 1130 PM  Current vancomycin regimen:  1000 mg IV q8h  Current vancomycin monitoring method: AUC  Current vancomycin therapeutic monitoring goal: 400-600 mg*h/L    [employer] Prediction of Current Vancomycin Regimen  Loading dose: N/A  Regimen: 1000 mg IV every 8 hours.  Start time: 14:01 on 2022  Exposure target: AUC24 (range)400-600 mg/L.hr   AUC24,ss: 473 mg/L.hr  Probability of AUC24 > 400: 85 %  Ctrough,ss: 11.8 mg/L  Probability of Ctrough,ss > 20: 0 %  Probability of nephrotoxicity (Lodise ELSIE ): 7 %      Current estimated CrCl = Estimated Creatinine Clearance: 116.1 mL/min (based on SCr of 0.66 mg/dL).    Creatinine for last 3 days  2022:  7:14 PM Creatinine 0.76 mg/dL  2022:  8:36 AM Creatinine 0.68 mg/dL  2022:  9:58 AM Creatinine 0.66 mg/dL    Recent Vancomycin Levels (past 3 days)  2022:  9:58 AM Vancomycin 4.5 mg/L  12/3/2022: 11:53 AM Vancomycin 12.6 mg/L    Vancomycin IV Administrations (past 72 hours)                   vancomycin (VANCOCIN) 1000 mg in dextrose 5% 200 mL PREMIX (mg) 1,000 mg New Bag 22 0409     1,000 mg New Bag 22 1929     1,000 mg New Bag  1215    vancomycin (VANCOCIN) 1,250 mg in 0.9% NaCl 250 mL intermittent infusion (mg) 1,250 mg New Bag 22 0004     1,250 mg New Bag 22 1133    vancomycin (VANCOCIN) 1,500 mg in 0.9% NaCl 250 mL intermittent infusion (mg) 1,500 mg New Bag 22                Nephrotoxins and other renal medications (From now, onward)    Start     Dose/Rate Route Frequency Ordered Stop    22 1200  vancomycin (VANCOCIN) 1000 mg in dextrose 5% 200 mL PREMIX         1,000 mg  200 mL/hr over 1 Hours Intravenous EVERY 8 HOURS 22 1113               Contrast Orders - past 72 hours (72h ago, onward)    Start     Dose/Rate Route  Frequency Stop    11/30/22 1930  iopamidol (ISOVUE-370) solution 100 mL         100 mL Intravenous ONCE 11/30/22 2018          Interpretation of levels and current regimen:  Vancomycin level is reflective of -600    Has serum creatinine changed greater than 50% in last 72 hours: No    Urine output:  unable to determine    Renal Function: Stable    Plan:  1. Continue Current Dose  2. Vancomycin monitoring method: AUC  3. Vancomycin therapeutic monitoring goal: 400-600 mg*h/L  4. Pharmacy will check vancomycin levels as appropriate in 3-5 Days.  5. Serum creatinine levels will be ordered every 48 hours.    Barbara Garcia, [facility]

## 2022-12-03 NOTE — DISCHARGE SUMMARY
[facility]  Hospitalist Discharge Summary      Date of Admission:  11/30/2022  Date of Discharge:  12/3/2022  Discharging Provider: Brock Currie MD  Discharge Service: Hospitalist Service, GOLD TEAM 18    Discharge Diagnoses   Chronic Septic arthritis   Medication and treatment non compliance  polysubstance use disorder   Hx of unspecified psychiatric disorder     Follow-ups Needed After Discharge       Unresulted Labs Ordered in the Past 30 Days of this Admission     Date and Time Order Name Status Description    12/2/2022  5:09 PM Cocaine Met, Urine, Quant In process     11/30/2022  6:14 PM Blood Culture Peripheral Blood Preliminary     11/30/2022  6:14 PM Blood Culture Peripheral Blood Preliminary           Discharge Disposition   Discharged to home  Condition at discharge: Stable      Hospital Course   Queen Brittany Stack is a 32 year old female admitted on 11/30/2022. Patient has had repeat visits to this facility and others for left hand cellulitis and septic arthritis. Patient often leaves against medical advice. Per chart review patient was admitted to [facility] [place] on 10/15/22 for left hand cellulitis.  Patient underwent I&D of left middle, ring, small finger proximal interphalangeal joint on 10/17/2022 but left the hospital AMA and did not continue antibiotics. She was prescribed doxycycline but she reportedly did not take prescribed antibiotics.  Wound cultures from this hospital stay were apparently positive for multiple organisms.     Patient next presented to [facility] on 10/29/2022 for worsening hand pain and underwent I&D during which she was found to have left 3rd, 4th and 5th digit PIP joint septic arthritis and left 4th digit pyogenic flexor tenosynovitis.  Tissue cultures were notable for MRSA and Strep pyogenes. Patient was treated with cefepime, daptomycin and metronidazole. Patient left AMA on 11/2/2022 prior to completing course of antibiotics.  She was prescribed a 14-day course of amoxicillin/clavulanate and Bactrim before leaving the hospital.  Patient did not complete outpatient antibiotic course.     Per reports, patient was found obtunded at a thrift store. Patient responded well to Narcan in the emergency department. Patient provided no medical history. She was also noted to have altered mental status on the floor suspected to be due to use of illicit drugs which she probably had in her possession .     #Chronic septic arthritis  --Cx from 11/30 pos for MRSA and strept pyogenes , started on Vancomycin and cefepime. ID and Ortho consulted. Pt is not septic at admission. Pt asked to be discharged. Threatened to leave AMA if not discharged today. ID had recommended Doxycycline po should the pt decide to leave AMA.  She does not have a PCP.    # Acute encephalopathy suspect toxic due to use of drugs of abuse. Urine drug screen pos for cocaine. Room and belongings searched , no drugs found. I place an order of No VISITOR allowed for suspicion of DUD.      #Unspecified psychiatric disorder  -- Continue PTA risperidone at bedtime  -- Continue PTA Atarax     #Complex psychosocial scenario  -- Case management consultation     #Opioid use disorder  -- Work to minimize opioid use  -- Consider Suboxone        Diet: NPO per Anesthesia Guidelines for Procedure/Surgery Except for: Meds    DVT Prophylaxis: Pneumatic Compression Devices  Sims Catheter: Not present  Central Lines: None  Cardiac Monitoring: None  Consultations This Hospital Stay   PHARMACY TO DOSE VANCO  INFECTIOUS DISEASE [facility] ADULT IP CONSULT  ORTHOPAEDIC SURGERY ADULT/PEDS IP CONSULT  CARE MANAGEMENT / SOCIAL WORK IP CONSULT    Code Status   Full Code    Time Spent on this Encounter   I, Brock Currie MD, personally saw the patient today and spent less than or equal to 30 minutes discharging this patient.       Brock Currie MD  [facility] MED SURG ORTHOPEDIC  [facility]  [facility] [address]  Phone: [phone]  Fax: [phone]  ______________________________________________________________________    Physical Exam   Vital Signs: Temp: (!) 95.7  F (35.4  C) Temp src: Oral BP: 127/71 Pulse: 95   Resp: 16 SpO2: 98 % O2 Device: None (Room air)    Weight: 132 lbs 9.6 oz  General Appearance:  Somnolent , no distress  Respiratory: CTAB  Cardiovascular: RRR. No Murmur  GI: soft and NT  Skin: no rashes or jaundice  Other:  No edema of the BLE   Primary Care Physician   Physician No Ref-Primary    Discharge Orders      Reason for your hospital stay    Septic arthritis , treatment non compliance and substance use disorder     Activity    Your activity upon discharge: activity as tolerated     Adult [facility] Follow-up and recommended labs and tests    Follow up with primary care provider, Physician No Ref-Primary, within 7 days for hospital follow- up.  The following labs/tests are recommended:  cmp, CBC, CRP .      Appointments on [place] and/or [facility] (with [facility] or [facility] provider or service). Call [phone] if you haven't heard regarding these appointments within 7 days of discharge.     Diet    Follow this diet upon discharge: Regular       Significant Results and Procedures   Most Recent 3 CBC's:Recent Labs   Lab Test 12/01/22  0836 11/30/22 1914 11/18/22  1107   WBC 5.5 5.7 5.6   HGB 12.7 12.8 12.6   MCV 87 87 88    324 325     Most Recent 3 BMP's:Recent Labs   Lab Test 12/02/22  0958 12/01/22  0836 11/30/22 1914 11/18/22  1107   NA  --  141 143 138   POTASSIUM  --  4.2 4.1 4.7   CHLORIDE  --  109 110* 110*   CO2  --  26 29 23   BUN  --  17 18 16   CR 0.66 0.68 0.76 0.71   ANIONGAP  --  6 4 5   TAZ  --  8.8 9.0 8.7   GLC  --  93 100* 76     Most Recent 2 LFT's:Recent Labs   Lab Test 12/01/22  0836 11/02/22  0857   AST 18 16   ALT 29 28   ALKPHOS 58 60   BILITOTAL 0.3 0.2     Most Recent 3 INR's:Recent Labs   Lab Test 11/11/22  0642 11/09/22  2142  10/29/22  1348   INR 0.89 0.92 1.00   ,   Results for orders placed or performed during the hospital encounter of 11/30/22   CT Hand Left w Contrast    Narrative    EXAM: CT HAND LEFT W CONTRAST  LOCATION: [facility]  DATE/TIME: 11/30/2022 8:24 PM    INDICATION: Severe swelling deformity to multiple areas of left hand. History of incomplete treatment of septic arthritis of multiple joints of hand.  COMPARISON: Hand radiographs 11/9/2022 and 10/29/2022.  TECHNIQUE: IV contrast. Axial, sagittal and coronal thin-section reconstruction. Dose reduction techniques were used.   CONTRAST: Isovue-370 100 mL.    FINDINGS:    There is soft tissue swelling throughout the hand, but particularly the third, fourth, and fifth fingers. There is skin irregularity over the dorsal aspects of the third, fourth, and fifth fingers, compatible with areas of ulceration. This is most   significantly over the dorsal aspects of the third and fourth fingers, superficial to the PIP joints.    CT evaluation is limited for assessment of the soft tissues due to poor contrast and spatial resolution. There is questionably a small tenosynovial effusion in the flexor tendon sheath of the fourth finger. No clear tenosynovial effusion otherwise.    Normal joint alignment. No large joint effusion/fluid collection identified, but small and even moderate joint effusions may be difficult to visualize on CT.    Nonspecific bony erosion of the head of the proximal phalanx in the fifth finger, at the PIP joint (sagittal series 7 images 36-47).    Additional erosion of the head of the head of the proximal phalanx in the fourth finger at the PIP joint (coronal series 5 images 65-69, sagittal series 7 images 13-19).    Additional more mild erosive change of the head of the proximal phalanx in the third finger at the PIP joint (sagittal series 7 images 62-72).    No erosions identified at the other bones or joints of  the hand and wrist.      Impression    IMPRESSION:  1.  Nonspecific soft tissue swelling throughout the hand, particularly the third, fourth, and fifth fingers, with associated ulcerations over the dorsal aspects of the third, fourth, and fifth fingers at the level of the PIP joints.    2.  Mild bony erosive changes involving the heads of the proximal phalanges at the PIP joints, in the third, fourth, and fifth fingers. The appearance and location would suggest that the eroded portions of the bones are intra-articular, but no large   joint effusion is evident. Findings could be due to gout, but an infectious or non-crystal deposition inflammatory cause would not be excluded by this study.    3.  No obvious fluid collection is evident as a target for aspiration, although CT imaging is limited for evaluation of the small soft tissue structures of the hand and wrist. MRI might be helpful to more sensitively evaluate the joint spaces and tendon   sheaths, if clinically appropriate.       Discharge Medications   Current Discharge Medication List      START taking these medications    Details   doxycycline hyclate (VIBRAMYCIN) 100 MG capsule Take 1 capsule (100 mg) by mouth 2 times daily  Qty: 60 capsule, Refills: 0    Associated Diagnoses: Cellulitis of left hand         CONTINUE these medications which have NOT CHANGED    Details   albuterol (PROAIR HFA/PROVENTIL HFA/VENTOLIN HFA) 108 (90 Base) MCG/ACT inhaler Inhale 2 puffs into the lungs every 6 hours as needed for shortness of breath / dyspnea or wheezing      hydrOXYzine (ATARAX) 25 MG tablet Take 1 tablet (25 mg) by mouth every 4 hours as needed for anxiety  Qty: 60 tablet, Refills: 1    Associated Diagnoses: Bipolar disorder, in partial remission, most recent episode depressed (H)      medroxyPROGESTERone (DEPO-PROVERA) 150 MG/ML IM injection Inject 1 mL (150 mg) into the muscle every 3 months Due 7/26/2021.  Qty:      Associated Diagnoses: Encounter for other  contraceptive management      risperiDONE (RISPERDAL) 1 MG tablet Take 1 tablet (1 mg) by mouth At Bedtime  Qty: 30 tablet, Refills: 1    Associated Diagnoses: Bipolar disorder, in partial remission, most recent episode depressed (H)           Allergies   Allergies   Allergen Reactions     Bees Anaphylaxis     Morphine Hives     Other reaction(s): Hives       Oxycodone Hives and Itching     Pork Derived Products Other (See Comments)     Zoroastrian reasons

## 2022-12-05 LAB
BACTERIA BLD CULT: NO GROWTH
BACTERIA BLD CULT: NO GROWTH

## 2022-12-06 ENCOUNTER — PATIENT OUTREACH (OUTPATIENT)
Dept: CARE COORDINATION | Facility: CLINIC | Age: 32
End: 2022-12-06

## 2022-12-06 NOTE — PROGRESS NOTES
Connected Care Resource Center Contact  [facility]/Voicemail     Clinical Data: Transitional Care Management Outreach     Outreach attempted x 2.  Left message on patient's voicemail, providing [facility]'s 24/7 scheduling and nurse triage phone number [phone] ([phone]) for questions/concerns and/or to schedule an appt with an [facility] provider, if they do not have a PCP.      Plan:  [facility] will do no further outreaches at this time.       Attila Rae  Connected Care Resource Center, [facility]    *Connected Care Resource Team does NOT follow patient ongoing. Referrals are identified based on internal discharge reports and the outreach is to ensure patient has an understanding of their discharge instructions.

## 2022-12-07 LAB — BZE UR-MCNC: 430 NG/ML
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
## 2023-04-09 ENCOUNTER — HEALTH MAINTENANCE LETTER (OUTPATIENT)
Age: 33
End: 2023-04-09

## 2023-11-27 ENCOUNTER — HOSPITAL ENCOUNTER (EMERGENCY)
Facility: CLINIC | Age: 33
Discharge: HOME | End: 2023-11-28
Attending: EMERGENCY MEDICINE | Admitting: EMERGENCY MEDICINE
Payer: MEDICAID

## 2023-11-27 DIAGNOSIS — F14.10 COCAINE ABUSE (H): ICD-10-CM

## 2023-11-27 DIAGNOSIS — Z59.00 HOMELESSNESS: ICD-10-CM

## 2023-11-27 PROCEDURE — 99283 EMERGENCY DEPT VISIT LOW MDM: CPT

## 2023-11-27 PROCEDURE — 99284 EMERGENCY DEPT VISIT MOD MDM: CPT | Performed by: EMERGENCY MEDICINE

## 2023-11-27 SDOH — ECONOMIC STABILITY - HOUSING INSECURITY: HOMELESSNESS UNSPECIFIED: Z59.00

## 2023-11-27 ASSESSMENT — ACTIVITIES OF DAILY LIVING (ADL)
ADLS_ACUITY_SCORE: 37

## 2023-11-27 NOTE — ED TRIAGE NOTES
"Patient was picked up from a [facility] [place], patient requested and ambulance because they felt \"too weak\", wanted to come to ER to get \"checked out and get some sleep\". Patient continues to fall asleep during triage.     Triage Assessment (Adult)       Row Name 11/27/23 0221          Triage Assessment    Airway WDL WDL        Respiratory WDL    Respiratory WDL WDL        Skin Circulation/Temperature WDL    Skin Circulation/Temperature WDL WDL        Cardiac WDL    Cardiac WDL WDL        Peripheral/Neurovascular WDL    Peripheral Neurovascular WDL WDL        Cognitive/Neuro/Behavioral WDL    Cognitive/Neuro/Behavioral WDL WDL                     "

## 2023-11-27 NOTE — ED PROVIDER NOTES
"    [facility] EMERGENCY DEPARTMENT ([facility])    11/27/23      ED PROVIDER NOTE     History     Chief Complaint   Patient presents with    Fatigue     Patient was picked up from a [place] clinic, patient requested and ambulance because they felt \"too weak\", wanted to come to ER to get \"checked out\"     HPI  Queen Brittany Stack is a 33 year old female with history of bipolar I disorder, KARENA, asthma, polysubstance use (alcohol, crack cocaine), homelessness who presents to the emergency department with fatigue.  She had presented to a [place] clinic and requested an ambulance because she felt too weak.  Here, the patient states that she feels very tired as she has not slept for the past few days.  She admits to using cocaine over the last few days.  She also reports having had a difficulty finding a safe place to sleep.  The patient states that she is lightheaded/dizzy and feels that she may be dehydrated.  She states she has felt ill lately.  No specific focal symptoms.    Past Medical History  Past Medical History:   Diagnosis Date    Abnormal Pap smear     2010, f/u normal    Anxiety     Asthma     no ihaler use    Bipolar 1 disorder (H)     Bipolar affective (H)     Fainting spell     Herpes simplex without mention of complication     Pt reports last outbreak about 4 weeks ago    Major depressive disorder     Pelvic inflammatory disease     Polysubstance abuse (H)      Past Surgical History:   Procedure Laterality Date    DILATION AND CURETTAGE SUCTION N/A 4/23/2021    Procedure: DILATION AND CURETTAGE, UTERUS, USING SUCTION;  Surgeon: Laine Yates MD;  Location: [facility] OR    IRRIGATION AND DEBRIDEMENT HAND, COMBINED Left 10/30/2022    Procedure: IRRIGATION AND DEBRIDEMENT, LEFT HAND;  Surgeon: Anshul Davis MD;  Location: [facility] OR    NO HISTORY OF SURGERY       albuterol (PROAIR HFA/PROVENTIL HFA/VENTOLIN HFA) 108 (90 Base) MCG/ACT inhaler  doxycycline hyclate (VIBRAMYCIN) 100 MG capsule  hydrOXYzine " Due for labs and follow up in next 2-3 months for HTN, please contact to schedule   (ATARAX) 25 MG tablet  medroxyPROGESTERone (DEPO-PROVERA) 150 MG/ML IM injection  risperiDONE (RISPERDAL) 1 MG tablet      Allergies   Allergen Reactions    Bees Anaphylaxis    Morphine Hives     Other reaction(s): Hives      Oxycodone Hives and Itching    Pork-Derived Products Other (See Comments)     Sikhism reasons     Family History  Family History   Problem Relation Age of Onset    Unknown/Adopted Mother     Unknown/Adopted Father     Unknown/Adopted Maternal Grandmother     Unknown/Adopted Maternal Grandfather     Unknown/Adopted Paternal Grandmother     Unknown/Adopted Paternal Grandfather      Social History   Social History     Tobacco Use    Smoking status: Every Day     Packs/day: 0.50     Years: 8.00     Additional pack years: 0.00     Total pack years: 4.00     Types: Cigarettes    Smokeless tobacco: Never   Substance Use Topics    Alcohol use: Not Currently     Comment: denies use    Drug use: Not Currently     Types: Cocaine     Comment: denies use      Past medical history, past surgical history, medications, allergies, family history, and social history were reviewed with the patient. No additional pertinent items.      A medically appropriate review of systems was performed with pertinent positives and negatives noted in the HPI, and all other systems negative.    Physical Exam   BP: 117/78  Pulse: 88  Temp: 98.4  F (36.9  C)  Resp: 18  SpO2: 98 %  Physical Exam  General: Well nourished, well developed, NAD  HEENT: EOMI, anicteric. NCAT, MMM  Neck: no jugular venous distension, supple, nl ROM  Cardiac: Regular rate and rhythm. No murmurs, rubs, or gallops. Normal S1, S2.  Intact peripheral pulses  Pulm: CTAB, no stridor, wheezes, rales, rhonchi   Skin: Warm and dry to the touch.  No rash  Extremities: No LE edema, no cyanosis, w/w/p  Neuro: Very drowsy, repeatedly falling asleep during our conversation, awakens to verbal/tactile stimuli        ED Course, Procedures, & Data      Procedures                       No results found for any visits on 11/27/23.  Medications - No data to display  Labs Ordered and Resulted from Time of ED Arrival to Time of ED Departure - No data to display  No orders to display          Critical care was not performed.     Medical Decision Making  The patient's presentation was of moderate complexity (a chronic illness mild to moderate exacerbation, progression, or side effect of treatment).    The patient's evaluation involved:  ordering and/or review of 3+ test(s) in this encounter (see separate area of note for details)    The patient's management necessitated moderate risk (limitations due to social determinants of health (homelessness, drug abuse)).    Assessment & Plan    The patient is a 33-year-old female who presents to the emergency department with fatigue and lightheadedness.  Likely secondary to lack of sleep over the past several days, which is likely secondary to heavy substance abuse (cocaine) and inability to find a safe place to sleep.  The patient is able to tolerate oral fluids in the emergency department and was provided with some water to drink.  Urinalysis, urine drug screen, and urine pregnancy test were ordered.    Patient slept for a while in the emergency department, upon awakening, reported some flank pain, states she is concerned about her kidneys.  Again, reiterated that we would like to collect a urinalysis from her.  The patient did go to the restroom to urinate, but did not provide a sample.    I have reviewed the nursing notes. I have reviewed the findings, diagnosis, plan and need for follow up with the patient.    Patient to be signed out to oncoming provider, Dr. Roberts, plan for patient to be discharged home when awake and after labs resulted.  Patient to be advised to follow up with PCP within 1 week with instructions to return to ER immediately with any new/worsening symptoms.       New Prescriptions    No medications on file       Final  diagnoses:   Cocaine abuse (H)   Homelessness       Imani Jones MD  [facility] EMERGENCY DEPARTMENT  11/27/2023     Imani Jones MD  11/28/23 0022

## 2023-11-28 VITALS
HEART RATE: 88 BPM | TEMPERATURE: 98.4 F | SYSTOLIC BLOOD PRESSURE: 117 MMHG | OXYGEN SATURATION: 98 % | DIASTOLIC BLOOD PRESSURE: 78 MMHG | RESPIRATION RATE: 18 BRPM

## 2023-11-28 PROBLEM — F14.20 COCAINE DEPENDENCE (H): Status: ACTIVE | Noted: 2023-11-28

## 2023-11-28 ASSESSMENT — COLUMBIA-SUICIDE SEVERITY RATING SCALE - C-SSRS
TOTAL  NUMBER OF INTERRUPTED ATTEMPTS SINCE LAST CONTACT: NO
ATTEMPT SINCE LAST CONTACT: NO
1. SINCE LAST CONTACT, HAVE YOU WISHED YOU WERE DEAD OR WISHED YOU COULD GO TO SLEEP AND NOT WAKE UP?: YES
SUICIDE, SINCE LAST CONTACT: NO
TOTAL  NUMBER OF ABORTED OR SELF INTERRUPTED ATTEMPTS SINCE LAST CONTACT: NO
6. HAVE YOU EVER DONE ANYTHING, STARTED TO DO ANYTHING, OR PREPARED TO DO ANYTHING TO END YOUR LIFE?: NO
2. HAVE YOU ACTUALLY HAD ANY THOUGHTS OF KILLING YOURSELF?: NO

## 2023-11-28 ASSESSMENT — ACTIVITIES OF DAILY LIVING (ADL)
ADLS_ACUITY_SCORE: 37

## 2023-11-28 NOTE — ED NOTES
Pt woke up stating that she wanted to be admitted to a psychiatric unit. RN asked what her symptoms are but pt reports wanted to talk to MD. Pt denies any pain at the moment. MD informed about the patient's request.

## 2023-11-28 NOTE — ED NOTES
Emergency Department Patient Sign-out       Brief HPI:  This is a 33 year old female signed out to me by Dr. Jones .  See initial ED Provider note for details of the presentation.            Significant Events prior to my assuming care: Patient homeless, hasn't slept, using cocaine. Patient felt tired. Plan to discharge in the morning.    Patient now stating she is suicidal and wants a psych admit. No plan. We will have a behavioral assessment done.    After behavioral assessment, patient no longer suicidal or without mental health concerns.      Exam:   Patient Vitals for the past 24 hrs:   BP Temp Temp src Pulse Resp SpO2   11/27/23 1726 117/78 98.4  F (36.9  C) Oral 88 18 98 %           ED RESULTS:   No results found for this visit on 11/27/23 (from the past 24 hour(s)).    ED MEDICATIONS:   Medications - No data to display      Impression:    ICD-10-CM    1. Cocaine abuse (H)  F14.10       2. Homelessness  Z59.00           Plan:    Plan to discharge so patient can get Rule 25 at [facility] this morning.        MD Armando Montes De Oca, Ben Herron MD  11/28/23 0324

## 2023-11-28 NOTE — CONSULTS
"Diagnostic Evaluation Consultation  Crisis Assessment    Patient Name: Queen Brittany Stack  Age:  33 year old  Legal Sex: female  Gender Identity: female  Pronouns:   Race: Black or   Ethnicity: Not  or   Language: English      Patient was assessed: In person      Patient location: [facility] EMERGENCY DEPARTMENT                             [facility]    Referral Data and Chief Complaint  Queen Brittany Stack presents to the ED via EMS. Patient is presenting to the ED for the following concerns: Health stressors.   Factors that make the mental health crisis life threatening or complex are:  Patient wanted to be seen because she felt weak.  She was sleepy, but oriented x 5.  She was calm and cooperative.  She was not aggressive.  Her affect was constricted.  After medical assessment and when the nurse was discharging her from the ED, she told the nurse that she was suicidal.  When  talked to her and asked pt whether she was suicidal, she said, \"no.\"  She denied NSSI and HI.  She mumbled some of her answers.  Her stressor was that she didn't have a place to live.  She did not know whether she'd be accepted back to [facility].  She wanted to stay at the hospital for one week.   encouraged her to go for a Chemical Health Assessment at one of the Walk-in clinics and she said, \"okay.\"   asked whether she was ready to stop using crack cocaine and she said, \"I guess so.  I need to be.\"  She denied hallucinations, paranoia, delusional thoughts, and lenny.  Her insight, judgment, and impulse control were impaired related to her cocaine use..      Informed Consent and Assessment Methods  Explained the crisis assessment process, including applicable information disclosures and limits to confidentiality, assessed understanding of the process, and obtained consent to proceed with the assessment.  Assessment methods included conducting a formal interview with " patient, review of medical records, collaboration with medical staff, and obtaining relevant collateral information from family and community providers when available.  : done     Patient response to interventions: acceptance expressed  Coping skills were attempted to reduce the crisis:  Sleep     History of the Crisis   Patient had prior diagnoses of Bipolar I, polysub abuse, KARENA, and asthma.  She stated she only uses cocaine, currently.  Her last inpatient mental health admission was in 2022.  She did not state the last time she was in a PAULO treatment.  She does not have current outpatient providers.    Brief Psychosocial History  Family:  Single, Children yes  Support System:  Parent(s)  Employment Status:  unemployed  Source of Income:  none  Financial Environmental Concerns:  unemployed, unable to afford rent/mortgage  Current Hobbies:     Barriers in Personal Life:       Significant Clinical History  Current Anxiety Symptoms:     Current Depression/Trauma:  avoidance, impaired decision making  Current Somatic Symptoms:     Current Psychosis/Thought Disturbance:  impulsive, inattentive  Current Eating Symptoms:     Chemical Use History:  Alcohol: None  Benzodiazepines: None  Opiates: None  Cocaine: Smoked  Last Use:: 11/27/23  Marijuana: None  Other Use: None   Past diagnosis:  Anxiety Disorder, Bipolar Disorder, Substance Use Disorder  Family history:  No known history of mental health or chemical health concerns  Past treatment:  Individual therapy, Psychiatric Medication Management, Inpatient Hospitalization  Details of most recent treatment:  Patient did not discuss  Other relevant history:          Collateral Information  Is there collateral information: Yes     Collateral information name, relationship, phone number:  LANDY FRANCO (Mother)  [phone]    What happened today: Mom's last interaction with patient was in April, 2023. Thus, she does not know what happended today. Mom suspected that it was  "due to substance use.     What is different about patient's functioning: Mom was unable to comment on patient's functioning as she has not seen the patient for approximately 7 months.     Concern about alcohol/drug use:      What do you think the patient needs:      Has patient made comments about wanting to kill themselves/others: no (Mom reports that patient has a history of passive suicidal ideation (made comments about not wanting to live).)    If d/c is recommended, can they take part in safety/aftercare planning:  no (Mom does not live in [place].)    Additional collateral information:  Mom reports that her last interaction with patient was in April, 2023. Patient cut off contact with mom and other family members. Mom and family did not know patient's whereabouts and has been looking for her. Mom reports \"She wasn't doing good when I last saw her. She thinks that she has done a lot of bad things and so she is punishing herself. She has a lot of negative thoughts about herself. She was struggling with drugs and hallucinations. She needs a lot of help.\" Mom further remarks \" I want her to know that she is beautiful and she should forgive herself.\" Mom reports that patient's siblings really want to help her but are also \"afraid to because of things that happended in the past\".     Risk Assessment  Simpson Suicide Severity Rating Scale Full Clinical Version:  Suicidal Ideation  Q1 Wish to be Dead (Lifetime): Yes  Q2 Non-Specific Active Suicidal Thoughts (Lifetime): No  3. Active Suicidal Ideation with any Methods (Not Plan) Without Intent to Act (Lifetime): No  Q4 Active Suicidal Ideation with Some Intent to Act, Without Specific Plan (Lifetime): No  Q5 Active Suicidal Ideation with Specific Plan and Intent (Lifetime): No  Q6 Suicide Behavior (Lifetime): no     Suicidal Behavior (Lifetime)  Actual Attempt (Lifetime): No  Has subject engaged in non-suicidal self-injurious behavior? (Lifetime): No  Interrupted Attempts " (Lifetime): No  Aborted or Self-Interrupted Attempt (Lifetime): No  Preparatory Acts or Behavior (Lifetime): No    Environmental or Psychosocial Events: ongoing abuse of substances, other life stressors  Protective Factors: Protective Factors: help seeking    Does the patient have thoughts of harming others? Feels Like Hurting Others: no  Previous Attempt to Hurt Others: no  Is the patient engaging in sexually inappropriate behavior?: no    Is the patient engaging in sexually inappropriate behavior?  no        Mental Status Exam   Affect: Constricted  Appearance: Disheveled  Attention Span/Concentration: Inattentive  Eye Contact: Avoidant    Fund of Knowledge: Appropriate   Language /Speech Content: Fluent  Language /Speech Volume: Soft  Language /Speech Rate/Productions: Minimally Responsive  Recent Memory: Variable  Remote Memory: Variable  Mood: Depressed  Orientation to Person: No   Orientation to Place: No  Orientation to Time of Day: No  Orientation to Date: No     Situation (Do they understand why they are here?): No  Psychomotor Behavior: Normal  Thought Content: Clear  Thought Form: Intact     Medication  Psychotropic medications: None     Current Care Team  Patient Care Team:  No Ref-Primary, Physician as PCP - Haritha Steve APRN CNP as Nurse Practitioner (Nurse Practitioner)  Clau Pool MD as MD (Internal Medicine)  Jacki Hi, PhD LP (Psychology)    Diagnosis  Patient Active Problem List   Diagnosis Code    Major depressive disorder, recurrent episode, moderate (H) F33.1    Anxiety F41.9    Drug use complicating pregnancy O99.320    Smoker F17.200    HRP  CNM client EDC 11/14/12. Onset care 16 weeks O09.90    Vitamin D deficiency E55.9    Trichomonal infection A59.9    Herpes simplex virus (HSV) infection B00.9    Depression with suicidal ideation F32.A, R45.851    Asthma J45.909    Bipolar I disorder, most recent episode mixed, moderate (H) F31.62    Cannabis dependence,  continuous (H) F12.20    Generalized anxiety disorder F41.1    Mild intermittent asthma without complication J45.20    Polysubstance dependence (H) F19.20    Pelvic inflammatory disease N73.9    Pregnant state, incidental Z33.1    Suicidal ideation R45.851    Polysubstance abuse (H) F19.10    Psychosis, unspecified psychosis type (H) F29    Unwanted pregnancy with plans for termination Z64.0    Bipolar disorder (H) F31.9    Legal termination of pregnancy Z33.2    Moderate episode of recurrent major depressive disorder (H) F33.1    Mental health-related complaint Z71.1    Cellulitis of left hand L03.114    Tenosynovitis M65.9    Cellulitis of hand L03.119    Cocaine dependence (H) F14.20       Primary Problem This Admission  Active Hospital Problems    Cocaine dependence (H)      *Bipolar disorder (H)        Clinical Summary and Substantiation of Recommendations   After therapeutic assessment, intervention and aftercare planning by ED care team and LMHP and in consultation with attending provider, the patient's circumstances and mental state were appropriate for outpatient management. It is the recommendation of this clinician that pt discharge with OP MH support. A this time the pt is not presenting as an acute risk to self or others due to the following factors: Patient denied SI, NSSI, and HI.  Patient agreed to go for a Walk-in Rule 25 PAULO assessment at [facility] in [place].       Patient coping skills attempted to reduce the crisis:  Sleep    Disposition  Recommended disposition: Substance Abuse Disorder Treatment, Rule 25/PAULO Assessment        Reviewed case and recommendations with attending provider. Attending Name: Ben Roberts MD       Attending concurs with disposition: yes       Patient and/or validated legal guardian concurs with disposition:   yes       Final disposition:  discharge    Legal status on admission: Voluntary/Patient has signed consent for treatment    Assessment Details   Total duration  spent with the patient: 30 min     CPT code(s) utilized: 44441 - Psychotherapy for Crisis - 60 (30-74*) min    Lalitha Trejo [facility], Psychotherapist  [facility] - Triage & Transition Services  Callback: [phone]

## 2023-11-28 NOTE — DISCHARGE INSTRUCTIONS
TODAY'S VISIT:  You were seen today for fatigue  -   - If you had any labs or imaging/radiology tests performed today, you should also discuss these tests with your usual provider.     FOLLOW-UP:  Please make an appointment to follow up with:  - Your Primary Care Provider. If you do not have a PCP, please call the Primary Care Center (phone: [phone] for an appointment    - Have your provider review the results from today's visit with you again to make sure no further follow-up or additional testing is needed based on those results.     RETURN TO THE EMERGENCY DEPARTMENT  Return to the Emergency Department at any time for any new or worsening symptoms or any concerns.

## 2023-11-28 NOTE — ED NOTES
Patient woke up, went to the bathroom without any problem. Went back to her bed, when this RN came to give her discharge papers, pt was complaining of kidney pain. RN encouraged pt to give urine sample, water given. MD informed

## 2024-01-15 ENCOUNTER — HOSPITAL ENCOUNTER (EMERGENCY)
Facility: CLINIC | Age: 34
Discharge: HOME | End: 2024-01-16
Attending: FAMILY MEDICINE | Admitting: FAMILY MEDICINE
Payer: MEDICAID

## 2024-01-15 ENCOUNTER — APPOINTMENT (OUTPATIENT)
Dept: GENERAL RADIOLOGY | Facility: CLINIC | Age: 34
End: 2024-01-15
Attending: FAMILY MEDICINE
Payer: MEDICAID

## 2024-01-15 VITALS
SYSTOLIC BLOOD PRESSURE: 114 MMHG | DIASTOLIC BLOOD PRESSURE: 72 MMHG | OXYGEN SATURATION: 99 % | TEMPERATURE: 98.7 F | HEART RATE: 101 BPM | RESPIRATION RATE: 18 BRPM

## 2024-01-15 DIAGNOSIS — M79.671 PAIN IN BOTH FEET: ICD-10-CM

## 2024-01-15 DIAGNOSIS — M79.672 PAIN IN BOTH FEET: ICD-10-CM

## 2024-01-15 DIAGNOSIS — S61.209S OPEN WOUND OF FINGER WITH COMPLICATION, SEQUELA: ICD-10-CM

## 2024-01-15 PROCEDURE — 99284 EMERGENCY DEPT VISIT MOD MDM: CPT | Performed by: FAMILY MEDICINE

## 2024-01-15 PROCEDURE — 73140 X-RAY EXAM OF FINGER(S): CPT | Mod: RT

## 2024-01-15 PROCEDURE — 250N000013 HC RX MED GY IP 250 OP 250 PS 637: Performed by: FAMILY MEDICINE

## 2024-01-15 RX ORDER — ACETAMINOPHEN 500 MG
1000 TABLET ORAL ONCE
Status: COMPLETED | OUTPATIENT
Start: 2024-01-15 | End: 2024-01-15

## 2024-01-15 RX ADMIN — ACETAMINOPHEN 1000 MG: 500 TABLET ORAL at 21:55

## 2024-01-15 ASSESSMENT — ACTIVITIES OF DAILY LIVING (ADL): ADLS_ACUITY_SCORE: 37

## 2024-01-16 ENCOUNTER — TELEPHONE (OUTPATIENT)
Dept: ORTHOPEDICS | Facility: CLINIC | Age: 34
End: 2024-01-16

## 2024-01-16 LAB
HCG UR QL: NEGATIVE
INTERNAL QC OK POCT: NORMAL
POCT KIT EXPIRATION DATE: NORMAL
POCT KIT LOT NUMBER: NORMAL

## 2024-01-16 PROCEDURE — 81025 URINE PREGNANCY TEST: CPT | Performed by: FAMILY MEDICINE

## 2024-01-16 RX ORDER — DOXYCYCLINE 100 MG/1
100 CAPSULE ORAL 2 TIMES DAILY
Qty: 28 CAPSULE | Refills: 0 | Status: SHIPPED | OUTPATIENT
Start: 2024-01-16 | End: 2024-01-30

## 2024-01-16 ASSESSMENT — ACTIVITIES OF DAILY LIVING (ADL)
ADLS_ACUITY_SCORE: 37

## 2024-01-16 NOTE — TELEPHONE ENCOUNTER
Called pt multiple times to schedule pt with Lauryn Paz. Unable to get a hold. Please schedule pt with Lauryn when she calls.     Also sent pt a mychart.    -[facility]        Message      2:15pm tomorrow with PERNELL Hill ATC      ----- Message -----   From: Martha Schwartz ATC   Sent: 1/16/2024   8:25 AM CST   To: Val Lockhart ATC; Pernell Alcala ATC   Subject: FW: Order for QUEEN ARMIN CUENCA                Order pager says non-op but she has an open wound so sports will not see pt. Should pt see Lauryn?         -[facility]         ----- Message -----   From: Derek Slaughter MD   Sent: 1/16/2024   1:29 AM CST   To: Ortho Ed Referrals-   Subject: Order for QUEEN ARMIN CUENCA

## 2024-01-16 NOTE — ED PROVIDER NOTES
"    [facility] [place]    1/15/24      ED PROVIDER NOTE    History     Chief Complaint   Patient presents with     Foot Pain     Patient is having bilateral foot pain and right middle finger pan, arrived by paramedics, vitals stable. She is moaning, \"I need some vancomycin.\" She states that this has been going on for a month, and that today it just got worse and is having pus come out of her.      The history is provided by the patient and medical records. The history is limited by the condition of the patient.     Queen Brittany Stack is a 33 year old female with PMH notable for bipolar 1 disorder, anxiety ,asthma, polysubstance abuse (alcohol, crack, cocaine) who presents to the Emergency Department today with bilateral foot pain and right middle finger pain. Patient reports pain and swelling from the tip of her right middle finger with yellow purulent drainage. She states that this happens for months. She also reports very cold feet and sore in the mucous membrane inside her lower lip. Of note, she is homeless.     Past Medical History  Past Medical History:   Diagnosis Date     Abnormal Pap smear     2010, f/u normal     Anxiety      Asthma     no ihaler use     Bipolar 1 disorder (H)      Bipolar affective (H)      Fainting spell      Herpes simplex without mention of complication     Pt reports last outbreak about 4 weeks ago     Major depressive disorder      Pelvic inflammatory disease      Polysubstance abuse (H)      Past Surgical History:   Procedure Laterality Date     DILATION AND CURETTAGE SUCTION N/A 4/23/2021    Procedure: DILATION AND CURETTAGE, UTERUS, USING SUCTION;  Surgeon: Lanie Yates MD;  Location: [facility] OR     IRRIGATION AND DEBRIDEMENT HAND, COMBINED Left 10/30/2022    Procedure: IRRIGATION AND DEBRIDEMENT, LEFT HAND;  Surgeon: Anshul Davis MD;  Location: [facility] OR     NO HISTORY OF SURGERY       doxycycline hyclate (VIBRAMYCIN) 100 MG capsule  albuterol " (PROAIR HFA/PROVENTIL HFA/VENTOLIN HFA) 108 (90 Base) MCG/ACT inhaler  doxycycline hyclate (VIBRAMYCIN) 100 MG capsule  hydrOXYzine (ATARAX) 25 MG tablet  medroxyPROGESTERone (DEPO-PROVERA) 150 MG/ML IM injection  risperiDONE (RISPERDAL) 1 MG tablet      Allergies   Allergen Reactions     Bees Anaphylaxis     Morphine Hives     Other reaction(s): Hives       Oxycodone Hives and Itching     Pork-Derived Products Other (See Comments)     Mandaeism reasons     Family History  Family History   Problem Relation Age of Onset     Unknown/Adopted Mother      Unknown/Adopted Father      Unknown/Adopted Maternal Grandmother      Unknown/Adopted Maternal Grandfather      Unknown/Adopted Paternal Grandmother      Unknown/Adopted Paternal Grandfather      Social History   Social History     Tobacco Use     Smoking status: Every Day     Packs/day: 0.50     Years: 8.00     Additional pack years: 0.00     Total pack years: 4.00     Types: Cigarettes     Smokeless tobacco: Never   Substance Use Topics     Alcohol use: Not Currently     Comment: denies use     Drug use: Not Currently     Types: Cocaine     Comment: denies use         A medically appropriate review of systems was performed with pertinent positives and negatives noted in the HPI, and all other systems negative.    Physical Exam   BP: 114/72  Pulse: 101  Temp: 98.7  F (37.1  C)  Resp: 18  SpO2: 99 %  Physical Exam  Vitals and nursing note reviewed.   Constitutional:       General: She is in acute distress.      Appearance: Normal appearance. She is well-developed.      Comments: Patient sleepy here in the ER is cold also.   HENT:      Head: Normocephalic and atraumatic.      Mouth/Throat:      Mouth: Mucous membranes are moist.      Pharynx: Oropharynx is clear. Posterior oropharyngeal erythema present.      Comments: Right inner lip reveals some mild irritation but not herpetic  Eyes:      General: No scleral icterus.     Extraocular Movements: Extraocular movements  intact.      Conjunctiva/sclera: Conjunctivae normal.      Pupils: Pupils are equal, round, and reactive to light.   Cardiovascular:      Rate and Rhythm: Normal rate and regular rhythm.   Pulmonary:      Effort: Pulmonary effort is normal. No respiratory distress.      Breath sounds: No stridor.   Abdominal:      General: Abdomen is flat.      Tenderness: There is no guarding.   Musculoskeletal:         General: Swelling and tenderness present.      Cervical back: Normal range of motion and neck supple.      Comments: See photo right middle finger   Skin:     General: Skin is warm and dry.      Capillary Refill: Capillary refill takes less than 2 seconds.      Coloration: Skin is pale.      Findings: No erythema or rash.      Comments: See photo   Neurological:      General: No focal deficit present.      Mental Status: She is alert and oriented to person, place, and time. Mental status is at baseline.   Psychiatric:      Comments: Flat affect somewhat withdrawn not delusional agitated                       ED Course, Procedures, & Data      Patient valuated here in the ER did take photos did have discussion with orthopedics at this point felt more conservative management at this point the symptoms been ongoing for least a month.  There is no lymphangitic spread otherwise with swelling though but x-rays done there is no bony erosions etc.  Plan at this point will be let the patient rest we will check pregnancy test negative would consider doxycycline referral to orthopedics for follow-up.    Patient had the finger soaked in warm salt water here in the ER.  X-rays were done were negative for any bony abnormalities urine pregnancy is negative.  I do not see any lesions on her feet otherwise no sign of frostbite etc.  At this point allow the patient to rest a warm area overnight as it is cold outside.  Will plan to place on doxycycline with history of MRSA as this lesion on the finger that is been there a month is  been draining somewhat orthopedics did not recommend any other intervention at this point.  Signed out to night physician regarding disposition.  Referral placed to orthopedics along with primary physician also.      Procedures                     Results for orders placed or performed during the hospital encounter of 01/15/24   XR Finger Right G/E 2 Views     Status: None    Narrative    EXAM: XR FINGER RIGHT G/E 2 VIEWS  LOCATION: [facility]  DATE: 1/15/2024    INDICATION: right middle finger swelling and drainage  COMPARISON: None.      Impression    IMPRESSION: Normal joint spaces and alignment. No fracture. No bone lesion or erosion. No radiopaque foreign body.     hCG qual urine POCT     Status: Normal   Result Value Ref Range    HCG Qual Urine Negative Negative    Internal QC Check POCT Valid Valid    POCT Kit Lot Number [id number]     POCT Kit Expiration Date 7/25/2025      Medications   acetaminophen (TYLENOL) tablet 1,000 mg (1,000 mg Oral $Given 1/15/24 0948)     Labs Ordered and Resulted from Time of ED Arrival to Time of ED Departure   HCG QUALITATIVE URINE POCT - Normal       Result Value    HCG Qual Urine Negative      Internal QC Check POCT Valid      POCT Kit Lot Number [id number]      POCT Kit Expiration Date 7/25/2025       XR Finger Right G/E 2 Views   Final Result   IMPRESSION: Normal joint spaces and alignment. No fracture. No bone lesion or erosion. No radiopaque foreign body.                Critical care was not performed.     Medical Decision Making  The patient's presentation was of moderate complexity (an acute illness with systemic symptoms).    The patient's evaluation involved:  review of external note(s) from 2 sources (see separate area of note for details)  review of 2 test result(s) ordered prior to this encounter (see separate area of note for details)  ordering and/or review of 2 test(s) in this encounter (see separate area of note for  details)  discussion of management or test interpretation with another health professional (see separate area of note for details)    The patient's management necessitated moderate risk (prescription drug management including medications given in the ED).    Assessment & Plan   33-year-old female homeless presented to the ER with concerns of bilateral foot pain without signs of any blistering or frostbite here in the ER.  Patient also complained about drainage from her right middle finger which has been over a month evaluation there was some swelling on the palmar aspect of the distal phalanx not a true felon and photos were taken though.  Discussed with orthopedics this point more likely conservative management wound was soaked in warm soapy water here patient was warmed observed here will check for pregnancy test would likely start doxycycline as an outpatient and referral to orthopedics for follow-up and primary care also.       I have reviewed the nursing notes. I have reviewed the findings, diagnosis, plan and need for follow up with the patient.    New Prescriptions    DOXYCYCLINE HYCLATE (VIBRAMYCIN) 100 MG CAPSULE    Take 1 capsule (100 mg) by mouth 2 times daily for 14 days       Final diagnoses:   Open wound of finger with complication, sequela - right middle finger   Pain in both feet       Derek Slaughter MD  [facility] EMERGENCY DEPARTMENT  1/15/2024    This note was created at least in part by the use of dragon voice dictation system. Inadvertent typographical errors may still exist.  Derek Slaughter MD.    Patient evaluated in the emergency department during the COVID-19 pandemic period. Careful attention to patients safety was addressed throughout the evaluation. Evaluation and treatment management was initiated with disposition made efficiently and appropriate as possible to minimize any risk of potential exposure to patient during this evaluation.       Derek Slaughter,  MD  01/16/24 0239

## 2024-01-16 NOTE — DISCHARGE INSTRUCTIONS
Home.  Discussed finger infection with ortho who felt soaking and antibiotics would be appropriate.  Take the doxycycline 100mg twice a day for 2 weeks.  Soak in warm soap water twice daily.  Follow up with primary MD and also with orthopedics.  Return if any concerns.  Xray did not show any randolph changes.    Please make an appointment to follow up with Your Primary Care Provider, [facility] (phone: [phone]), [facility] (phone: [phone]), and Orthopedics Clinic (phone: [phone]) as soon as possible.      .

## 2024-01-16 NOTE — ED TRIAGE NOTES
Triage Assessment (Adult)       Row Name 01/15/24 7185          Triage Assessment    Airway WDL WDL        Respiratory WDL    Respiratory WDL WDL        Skin Circulation/Temperature WDL    Skin Circulation/Temperature WDL WDL        Cardiac WDL    Cardiac WDL WDL        Peripheral/Neurovascular WDL    Peripheral Neurovascular WDL WDL        Cognitive/Neuro/Behavioral WDL    Cognitive/Neuro/Behavioral WDL WDL

## 2024-01-16 NOTE — ED NOTES
Emergency Department Patient Sign-out       Brief HPI and ED course:  Patient is a 33 year old female signed out to me by Dr. Slaughter.  See initial ED Provider note for details of the presentation. In brief, patient with R middle finger pain and feet pain. Finger swollen for nearly a month, has been draining. Discussed with ortho, plan for doxycycline and outpatient f/u, I&D not indicated. Patient homeless, very cold out tonight, plan discharge once busses start running in the morning.     Vitals:   Patient Vitals for the past 24 hrs:   BP Temp Temp src Pulse Resp SpO2   01/15/24 2116 114/72 98.7  F (37.1  C) Oral 101 18 99 %       Received Sign-out Plan:    Pending studies include: none       Plan:   - discharge once busses are running  - send with doxycycline prescription prepared by Dr. Slaughter    Events after assuming care:  Patient monitored in the ED overnight without acute events. Patient discharged in the morning with plan as established above from Dr. Slaughter.        --  Hua Jordan MD   Emergency Medicine         Hua Jrodan MD  01/16/24 5692

## 2024-02-05 NOTE — ED NOTES
"Patient asking for food patient informed that this writer would have to ask provider. Patient then moving from surge area to other hallway and this writer asked patient to return to surge area and patient refused \"what is that for those who are trash.\" This writer explained that other patients are there too and that was not the case but we have different sections for different things. 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 Provider updated.   " Subjective   Marcella Kemp is a 5 y.o. female who presents for Tick Removal (HERE WITH MOTHER. MARCELLA WAS PLAYING WITH DOG AND FELT BUMP ON BACK NECK. MOM FOUND TICK ON HER NECK, REMOVED TICK.  DENIES FEVER. HAS SMALL RED MALIA ON BACK OF NECK. ).      5 yr female here with mom for tick bite. Mom reports yesterday Marcella said she felt something on back of her neck and it was a tick. Dad pulled the tick out and brought it with them today. Mom is unsure how long the tick has been on her. They did just get a new puppy 2 days ago but didn't find anything on the puppy. However, [place].  Denies any fever or rashes.  Mom is very concerned about Lyme disease.        Review of Systems   All other systems reviewed and are negative.      Objective   Temp 36.9 °C (98.4 °F) (Temporal)   Wt 18.3 kg Comment: 40.4#  BSA: There is no height or weight on file to calculate BSA.  Growth percentiles: No height on file for this encounter. 42 %ile (Z= -0.20) based on CDC (Girls, 2-20 Years) weight-for-age data using vitals from 2/5/2024.     Physical Exam  Vitals reviewed.   Constitutional:       Appearance: Normal appearance.   HENT:      Head: Normocephalic.      Right Ear: Tympanic membrane normal.      Left Ear: Tympanic membrane normal.   Eyes:      Conjunctiva/sclera: Conjunctivae normal.   Cardiovascular:      Rate and Rhythm: Normal rate and regular rhythm.   Pulmonary:      Effort: Pulmonary effort is normal.      Breath sounds: Normal breath sounds.   Abdominal:      Palpations: Abdomen is soft.   Musculoskeletal:      Cervical back: Neck supple.   Skin:     Comments: At top of back of neck near hairline is 2mm erythematous lesion   Neurological:      Mental Status: She is alert.         Assessment/Plan   Problem List Items Addressed This Visit    None  Visit Diagnoses         Codes    Tick bite of neck, initial encounter    -  Primary S10.96XA, W57.XXXA    Relevant Medications    amoxicillin  (Amoxil) 400 mg/5 mL suspension    Other Relevant Orders    Parasite Identification, Macroscopic        Discussed that we will send the tick to have it tested. Mom wants to start treatment today instead of waiting for results and then discontinue if the tick is negative. Call with any concerns.           Ria Jerome, DO

## 2024-03-26 ENCOUNTER — HOSPITAL ENCOUNTER (EMERGENCY)
Facility: CLINIC | Age: 34
Discharge: HOME | End: 2024-03-26
Attending: EMERGENCY MEDICINE | Admitting: EMERGENCY MEDICINE
Payer: MEDICAID

## 2024-03-26 VITALS
SYSTOLIC BLOOD PRESSURE: 139 MMHG | TEMPERATURE: 97.8 F | HEART RATE: 79 BPM | RESPIRATION RATE: 18 BRPM | DIASTOLIC BLOOD PRESSURE: 77 MMHG | OXYGEN SATURATION: 96 %

## 2024-03-26 DIAGNOSIS — R11.2 NAUSEA AND VOMITING, UNSPECIFIED VOMITING TYPE: ICD-10-CM

## 2024-03-26 DIAGNOSIS — Z59.00 HOMELESSNESS: ICD-10-CM

## 2024-03-26 DIAGNOSIS — K12.0 APHTHOUS ULCER OF MOUTH: ICD-10-CM

## 2024-03-26 DIAGNOSIS — F14.90 CRACK COCAINE USE: ICD-10-CM

## 2024-03-26 LAB
ALBUMIN UR-MCNC: NEGATIVE MG/DL
AMORPH CRY #/AREA URNS HPF: ABNORMAL /HPF
APPEARANCE UR: CLEAR
BILIRUB UR QL STRIP: NEGATIVE
COLOR UR AUTO: ABNORMAL
GLUCOSE UR STRIP-MCNC: NEGATIVE MG/DL
HCG UR QL: NEGATIVE
HGB UR QL STRIP: NEGATIVE
KETONES UR STRIP-MCNC: NEGATIVE MG/DL
LEUKOCYTE ESTERASE UR QL STRIP: NEGATIVE
MUCOUS THREADS #/AREA URNS LPF: PRESENT /LPF
NITRATE UR QL: NEGATIVE
PH UR STRIP: 7 [PH] (ref 5–7)
RBC URINE: <1 /HPF
SP GR UR STRIP: 1.02 (ref 1–1.03)
SQUAMOUS EPITHELIAL: 1 /HPF
UROBILINOGEN UR STRIP-MCNC: NORMAL MG/DL
WBC URINE: 2 /HPF

## 2024-03-26 PROCEDURE — 81025 URINE PREGNANCY TEST: CPT | Performed by: EMERGENCY MEDICINE

## 2024-03-26 PROCEDURE — 81001 URINALYSIS AUTO W/SCOPE: CPT | Performed by: EMERGENCY MEDICINE

## 2024-03-26 PROCEDURE — 99283 EMERGENCY DEPT VISIT LOW MDM: CPT

## 2024-03-26 SDOH — ECONOMIC STABILITY - HOUSING INSECURITY: HOMELESSNESS UNSPECIFIED: Z59.00

## 2024-03-26 ASSESSMENT — ACTIVITIES OF DAILY LIVING (ADL)
ADLS_ACUITY_SCORE: 37

## 2024-03-26 ASSESSMENT — COLUMBIA-SUICIDE SEVERITY RATING SCALE - C-SSRS
1. IN THE PAST MONTH, HAVE YOU WISHED YOU WERE DEAD OR WISHED YOU COULD GO TO SLEEP AND NOT WAKE UP?: YES
2. HAVE YOU ACTUALLY HAD ANY THOUGHTS OF KILLING YOURSELF IN THE PAST MONTH?: NO
6. HAVE YOU EVER DONE ANYTHING, STARTED TO DO ANYTHING, OR PREPARED TO DO ANYTHING TO END YOUR LIFE?: NO

## 2024-03-26 NOTE — ED PROVIDER NOTES
"  History     Chief Complaint:  Wound Check and Generalized Body Aches     HPI   Queen Brittany Stack is a 33 year old female who presents to the ED for evaluation of generalized body aches, vomiting and diarrhea. The patient reports she has had nausea, vomiting, and diarrhea for the last three days, \"unable to keep any food down\". She states her \"stomach, face, and everything\" does not feel well. She has had red colored loose stools x1 today and urinary frequency for the last few days. The patient also reports she has had sores to her lips and was evaluated for these previously, but was not able to started the prescribed medications. She reports she was prescribed \"a cillin.\"  She was unable to fill her prescriptions due to financial difficulties.  She endorses tobacco, THC, and crack cocaine use, with last use yesterday. She denies any fevers.  During the interview she also asks for food stating that she is very hungry and would like a sandwich and water.    Independent Historian:   None - Patient Only    Review of External Notes:  None    Medications:    albuterol (PROAIR HFA/PROVENTIL HFA/VENTOLIN HFA) 108 (90 Base) MCG/ACT inhaler  doxycycline hyclate (VIBRAMYCIN) 100 MG capsule  hydrOXYzine (ATARAX) 25 MG tablet  medroxyPROGESTERone (DEPO-PROVERA) 150 MG/ML IM injection  risperiDONE (RISPERDAL) 1 MG tablet      Past Medical History:    Past Medical History:   Diagnosis Date    Abnormal Pap smear     Anxiety     Asthma     Bipolar 1 disorder (H)     Bipolar affective (H)     Fainting spell     Herpes simplex without mention of complication     Major depressive disorder     Pelvic inflammatory disease     Polysubstance abuse (H)        Past Surgical History:    Past Surgical History:   Procedure Laterality Date    DILATION AND CURETTAGE SUCTION N/A 4/23/2021    Procedure: DILATION AND CURETTAGE, UTERUS, USING SUCTION;  Surgeon: Lanie Yates MD;  [facility] OR    IRRIGATION AND DEBRIDEMENT HAND, " COMBINED Left 10/30/2022    Procedure: IRRIGATION AND DEBRIDEMENT, LEFT HAND;  Surgeon: Anshul Davis MD;  Location: [facility] OR    NO HISTORY OF SURGERY          Physical Exam   Patient Vitals for the past 24 hrs:   BP Temp Temp src Pulse Resp SpO2   03/26/24 1233 -- -- -- -- -- 96 %   03/26/24 1232 139/77 -- -- 79 -- --   03/26/24 0909 (!) 139/98 97.8  F (36.6  C) Oral 88 18 99 %      Physical Exam  General: Well appearing, nontoxic.  Resting comfortably  Head:  Scalp, face, and head appear normal  Eyes:  Pupils are equal, round, reactive to light     Conjunctivae non-injected and sclerae white  ENT:    The external nose is normal    Pinnae are normal    MMM. Posterior pharynx clear without swelling, exudates or erythema. No tongue or lip swelling. No tongue elevation. Uvula normal without deviation. Voice normal. No drooling or trismus.  She has 2 areas of mucosal skin ulceration to the inside of the lips with minimal reactive erythema.  No area of surrounding erythema crepitus fluctuance.   Neck:  Normal range of motion    There is no rigidity noted    Trachea is in the midline  CV:  Regular rate and rhythm     Normal S1/S2, no S3/S4    No murmur or rub. Radial pulses 2+ bilaterally.  Resp:  Lungs are clear and equal bilaterally  There is no tachypnea    No increased work of breathing    No rales, wheezing, or rhonchi  GI:  Abdomen is soft, no rigidity or guarding    No distension, or mass    No tenderness or rebound tenderness   MS:  Normal muscular tone    Symmetric motor strength    No lower extremity edema  Skin:  No rash or acute skin lesions noted  Neuro:  Awake and alert  Speech is normal and fluent  Moves all extremities spontaneously  Psych: Normal affect. Appropriate interactions.     Emergency Department Course   Laboratory:  Labs Ordered and Resulted from Time of ED Arrival to Time of ED Departure   ROUTINE UA WITH MICROSCOPIC REFLEX TO CULTURE - Abnormal       Result Value    Color Urine Straw       "Appearance Urine Clear      Glucose Urine Negative      Bilirubin Urine Negative      Ketones Urine Negative      Specific Gravity Urine 1.018      Blood Urine Negative      pH Urine 7.0      Protein Albumin Urine Negative      Urobilinogen Urine Normal      Nitrite Urine Negative      Leukocyte Esterase Urine Negative      Mucus Urine Present (*)     Amorphous Crystals Urine Few (*)     RBC Urine <1      WBC Urine 2      Squamous Epithelials Urine 1     HCG QUALITATIVE URINE - Normal    hCG Urine Qualitative Negative        Emergency Department Course & Assessments:     Interventions:  Medications - No data to display     Assessments, Independent Interpretation, Consults/Discussion of Management/Tests:  ED Course as of 03/26/24 1242   Tue Mar 26, 2024   0928 Initial Examination   1238 Patient updated regarding findings and plan of care.       Social Determinants of Health affecting care:  Healthcare Access/Compliance, Financial Insecurity, and Homelessness/Housing Insecurity affects the patient's ability to obtain medications, follow-up care as well as routine medical care.  These pose a risk to the patient's health.    Disposition:  The patient was discharged to home.     Impression & Plan      Medical Decision Making:  Queen Brittany Stack is a 33 year old female who presents to the ED for evaluation of multiple concerns.  On my evaluation she is well-appearing, hemodynamically stable and afebrile.  Abdominal exam is benign with no tenderness whatsoever.  No clinical findings to suggest an acute underlying surgical process including appendicitis, cholecystitis, bowel obstruction or volvulus.  Patient reported nausea vomiting and \"not being able to keep anything down\" but then asked immediately for sandwich and food from the emergency department.  She was able to tolerate a sandwich box and liquids without any vomiting or diarrhea seen in the emergency department.  Of note patient presents on a very snowy winter " day and is homeless and there may be some secondary gain.  She has a history of polysubstance abuse including ongoing crack cocaine use.  She also reports oral ulcers and states that she was prescribed penicillin for this but she was not able to get this from the pharmacy due to financial inability to pay.  She does have what appears to be mild stomatitis ulcers possibly related to smoking crack cocaine.  At this time there is no evidence for severe bacterial infection, abscess or deep space infection of the head or neck.  No gingival swelling or evidence of definite dental or mandibular infection.  I see no need for further antibiotics at this time.  Given her reported symptoms she may have a mild gastroenteritis.  She appears clinically well-hydrated with a benign exam no further testing or workup is indicated at this time.  I recommended supportive outpatient primary care follow-up. PCP referral was placed. Return precautions were discussed with patient. The patient's questions were answered and the patient was agreeable with discharge.       Diagnosis:    ICD-10-CM    1. Homelessness  Z59.00 Primary Care Referral     Care Management / Social Work IP Consult      2. Aphthous ulcer of mouth  K12.0 Primary Care Referral     Care Management / Social Work IP Consult      3. Nausea and vomiting, unspecified vomiting type  R11.2 Primary Care Referral     Care Management / Social Work IP Consult      4. Crack cocaine use  F14.90 Primary Care Referral     Care Management / Social Work IP Consult           Discharge Medications:  New Prescriptions    No medications on file      Scribe Disclosure:   I, Juan Grant, am serving as a scribe; to document services personally performed by No name on file -based on data collection and the provider's statements to me.     Provider Disclosure:  I agree with above History, Review of Systems, Physical exam and Plan.  I have reviewed the content of the documentation and have edited  it as needed. I have personally performed the services documented here and the documentation accurately represents those services and the decisions I have made.      Electronically signed by:  3/26/2024   Benny Courtney MD Daro, Ryan Clay, MD  03/26/24 1244

## 2024-03-26 NOTE — ED NOTES
Pt provided with laundered clothing.  Pt refusing to discharge and requesting blood work and to speak to carrie CARVALHO again.

## 2024-03-26 NOTE — ED TRIAGE NOTES
"[facility] and requesting treatment for \"mouth sores\".  Pt has numerous complains including generalized body pain, nausea, sore in her mouth and \"holes all over my body\".  Pt states she is homeless and requested clean clothes, food and coffee, warm blankets, a TV remote and her clothes to be laundered upon initial arrival to the ED.     Triage Assessment (Adult)       Row Name 03/26/24 0909          Triage Assessment    Airway WDL WDL        Respiratory WDL    Respiratory WDL WDL        Skin Circulation/Temperature WDL    Skin Circulation/Temperature WDL WDL        Cardiac WDL    Cardiac WDL WDL     Cardiac Rhythm NSR        Peripheral/Neurovascular WDL    Peripheral Neurovascular WDL WDL        Cognitive/Neuro/Behavioral WDL    Cognitive/Neuro/Behavioral WDL WDL                     "

## 2024-03-26 NOTE — ED NOTES
"Bed: ED30  Expected date:   Expected time:   Means of arrival:   Comments:  HEMS 451 33F \"mouth sores\"  "

## 2024-04-24 ENCOUNTER — HOSPITAL ENCOUNTER (EMERGENCY)
Facility: CLINIC | Age: 34
Discharge: HOME | End: 2024-04-24
Attending: EMERGENCY MEDICINE | Admitting: EMERGENCY MEDICINE
Payer: MEDICAID

## 2024-04-24 ENCOUNTER — APPOINTMENT (OUTPATIENT)
Dept: GENERAL RADIOLOGY | Facility: CLINIC | Age: 34
End: 2024-04-24
Attending: EMERGENCY MEDICINE
Payer: MEDICAID

## 2024-04-24 ENCOUNTER — APPOINTMENT (OUTPATIENT)
Dept: CT IMAGING | Facility: CLINIC | Age: 34
End: 2024-04-24
Attending: EMERGENCY MEDICINE
Payer: MEDICAID

## 2024-04-24 VITALS
RESPIRATION RATE: 16 BRPM | HEART RATE: 84 BPM | SYSTOLIC BLOOD PRESSURE: 136 MMHG | OXYGEN SATURATION: 99 % | DIASTOLIC BLOOD PRESSURE: 86 MMHG | TEMPERATURE: 98.4 F

## 2024-04-24 DIAGNOSIS — M25.561 ACUTE PAIN OF RIGHT KNEE: ICD-10-CM

## 2024-04-24 DIAGNOSIS — S09.90XA INJURY OF HEAD, INITIAL ENCOUNTER: ICD-10-CM

## 2024-04-24 LAB
AMPHETAMINES UR QL SCN: ABNORMAL
BARBITURATES UR QL SCN: ABNORMAL
BENZODIAZ UR QL SCN: ABNORMAL
BZE UR QL SCN: ABNORMAL
CANNABINOIDS UR QL SCN: ABNORMAL
FENTANYL UR QL: ABNORMAL
OPIATES UR QL SCN: ABNORMAL
PCP QUAL URINE (ROCHE): ABNORMAL

## 2024-04-24 PROCEDURE — 73560 X-RAY EXAM OF KNEE 1 OR 2: CPT | Mod: RT

## 2024-04-24 PROCEDURE — 99284 EMERGENCY DEPT VISIT MOD MDM: CPT | Mod: 25 | Performed by: FAMILY MEDICINE

## 2024-04-24 PROCEDURE — 70450 CT HEAD/BRAIN W/O DYE: CPT

## 2024-04-24 PROCEDURE — 99284 EMERGENCY DEPT VISIT MOD MDM: CPT | Performed by: FAMILY MEDICINE

## 2024-04-24 PROCEDURE — 80307 DRUG TEST PRSMV CHEM ANLYZR: CPT | Performed by: FAMILY MEDICINE

## 2024-04-24 PROCEDURE — 72125 CT NECK SPINE W/O DYE: CPT

## 2024-04-24 ASSESSMENT — COLUMBIA-SUICIDE SEVERITY RATING SCALE - C-SSRS
6. HAVE YOU EVER DONE ANYTHING, STARTED TO DO ANYTHING, OR PREPARED TO DO ANYTHING TO END YOUR LIFE?: NO
1. IN THE PAST MONTH, HAVE YOU WISHED YOU WERE DEAD OR WISHED YOU COULD GO TO SLEEP AND NOT WAKE UP?: NO
2. HAVE YOU ACTUALLY HAD ANY THOUGHTS OF KILLING YOURSELF IN THE PAST MONTH?: NO

## 2024-04-24 ASSESSMENT — ACTIVITIES OF DAILY LIVING (ADL)
ADLS_ACUITY_SCORE: 37

## 2024-04-24 NOTE — CONSULTS
"Diagnostic Evaluation Consultation  Crisis Assessment    Patient Name: Queen Brittany Stack  Age:  34 year old  Legal Sex: female  Gender Identity: female  Pronouns:   Race: Black or   Ethnicity: Not  or   Language: English      Patient was assessed: In person      Patient location: [facility] EMERGENCY DEPARTMENT                             URE-    Referral Data and Chief Complaint  Queen Brittany Stack presents to the ED via EMS. Patient is presenting to the ED for the following concerns: Other (see comment) (She was cold).   Factors that make the mental health crisis life threatening or complex are:  Pt brought to the ED by EMS. She was found passed out in public and 911 was called.   She has been sleeping in the ED for more than 9 hours. She was initially placed for discharge but when given discharge papers she told RN that she was having suicidal thoughts and needed to be admitted.   DEC assessment was attempted with her several hours ago and she was unwilling to engage.    This time for DEC assessment she is woken and taken to consult room. She is tired and falls asleep often. She was able to stay awake for periods of time to complete assessment.     She states \"I want to go to the psych garcía.\"     When asked why she wants to do this she states \"so I can sleep, clear my mind and get sober.     She endorses passive suicidal thoughts, no plan or intentions.   \"It is an emotional situation.  I don't see my kids or my family, everyone out there just wants to have sex.\"   She is homeless and lives on the streets. She does not utilize the shelter system often.   She is using cocaine daily, also using heroine and fentany when accessible..      Informed Consent and Assessment Methods  Explained the crisis assessment process, including applicable information disclosures and limits to confidentiality, assessed understanding of the process, and obtained consent to proceed " with the assessment.  Assessment methods included conducting a formal interview with patient, review of medical records, collaboration with medical staff, and obtaining relevant collateral information from family and community providers when available.  : done     Patient response to interventions:    Coping skills were attempted to reduce the crisis:  sleep     History of the Crisis   Pt has diagnosis of Bipolar Disorder, poly substance abuse, KARENA and asthma.   Her last mental health admission was in 2022.    She does not have any current mental health providers.    Brief Psychosocial History  Family:  Single, Children yes  Support System:     Employment Status:  unemployed  Source of Income:  none  Financial Environmental Concerns:  unemployed, unable to afford rent/mortgage  Current Hobbies:     Barriers in Personal Life:       Significant Clinical History  Current Anxiety Symptoms:   (Denies)  Current Depression/Trauma:  impaired decision making  Current Somatic Symptoms:   (Denies)  Current Psychosis/Thought Disturbance:   (Denies)  Current Eating Symptoms:   (Denies)  Chemical Use History:  Alcohol: None  Benzodiazepines: None  Opiates: Street buy pills, Heroin other  Cocaine: Smoked  Last Use:: 04/23/24  Marijuana: Occasional   Past diagnosis:  Anxiety Disorder, Bipolar Disorder, Substance Use Disorder  Family history:  No known history of mental health or chemical health concerns  Past treatment:  Individual therapy, Primary Care, Inpatient Hospitalization  Details of most recent treatment:     Other relevant history:          Collateral Information  Is there collateral information: No     Risk Assessment  Hydes Suicide Severity Rating Scale Full Clinical Version:  Suicidal Ideation  Q1 Wish to be Dead (Lifetime): Yes  Q6 Suicide Behavior (Lifetime): no      Hydes Suicide Severity Rating Scale Recent:   Suicidal Ideation (Recent)  Q1 Wished to be Dead (Past Month): no  Q2 Suicidal Thoughts (Past  Month): no  Level of Risk per Screen: no risks indicated     Suicidal Behavior (Recent)  Actual Attempt (Past 3 Months): No  Has subject engaged in non-suicidal self-injurious behavior? (Past 3 Months): No  Interrupted Attempts (Past 3 Months): No  Aborted or Self-Interrupted Attempt (Past 3 Months): No  Preparatory Acts or Behavior (Past 3 Months): No    Environmental or Psychosocial Events: ongoing abuse of substances, other life stressors  Protective Factors: Protective Factors: help seeking    Does the patient have thoughts of harming others? Feels Like Hurting Others: no  Previous Attempt to Hurt Others: no  Current presentation:  (sleepy)  Is the patient engaging in sexually inappropriate behavior?: no    Is the patient engaging in sexually inappropriate behavior?  no        Mental Status Exam   Affect: Constricted  Appearance: Disheveled  Attention Span/Concentration: Inattentive  Eye Contact: Variable    Fund of Knowledge: Appropriate   Language /Speech Content: Fluent  Language /Speech Volume: Normal  Language /Speech Rate/Productions: Other (please comment) (mumbled)  Recent Memory: Intact  Remote Memory: Intact  Mood: Apathetic  Orientation to Person: Yes   Orientation to Place: Yes  Orientation to Time of Day: Yes  Orientation to Date: Yes     Situation (Do they understand why they are here?): Yes  Psychomotor Behavior: Normal  Thought Content: Clear  Thought Form: Intact        Medication  Psychotropic medications:   Medication Orders - Psychiatric (From admission, onward)      None           Current Care Team  Patient Care Team:  No Ref-Primary, Physician as PCP - Haritha Steve APRN CNP as Nurse Practitioner (Nurse Practitioner)  Clau Pool MD as MD (Internal Medicine)  Jacki Hi, PhD LP (Psychology)    Diagnosis  Patient Active Problem List   Diagnosis    Major depressive disorder, recurrent episode, moderate (H)    Anxiety    Drug use complicating pregnancy    Smoker     [facility]  CN client EDC 11/14/12. Onset care 16 weeks    Vitamin D deficiency    Trichomonal infection    Herpes simplex virus (HSV) infection    Depression with suicidal ideation    Asthma    Bipolar I disorder, most recent episode mixed, moderate (H)    Cannabis dependence, continuous (H)    Generalized anxiety disorder    Mild intermittent asthma without complication    Polysubstance dependence (H)    Pelvic inflammatory disease    Pregnant state, incidental    Suicidal ideation    Polysubstance abuse (H)    Psychosis, unspecified psychosis type (H)    Unwanted pregnancy with plans for termination    Bipolar disorder (H)    Legal termination of pregnancy    Moderate episode of recurrent major depressive disorder (H)    Mental health-related complaint    Cellulitis of left hand    Tenosynovitis    Cellulitis of hand    Cocaine dependence (H)       Primary Problem This Admission  Active Hospital Problems    Polysubstance dependence (H)      Anxiety      Clinical Summary and Substantiation of Recommendations   Pt does not appear to be an imminent risk to self or others.  She has passive suicidal thoughts, no plan or intentions.   She is abusing drugs. She is offered referrals for PAULO assessment which she declines.  She is aware of community resources and how to access.       Patient coping skills attempted to reduce the crisis:  sleep    Disposition  Recommended disposition: Rule 25/PAULO Assessment, Substance Abuse Disorder Treatment        Reviewed case and recommendations with attending provider. Attending Name: Dr. Calvillo       Attending concurs with disposition: yes       Patient and/or validated legal guardian concurs with disposition:   yes       Final disposition:  discharge        Assessment Details   Total duration spent with the patient: 30 min     CPT code(s) utilized: 19969 - Psychotherapy for Crisis - 60 (30-74*) min    Nikkie Jacobs, [facility], Psychotherapist  DEC - Triage & Transition  Services  Callback: [phone]

## 2024-04-24 NOTE — DISCHARGE INSTRUCTIONS
Thank you for choosing [facility].     Please closely monitor for further symptoms. Return to the Emergency Department if you develop any new or worsening signs or symptoms.    If you received any opiate pain medications or sedatives during your visit, please do not drive for at least 8 hours.     Labs, cultures or final xray interpretations may still need to be reviewed.  We will call you if your plan of care needs to be changed.    Please follow up with your primary care physician or clinic.      To check the status of detox availability at [facility] call:  [phone]  To check the status of detox bed availability at [address] [place] [address] call:  [phone]  If you desire chemical dependency assessment or counseling, follow up with [facility]: [phone]
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
## 2024-04-24 NOTE — ED NOTES
Pt wakes up with bugged a lot. Pt states she just wants to sleep and gets mad at staff for waking her up. Moving all extremities spontaneously without any c/o pain.
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
## 2024-04-24 NOTE — ED NOTES
Leslyr attempted to complete DEC assessment at 1203 today via iPad. Patient would not open eyes to engage in assessment, and there was not a private space at the time to complete assessment via iPad as consult room was occupied. Per MD, DEC will wait for patient to awake independently and reattempt assessment at that time.     Francisco Inman on 4/24/2024 at 12:25 PM

## 2024-06-15 ENCOUNTER — HEALTH MAINTENANCE LETTER (OUTPATIENT)
Age: 34
End: 2024-06-15

## 2024-07-05 ENCOUNTER — HOSPITAL ENCOUNTER (EMERGENCY)
Facility: CLINIC | Age: 34
Discharge: HOME | End: 2024-07-06
Attending: EMERGENCY MEDICINE | Admitting: EMERGENCY MEDICINE
Payer: MEDICAID

## 2024-07-05 DIAGNOSIS — Z34.91 FIRST TRIMESTER PREGNANCY: ICD-10-CM

## 2024-07-05 PROCEDURE — 82075 ASSAY OF BREATH ETHANOL: CPT | Performed by: EMERGENCY MEDICINE

## 2024-07-05 PROCEDURE — 99284 EMERGENCY DEPT VISIT MOD MDM: CPT | Performed by: EMERGENCY MEDICINE

## 2024-07-05 PROCEDURE — 99284 EMERGENCY DEPT VISIT MOD MDM: CPT | Mod: 25 | Performed by: EMERGENCY MEDICINE

## 2024-07-05 ASSESSMENT — ACTIVITIES OF DAILY LIVING (ADL)
ADLS_ACUITY_SCORE: 37
ADLS_ACUITY_SCORE: 37

## 2024-07-05 ASSESSMENT — COLUMBIA-SUICIDE SEVERITY RATING SCALE - C-SSRS
1. IN THE PAST MONTH, HAVE YOU WISHED YOU WERE DEAD OR WISHED YOU COULD GO TO SLEEP AND NOT WAKE UP?: NO
6. HAVE YOU EVER DONE ANYTHING, STARTED TO DO ANYTHING, OR PREPARED TO DO ANYTHING TO END YOUR LIFE?: NO
2. HAVE YOU ACTUALLY HAD ANY THOUGHTS OF KILLING YOURSELF IN THE PAST MONTH?: NO

## 2024-07-06 ENCOUNTER — APPOINTMENT (OUTPATIENT)
Dept: ULTRASOUND IMAGING | Facility: CLINIC | Age: 34
End: 2024-07-06
Attending: EMERGENCY MEDICINE
Payer: MEDICAID

## 2024-07-06 VITALS
DIASTOLIC BLOOD PRESSURE: 73 MMHG | HEART RATE: 106 BPM | SYSTOLIC BLOOD PRESSURE: 112 MMHG | OXYGEN SATURATION: 98 % | RESPIRATION RATE: 16 BRPM | TEMPERATURE: 98 F

## 2024-07-06 LAB
ALBUMIN SERPL BCG-MCNC: 3.9 G/DL (ref 3.5–5.2)
ALCOHOL BREATH TEST: 0 (ref 0–0.01)
ALP SERPL-CCNC: 69 U/L (ref 40–150)
ALT SERPL W P-5'-P-CCNC: 32 U/L (ref 0–50)
ANION GAP SERPL CALCULATED.3IONS-SCNC: 12 MMOL/L (ref 7–15)
AST SERPL W P-5'-P-CCNC: 25 U/L (ref 0–45)
BASOPHILS # BLD AUTO: 0 10E3/UL (ref 0–0.2)
BASOPHILS NFR BLD AUTO: 0 %
BILIRUB SERPL-MCNC: 0.2 MG/DL
BUN SERPL-MCNC: 11 MG/DL (ref 6–20)
CALCIUM SERPL-MCNC: 9.1 MG/DL (ref 8.6–10)
CHLORIDE SERPL-SCNC: 104 MMOL/L (ref 98–107)
CREAT SERPL-MCNC: 0.76 MG/DL (ref 0.51–0.95)
DEPRECATED HCO3 PLAS-SCNC: 21 MMOL/L (ref 22–29)
EGFRCR SERPLBLD CKD-EPI 2021: >90 ML/MIN/1.73M2
EOSINOPHIL # BLD AUTO: 0 10E3/UL (ref 0–0.7)
EOSINOPHIL NFR BLD AUTO: 1 %
ERYTHROCYTE [DISTWIDTH] IN BLOOD BY AUTOMATED COUNT: 12.9 % (ref 10–15)
GLUCOSE SERPL-MCNC: 89 MG/DL (ref 70–99)
HCG INTACT+B SERPL-ACNC: ABNORMAL MIU/ML
HCG SERPL QL: POSITIVE
HCT VFR BLD AUTO: 41.9 % (ref 35–47)
HGB BLD-MCNC: 14.5 G/DL (ref 11.7–15.7)
IMM GRANULOCYTES # BLD: 0 10E3/UL
IMM GRANULOCYTES NFR BLD: 0 %
LIPASE SERPL-CCNC: 43 U/L (ref 13–60)
LYMPHOCYTES # BLD AUTO: 1.4 10E3/UL (ref 0.8–5.3)
LYMPHOCYTES NFR BLD AUTO: 32 %
MCH RBC QN AUTO: 30.1 PG (ref 26.5–33)
MCHC RBC AUTO-ENTMCNC: 34.6 G/DL (ref 31.5–36.5)
MCV RBC AUTO: 87 FL (ref 78–100)
MONOCYTES # BLD AUTO: 0.5 10E3/UL (ref 0–1.3)
MONOCYTES NFR BLD AUTO: 11 %
NEUTROPHILS # BLD AUTO: 2.5 10E3/UL (ref 1.6–8.3)
NEUTROPHILS NFR BLD AUTO: 56 %
NRBC # BLD AUTO: 0 10E3/UL
NRBC BLD AUTO-RTO: 0 /100
PLATELET # BLD AUTO: 275 10E3/UL (ref 150–450)
POTASSIUM SERPL-SCNC: 4.3 MMOL/L (ref 3.4–5.3)
PROT SERPL-MCNC: 6.9 G/DL (ref 6.4–8.3)
RBC # BLD AUTO: 4.81 10E6/UL (ref 3.8–5.2)
SODIUM SERPL-SCNC: 137 MMOL/L (ref 135–145)
WBC # BLD AUTO: 4.4 10E3/UL (ref 4–11)

## 2024-07-06 PROCEDURE — 84703 CHORIONIC GONADOTROPIN ASSAY: CPT | Performed by: EMERGENCY MEDICINE

## 2024-07-06 PROCEDURE — 76801 OB US < 14 WKS SINGLE FETUS: CPT

## 2024-07-06 PROCEDURE — 250N000011 HC RX IP 250 OP 636: Performed by: STUDENT IN AN ORGANIZED HEALTH CARE EDUCATION/TRAINING PROGRAM

## 2024-07-06 PROCEDURE — 80053 COMPREHEN METABOLIC PANEL: CPT | Performed by: EMERGENCY MEDICINE

## 2024-07-06 PROCEDURE — 84702 CHORIONIC GONADOTROPIN TEST: CPT | Performed by: EMERGENCY MEDICINE

## 2024-07-06 PROCEDURE — 83690 ASSAY OF LIPASE: CPT | Performed by: EMERGENCY MEDICINE

## 2024-07-06 PROCEDURE — 85025 COMPLETE CBC W/AUTO DIFF WBC: CPT | Performed by: EMERGENCY MEDICINE

## 2024-07-06 PROCEDURE — 36415 COLL VENOUS BLD VENIPUNCTURE: CPT | Performed by: EMERGENCY MEDICINE

## 2024-07-06 RX ORDER — ONDANSETRON 4 MG/1
4 TABLET, ORALLY DISINTEGRATING ORAL ONCE
Status: COMPLETED | OUTPATIENT
Start: 2024-07-06 | End: 2024-07-06

## 2024-07-06 RX ADMIN — ONDANSETRON 4 MG: 4 TABLET, ORALLY DISINTEGRATING ORAL at 13:29

## 2024-07-06 ASSESSMENT — ACTIVITIES OF DAILY LIVING (ADL)
ADLS_ACUITY_SCORE: 37

## 2024-07-06 NOTE — ED TRIAGE NOTES
"Patient reports generalized abdominal pain for the past few days. Patient unable to give a location for the pain. Patient states it \"hurts real bad\". Patient requesting to put her legs up. Patient is drowsy in triage.         "

## 2024-07-06 NOTE — DISCHARGE INSTRUCTIONS
Here in the emergency room your labs were reassuring but did show that you were pregnant, and your ultrasound showed a 6-week pregnancy  OB/GYN will make an appointment for you in the clinic and we will be giving you a call  If you have any new or worsening symptoms including but not limited to abdominal pain, bleeding, nausea, vomiting, fevers please return immediately to the emergency room    [facility] HOMELESSNESS RESOURCES       For shelter tonight, start with Adult Shelter Connect Overnight Shelter: [phone]  *Phone lines are closed between 5:30-7:30 pm    [address] (enter on the [address] side near the [address] corner)  Walk-in Hours: 9 am - 5:30 pm Monday-Friday and 1 pm - 5:30 pm Saturday and Sunday    [employer] [facility]  [phone]  [address] [facility]  [phone]  [address] [facility]  [phone]  [address] [facility]  [phone]  [address] [facility]  [phone]  [address] [place] [address]    To start making a plan for a more long-term solution, contact the Coordinated Entry Homeless Assistance Program:    Coordinated Entry Homeless Assistance  [employer] [facility]  [address] [place] [address]  [phone]  Open Wednesdays and Thursdays 8 am-2 pm  The Coordinated Entry System is the [facility]'s approach to organizing and providing housing services for people experiencing homelessness in [facility].  Because housing resources are limited, this process is designed to ensure that individuals and families with the highest vulnerability, service needs, and length of homelessness receive top priority in housing placement.    Assessment changes due to COVID-19 virus    [facility] Human Services family assessors will now be conducting assessments by phone. If you are working with a family staying in a  place other than a [facility]-funded shelter and is eligible for Coordinated Entry, continue to contact [facility]  at [phone] to set up an assessment.    For single adults, [facility] will be suspending drop-in hours at [facility]. To have a Coordinated Entry assessment completed, call the [facility]' certified assessors: Maikel at [phone] or Cherie at [phone] directly to set up a time to meet    Call 211 at any time on any day for questions about services and resources available to you.      [facility]    [facility] Shelter Team  [phone]  [address] [facility], Floors 5 & 6              Youth, families & disabled adults    Hours: Mon-Fri 8:00 am-4:30 pm.  After-Hours Shelter Team  [address]         Drop-Ins    [facility]  [phone]  [address] [place] [address] ([facility])              Showers/Laundry (8-11am)              Health Care              Employment Services              Breakfast (7-8 am)              Lunch (11:30am-12:30pm)    Hours: Mon-Sat: Summer 7am-3pm; Winter 7am-4pm.         [facility] [place] [phone]  [address] [place] [address]  Hours: Mon, Wed and Fri 9:00-11:30 am      Clothing    [facility]  [phone]  [address] [place] [address]  Hours: M-Th 10-11:30am & 1:30-3:30pm; Sat/Sun 9:30-10:30 am         Free Meals & Showers    [employer]  [phone]  [address] [place] [address]  Dinner: Mon-Fri 5:30-6:30pm (No showers)    [facility]  [phone]  Meals ([address]): Women & Children M-F 8:30-9am; Everyone: M-F 12-1pm; Sat/Sun, 10:30-11:30 am  Showers ([address] [place] [address]): Mon-Fri 9-10 am    [facility] [employer]  [phone]  [address]  Dinner: Thurs - Mon 6 pm  Showers: Daily 8 - 4:30 pm    Health Care    [facility]  [phone]  Clinics are in [facility] [place] shelters/drop-in centers.  Hours: Mon-Fri at varying sites. Call for sites/times. No  insurance or appts required to receive care.  Clinic sites: [facility], [facility], [facility], [facility], [facility], [facility], [facility], [facility], [facility], [facility], [facility]      Domestic/Sexual Violence Survivors    [facility]  [phone]  [address] [place] [address]            Singles women, families, survivors of prostitution & domestic abuse    Rape & Sexual Violence Hotline  666-642-AJUG    [facility] Toll-Free 24h crisis line  [phone]     [facility]  [phone]      Rule 25 Chemical Health Assessments    [facility]  [phone] [address] [facility]  Hours: Mon-Fri, 8 am-2:30 pm (first come, first served)    [facility]  [phone] [address] [facility]  Walk-in: M-F, 7am-4pm (First come, first served or by appt)      Mental Health Care    [facility] ([facility])  Suicidal: [phone] Consultation: [phone]  [address] [facility], 24/7 Crisis Intervention Center     Walk-in Counseling Center  [phone]  Hours: Mon, Wed, Fri 1-3pm; Mon-Thurs 6:30-8:30pm      Veterans Services    [facility] Veterans Service Office  [phone]  Hours: Mon-Fri 8am-4:30 pm; [facility]    [facility] Community Resource & Referral  [phone]  [facility]; Hours: Mon-Fri 7a-5p    [place] Assistance [place] for Vets (MAC[facility])  [phone]    [facility] Supportive Services for Veterans & Families (SSVF)  [phone] [address]

## 2024-07-06 NOTE — ED PROVIDER NOTES
"ED Provider Note  [facility]      History     Chief Complaint   Patient presents with    Abdominal Pain     HPI  Queen Brittany Stack is a 34 year old female PMH of opiate overdose, pyogenic arthritis, BPD, drug abuse, depression, SI, psychosis,  who presents to the ER for evaluation of abdominal pain.    Patient states that she started feeling abdominal cramping approximately 2 days ago.  It has been both intermittent and constant.  It is general throughout her entire abdomen.  She denies any alcohol, marijuana or drug use.  Denies any SI/HI.  Denies any trauma.  She states that she is tired because she \"has a mild headache.\"  She does not think she took anything for the pain.  She denies any vomiting, diarrhea, abnormal vaginal discharge or concern for STI.  Unsure when her last LMP was.  Unsure if she is ever had abdominal pain like this before.  She does not think it radiates anywhere.    Past Medical History  Past Medical History:   Diagnosis Date    Abnormal Pap smear     2010, f/u normal    Anxiety     Asthma     no ihaler use    Bipolar 1 disorder (H)     Bipolar affective (H)     Fainting spell     Herpes simplex without mention of complication     Pt reports last outbreak about 4 weeks ago    Major depressive disorder     Pelvic inflammatory disease     Polysubstance abuse (H)      Past Surgical History:   Procedure Laterality Date    DILATION AND CURETTAGE SUCTION N/A 4/23/2021    Procedure: DILATION AND CURETTAGE, UTERUS, USING SUCTION;  Surgeon: Lanie Yates MD;  Location: [facility] OR    IRRIGATION AND DEBRIDEMENT HAND, COMBINED Left 10/30/2022    Procedure: IRRIGATION AND DEBRIDEMENT, LEFT HAND;  Surgeon: Anshul Davis MD;  Location: [facility] OR    NO HISTORY OF SURGERY       albuterol (PROAIR HFA/PROVENTIL HFA/VENTOLIN HFA) 108 (90 Base) MCG/ACT inhaler  doxycycline hyclate (VIBRAMYCIN) 100 MG capsule  hydrOXYzine (ATARAX) 25 MG tablet  medroxyPROGESTERone (DEPO-PROVERA) 150 " MG/ML IM injection  risperiDONE (RISPERDAL) 1 MG tablet      Allergies   Allergen Reactions    Bees Anaphylaxis    Morphine Hives     Other reaction(s): Hives      Oxycodone Hives and Itching    Pork-Derived Products Other (See Comments)     Confucianism reasons     Family History  Family History   Problem Relation Age of Onset    Unknown/Adopted Mother     Unknown/Adopted Father     Unknown/Adopted Maternal Grandmother     Unknown/Adopted Maternal Grandfather     Unknown/Adopted Paternal Grandmother     Unknown/Adopted Paternal Grandfather      Social History   Social History     Tobacco Use    Smoking status: Every Day     Current packs/day: 0.50     Average packs/day: 0.5 packs/day for 8.0 years (4.0 ttl pk-yrs)     Types: Cigarettes    Smokeless tobacco: Never   Substance Use Topics    Alcohol use: Not Currently     Comment: denies use    Drug use: Not Currently     Types: Cocaine     Comment: denies use      A medically appropriate review of systems was performed with pertinent positives and negatives noted in the HPI, and all other systems negative.    Physical Exam   BP: 119/81  Pulse: 91  Temp: 98  F (36.7  C)  Resp: 16  SpO2: 98 %  Physical Exam  General: Somnolent, sleeping on gurney.  Wakes to loud voice  HENT: Normocephalic and atraumatic.   Eyes: PERRLA  Cardiovascular: Normal rate and regular rhythm.  Normal heart sounds. No murmur heard.  Pulmonary: No respiratory distress. Normal breath sounds.   Abdominal: There is no distension. Abdomen is soft. There is no mass. There is no abdominal tenderness.   Musculoskeletal: No swelling or tenderness. Moving all extremities spontaneously.    Skin: Warm and dry  Neurological: No focal deficit present.   Mood and Affect: Mood normal.       ED Course, Procedures, & Data      Procedures                No results found for any visits on 07/05/24.  Medications - No data to display  Labs Ordered and Resulted from Time of ED Arrival to Time of ED Departure - No data to  display  No orders to display          Critical care was not performed.     Medical Decision Making  The patient's presentation was of moderate complexity (an undiagnosed new problem with uncertain prognosis).    The patient's evaluation involved:  review of external note(s) from 3+ sources (see separate area of note for details)  review of 3+ test result(s) ordered prior to this encounter (see separate area of note for details)  strong consideration of a test (see separate area of note for details) that was ultimately deferred  ordering and/or review of 3+ test(s) in this encounter (see separate area of note for details)  The patient's management necessitated further care after sign-out to Dr. Carl (see their note for further management).    Assessment & Plan    Patient presents emergency department chief complaint of cramping abdominal pain x 2 days.  Patient is not very descriptive of her pain, and continues to fall asleep on my exam and history taking.  She continues to wake to loud voice.  There is no evidence of trauma.  She does not have any focal neurologic deficits.  She is able to respond to commands, However just continues to fall asleep during my questioning.    She is afebrile on exam.  Abdomen is nontender throughout.  Alcohol breath test 0.  Pending blood work, UA, U drug screen, beta hCG.    Patient care signed out to oncoming physician to follow-up on lab work, and reassessment when patient is more alert and agreeable to history and exam.  Chart has been reviewed, and patient has had multiple emergency department visits for various complaints, however she does have a history of polysubstance abuse, malingering secondary to homelessness    I have reviewed the nursing notes. I have reviewed the findings, diagnosis, plan and need for follow up with the patient.    New Prescriptions    No medications on file       Final diagnoses:   None       [facility] EMERGENCY DEPARTMENT  7/5/2024      Rosita Webster DO  07/06/24 0125

## 2024-07-06 NOTE — ED PROVIDER NOTES
Sign Out Provider: Dr. Webster    Sign Out Plan: 33 yo female who presents to the emergency department for evaluation of abdominal pain/cramping x 2 days.  Patient with nontender abdomen, pending laboratory testing, urinalysis, drug screen, and pregnancy test at the time of signout.  Patient with history of multiple emergency department visits for various complaints, polysubstance use, malingering secondary to homelessness prior to suspect likely discharge home.    Reassessment: I reviewed comprehensive labs which are remarkable for positive pregnancy test, quantitative hCG 73319.  Alcohol negative, no leukocytosis white blood cell count 4.4, hemoglobin 14.5, no acute metabolic or electrolyte abnormality, no transaminitis, normal lipase.  On reevaluation patient continues to be sleeping, resting comfortably.  Patient reports feeling tired but denies any other symptoms.  Abdomen remains soft, nontender, nondistended with no rebound, no guarding, no peritoneal timelines.  Low suspicious for acute intra-abdominal infectious process/emergency.  Discussed pregnancy results with patient and will proceed with pelvic ultrasound to rule out ectopic pregnancy.    Patient pending ultrasound, reevaluation, final disposition at that time of signout.  Patient signed out to morning provider Dr. Falk    Disposition: pending       Kerry Carl MD  07/06/24 1655

## 2024-07-06 NOTE — ED NOTES
Emergency Department Patient Sign-out       Brief HPI:  This is a 34 year old female signed out to me by Dr. Carl pending reassessment and ultrasound (OB).  See initial ED Provider note for details of the presentation.            Significant Events prior to my assuming care: Ultrasound tech forgot to send images over so there was significant delay and OB ultrasound read      Exam:   Patient Vitals for the past 24 hrs:   BP Temp Temp src Pulse Resp SpO2   07/05/24 2105 119/81 98  F (36.7  C) Oral 91 16 98 %     General: Patient is in no acute distress currently.  HEENT: Normocephalic atraumatic.    Neck: Supple  Cardiovascular: Heart rate normal  Pulmonary: Patient is in no respiratory distress  Abdomen: Soft nontender nondistended, nongravid  Extremities: No signs of any significant or life-threatening trauma.  Neurologic: No new focal neurologic deficits.       ED RESULTS:   Results for orders placed or performed during the hospital encounter of 07/05/24 (from the past 24 hour(s))   CBC with platelets differential     Status: None    Collection Time: 07/06/24  1:01 AM    Narrative    The following orders were created for panel order CBC with platelets differential.  Procedure                               Abnormality         Status                     ---------                               -----------         ------                     CBC with platelets and d...[[id number]]                      Final result                 Please view results for these tests on the individual orders.   Comprehensive metabolic panel     Status: Abnormal    Collection Time: 07/06/24  1:01 AM   Result Value Ref Range    Sodium 137 135 - 145 mmol/L    Potassium 4.3 3.4 - 5.3 mmol/L    Carbon Dioxide (CO2) 21 (L) 22 - 29 mmol/L    Anion Gap 12 7 - 15 mmol/L    Urea Nitrogen 11.0 6.0 - 20.0 mg/dL    Creatinine 0.76 0.51 - 0.95 mg/dL    GFR Estimate >90 >60 mL/min/1.73m2    Calcium 9.1 8.6 - 10.0 mg/dL    Chloride 104 98 - 107  mmol/L    Glucose 89 70 - 99 mg/dL    Alkaline Phosphatase 69 40 - 150 U/L    AST 25 0 - 45 U/L    ALT 32 0 - 50 U/L    Protein Total 6.9 6.4 - 8.3 g/dL    Albumin 3.9 3.5 - 5.2 g/dL    Bilirubin Total 0.2 <=1.2 mg/dL   Lipase     Status: Normal    Collection Time: 07/06/24  1:01 AM   Result Value Ref Range    Lipase 43 13 - 60 U/L   CBC with platelets and differential     Status: None    Collection Time: 07/06/24  1:01 AM   Result Value Ref Range    WBC Count 4.4 4.0 - 11.0 10e3/uL    RBC Count 4.81 3.80 - 5.20 10e6/uL    Hemoglobin 14.5 11.7 - 15.7 g/dL    Hematocrit 41.9 35.0 - 47.0 %    MCV 87 78 - 100 fL    MCH 30.1 26.5 - 33.0 pg    MCHC 34.6 31.5 - 36.5 g/dL    RDW 12.9 10.0 - 15.0 %    Platelet Count 275 150 - 450 10e3/uL    % Neutrophils 56 %    % Lymphocytes 32 %    % Monocytes 11 %    % Eosinophils 1 %    % Basophils 0 %    % Immature Granulocytes 0 %    NRBCs per 100 WBC 0 <1 /100    Absolute Neutrophils 2.5 1.6 - 8.3 10e3/uL    Absolute Lymphocytes 1.4 0.8 - 5.3 10e3/uL    Absolute Monocytes 0.5 0.0 - 1.3 10e3/uL    Absolute Eosinophils 0.0 0.0 - 0.7 10e3/uL    Absolute Basophils 0.0 0.0 - 0.2 10e3/uL    Absolute Immature Granulocytes 0.0 <=0.4 10e3/uL    Absolute NRBCs 0.0 10e3/uL   HCG qualitative pregnancy (blood)     Status: Abnormal    Collection Time: 07/06/24  1:04 AM   Result Value Ref Range    hCG Serum Qualitative Positive (A) Negative   HCG quantitative pregnancy     Status: Abnormal    Collection Time: 07/06/24  1:04 AM   Result Value Ref Range    hCG Quantitative 12,672 (H) <5 mIU/mL   Alcohol breath test POCT     Status: Normal    Collection Time: 07/06/24  1:06 AM   Result Value Ref Range    Alcohol Breath Test 0.00 0.00 - 0.01   US OB < 14 Weeks Single     Status: None    Collection Time: 07/06/24  6:53 AM    Narrative    EXAM: US OB < 14 WEEKS SINGLE-TRANSABDOMINAL  LOCATION: [facility]  DATE: 7/6/2024    INDICATION: abdominal pain, early  pregnancy?  COMPARISON: Pelvic ultrasound 6/13/2015  TECHNIQUE: Transabdominal scans were performed. Endovaginal ultrasound was performed to better visualize the embryo.    FINDINGS:  UTERUS: Single normal appearing intrauterine gestation sac.  CRL: Measures 0.3 cm, equals 6 weeks and 0 days.  FETAL HEART RATE: 95 bpm. Note that assessment of the fetal heart rate was limited due to technical factors including the lack of transvaginal imaging.  AMNIOTIC FLUID: Normal.  PLACENTA: Not yet formed. No evidence for sub-chorionic hemorrhage.    RIGHT OVARY: Not visualized due to technical factors.  LEFT OVARY: Not visualized due to technical factors.      Impression    IMPRESSION:   1.  Single living intrauterine gestation at 6 weeks and 0 days, EDC 3/1/2025.  2.  Fetal bradycardia with a fetal heart rate of 95 bpm. Recommend short-term ultrasound follow-up within 7-10 days.           ED MEDICATIONS:   Medications - No data to display      Impression:  No diagnosis found.    Plan:    Discussed case with OB given relative fetal bradycardia and OB set at 6 weeks, 95 is not necessarily bradycardic, but they will make an appointment for her for an expedited follow-up.  Pending studies include ultrasound, reassessment.    On reassessment, patient only complaining that she would like to have food, and when ultrasound results explained to her and need for follow-up, patient says she will follow-up if we make appointment  As patient has no bleeding now, no other symptoms, and her only symptom right now is feeling hungry, will discharge her home with OB follow-up, OB will message their clinic, as well as strict return precautions come back to the emergency room for new or worsening symptoms.  On further reassessment, patient tells nursing that she does not have a home to go to, so DEC assessment ordered, patient signed out to incoming provider pending DEC assessment      MD Dileep Anders Collin, MD  07/06/24  1310       Michael Falk MD  07/06/24 2748

## 2024-07-08 DIAGNOSIS — Z32.01 PREGNANCY TEST POSITIVE: Primary | ICD-10-CM

## 2024-07-09 NOTE — ED NOTES
Patient Education   Preventive Care Advice   This is general advice given by our system to help you stay healthy. However, your care team may have specific advice just for you. Please talk to your care team about your preventive care needs.  Nutrition  Eat 5 or more servings of fruits and vegetables each day.  Try wheat bread, brown rice and whole grain pasta (instead of white bread, rice, and pasta).  Get enough calcium and vitamin D. Check the label on foods and aim for 100% of the RDA (recommended daily allowance).  Lifestyle  Exercise at least 150 minutes each week  (30 minutes a day, 5 days a week).  Do muscle strengthening activities 2 days a week. These help control your weight and prevent disease.  No smoking.  Wear sunscreen to prevent skin cancer.  Have a dental exam and cleaning every 6 months.  Yearly exams  See your health care team every year to talk about:  Any changes in your health.  Any medicines your care team has prescribed.  Preventive care, family planning, and ways to prevent chronic diseases.  Shots (vaccines)   HPV shots (up to age 26), if you've never had them before.  Hepatitis B shots (up to age 59), if you've never had them before.  COVID-19 shot: Get this shot when it's due.  Flu shot: Get a flu shot every year.  Tetanus shot: Get a tetanus shot every 10 years.  Pneumococcal, hepatitis A, and RSV shots: Ask your care team if you need these based on your risk.  Shingles shot (for age 50 and up)  General health tests  Diabetes screening:  Starting at age 35, Get screened for diabetes at least every 3 years.  If you are younger than age 35, ask your care team if you should be screened for diabetes.  Cholesterol test: At age 39, start having a cholesterol test every 5 years, or more often if advised.  Bone density scan (DEXA): At age 50, ask your care team if you should have this scan for osteoporosis (brittle bones).  Hepatitis C: Get tested at least once in your life.  STIs (sexually  [facility] ED Mental Health Handoff Note:       Brief HPI:  This is a 31 year old female signed out to me by Dr. Mensah.  See initial ED Provider note for full details of the presentation. Interval history is pertinent for SI, disorganization.    Home meds reviewed and ordered/administered: Yes, unclear if taking    Medically stable for inpatient mental health admission: Yes.    Evaluated by mental health: Yes. The recommendation is for inpatient mental health treatment. Bed search in process    Safety concerns: At the time I received sign out, there were no safety concerns.    Hold Status:  Active Orders   N/A            Exam:   Patient Vitals for the past 24 hrs:   BP Temp Temp src Pulse Resp SpO2   04/14/22 2306 99/68 -- -- 86 16 100 %   04/14/22 1804 92/54 -- -- 81 16 100 %   04/14/22 1020 97/55 -- -- 94 16 100 %   04/14/22 0429 (!) 133/97 97.1  F (36.2  C) Axillary 96 18 97 %           ED Course:    Medications - No data to display         There were no significant events during my shift.    Patient was signed out to the oncoming provider      Impression:    ICD-10-CM    1. Mental health-related complaint  Z71.1    2. Suicidal ideation  R45.851    3. Moderate episode of recurrent major depressive disorder (H)  F33.1        Plan:    1. Awaiting inpatient mental health admission/transfer.      RESULTS:   Results for orders placed or performed during the hospital encounter of 04/14/22 (from the past 24 hour(s))   Asymptomatic COVID-19 Virus (Coronavirus) by PCR Nasopharyngeal     Status: Normal    Collection Time: 04/14/22  7:33 PM    Specimen: Nasopharyngeal; Swab   Result Value Ref Range    SARS CoV2 PCR Negative Negative    Narrative    Testing was performed using the guy  SARS-CoV-2 & Influenza A/B Assay on the guy  [employer]  System.  This test should be ordered for the detection of SARS-COV-2 in individuals who meet SARS-CoV-2 clinical and/or epidemiological criteria. Test performance is unknown in  asymptomatic patients.  This test is for in vitro diagnostic use under the FDA EUA for laboratories certified under CLIA to perform moderate and/or high complexity testing. This test has not been FDA cleared or approved.  A negative test does not rule out the presence of PCR inhibitors in the specimen or target RNA in concentration below the limit of detection for the assay. The possibility of a false negative should be considered if the patient's recent exposure or clinical presentation suggests COVID-19.  [facility] are certified under the Clinical Laboratory Improvement Amendments of 1988 (CLIA-88) as qualified to perform moderate and/or high complexity laboratory testing.   Urine Drugs of Abuse Screen     Status: Abnormal    Collection Time: 04/15/22  2:15 AM    Narrative    The following orders were created for panel order Urine Drugs of Abuse Screen.  Procedure                               Abnormality         Status                     ---------                               -----------         ------                     Drug abuse screen 1 urin...[[id number]]  Abnormal            Final result                 Please view results for these tests on the individual orders.   Drug abuse screen 1 urine (ED)     Status: Abnormal    Collection Time: 04/15/22  2:15 AM   Result Value Ref Range    Amphetamines Urine Screen Negative Screen Negative    Barbiturates Urine Screen Negative Screen Negative    Benzodiazepines Urine Screen Negative Screen Negative    Cannabinoids Urine Screen Negative Screen Negative    Cocaine Urine Screen Positive (A) Screen Negative    Opiates Urine Screen Negative Screen Negative             MD Armando Montes De Oca Matthew Joseph, MD  04/15/22 0344     transmitted infections)  Before age 24: Ask your care team if you should be screened for STIs.  After age 24: Get screened for STIs if you're at risk. You are at risk for STIs (including HIV) if:  You are sexually active with more than one person.  You don't use condoms every time.  You or a partner was diagnosed with a sexually transmitted infection.  If you are at risk for HIV, ask about PrEP medicine to prevent HIV.  Get tested for HIV at least once in your life, whether you are at risk for HIV or not.  Cancer screening tests  Cervical cancer screening: If you have a cervix, begin getting regular cervical cancer screening tests starting at age 21.  Breast cancer scan (mammogram): If you've ever had breasts, begin having regular mammograms starting at age 40. This is a scan to check for breast cancer.  Colon cancer screening: It is important to start screening for colon cancer at age 45.  Have a colonoscopy test every 10 years (or more often if you're at risk) Or, ask your provider about stool tests like a FIT test every year or Cologuard test every 3 years.  To learn more about your testing options, visit:   .  For help making a decision, visit:   https://bit.ly/eg67463.  Prostate cancer screening test: If you have a prostate, ask your care team if a prostate cancer screening test (PSA) at age 55 is right for you.  Lung cancer screening: If you are a current or former smoker ages 50 to 80, ask your care team if ongoing lung cancer screenings are right for you.  For informational purposes only. Not to replace the advice of your health care provider. Copyright   2023 [facility]. All rights reserved. Clinically reviewed by the [facility]itions [facility]. [employer] [id number] - REV 01/24.  Eating Healthy Foods: Care Instructions  With every meal, you can make healthy food choices. Try to eat a variety of fruits, vegetables, whole grains, lean proteins, and low-fat dairy products. This can help  "you get the right balance of nutrients, including vitamins and minerals. Small changes add up over time. You can start by adding one healthy food to your meals each day.    Try to make half your plate fruits and vegetables, one-fourth whole grains, and one-fourth lean proteins. Try including dairy with your meals.   Eat more fruits and vegetables. Try to have them with most meals and snacks.   Foods for healthy eating    Fruits    These can be fresh, frozen, canned, or dried.  Try to choose whole fruit rather than fruit juice.  Eat a variety of colors.    Vegetables    These can be fresh, frozen, canned, or dried.  Beans, peas, and lentils count too.    Whole grains    Choose whole-grain breads, cereals, and noodles.  Try brown rice.    Lean proteins    These can include lean meat, poultry, fish, and eggs.  You can also have tofu, beans, peas, lentils, nuts, and seeds.    Dairy    Try milk, yogurt, and cheese.  Choose low-fat or fat-free when you can.  If you need to, use lactose-free milk or fortified plant-based milk products, such as soy milk.    Water    Drink water when you're thirsty.  Limit sugar-sweetened drinks, including soda, fruit drinks, and sports drinks.  Where can you learn more?  Go to https://www.[employer].net/patiented  Enter T756 in the search box to learn more about \"Eating Healthy Foods: Care Instructions.\"  Current as of: September 20, 2023               Content Version: 14.0    8082-9093 [employer].   Care instructions adapted under license by your healthcare professional. If you have questions about a medical condition or this instruction, always ask your healthcare professional. [employer] disclaims any warranty or liability for your use of this information.      Learning About Stress  What is stress?     Stress is your body's response to a hard situation. Your body can have a physical, emotional, or mental response. Stress is a fact of life for most people, and " it affects everyone differently. What causes stress for you may not be stressful for someone else.  A lot of things can cause stress. You may feel stress when you go on a job interview, take a test, or run a race. This kind of short-term stress is normal and even useful. It can help you if you need to work hard or react quickly. For example, stress can help you finish an important job on time.  Long-term stress is caused by ongoing stressful situations or events. Examples of long-term stress include long-term health problems, ongoing problems at work, or conflicts in your family. Long-term stress can harm your health.  How does stress affect your health?  When you are stressed, your body responds as though you are in danger. It makes hormones that speed up your heart, make you breathe faster, and give you a burst of energy. This is called the fight-or-flight stress response. If the stress is over quickly, your body goes back to normal and no harm is done.  But if stress happens too often or lasts too long, it can have bad effects. Long-term stress can make you more likely to get sick, and it can make symptoms of some diseases worse. If you tense up when you are stressed, you may develop neck, shoulder, or low back pain. Stress is linked to high blood pressure and heart disease.  Stress also harms your emotional health. It can make you brandon, tense, or depressed. Your relationships may suffer, and you may not do well at work or school.  What can you do to manage stress?  You can try these things to help manage stress:   Do something active. Exercise or activity can help reduce stress. Walking is a great way to get started. Even everyday activities such as housecleaning or yard work can help.  Try yoga or olivia chi. These techniques combine exercise and meditation. You may need some training at first to learn them.  Do something you enjoy. For example, listen to music or go to a movie. Practice your hobby or do  "volunteer work.  Meditate. This can help you relax, because you are not worrying about what happened before or what may happen in the future.  Do guided imagery. Imagine yourself in any setting that helps you feel calm. You can use online videos, books, or a teacher to guide you.  Do breathing exercises. For example:  From a standing position, bend forward from the waist with your knees slightly bent. Let your arms dangle close to the floor.  Breathe in slowly and deeply as you return to a standing position. Roll up slowly and lift your head last.  Hold your breath for just a few seconds in the standing position.  Breathe out slowly and bend forward from the waist.  Let your feelings out. Talk, laugh, cry, and express anger when you need to. Talking with supportive friends or family, a counselor, or a rey leader about your feelings is a healthy way to relieve stress. Avoid discussing your feelings with people who make you feel worse.  Write. It may help to write about things that are bothering you. This helps you find out how much stress you feel and what is causing it. When you know this, you can find better ways to cope.  What can you do to prevent stress?  You might try some of these things to help prevent stress:  Manage your time. This helps you find time to do the things you want and need to do.  Get enough sleep. Your body recovers from the stresses of the day while you are sleeping.  Get support. Your family, friends, and community can make a difference in how you experience stress.  Limit your news feed. Avoid or limit time on social media or news that may make you feel stressed.  Do something active. Exercise or activity can help reduce stress. Walking is a great way to get started.  Where can you learn more?  Go to https://www.healthwise.net/patiented  Enter N032 in the search box to learn more about \"Learning About Stress.\"  Current as of: October 24, 2023               Content Version: 14.0    " 9534-2579 [employer].   Care instructions adapted under license by your healthcare professional. If you have questions about a medical condition or this instruction, always ask your healthcare professional. [employer] disclaims any warranty or liability for your use of this information.

## 2024-08-31 ENCOUNTER — HOSPITAL ENCOUNTER (EMERGENCY)
Facility: CLINIC | Age: 34
Discharge: HOME | End: 2024-08-31
Attending: INTERNAL MEDICINE | Admitting: INTERNAL MEDICINE
Payer: MEDICAID

## 2024-08-31 VITALS
HEART RATE: 108 BPM | DIASTOLIC BLOOD PRESSURE: 74 MMHG | SYSTOLIC BLOOD PRESSURE: 117 MMHG | OXYGEN SATURATION: 98 % | RESPIRATION RATE: 16 BRPM | TEMPERATURE: 98.3 F

## 2024-08-31 DIAGNOSIS — Z32.01 PREGNANCY TEST POSITIVE: ICD-10-CM

## 2024-08-31 DIAGNOSIS — F14.10 COCAINE ABUSE (H): ICD-10-CM

## 2024-08-31 DIAGNOSIS — F12.10 MARIJUANA ABUSE: ICD-10-CM

## 2024-08-31 PROCEDURE — 99284 EMERGENCY DEPT VISIT MOD MDM: CPT | Mod: 25 | Performed by: INTERNAL MEDICINE

## 2024-08-31 PROCEDURE — 99283 EMERGENCY DEPT VISIT LOW MDM: CPT | Mod: 25 | Performed by: INTERNAL MEDICINE

## 2024-08-31 PROCEDURE — 76815 OB US LIMITED FETUS(S): CPT | Performed by: INTERNAL MEDICINE

## 2024-08-31 PROCEDURE — 80307 DRUG TEST PRSMV CHEM ANLYZR: CPT | Performed by: INTERNAL MEDICINE

## 2024-08-31 PROCEDURE — 76815 OB US LIMITED FETUS(S): CPT | Mod: 26 | Performed by: INTERNAL MEDICINE

## 2024-08-31 ASSESSMENT — ACTIVITIES OF DAILY LIVING (ADL)
ADLS_ACUITY_SCORE: 37
ADLS_ACUITY_SCORE: 35
ADLS_ACUITY_SCORE: 37

## 2024-08-31 NOTE — ED PROVIDER NOTES
[facility] EMERGENCY DEPARTMENT ([facility])    8/31/24      ED PROVIDER NOTE       History     Chief Complaint   Patient presents with    Drug Overdose     Pt found sleeping at the Bus stop, stand by called. Pt sleeping not able to answer the questions     HPI  Queen Brittany Stack is a 34 year old female with history of polysubstance dependence who presents to the ED seeking evaluation for possible drug overdose.  Patient was found sleeping at a bus stop when a bystander called for help.  On discussion, patient states that she is not currently using any substances.  She states that she was simply just tired.  She does not want Suboxone treatment.  She also states that she is 13 weeks pregnant.  In this ED, she is looking for a hot meal.    Past Medical History  Past Medical History:   Diagnosis Date    Abnormal Pap smear     2010, f/u normal    Anxiety     Asthma     no ihaler use    Bipolar 1 disorder (H)     Bipolar affective (H)     Fainting spell     Herpes simplex without mention of complication     Pt reports last outbreak about 4 weeks ago    Major depressive disorder     Pelvic inflammatory disease     Polysubstance abuse (H)      Past Surgical History:   Procedure Laterality Date    DILATION AND CURETTAGE SUCTION N/A 4/23/2021    Procedure: DILATION AND CURETTAGE, UTERUS, USING SUCTION;  Surgeon: Lanie Yates MD;  Location: [facility] OR    IRRIGATION AND DEBRIDEMENT HAND, COMBINED Left 10/30/2022    Procedure: IRRIGATION AND DEBRIDEMENT, LEFT HAND;  Surgeon: Anshul Davis MD;  Location: [facility] OR    NO HISTORY OF SURGERY       albuterol (PROAIR HFA/PROVENTIL HFA/VENTOLIN HFA) 108 (90 Base) MCG/ACT inhaler  doxycycline hyclate (VIBRAMYCIN) 100 MG capsule  hydrOXYzine (ATARAX) 25 MG tablet  medroxyPROGESTERone (DEPO-PROVERA) 150 MG/ML IM injection  risperiDONE (RISPERDAL) 1 MG tablet      Allergies   Allergen Reactions    Bees Anaphylaxis    Morphine Hives     Other reaction(s): Hives      Oxycodone  Hives and Itching    Pork-Derived Products Other (See Comments)     Zoroastrianism reasons     Family History  Family History   Problem Relation Age of Onset    Unknown/Adopted Mother     Unknown/Adopted Father     Unknown/Adopted Maternal Grandmother     Unknown/Adopted Maternal Grandfather     Unknown/Adopted Paternal Grandmother     Unknown/Adopted Paternal Grandfather      Social History   Social History     Tobacco Use    Smoking status: Every Day     Current packs/day: 0.50     Average packs/day: 0.5 packs/day for 8.0 years (4.0 ttl pk-yrs)     Types: Cigarettes    Smokeless tobacco: Never   Substance Use Topics    Alcohol use: Not Currently     Comment: denies use    Drug use: Not Currently     Types: Cocaine     Comment: denies use      A complete review of systems was performed with pertinent positives and negatives noted in the HPI, and all other systems negative.    Physical Exam   BP: 97/60  Pulse: 104  Temp: 98.3  F (36.8  C)  Resp: 16  SpO2: 98 %  Physical Exam  Vitals and nursing note reviewed.   Constitutional:       General: She is not in acute distress.     Appearance: She is not diaphoretic.   HENT:      Head: Normocephalic and atraumatic.   Eyes:      Extraocular Movements: Extraocular movements intact.      Conjunctiva/sclera: Conjunctivae normal.   Cardiovascular:      Rate and Rhythm: Normal rate and regular rhythm.      Heart sounds: Normal heart sounds. No murmur heard.     No friction rub. No gallop.   Pulmonary:      Effort: Pulmonary effort is normal. No respiratory distress.      Breath sounds: Normal breath sounds. No stridor. No wheezing, rhonchi or rales.   Chest:      Chest wall: No tenderness.   Abdominal:      General: Abdomen is flat. Bowel sounds are normal. There is no distension.      Palpations: Abdomen is soft. There is no mass.      Tenderness: There is no abdominal tenderness. There is no right CVA tenderness, left CVA tenderness, guarding or rebound.      Hernia: No hernia is  present.          Comments: Gravid uterus   Musculoskeletal:         General: No tenderness. Normal range of motion.      Cervical back: Normal range of motion and neck supple.   Skin:     General: Skin is warm.      Findings: No rash.   Neurological:      General: No focal deficit present.      Cranial Nerves: No cranial nerve deficit.      Motor: No weakness.      Gait: Gait normal.           ED Course, Procedures, & Data      Procedures  Results for orders placed during the hospital encounter of 08/31/24    POC US OB TRANSABDOMINAL LIMITED    Impression  Limited Bedside Transabdominal ultrasound for evaluation of IUP  Performed any interpreted by me.    Indication:  pregnant 13 weeks ?overdose per bystander  Findings:  The lower abdomen was interrogated with a curvilinear probe. The uterus was identified.  Within the uterus there is a moving fetus with visible heart rate with FHR of 150's    Impression: Intrauterine pregnancy            Results for orders placed or performed during the hospital encounter of 08/31/24   POC US OB TRANSABDOMINAL LIMITED     Status: None    Impression    Limited Bedside Transabdominal ultrasound for evaluation of IUP        Performed any interpreted by me.    Indication:  pregnant 13 weeks ?overdose per bystander  Findings:  The lower abdomen was interrogated with a curvilinear probe. The uterus was identified.   Within the uterus there is a moving fetus with visible heart rate with FHR of 150's    Impression: Intrauterine pregnancy      Urine Drug Screen Panel     Status: Abnormal   Result Value Ref Range    Amphetamines Urine Screen Negative Screen Negative    Barbituates Urine Screen Negative Screen Negative    Benzodiazepine Urine Screen Negative Screen Negative    Cannabinoids Urine Screen Positive (A) Screen Negative    Cocaine Urine Screen Positive (A) Screen Negative    Fentanyl Qual Urine Screen Negative Screen Negative    Opiates Urine Screen Negative Screen Negative    PCP  Urine Screen Negative Screen Negative   Urine Drug Screen     Status: Abnormal    Narrative    The following orders were created for panel order Urine Drug Screen.  Procedure                               Abnormality         Status                     ---------                               -----------         ------                     Urine Drug Screen Panel[[id number]]      Abnormal            Final result                 Please view results for these tests on the individual orders.     Medications - No data to display  Labs Ordered and Resulted from Time of ED Arrival to Time of ED Departure   URINE DRUG SCREEN PANEL - Abnormal       Result Value    Amphetamines Urine Screen Negative      Barbituates Urine Screen Negative      Benzodiazepine Urine Screen Negative      Cannabinoids Urine Screen Positive (*)     Cocaine Urine Screen Positive (*)     Fentanyl Qual Urine Screen Negative      Opiates Urine Screen Negative      PCP Urine Screen Negative       POC US OB TRANSABDOMINAL LIMITED   Final Result   Limited Bedside Transabdominal ultrasound for evaluation of IUP         Performed any interpreted by me.      Indication:  pregnant 13 weeks ?overdose per bystander   Findings:   The lower abdomen was interrogated with a curvilinear probe. The uterus was identified.    Within the uterus there is a moving fetus with visible heart rate with FHR of 150's      Impression: Intrauterine pregnancy                 Critical care was not performed.     Medical Decision Making  The patient's presentation was of high complexity (an acute health issue posing potential threat to life or bodily function).    The patient's evaluation involved:  ordering and/or review of 2 test(s) in this encounter (see separate area of note for details)    The patient's management necessitated only low risk treatment.    Assessment & Plan    A bystander concerned that she might have been overdosed, pt asking for food and resting comfortable with  normal vitals, states she is 13 weeks pregnant and denies opioid use or any substance use, urine tox pos for cocaine and marijuana, possible post cocaine effect with sleepingm POCUS wth viable IUP, not interested in CD program, will discharge and follow up with one of the Primary Care and also OB Care.    I have reviewed the nursing notes. I have reviewed the findings, diagnosis, plan and need for follow up with the patient.    Discharge Medication List as of 8/31/2024  5:00 PM          Final diagnoses:   Cocaine abuse (H)   Marijuana abuse   Pregnancy test positive       Amanda Wood Md   IGenaro, am serving as a trained medical scribe to document services personally performed by Amanda Wood Md MD based on the provider's statements to me on August 31, 2024.  This document has been checked and approved by the attending provider.    IAmanda Md MD, was physically present and have reviewed and verified the accuracy of this note documented by Genaro Howell medical scribe.      Amanda Wood Md MD   [facility] EMERGENCY DEPARTMENT  8/31/2024     Amanda Wood MD  08/31/24 2001

## 2024-08-31 NOTE — ED NOTES
Bed: ED10  Expected date: 8/31/24  Expected time: 2:44 PM  Means of arrival:   Comments:  34 year old female  Opiote use

## 2024-08-31 NOTE — ED TRIAGE NOTES
Triage Assessment (Adult)       Row Name 08/31/24 1509          Triage Assessment    Airway WDL WDL        Respiratory WDL    Respiratory WDL WDL        Skin Circulation/Temperature WDL    Skin Circulation/Temperature WDL WDL        Cardiac WDL    Cardiac WDL WDL        Peripheral/Neurovascular WDL    Peripheral Neurovascular WDL WDL        Cognitive/Neuro/Behavioral WDL    Cognitive/Neuro/Behavioral WDL WDL

## 2024-08-31 NOTE — DISCHARGE INSTRUCTIONS
Please make an appointment to follow up with One of the Primary Care Clinics. Order has been placed in 7-14 days even if entirely better.

## 2025-07-27 NOTE — PLAN OF CARE
VS: VSS, pt denied CP or SOB.   O2: Room air sat. > 90%.   Output: Voiding adequate amount without difficulty.    Last BM: 10/31/22 passing gas.    Activity: Up independent to bathroom.    Skin: Visible skin intact pt declined full skin assessment, surgical incision on LUE.    Pain: Denied pain pt sleeping most of the day.    Neuro: CMS and neuro intact.   Dressing: CDI.   Diet: Regular tolerating okay.    LDA: PIV SL between antibiotics.   Equipment: IV pole, commode,and personal belongings.    Plan: TBD.    Additional Info:                             
  VS: VSS, pt denied CP or SOB.   O2: Room air sat. > 90%.   Output: Voiding adequate amount without difficulty.    Last BM: 11/01/22 passing gas.    Activity: Up independent to bathroom.    Skin: Visible skin intact pt declined full skin assessment, surgical incision on LUE.    Pain: Denied pain pt sleeping most of the day, tylenol and ibuprofen po given.     Neuro: CMS and neuro intact.   Dressing: CDI.   Diet: Regular tolerating okay.    LDA: PIV SL between antibiotics.   Equipment: IV pole, commode,and personal belongings.    Plan: TBD.    Additional Info:                           
"  VS: /65 (BP Location: Right arm, Patient Position: Supine)   Pulse 85   Temp (!) 96.2  F (35.7  C) (Oral)   Resp 16   Ht 1.702 m (5' 7\")   Wt 61 kg (134 lb 7.7 oz)   SpO2 100%   BMI 21.06 kg/m      O2: Stable on room air. Denies chest pain    Output:  voids adequate amount without difficulty. Keep commode within reach   Last BM: 10/31/22   Activity:  Independent in the room.    Skin: Surgical incision on left hand- UTV. ACE Wrapped       Pain: Managed with oral/IV dilaudid, tylenol, ibuprofen   CMS: Intact, AXO X4. Denied numbness and tingling    Dressing: Left hand dressing-CDI   Diet: Regular, good appetite    LDA: Right PIV -sl   Equipment: IV pole, commode, and personal belongings, call light within reach   Plan: IV abx   Additional Info:                             "
"Goal Outcome Evaluation:      Plan of Care Reviewed With: patient    Overall Patient Progress: improvingOverall Patient Progress: improving    Outcome Evaluation: Plan of care discussed. pt has not being coperative with teaching. Pt has her period and voided on the floor. House keeping came and cleaned. Whole bed changed bath given and CHG done on the left arm, teeth brushed. Pt does not want to pertipate says\" you do it\". Pt teaching done will monitor. Left hand fingers more swollen, crusts formed on the fingers and right hand finger also have crust nothing is oozing. Pt can bend fingers but not much, denies numbness or tingling.    VS: Temp: 98.1  F (36.7  C) Temp src: Oral BP: 122/58 Pulse: 89   Resp: 16 SpO2: 100 % O2 Device: None (Room air)      O2:  stable on RA. LS clear and equal bilaterally. Denies chest pain and SOB.    Output: Voids spontaneously without difficulty to bathroom  was incontinent, pt has period   Last BM: denies abdominal discomfort. BS active / passing flatus. BM 10/29/22   Activity: Up with STANDY BY ad colleen no walker   Skin: WDL except, left hand swollen and scabs and right hand fingers scabs   Pain: Pain managed with dilaudid given x 1 no reaction noted even though is allergic to Morphine   CMS: Intact, AOx4. Denies numbness and tingling.   Dressing: none   Diet: Regular diet. Denies nausea/vomiting.      LDA: PIV SL / infusing  LR at 100 mL/hr   Equipment: IV pole, personal belongings,    Plan: Fall precautions maintained / Continue with plan of care. Call light within reach, pt able to make needs known.    Additional Info: NPO since midnight sips of water at 0530 with med.I&D for left Hand. Surgeon is here consenting.      "
"Goal Outcome Evaluation:      Plan of Care Reviewed With: patient    Overall Patient Progress: improvingOverall Patient Progress: improving    Outcome Evaluation: Pt has been tolorating pain medications well and states \" pain is good now\". Pt has been reqesting a lot of food anytime pt is awake or some type of medications. Plan of care reviewed and pt verbal understanding. Left hand is doing well per pt, elevated pt refused ice will monitor.    Patient vital signs are at baseline: Yes  Patient able to ambulate as they were prior to admission or with assist devices provided by therapies during their stay:  Yes  Patient MUST void prior to discharge:  Yes  Patient able to tolerate oral intake:  Yes  Pain has adequate pain control using Oral analgesics:  Yes  Does patient have an identified :  Yes  Has goal D/C date and time been discussed with patient:  Yes    VS: Temp: 96.9  F (36.1  C) Temp src: Oral BP: 133/72 Pulse: 86   Resp: 16 SpO2: 100 % O2 Device: None (Room air)      O2: RA. LS clear and equal bilaterally. Denies chest pain and SOB.    Output: Voids spontaneously without difficulty to bathroom /BM 10/29/22   Last BM: denies abdominal discomfort. BS active / passing flatus.    Activity: Stand by ad colleen   Skin: WDL except, both hands, left had I/D dressing in place   Pain: Pain managed with pain medications   CMS: Intact, AOx4. Denies numbness and tingling.   Dressing: C/D/I   Diet: Regular diet. Denies nausea/vomiting.    BG overnight    LDA:  PIV SL   Equipment: IV pole, personal belongings,    Plan: Fall  precautions maintained / Continue with plan of care. Call light within reach, pt able to make needs known.    Additional Info: Pt has been asking for food. Hot blanket or medications while awake. Continue with ABTs.       "
"VS: /52   Pulse 93   Temp 96.9  F (36.1  C) (Oral)   Resp 16   Ht 1.702 m (5' 7\")   Wt 61 kg (134 lb 7.7 oz)   SpO2 98%   BMI 21.06 kg/m     O2: Stable on room air   Output: WNL, pt denies concerns   Last BM: 10/30/22   Activity: Up ad colleen in room   Skin: L hand wound   Pain: Managed well with oral medications   CMS: Some numbness to L hand, otherwise intact   Dressing: CDI   Diet: Regular, tolerated well   LDA: PIV SL R hand   Equipment: IV pole, commode, personal belongings, call light within reach   Plan: IV abx   Additional Info:          "
"VS: /68   Pulse 84   Temp 97.4  F (36.3  C) (Oral)   Resp 16   Ht 1.702 m (5' 7\")   Wt 61 kg (134 lb 7.7 oz)   SpO2 96%   BMI 21.06 kg/m     O2: >90% on room air. Denies SOB/N/V/chest pain    Output: Voiding spontaneously without difficulties    Last BM: 10/29/22   Activity: NWB LUE. SBA to bathroom.    Skin: Incision to left hand, scattered scabs over body    Pain: Managed with schedule Tylenol, PRN Ibuprofen, PO and IV dilaudid    CMS: Denies numbness and tingling    Dressing: Splint in place, CDI    Diet: Regular diet    LDA: PIV saline locked    Equipment: IV pole/pump, BSC, CAPNO, personal belongings, call light within patient reach    Plan: Pending culture results    Additional Info:           "
Goal Outcome Evaluation:       Pt is not available on the unit.                 
Goal Outcome Evaluation:      [id number]      VS: VSS and afebrile.    O2: Stable on RA.  Denies SOB and chest pain   Output: Voiding adequate amount without difficulty.    Last BM: 11/02/22 and passing gas per pt report   Activity: Up independent to bathroom.   Steady feet.   Skin: Pt decline full assessment.   Scab on R knuckles observed.   Pain: Denied pain pt sleeping most of the day, tylenol and ibuprofen po given.     Neuro: CMS and neuro intact.   Dressing: CDI.   Diet: Regular.  Pt has been asking for food when awake.   Multiple request this shift.     LDA: New PIV inserted -SL between antibiotics.   Equipment: IV pole, commode,and personal belongings.    Plan: TBD.    Additional Info:  Pt has a prepared home meds  in the med room just incase pt leaves AMA.           
Goal Outcome Evaluation:    Pt is A&O x4, able to make needs known. C/o pain in L hand, managed w/ PRN dilaudid. Up independently in room. PIV in R hand saline locked. L hand covered w/ ace wrap CDI. Using call light appropriately.         
VS: VSS, pt denied CP or SOB.   O2: Room air sat. > 90%.   Output: Voiding adequate amount without difficulty.    Last BM: 11/02/22 passing gas.    Activity: Up independent to bathroom.    Skin: Visible skin intact pt declined full skin assessment, surgical incision on LUE.    Pain: Denied pain,tylenol and ibuprofen po given.   Neuro: CMS and neuro intact.   Dressing: CDI.   Diet: Regular tolerating okay.    LDA: PIV SL between antibiotics.   Equipment: IV pole, commode,and personal belongings.    Plan: TBD.    Additional Info:  pt left AMA about 03:30 pm,  was up on the unit talking to the pt. explained the risk of going AMA. po antibiotics given to go home with.                            
Medications

## (undated) DEVICE — CAST PLASTER SPLINT 4X15" 7394

## (undated) DEVICE — LINEN ORTHO PACK 5446

## (undated) DEVICE — Device

## (undated) DEVICE — DRSG KERLIX FLUFFS X5

## (undated) DEVICE — ESU PENCIL W/HOLSTER E2350H

## (undated) DEVICE — DRAPE STERI TOWEL LG 1010

## (undated) DEVICE — TOURNIQUET CUFF 18" REPRO RED 60-7070-103

## (undated) DEVICE — SYR 01ML 27GA 0.5" NDL TBC 309623

## (undated) DEVICE — SOL NACL 0.9% IRRIG 3000ML BAG 2B7477

## (undated) DEVICE — CAST PADDING 3" STERILE 9043S

## (undated) DEVICE — PREP POVIDONE IODINE SOLUTION 10% 4OZ

## (undated) DEVICE — DRAPE UNDER BUTTOCK 8483

## (undated) DEVICE — SOL WATER IRRIG 1000ML BOTTLE 2F7114

## (undated) DEVICE — BRUSH SURGICAL SCRUB W/4% CHLORHEXIDINE GLUCONATE SOL 4458A

## (undated) DEVICE — CAST PADDING 3" UNSTERILE 9043

## (undated) DEVICE — DRAIN PENROSE 0.25"X18" LATEX FREE GR201

## (undated) DEVICE — GLOVE PROTEXIS W/NEU-THERA 6.5  2D73TE65

## (undated) DEVICE — PREP POVIDONE-IODINE 10% SOLUTION 4OZ BOTTLE MDS093944

## (undated) DEVICE — SOL NACL 0.9% IRRIG 1000ML BOTTLE 2F7124

## (undated) DEVICE — SYR 50ML LL W/O NDL 309653

## (undated) DEVICE — GLOVE PROTEXIS BLUE W/NEU-THERA 7.5  2D73EB75

## (undated) DEVICE — DECANTER TRANSFER DEVICE 2008S

## (undated) DEVICE — SU ETHILON 4-0 PS-2 18" BLACK 1667H

## (undated) DEVICE — LINEN TOWEL PACK X5 5464

## (undated) DEVICE — STRAP KNEE/BODY 31143004

## (undated) DEVICE — PREP SKIN SCRUB TRAY 4461A

## (undated) DEVICE — DRSG ADAPTIC 3X8" 6113

## (undated) DEVICE — NDL ANGIOCATH 18GA 1.25" 4055

## (undated) DEVICE — BLADE KNIFE SURG 10 371110

## (undated) DEVICE — LINEN LEG DRAPE 5457

## (undated) DEVICE — COVER CAMERA IN-LIGHT DISP LT-C02

## (undated) DEVICE — TUBE CULTURE AEROBIC/ANAEROBIC W/O SWABS A.C.T.I.1. 12401

## (undated) DEVICE — LINEN GOWN X4 5410

## (undated) DEVICE — PAD CHUX UNDERPAD 30X36" P3036C

## (undated) DEVICE — SUCTION CANNULA UTERINE 14MM CVD 3/8" BASE 022145

## (undated) DEVICE — SUCTION MANIFOLD NEPTUNE 2 SYS 4 PORT 0702-020-000

## (undated) DEVICE — ESU GROUND PAD ADULT W/CORD E7507

## (undated) DEVICE — BLADE KNIFE SURG 15 371115

## (undated) DEVICE — NDL ANGIOCATH 16GA 2" 4082

## (undated) DEVICE — GLOVE PROTEXIS W/NEU-THERA 7.0  2D73TE70

## (undated) DEVICE — DRAPE POUCH IRR 1016

## (undated) DEVICE — GLOVE PROTEXIS BLUE W/NEU-THERA 7.0  2D73EB70

## (undated) DEVICE — TUBING IRRIG CYSTO/BLADDER SET 81" LF 2C4040

## (undated) DEVICE — BNDG ELASTIC 3"X5YDS UNSTERILE 6611-30

## (undated) DEVICE — APPLICATORS COTTON-TIPPED 3" PKG OF 2 C15050-003

## (undated) DEVICE — TUBING SUCTION VACUUM COLLECTION 6FT 610

## (undated) DEVICE — SUCTION VACUUM CANISTER STANDARD W/LID&CAPS 003987-901

## (undated) RX ORDER — HYDROMORPHONE HYDROCHLORIDE 1 MG/ML
INJECTION, SOLUTION INTRAMUSCULAR; INTRAVENOUS; SUBCUTANEOUS
Status: DISPENSED
Start: 2022-10-30

## (undated) RX ORDER — CEFAZOLIN SODIUM/WATER 2 G/20 ML
SYRINGE (ML) INTRAVENOUS
Status: DISPENSED
Start: 2022-10-30

## (undated) RX ORDER — DOXYCYCLINE 100 MG/1
CAPSULE ORAL
Status: DISPENSED
Start: 2021-04-23

## (undated) RX ORDER — MISOPROSTOL 200 UG/1
TABLET ORAL
Status: DISPENSED
Start: 2021-04-23

## (undated) RX ORDER — ACETAMINOPHEN 325 MG/1
TABLET ORAL
Status: DISPENSED
Start: 2021-04-23

## (undated) RX ORDER — FENTANYL CITRATE 50 UG/ML
INJECTION, SOLUTION INTRAMUSCULAR; INTRAVENOUS
Status: DISPENSED
Start: 2021-04-23

## (undated) RX ORDER — PROPOFOL 10 MG/ML
INJECTION, EMULSION INTRAVENOUS
Status: DISPENSED
Start: 2021-04-23

## (undated) RX ORDER — BUPIVACAINE HYDROCHLORIDE 2.5 MG/ML
INJECTION, SOLUTION EPIDURAL; INFILTRATION; INTRACAUDAL
Status: DISPENSED
Start: 2022-10-30

## (undated) RX ORDER — DEXAMETHASONE SODIUM PHOSPHATE 4 MG/ML
INJECTION, SOLUTION INTRA-ARTICULAR; INTRALESIONAL; INTRAMUSCULAR; INTRAVENOUS; SOFT TISSUE
Status: DISPENSED
Start: 2022-10-30

## (undated) RX ORDER — ONDANSETRON 2 MG/ML
INJECTION INTRAMUSCULAR; INTRAVENOUS
Status: DISPENSED
Start: 2022-10-30

## (undated) RX ORDER — FENTANYL CITRATE 50 UG/ML
INJECTION, SOLUTION INTRAMUSCULAR; INTRAVENOUS
Status: DISPENSED
Start: 2022-10-30

## (undated) RX ORDER — LIDOCAINE HYDROCHLORIDE 20 MG/ML
INJECTION, SOLUTION EPIDURAL; INFILTRATION; INTRACAUDAL; PERINEURAL
Status: DISPENSED
Start: 2022-10-30

## (undated) RX ORDER — PROPOFOL 10 MG/ML
INJECTION, EMULSION INTRAVENOUS
Status: DISPENSED
Start: 2022-10-30

## (undated) RX ORDER — KETOROLAC TROMETHAMINE 30 MG/ML
INJECTION, SOLUTION INTRAMUSCULAR; INTRAVENOUS
Status: DISPENSED
Start: 2021-04-23

## (undated) RX ORDER — LIDOCAINE HYDROCHLORIDE 10 MG/ML
INJECTION, SOLUTION EPIDURAL; INFILTRATION; INTRACAUDAL; PERINEURAL
Status: DISPENSED
Start: 2021-04-23